# Patient Record
Sex: FEMALE | Race: WHITE | NOT HISPANIC OR LATINO | Employment: OTHER | URBAN - METROPOLITAN AREA
[De-identification: names, ages, dates, MRNs, and addresses within clinical notes are randomized per-mention and may not be internally consistent; named-entity substitution may affect disease eponyms.]

---

## 2007-02-09 LAB
ABS LYMPHOCYTES: 1188 (ref 850–3900)
CD19 CELLS # BLD: 447 /UL (ref 110–660)
CD19: 38 % (ref 6–29)
CD3 ABSOLUTE COUNT: 563 (ref 840–3060)
CD3: 47 % (ref 57–85)

## 2017-01-18 ENCOUNTER — OFFICE VISIT - RIVER FALLS (OUTPATIENT)
Dept: FAMILY MEDICINE | Facility: CLINIC | Age: 42
End: 2017-01-18
Payer: MEDICARE

## 2017-02-28 ENCOUNTER — OFFICE VISIT - RIVER FALLS (OUTPATIENT)
Dept: FAMILY MEDICINE | Facility: CLINIC | Age: 42
End: 2017-02-28
Payer: MEDICARE

## 2017-02-28 ASSESSMENT — MIFFLIN-ST. JEOR: SCORE: 1932.7

## 2017-03-01 ENCOUNTER — OFFICE VISIT - RIVER FALLS (OUTPATIENT)
Dept: FAMILY MEDICINE | Facility: CLINIC | Age: 42
End: 2017-03-01
Payer: MEDICARE

## 2017-03-01 ASSESSMENT — MIFFLIN-ST. JEOR
SCORE: 1932.7
SCORE: 1932.7

## 2017-05-17 ENCOUNTER — OFFICE VISIT - RIVER FALLS (OUTPATIENT)
Dept: FAMILY MEDICINE | Facility: CLINIC | Age: 42
End: 2017-05-17
Payer: MEDICARE

## 2017-05-18 ENCOUNTER — OFFICE VISIT - RIVER FALLS (OUTPATIENT)
Dept: FAMILY MEDICINE | Facility: CLINIC | Age: 42
End: 2017-05-18
Payer: MEDICARE

## 2017-05-18 ENCOUNTER — COMMUNICATION - RIVER FALLS (OUTPATIENT)
Dept: FAMILY MEDICINE | Facility: CLINIC | Age: 42
End: 2017-05-18
Payer: MEDICARE

## 2017-10-17 ENCOUNTER — OFFICE VISIT - RIVER FALLS (OUTPATIENT)
Dept: FAMILY MEDICINE | Facility: CLINIC | Age: 42
End: 2017-10-17
Payer: MEDICARE

## 2018-01-26 ENCOUNTER — OFFICE VISIT - RIVER FALLS (OUTPATIENT)
Dept: FAMILY MEDICINE | Facility: CLINIC | Age: 43
End: 2018-01-26
Payer: MEDICARE

## 2018-01-27 LAB
CREAT SERPL-MCNC: 0.71 MG/DL (ref 0.5–1.1)
GLUCOSE BLD-MCNC: 139 MG/DL (ref 65–99)

## 2018-01-31 ENCOUNTER — OFFICE VISIT - RIVER FALLS (OUTPATIENT)
Dept: FAMILY MEDICINE | Facility: CLINIC | Age: 43
End: 2018-01-31
Payer: MEDICARE

## 2018-02-01 ENCOUNTER — OFFICE VISIT - RIVER FALLS (OUTPATIENT)
Dept: FAMILY MEDICINE | Facility: CLINIC | Age: 43
End: 2018-02-01
Payer: MEDICARE

## 2018-02-01 ASSESSMENT — MIFFLIN-ST. JEOR: SCORE: 1955.38

## 2018-02-02 ENCOUNTER — OFFICE VISIT - RIVER FALLS (OUTPATIENT)
Dept: FAMILY MEDICINE | Facility: CLINIC | Age: 43
End: 2018-02-02
Payer: MEDICARE

## 2018-02-02 ASSESSMENT — MIFFLIN-ST. JEOR: SCORE: 1950.85

## 2018-02-06 ENCOUNTER — OFFICE VISIT - RIVER FALLS (OUTPATIENT)
Dept: FAMILY MEDICINE | Facility: CLINIC | Age: 43
End: 2018-02-06
Payer: MEDICARE

## 2018-02-06 ENCOUNTER — AMBULATORY - RIVER FALLS (OUTPATIENT)
Dept: FAMILY MEDICINE | Facility: CLINIC | Age: 43
End: 2018-02-06
Payer: MEDICARE

## 2018-02-21 ENCOUNTER — OFFICE VISIT - RIVER FALLS (OUTPATIENT)
Dept: FAMILY MEDICINE | Facility: CLINIC | Age: 43
End: 2018-02-21
Payer: MEDICARE

## 2018-02-21 ASSESSMENT — MIFFLIN-ST. JEOR: SCORE: 1976.25

## 2018-02-23 ENCOUNTER — OFFICE VISIT - RIVER FALLS (OUTPATIENT)
Dept: FAMILY MEDICINE | Facility: CLINIC | Age: 43
End: 2018-02-23
Payer: MEDICARE

## 2018-03-01 ENCOUNTER — OFFICE VISIT - RIVER FALLS (OUTPATIENT)
Dept: FAMILY MEDICINE | Facility: CLINIC | Age: 43
End: 2018-03-01
Payer: MEDICARE

## 2018-03-01 ASSESSMENT — MIFFLIN-ST. JEOR: SCORE: 1950.85

## 2018-03-02 ENCOUNTER — OFFICE VISIT - RIVER FALLS (OUTPATIENT)
Dept: FAMILY MEDICINE | Facility: CLINIC | Age: 43
End: 2018-03-02
Payer: MEDICARE

## 2018-04-10 ENCOUNTER — OFFICE VISIT - RIVER FALLS (OUTPATIENT)
Dept: FAMILY MEDICINE | Facility: CLINIC | Age: 43
End: 2018-04-10
Payer: MEDICARE

## 2018-04-10 ASSESSMENT — MIFFLIN-ST. JEOR: SCORE: 1983.51

## 2018-04-17 ENCOUNTER — OFFICE VISIT - RIVER FALLS (OUTPATIENT)
Dept: FAMILY MEDICINE | Facility: CLINIC | Age: 43
End: 2018-04-17
Payer: MEDICARE

## 2018-04-24 ENCOUNTER — OFFICE VISIT - RIVER FALLS (OUTPATIENT)
Dept: FAMILY MEDICINE | Facility: CLINIC | Age: 43
End: 2018-04-24
Payer: MEDICARE

## 2018-05-01 ENCOUNTER — OFFICE VISIT - RIVER FALLS (OUTPATIENT)
Dept: FAMILY MEDICINE | Facility: CLINIC | Age: 43
End: 2018-05-01
Payer: MEDICARE

## 2018-05-01 ASSESSMENT — MIFFLIN-ST. JEOR: SCORE: 1985.32

## 2018-05-22 ENCOUNTER — AMBULATORY - RIVER FALLS (OUTPATIENT)
Dept: FAMILY MEDICINE | Facility: CLINIC | Age: 43
End: 2018-05-22
Payer: MEDICARE

## 2018-10-26 ENCOUNTER — OFFICE VISIT - RIVER FALLS (OUTPATIENT)
Dept: FAMILY MEDICINE | Facility: CLINIC | Age: 43
End: 2018-10-26
Payer: MEDICARE

## 2018-10-26 ASSESSMENT — MIFFLIN-ST. JEOR: SCORE: 1973.53

## 2018-12-12 ENCOUNTER — OFFICE VISIT - RIVER FALLS (OUTPATIENT)
Dept: FAMILY MEDICINE | Facility: CLINIC | Age: 43
End: 2018-12-12
Payer: MEDICARE

## 2018-12-12 ASSESSMENT — MIFFLIN-ST. JEOR: SCORE: 1919.1

## 2018-12-13 LAB
CREAT SERPL-MCNC: 0.55 MG/DL (ref 0.5–1.1)
GLUCOSE BLD-MCNC: 133 MG/DL (ref 65–99)
LDLC SERPL CALC-MCNC: 88 MG/DL

## 2019-01-31 LAB
ALBUMIN SERPL-MCNC: 4 G/DL
ALP SERPL-CCNC: 115 UNIT/L
ALT SERPL W P-5'-P-CCNC: 22 UNIT/L
AST SERPL W P-5'-P-CCNC: 15 UNIT/L
BILIRUB SERPL-MCNC: 0.7 MG/DL
BUN SERPL-MCNC: 8 MG/DL
BUN/CREAT RATIO - HISTORICAL: 11
CALCIUM SERPL-MCNC: 9.8 MEQ/DL
CHLORIDE BLD-SCNC: 105 MEQ/L
CREAT SERPL-MCNC: 0.76 MG/DL
GLUCOSE BLD-MCNC: 138 MG/DL
HGB BLD-MCNC: 12.4 G/DL
PLATELET # BLD AUTO: 323 X10
POTASSIUM BLD-SCNC: 4.1 MEQ/L
PROT SERPL-MCNC: 7 GM/DL
SODIUM SERPL-SCNC: 140 MEQ/L
TSH SERPL DL<=0.005 MIU/L-ACNC: 1.7 MIU/L
WBC # BLD AUTO: 8.6 X10

## 2019-02-05 ENCOUNTER — OFFICE VISIT - RIVER FALLS (OUTPATIENT)
Dept: FAMILY MEDICINE | Facility: CLINIC | Age: 44
End: 2019-02-05
Payer: MEDICARE

## 2019-02-08 ENCOUNTER — OFFICE VISIT - RIVER FALLS (OUTPATIENT)
Dept: FAMILY MEDICINE | Facility: CLINIC | Age: 44
End: 2019-02-08
Payer: MEDICARE

## 2019-03-16 ENCOUNTER — OFFICE VISIT - RIVER FALLS (OUTPATIENT)
Dept: FAMILY MEDICINE | Facility: CLINIC | Age: 44
End: 2019-03-16
Payer: MEDICARE

## 2019-03-16 ENCOUNTER — AMBULATORY - RIVER FALLS (OUTPATIENT)
Dept: FAMILY MEDICINE | Facility: CLINIC | Age: 44
End: 2019-03-16
Payer: MEDICARE

## 2019-03-16 LAB
ALBUMIN UR-MCNC: ABNORMAL G/DL
BILIRUB UR QL STRIP: NEGATIVE
GLUCOSE UR STRIP-MCNC: NEGATIVE MG/DL
HGB UR QL STRIP: ABNORMAL
KETONES UR STRIP-MCNC: ABNORMAL MG/DL
LEUKOCYTE ESTERASE UR QL STRIP: NEGATIVE
NITRATE UR QL: POSITIVE
PH UR STRIP: 5.5 [PH] (ref 5–8)
SP GR UR STRIP: 1.02 (ref 1–1.03)

## 2019-03-17 LAB
ALBUMIN UR-MCNC: ABNORMAL G/DL
APPEARANCE UR: ABNORMAL
BACTERIA #/AREA URNS HPF: ABNORMAL /HPF
BILIRUB UR QL STRIP: NEGATIVE
CAOX CRY #/AREA URNS HPF: ABNORMAL /HPF
COLOR UR AUTO: ABNORMAL
GLUCOSE UR STRIP-MCNC: NEGATIVE MG/DL
HGB UR QL STRIP: ABNORMAL
HYALINE CASTS #/AREA URNS LPF: ABNORMAL /LPF
KETONES UR STRIP-MCNC: ABNORMAL MG/DL
LEUKOCYTE ESTERASE UR QL STRIP: ABNORMAL
NITRATE UR QL: POSITIVE
PH UR STRIP: 5.5 [PH] (ref 5–8)
RBC #/AREA URNS AUTO: ABNORMAL /HPF
SP GR UR STRIP: 1.02 (ref 1–1.03)
SQUAMOUS #/AREA URNS AUTO: ABNORMAL /HPF
WBC #/AREA URNS AUTO: ABNORMAL /HPF

## 2019-03-19 LAB — BACTERIA SPEC CULT: ABNORMAL

## 2019-04-06 ENCOUNTER — AMBULATORY - RIVER FALLS (OUTPATIENT)
Dept: FAMILY MEDICINE | Facility: CLINIC | Age: 44
End: 2019-04-06
Payer: MEDICARE

## 2019-04-10 LAB — BACTERIA SPEC CULT: NORMAL

## 2019-07-09 ENCOUNTER — COMMUNICATION - RIVER FALLS (OUTPATIENT)
Dept: FAMILY MEDICINE | Facility: CLINIC | Age: 44
End: 2019-07-09
Payer: MEDICARE

## 2019-07-23 ENCOUNTER — OFFICE VISIT - RIVER FALLS (OUTPATIENT)
Dept: FAMILY MEDICINE | Facility: CLINIC | Age: 44
End: 2019-07-23
Payer: MEDICARE

## 2019-07-25 ENCOUNTER — OFFICE VISIT - RIVER FALLS (OUTPATIENT)
Dept: FAMILY MEDICINE | Facility: CLINIC | Age: 44
End: 2019-07-25
Payer: MEDICARE

## 2019-07-25 ASSESSMENT — MIFFLIN-ST. JEOR: SCORE: 1822.93

## 2019-08-17 ENCOUNTER — OFFICE VISIT - RIVER FALLS (OUTPATIENT)
Dept: FAMILY MEDICINE | Facility: CLINIC | Age: 44
End: 2019-08-17
Payer: MEDICARE

## 2019-08-17 LAB
ALBUMIN UR-MCNC: NEGATIVE G/DL
BILIRUB UR QL STRIP: NEGATIVE
GLUCOSE UR STRIP-MCNC: NEGATIVE MG/DL
HGB UR QL STRIP: NEGATIVE
KETONES UR STRIP-MCNC: NEGATIVE MG/DL
LEUKOCYTE ESTERASE UR QL STRIP: NEGATIVE
NITRATE UR QL: NEGATIVE
PH UR STRIP: 6 [PH] (ref 5–8)
SP GR UR STRIP: 1.02 (ref 1–1.03)

## 2019-08-17 ASSESSMENT — MIFFLIN-ST. JEOR: SCORE: 1811.14

## 2019-10-13 ENCOUNTER — OFFICE VISIT - RIVER FALLS (OUTPATIENT)
Dept: FAMILY MEDICINE | Facility: CLINIC | Age: 44
End: 2019-10-13
Payer: MEDICARE

## 2020-01-09 LAB
ANION GAP SERPL CALCULATED.3IONS-SCNC: 8 MEQ/L
BUN SERPL-MCNC: 15 MG/DL
BUN/CREAT RATIO - HISTORICAL: 22
CALCIUM SERPL-MCNC: 9.1 MEQ/DL
CHLORIDE BLD-SCNC: 109 MEQ/L
CO2 SERPL-SCNC: 23 MEQ/L
CREAT SERPL-MCNC: 0.68 MG/DL
ERYTHROCYTE [DISTWIDTH] IN BLOOD BY AUTOMATED COUNT: 14.3 %
GLUCOSE BLD-MCNC: 148 MG/DL
HBA1C MFR BLD: 6.9 %
HCT VFR BLD AUTO: 36.6 %
HGB BLD-MCNC: 12.2 G/DL
MCH RBC QN AUTO: 28.3 PG
MCHC RBC AUTO-ENTMCNC: 33.3 GM/DL
MCV RBC AUTO: 85 FL
PLATELET # BLD AUTO: 300 X10
PMV BLD: 10 FL
POTASSIUM BLD-SCNC: 3.9 MEQ/L
RBC # BLD AUTO: 4.31 X10
SODIUM SERPL-SCNC: 140 MEQ/L
VITAMIN D, 1, 25-DIHYDROXY - QUEST: 89.1 NG/ML
WBC # BLD AUTO: 9.3 X10

## 2020-01-16 ENCOUNTER — OFFICE VISIT - RIVER FALLS (OUTPATIENT)
Dept: FAMILY MEDICINE | Facility: CLINIC | Age: 45
End: 2020-01-16
Payer: MEDICARE

## 2020-01-27 ENCOUNTER — OFFICE VISIT - RIVER FALLS (OUTPATIENT)
Dept: FAMILY MEDICINE | Facility: CLINIC | Age: 45
End: 2020-01-27
Payer: MEDICARE

## 2020-01-27 ASSESSMENT — MIFFLIN-ST. JEOR: SCORE: 1805.7

## 2020-01-29 ENCOUNTER — COMMUNICATION - RIVER FALLS (OUTPATIENT)
Dept: FAMILY MEDICINE | Facility: CLINIC | Age: 45
End: 2020-01-29
Payer: MEDICARE

## 2020-01-31 ENCOUNTER — OFFICE VISIT - RIVER FALLS (OUTPATIENT)
Dept: FAMILY MEDICINE | Facility: CLINIC | Age: 45
End: 2020-01-31
Payer: MEDICARE

## 2020-01-31 ASSESSMENT — MIFFLIN-ST. JEOR: SCORE: 1851.06

## 2020-02-04 ENCOUNTER — OFFICE VISIT - RIVER FALLS (OUTPATIENT)
Dept: FAMILY MEDICINE | Facility: CLINIC | Age: 45
End: 2020-02-04
Payer: MEDICARE

## 2020-02-04 ASSESSMENT — MIFFLIN-ST. JEOR: SCORE: 1841.08

## 2020-03-27 ENCOUNTER — OFFICE VISIT - RIVER FALLS (OUTPATIENT)
Dept: FAMILY MEDICINE | Facility: CLINIC | Age: 45
End: 2020-03-27
Payer: MEDICARE

## 2020-03-30 ENCOUNTER — OFFICE VISIT - RIVER FALLS (OUTPATIENT)
Dept: FAMILY MEDICINE | Facility: CLINIC | Age: 45
End: 2020-03-30
Payer: MEDICARE

## 2020-08-17 ENCOUNTER — AMBULATORY - RIVER FALLS (OUTPATIENT)
Dept: FAMILY MEDICINE | Facility: CLINIC | Age: 45
End: 2020-08-17
Payer: MEDICARE

## 2020-08-17 ENCOUNTER — OFFICE VISIT - RIVER FALLS (OUTPATIENT)
Dept: FAMILY MEDICINE | Facility: CLINIC | Age: 45
End: 2020-08-17
Payer: MEDICARE

## 2020-08-19 LAB
ALBUMIN UR-MCNC: NEGATIVE G/DL
APPEARANCE UR: ABNORMAL
BACTERIA #/AREA URNS HPF: ABNORMAL /HPF
BILIRUB UR QL STRIP: NEGATIVE
CAOX CRY #/AREA URNS HPF: ABNORMAL /HPF
COLOR UR AUTO: YELLOW
GLUCOSE UR STRIP-MCNC: ABNORMAL MG/DL
HGB UR QL STRIP: NEGATIVE
HYALINE CASTS #/AREA URNS LPF: ABNORMAL /LPF
KETONES UR STRIP-MCNC: NEGATIVE MG/DL
LEUKOCYTE ESTERASE UR QL STRIP: NEGATIVE
Lab: ABNORMAL
NITRATE UR QL: NEGATIVE
PH UR STRIP: 5.5 [PH] (ref 5–8)
RBC #/AREA URNS AUTO: ABNORMAL /HPF
SP GR UR STRIP: 1.02 (ref 1–1.03)
SQUAMOUS #/AREA URNS AUTO: ABNORMAL /HPF
WBC #/AREA URNS AUTO: ABNORMAL /HPF

## 2020-08-21 LAB — SARS-COV-2 RNA SPEC QL NAA+PROBE: NOT DETECTED

## 2020-10-09 ENCOUNTER — OFFICE VISIT - RIVER FALLS (OUTPATIENT)
Dept: FAMILY MEDICINE | Facility: CLINIC | Age: 45
End: 2020-10-09
Payer: MEDICARE

## 2020-10-09 LAB
ALBUMIN UR-MCNC: NEGATIVE G/DL
BILIRUB UR QL STRIP: NEGATIVE
GLUCOSE UR STRIP-MCNC: NEGATIVE MG/DL
HGB UR QL STRIP: NEGATIVE
KETONES UR STRIP-MCNC: NEGATIVE MG/DL
LEUKOCYTE ESTERASE UR QL STRIP: NEGATIVE
NITRATE UR QL: NEGATIVE
PH UR STRIP: 7 [PH] (ref 5–8)
SP GR UR STRIP: 1.02 (ref 1–1.03)

## 2020-10-09 ASSESSMENT — MIFFLIN-ST. JEOR: SCORE: 1955.38

## 2020-10-10 LAB
CHOLEST SERPL-MCNC: 168 MG/DL
CHOLEST/HDLC SERPL: 3.2 {RATIO}
HBA1C MFR BLD: 7 %
HDLC SERPL-MCNC: 52 MG/DL
LDLC SERPL CALC-MCNC: 92 MG/DL
NONHDLC SERPL-MCNC: 116 MG/DL
TRIGL SERPL-MCNC: 143 MG/DL

## 2020-10-12 ENCOUNTER — COMMUNICATION - RIVER FALLS (OUTPATIENT)
Dept: FAMILY MEDICINE | Facility: CLINIC | Age: 45
End: 2020-10-12
Payer: MEDICARE

## 2021-01-26 ENCOUNTER — AMBULATORY - RIVER FALLS (OUTPATIENT)
Dept: FAMILY MEDICINE | Facility: CLINIC | Age: 46
End: 2021-01-26
Payer: MEDICARE

## 2021-01-26 ENCOUNTER — OFFICE VISIT - RIVER FALLS (OUTPATIENT)
Dept: FAMILY MEDICINE | Facility: CLINIC | Age: 46
End: 2021-01-26
Payer: MEDICARE

## 2021-01-29 LAB — SARS-COV-2 RNA RESP QL NAA+PROBE: NEGATIVE

## 2021-02-08 ENCOUNTER — COMMUNICATION - RIVER FALLS (OUTPATIENT)
Dept: FAMILY MEDICINE | Facility: CLINIC | Age: 46
End: 2021-02-08
Payer: MEDICARE

## 2021-02-10 LAB
ALBUMIN SERPL-MCNC: 3.9 G/DL
ALP SERPL-CCNC: 119 UNIT/L
ALT SERPL W P-5'-P-CCNC: 25 UNIT/L
AST SERPL W P-5'-P-CCNC: 12 UNIT/L
BILIRUB SERPL-MCNC: 0.6 MG/DL
BUN SERPL-MCNC: 12 MG/DL
BUN/CREAT RATIO - HISTORICAL: 18
CALCIUM SERPL-MCNC: 8.8 MEQ/DL
CHLORIDE BLD-SCNC: 105 MEQ/L
CREAT SERPL-MCNC: 0.68 MG/DL
GLUCOSE BLD-MCNC: 164 MG/DL
HCT VFR BLD AUTO: 37.2 %
HGB BLD-MCNC: 12.3 G/DL
PLATELET # BLD AUTO: 256 X10
POTASSIUM BLD-SCNC: 4.3 MEQ/L
PROT SERPL-MCNC: 7.1 GM/DL
RBC # BLD AUTO: 4.28 X10
SODIUM SERPL-SCNC: 139 MEQ/L
WBC # BLD AUTO: 7 X10

## 2021-02-16 ENCOUNTER — OFFICE VISIT - RIVER FALLS (OUTPATIENT)
Dept: FAMILY MEDICINE | Facility: CLINIC | Age: 46
End: 2021-02-16
Payer: MEDICARE

## 2021-02-16 ASSESSMENT — MIFFLIN-ST. JEOR: SCORE: 1973.53

## 2021-03-03 ENCOUNTER — COMMUNICATION - RIVER FALLS (OUTPATIENT)
Dept: FAMILY MEDICINE | Facility: CLINIC | Age: 46
End: 2021-03-03
Payer: MEDICARE

## 2021-03-09 ENCOUNTER — OFFICE VISIT - RIVER FALLS (OUTPATIENT)
Dept: FAMILY MEDICINE | Facility: CLINIC | Age: 46
End: 2021-03-09
Payer: MEDICARE

## 2021-03-18 ENCOUNTER — COMMUNICATION - HEALTHEAST (OUTPATIENT)
Dept: INFECTIOUS DISEASES | Facility: CLINIC | Age: 46
End: 2021-03-18

## 2021-04-07 ENCOUNTER — TELEPHONE (OUTPATIENT)
Facility: CLINIC | Age: 46
End: 2021-04-07

## 2021-04-07 ENCOUNTER — COMMUNICATION - RIVER FALLS (OUTPATIENT)
Dept: FAMILY MEDICINE | Facility: CLINIC | Age: 46
End: 2021-04-07
Payer: MEDICARE

## 2021-04-07 ENCOUNTER — OFFICE VISIT - RIVER FALLS (OUTPATIENT)
Dept: FAMILY MEDICINE | Facility: CLINIC | Age: 46
End: 2021-04-07
Payer: MEDICARE

## 2021-04-07 NOTE — TELEPHONE ENCOUNTER
"3-Hospitalist Huddle Documentation    Acuity/Preferred Bed Type: Observation  Infection Concerns: Possible COVID19 - Unable to get rapid swab at [facility].  Will need to be in precautions and swabbed once here.    Additional Specialist Needed Or Specialist Already Contacted:   Neurology? ID?   Timely Treatments Needed: No  Important things to know/address during hospitalization: sitter not needed and none    44 yo with MS.  Has a cough.  Weak.  Contacted by PMD (Emily Shah).  PCP spoke to Neurologist who stated only rounds at [facility].      Feels as though her \"old symptoms have flared\" which is typical for when she has an infection.  Received Lemtrada, about 1.5 years ago.  According to her Nuerologist, Dr Lai, felt should be able mount infection response.  He also believes it's unlikely acute flare of MS but rather possible infection.      Patient has no fever.  Tmax 98.4.  CXR no infiltrate or consolidation.  No cough since being in clinic.  UA via hat so is felt to be inaccurate.  Unable to straight cath because is felt to be too weak to get up on a table.   Acute on chronic knee pain without associated redness or swelling.  Came in to clinic because she wanted steroid injection.  WBC 7.6 with 77% neutrophils, hgb 12.3, Plt OK.  Also has lymphadenopathy since Oct.  Lymph node biopsy x2 showing reactive process/benign.  \"Spots on lungs\" during recent CT.  Could not get in to see ID.  Symptomatic RUQ hernia without incarceration.  PMD requesting admission for investigation for possible infection, ID evaluation for lymphadenopathy, evaluation of weakness, and so patient can be seen by her primary Neurologist, Dr Lai.      Discussed that this point, patient will qualify only as an observation admission.  Also discussed with patient's neurologist is not the on call neurologist this week and so it is unlikely she would actually see her regular neurologist.      Ultimately, patient did not feel it " would make sense to be transferred so far (from [place]) and less she could see her neurologist.  Thus, they will look for admission elsewhere.    Did not accept patient.  If unable to secure bed elsewhere, would need to call back and evaluate Obs bed availability before being accepted.     [facility]

## 2021-04-08 ENCOUNTER — COMMUNICATION - RIVER FALLS (OUTPATIENT)
Dept: FAMILY MEDICINE | Facility: CLINIC | Age: 46
End: 2021-04-08
Payer: MEDICARE

## 2021-04-09 LAB — BACTERIA SPEC CULT: NORMAL

## 2021-04-14 ENCOUNTER — COMMUNICATION - RIVER FALLS (OUTPATIENT)
Dept: FAMILY MEDICINE | Facility: CLINIC | Age: 46
End: 2021-04-14
Payer: MEDICARE

## 2021-04-14 ENCOUNTER — OFFICE VISIT - RIVER FALLS (OUTPATIENT)
Dept: FAMILY MEDICINE | Facility: CLINIC | Age: 46
End: 2021-04-14
Payer: MEDICARE

## 2021-04-14 ASSESSMENT — MIFFLIN-ST. JEOR: SCORE: 1978.97

## 2021-05-18 ENCOUNTER — OFFICE VISIT - RIVER FALLS (OUTPATIENT)
Dept: FAMILY MEDICINE | Facility: CLINIC | Age: 46
End: 2021-05-18
Payer: MEDICARE

## 2021-05-18 ASSESSMENT — MIFFLIN-ST. JEOR: SCORE: 1955.38

## 2021-05-19 ENCOUNTER — COMMUNICATION - RIVER FALLS (OUTPATIENT)
Dept: FAMILY MEDICINE | Facility: CLINIC | Age: 46
End: 2021-05-19
Payer: MEDICARE

## 2021-05-19 LAB
BASOPHILS # BLD MANUAL: 57 10*3/UL (ref 0–200)
BASOPHILS NFR BLD MANUAL: 0.7 %
BUN SERPL-MCNC: 10 MG/DL (ref 7–25)
BUN/CREAT RATIO - HISTORICAL: ABNORMAL (ref 6–22)
CALCIUM SERPL-MCNC: 9.3 MG/DL (ref 8.6–10.2)
CHLORIDE BLD-SCNC: 106 MMOL/L (ref 98–110)
CO2 SERPL-SCNC: 24 MMOL/L (ref 20–32)
CREAT SERPL-MCNC: 0.67 MG/DL (ref 0.5–1.1)
EGFRCR SERPLBLD CKD-EPI 2021: 105 ML/MIN/1.73M2
EOSINOPHIL # BLD MANUAL: 287 10*3/UL (ref 15–500)
EOSINOPHIL NFR BLD MANUAL: 3.5 %
ERYTHROCYTE [DISTWIDTH] IN BLOOD BY AUTOMATED COUNT: 14.7 % (ref 11–15)
GLUCOSE BLD-MCNC: 118 MG/DL (ref 65–99)
HCT VFR BLD AUTO: 39.3 % (ref 35–45)
HGB BLD-MCNC: 12.8 GM/DL (ref 11.7–15.5)
LYMPHOCYTES # BLD MANUAL: 1222 10*3/UL (ref 850–3900)
LYMPHOCYTES NFR BLD MANUAL: 14.9 %
MCH RBC QN AUTO: 28.4 PG (ref 27–33)
MCHC RBC AUTO-ENTMCNC: 32.6 GM/DL (ref 32–36)
MCV RBC AUTO: 87.1 FL (ref 80–100)
MONOCYTES # BLD MANUAL: 336 10*3/UL (ref 200–950)
MONOCYTES NFR BLD MANUAL: 4.1 %
NEUTROPHILS # BLD MANUAL: 6298 10*3/UL (ref 1500–7800)
NEUTROPHILS NFR BLD MANUAL: 76.8 %
PLATELET # BLD AUTO: 301 10*3/UL (ref 140–400)
PMV BLD: 10.5 FL (ref 7.5–12.5)
POTASSIUM BLD-SCNC: 4.2 MMOL/L (ref 3.5–5.3)
RBC # BLD AUTO: 4.51 10*6/UL (ref 3.8–5.1)
SODIUM SERPL-SCNC: 139 MMOL/L (ref 135–146)
WBC # BLD AUTO: 8.2 10*3/UL (ref 3.8–10.8)

## 2021-06-01 ENCOUNTER — COMMUNICATION - RIVER FALLS (OUTPATIENT)
Dept: FAMILY MEDICINE | Facility: CLINIC | Age: 46
End: 2021-06-01
Payer: MEDICARE

## 2021-06-01 ENCOUNTER — NURSE TRIAGE (OUTPATIENT)
Dept: NURSING | Facility: CLINIC | Age: 46
End: 2021-06-01

## 2021-06-02 NOTE — TELEPHONE ENCOUNTER
Karli calls and says that that she had abdominal hernia surgery last week and her right foot and upper right leg are swollen. Swelling began last night. Pt. Says that she also has pain behind her right knee. Pt. Says that she had surgery at [facility]. Pt. Says that she does not want to go to an ER tonight. Pt. Says that she is going to call [facility] and speak to a Dr. On-call. COVID 19 Nurse Triage Plan/Patient Instructions    Please be aware that novel coronavirus (COVID-19) may be circulating in the community. If you develop symptoms such as fever, cough, or SOB or if you have concerns about the presence of another infection including coronavirus (COVID-19), please contact your health care provider or visit [email or web address].     Disposition/Instructions    ED Visit recommended. Follow protocol based instructions.     Bring Your Own Device:  Please also bring your smart device(s) (smart phones, tablets, laptops) and their charging cables for your personal use and to communicate with your care team during your visit.    Thank you for taking steps to prevent the spread of this virus.  o Limit your contact with others.  o Wear a simple mask to cover your cough.  o Wash your hands well and often.    Resources    [facility]: About COVID-19: [email or web address]    CDC: What to Do If You're Sick: www.cdc.gov/coronavirus/2019-ncov/about/steps-when-sick.html    CDC: Ending Home Isolation: www.cdc.gov/coronavirus/2019-ncov/hcp/disposition-in-home-patients.html     CDC: Caring for Someone: www.cdc.gov/coronavirus/2019-ncov/if-you-are-sick/care-for-someone.html     [facility]: Interim Guidance for Hospital Discharge to Home: www.health.[facility].mn.us/diseases/coronavirus/hcp/hospdischarge.pdf    [facility] clinical trials (COVID-19 research studies): [email or web address].[facility]/umn-clinical-trials     Below are the COVID-19 hotlines at the [facility]  [facility] ([facility]). Interpreters are available.   o For health questions: Call 585-037-5235 or 1-132.891.5958 (7 a.m. to 7 p.m.)  o For questions about schools and childcare: Call 294-493-1951 or 1-840.951.2373 (7 a.m. to 7 p.m.)                   Reason for Disposition    New or worsening leg (calf, thigh) pain    Leg swelling is main symptom    [1] Difficulty breathing with exertion (e.g., walking) AND [2] new onset or worsening    Additional Information    Negative: Sounds like a life-threatening emergency to the triager    Negative: Chest pain    Negative: Difficulty breathing    Negative: Acting confused (e.g., disoriented, slurred speech) or excessively sleepy    Negative: Surgical incision symptoms and questions    Negative: [1] Discomfort (pain, burning or stinging) when passing urine AND [2] male    Negative: [1] Discomfort (pain, burning or stinging) when passing urine AND [2] female    Negative: Constipation    Negative: Looks like a broken bone or dislocated joint (e.g., crooked or deformed)    Negative: Sounds like a life-threatening emergency to the triager    Negative: Followed a leg injury    Negative: Severe difficulty breathing (e.g., struggling for each breath, speaks in single words)    Negative: Looks like a broken bone or dislocated joint (e.g., crooked or deformed)    Negative: Sounds like a life-threatening emergency to the triager    Negative: Chest pain    Negative: Followed a leg injury    Negative: [1] Small area of swelling AND [2] followed an insect bite to the area    Negative: Swelling of one ankle joint    Negative: Swelling of knee is main symptom    Negative: Pregnant    Negative: Postpartum (from 0 to 6 weeks after delivery)    Negative: Difficulty breathing at rest    Negative: Entire foot is cool or blue in comparison to other side    Negative: [1] Can't walk or can barely walk AND [2] new onset    Protocols used: POST-OP SYMPTOMS AND SBMLEJIRC-U-DN, LEG PAIN-A-AH, LEG SWELLING AND  EDEMA-A-AH

## 2021-06-16 ENCOUNTER — OFFICE VISIT - RIVER FALLS (OUTPATIENT)
Dept: FAMILY MEDICINE | Facility: CLINIC | Age: 46
End: 2021-06-16
Payer: MEDICARE

## 2021-06-16 LAB
CREAT SERPL-MCNC: 0.81 MG/DL
HGB BLD-MCNC: 11.6 G/DL
TSH SERPL DL<=0.005 MIU/L-ACNC: 2.08 MIU/L

## 2021-06-16 NOTE — TELEPHONE ENCOUNTER
Date: 3/30/2021 Status: [facility]   Time: 10:30 AM Length: 60   Visit Type: CONSULT [[id number]] Copay: $0.00   Provider: Garrett Montiel MD

## 2021-06-17 NOTE — TELEPHONE ENCOUNTER
Telephone Encounter by Rylee Tam CMA at 3/18/2021 10:28 AM     Author: Rylee Tam CMA Service: -- Author Type: Certified Medical Assistant    Filed: 3/18/2021 10:43 AM Encounter Date: 3/18/2021 Status: Signed    : Rylee Tam CMA (Certified Medical Assistant)       3/17/2021 8:22:15 AM Transmission Record    Referral reviewed by Dr Portillo, next available in clinic visit  Images and labs in care everywhere

## 2021-06-17 NOTE — TELEPHONE ENCOUNTER
Telephone Encounter by Gayathri Maurer at 3/18/2021 11:21 AM     Author: Gayathri Maurer Service: -- Author Type: --    Filed: 3/18/2021 11:31 AM Encounter Date: 3/18/2021 Status: Signed    : Gayathri Maurer          [facility] - , Referring: Dr Orestes Mendosa     3/17/2021 8:22:15 AM Transmission Record     Referral reviewed by Dr Portillo, next available in clinic visit  Images and labs in care everywhere

## 2021-07-13 ENCOUNTER — OFFICE VISIT - RIVER FALLS (OUTPATIENT)
Dept: FAMILY MEDICINE | Facility: CLINIC | Age: 46
End: 2021-07-13
Payer: MEDICARE

## 2021-07-27 ENCOUNTER — AMBULATORY - RIVER FALLS (OUTPATIENT)
Dept: FAMILY MEDICINE | Facility: CLINIC | Age: 46
End: 2021-07-27
Payer: MEDICARE

## 2021-08-24 ENCOUNTER — AMBULATORY - RIVER FALLS (OUTPATIENT)
Dept: FAMILY MEDICINE | Facility: CLINIC | Age: 46
End: 2021-08-24
Payer: MEDICARE

## 2021-08-26 LAB
ALT SERPL W P-5'-P-CCNC: 18 UNIT/L (ref 6–29)
AST SERPL W P-5'-P-CCNC: 11 UNIT/L (ref 10–35)
BASOPHILS # BLD MANUAL: 50 10*3/UL (ref 0–200)
BASOPHILS NFR BLD MANUAL: 0.7 %
EOSINOPHIL # BLD MANUAL: 227 10*3/UL (ref 15–500)
EOSINOPHIL NFR BLD MANUAL: 3.2 %
ERYTHROCYTE [DISTWIDTH] IN BLOOD BY AUTOMATED COUNT: 14.4 % (ref 11–15)
GAMMA INTERFERON BACKGROUND BLD IA-ACNC: 0.02 IU/ML
HBV SURFACE AG SERPL QL IA: NORMAL
HCT VFR BLD AUTO: 38.7 % (ref 35–45)
HCV AB SERPL QL IA: NORMAL
HEP B CORE AB, IGM: NORMAL
HEP C SIGNAL TO CUTOFF(HISTORICAL): 0.01
HGB BLD-MCNC: 12.5 GM/DL (ref 11.7–15.5)
HIV AG/AB  - NOTE: NORMAL
IGG SERPL-MCNC: 911 MG/DL (ref 600–1640)
IGM SERPL-MCNC: 50 MG/DL (ref 50–300)
IMMUNOGLOBULIN A: 111 MG/DL (ref 47–310)
LYMPHOCYTES # BLD MANUAL: 1058 10*3/UL (ref 850–3900)
LYMPHOCYTES NFR BLD MANUAL: 14.9 %
M TB IFN-G BLD-IMP: NEGATIVE
M TB IFN-G CD4+ BCKGRND COR BLD-ACNC: >10 IU/ML
MCH RBC QN AUTO: 28.2 PG (ref 27–33)
MCHC RBC AUTO-ENTMCNC: 32.3 GM/DL (ref 32–36)
MCV RBC AUTO: 87.2 FL (ref 80–100)
MITOGEN IGNF BCKGRD COR BLD-ACNC: 0 IU/ML
MITOGEN IGNF BCKGRD COR BLD-ACNC: 0 IU/ML
MONOCYTES # BLD MANUAL: 312 10*3/UL (ref 200–950)
MONOCYTES NFR BLD MANUAL: 4.4 %
NEUTROPHILS # BLD MANUAL: 5453 10*3/UL (ref 1500–7800)
NEUTROPHILS NFR BLD MANUAL: 76.8 %
PLATELET # BLD AUTO: 272 10*3/UL (ref 140–400)
PMV BLD: 10.3 FL (ref 7.5–12.5)
PROT PATTERN SERPL IFE-IMP: NORMAL
RBC # BLD AUTO: 4.44 10*6/UL (ref 3.8–5.1)
WBC # BLD AUTO: 7.1 10*3/UL (ref 3.8–10.8)

## 2021-09-14 ENCOUNTER — AMBULATORY - RIVER FALLS (OUTPATIENT)
Dept: FAMILY MEDICINE | Facility: CLINIC | Age: 46
End: 2021-09-14
Payer: MEDICARE

## 2021-11-16 ENCOUNTER — COMMUNICATION - RIVER FALLS (OUTPATIENT)
Dept: FAMILY MEDICINE | Facility: CLINIC | Age: 46
End: 2021-11-16
Payer: MEDICARE

## 2021-11-24 ENCOUNTER — OFFICE VISIT - RIVER FALLS (OUTPATIENT)
Dept: FAMILY MEDICINE | Facility: CLINIC | Age: 46
End: 2021-11-24
Payer: MEDICARE

## 2021-11-28 LAB
BUN SERPL-MCNC: 12 MG/DL (ref 7–25)
BUN/CREAT RATIO - HISTORICAL: ABNORMAL (ref 6–22)
CALCIUM SERPL-MCNC: 9.4 MG/DL (ref 8.6–10.2)
CHLORIDE BLD-SCNC: 108 MMOL/L (ref 98–110)
CO2 SERPL-SCNC: 22 MMOL/L (ref 20–32)
CREAT SERPL-MCNC: 0.63 MG/DL (ref 0.5–1.1)
EGFRCR SERPLBLD CKD-EPI 2021: 106 ML/MIN/1.73M2
GLUCOSE BLD-MCNC: 149 MG/DL (ref 65–99)
HBA1C MFR BLD: 6.7 %
LDLC SERPL CALC-MCNC: 83 MG/DL
MICROALBUMIN UR-MCNC: 1.5 MG/DL
POTASSIUM BLD-SCNC: 4.2 MMOL/L (ref 3.5–5.3)
SODIUM SERPL-SCNC: 140 MMOL/L (ref 135–146)

## 2021-11-30 ENCOUNTER — COMMUNICATION - RIVER FALLS (OUTPATIENT)
Dept: FAMILY MEDICINE | Facility: CLINIC | Age: 46
End: 2021-11-30
Payer: MEDICARE

## 2021-12-14 ENCOUNTER — LAB REQUISITION (OUTPATIENT)
Dept: LAB | Facility: CLINIC | Age: 46
End: 2021-12-14
Payer: MEDICARE

## 2021-12-14 ENCOUNTER — AMBULATORY - RIVER FALLS (OUTPATIENT)
Dept: FAMILY MEDICINE | Facility: CLINIC | Age: 46
End: 2021-12-14

## 2021-12-14 ENCOUNTER — OFFICE VISIT - RIVER FALLS (OUTPATIENT)
Dept: FAMILY MEDICINE | Facility: CLINIC | Age: 46
End: 2021-12-14

## 2021-12-14 DIAGNOSIS — U07.1 COVID-19: ICD-10-CM

## 2021-12-14 PROCEDURE — U0005 INFEC AGEN DETEC AMPLI PROBE: HCPCS | Mod: ORL | Performed by: FAMILY MEDICINE

## 2021-12-15 LAB — SARS-COV-2 RNA RESP QL NAA+PROBE: POSITIVE

## 2021-12-16 LAB — SARS-COV-2 RNA RESP QL NAA+PROBE: POSITIVE

## 2022-02-08 ENCOUNTER — AMBULATORY - RIVER FALLS (OUTPATIENT)
Dept: FAMILY MEDICINE | Facility: CLINIC | Age: 47
End: 2022-02-08
Payer: MEDICARE

## 2022-02-11 VITALS — HEIGHT: 65 IN

## 2022-02-12 VITALS
TEMPERATURE: 98.3 F | BODY MASS INDEX: 48.82 KG/M2 | TEMPERATURE: 99.3 F | HEART RATE: 92 BPM | BODY MASS INDEX: 48.59 KG/M2 | SYSTOLIC BLOOD PRESSURE: 134 MMHG | BODY MASS INDEX: 48.65 KG/M2 | HEIGHT: 65 IN | DIASTOLIC BLOOD PRESSURE: 64 MMHG | SYSTOLIC BLOOD PRESSURE: 146 MMHG | DIASTOLIC BLOOD PRESSURE: 78 MMHG | WEIGHT: 291 LBS | TEMPERATURE: 98.8 F | SYSTOLIC BLOOD PRESSURE: 130 MMHG | SYSTOLIC BLOOD PRESSURE: 144 MMHG | DIASTOLIC BLOOD PRESSURE: 84 MMHG | HEART RATE: 105 BPM | DIASTOLIC BLOOD PRESSURE: 80 MMHG | TEMPERATURE: 98.3 F | HEART RATE: 108 BPM | DIASTOLIC BLOOD PRESSURE: 84 MMHG | HEART RATE: 96 BPM | TEMPERATURE: 98.5 F | WEIGHT: 292 LBS | BODY MASS INDEX: 48.48 KG/M2 | HEIGHT: 65 IN | SYSTOLIC BLOOD PRESSURE: 188 MMHG | SYSTOLIC BLOOD PRESSURE: 146 MMHG | SYSTOLIC BLOOD PRESSURE: 132 MMHG | BODY MASS INDEX: 48.09 KG/M2 | BODY MASS INDEX: 47.26 KG/M2 | HEIGHT: 65 IN | WEIGHT: 284 LBS | DIASTOLIC BLOOD PRESSURE: 80 MMHG | TEMPERATURE: 98.1 F | DIASTOLIC BLOOD PRESSURE: 88 MMHG | HEART RATE: 112 BPM | WEIGHT: 292 LBS | HEART RATE: 88 BPM | HEART RATE: 116 BPM | DIASTOLIC BLOOD PRESSURE: 82 MMHG | BODY MASS INDEX: 48.59 KG/M2 | WEIGHT: 291 LBS | WEIGHT: 293 LBS | OXYGEN SATURATION: 96 % | WEIGHT: 289 LBS | SYSTOLIC BLOOD PRESSURE: 144 MMHG | HEIGHT: 65 IN | BODY MASS INDEX: 48.48 KG/M2 | OXYGEN SATURATION: 96 % | TEMPERATURE: 98.2 F | WEIGHT: 292 LBS | HEART RATE: 100 BPM

## 2022-02-12 VITALS
SYSTOLIC BLOOD PRESSURE: 150 MMHG | HEART RATE: 88 BPM | HEIGHT: 65 IN | SYSTOLIC BLOOD PRESSURE: 143 MMHG | BODY MASS INDEX: 47.33 KG/M2 | HEIGHT: 65 IN | HEART RATE: 104 BPM | DIASTOLIC BLOOD PRESSURE: 94 MMHG | TEMPERATURE: 98.7 F | TEMPERATURE: 99 F | HEART RATE: 80 BPM | DIASTOLIC BLOOD PRESSURE: 84 MMHG | SYSTOLIC BLOOD PRESSURE: 150 MMHG | DIASTOLIC BLOOD PRESSURE: 78 MMHG | WEIGHT: 287 LBS | SYSTOLIC BLOOD PRESSURE: 164 MMHG | WEIGHT: 287 LBS | BODY MASS INDEX: 47.82 KG/M2 | BODY MASS INDEX: 47.82 KG/M2 | BODY MASS INDEX: 47.82 KG/M2 | WEIGHT: 287 LBS | DIASTOLIC BLOOD PRESSURE: 86 MMHG | HEIGHT: 65 IN | HEART RATE: 95 BPM | WEIGHT: 284.4 LBS

## 2022-02-12 VITALS
HEIGHT: 65 IN | HEART RATE: 97 BPM | BODY MASS INDEX: 48.82 KG/M2 | OXYGEN SATURATION: 96 % | TEMPERATURE: 98.1 F | BODY MASS INDEX: 48.82 KG/M2 | HEIGHT: 65 IN | BODY MASS INDEX: 49.26 KG/M2 | SYSTOLIC BLOOD PRESSURE: 126 MMHG | BODY MASS INDEX: 49.26 KG/M2 | TEMPERATURE: 98.4 F | OXYGEN SATURATION: 97 % | RESPIRATION RATE: 16 BRPM | DIASTOLIC BLOOD PRESSURE: 74 MMHG | OXYGEN SATURATION: 97 % | HEIGHT: 65 IN | HEART RATE: 81 BPM | WEIGHT: 293 LBS | WEIGHT: 293 LBS | DIASTOLIC BLOOD PRESSURE: 84 MMHG | SYSTOLIC BLOOD PRESSURE: 151 MMHG | SYSTOLIC BLOOD PRESSURE: 124 MMHG | DIASTOLIC BLOOD PRESSURE: 84 MMHG | HEART RATE: 93 BPM | HEIGHT: 65 IN

## 2022-02-12 VITALS
HEART RATE: 93 BPM | WEIGHT: 269 LBS | BODY MASS INDEX: 44.45 KG/M2 | TEMPERATURE: 98.1 F | WEIGHT: 259 LBS | SYSTOLIC BLOOD PRESSURE: 123 MMHG | TEMPERATURE: 99.1 F | WEIGHT: 266.8 LBS | BODY MASS INDEX: 43.15 KG/M2 | HEIGHT: 65 IN | BODY MASS INDEX: 44.82 KG/M2 | HEIGHT: 65 IN | HEART RATE: 80 BPM | DIASTOLIC BLOOD PRESSURE: 76 MMHG | HEIGHT: 65 IN | DIASTOLIC BLOOD PRESSURE: 78 MMHG | SYSTOLIC BLOOD PRESSURE: 132 MMHG

## 2022-02-12 VITALS
BODY MASS INDEX: 48.82 KG/M2 | SYSTOLIC BLOOD PRESSURE: 142 MMHG | DIASTOLIC BLOOD PRESSURE: 66 MMHG | SYSTOLIC BLOOD PRESSURE: 130 MMHG | DIASTOLIC BLOOD PRESSURE: 72 MMHG | HEART RATE: 100 BPM | WEIGHT: 293 LBS | WEIGHT: 292 LBS | DIASTOLIC BLOOD PRESSURE: 94 MMHG | HEART RATE: 90 BPM | SYSTOLIC BLOOD PRESSURE: 138 MMHG | WEIGHT: 293 LBS | BODY MASS INDEX: 48.59 KG/M2 | HEIGHT: 65 IN | HEART RATE: 88 BPM | BODY MASS INDEX: 48.82 KG/M2 | TEMPERATURE: 98.8 F | TEMPERATURE: 98.4 F | HEIGHT: 65 IN

## 2022-02-12 VITALS
HEART RATE: 94 BPM | HEIGHT: 65 IN | WEIGHT: 284 LBS | TEMPERATURE: 98.5 F | OXYGEN SATURATION: 92 % | DIASTOLIC BLOOD PRESSURE: 94 MMHG | SYSTOLIC BLOOD PRESSURE: 152 MMHG | BODY MASS INDEX: 47.32 KG/M2

## 2022-02-12 VITALS
BODY MASS INDEX: 48.65 KG/M2 | HEIGHT: 65 IN | WEIGHT: 288.8 LBS | RESPIRATION RATE: 16 BRPM | BODY MASS INDEX: 48.06 KG/M2 | HEART RATE: 107 BPM | WEIGHT: 292 LBS | SYSTOLIC BLOOD PRESSURE: 126 MMHG | OXYGEN SATURATION: 97 % | HEART RATE: 76 BPM | DIASTOLIC BLOOD PRESSURE: 74 MMHG | SYSTOLIC BLOOD PRESSURE: 140 MMHG | DIASTOLIC BLOOD PRESSURE: 84 MMHG

## 2022-02-12 VITALS
BODY MASS INDEX: 48.65 KG/M2 | HEIGHT: 65 IN | SYSTOLIC BLOOD PRESSURE: 134 MMHG | DIASTOLIC BLOOD PRESSURE: 88 MMHG | WEIGHT: 292 LBS | TEMPERATURE: 98.5 F | HEART RATE: 76 BPM

## 2022-02-12 VITALS
SYSTOLIC BLOOD PRESSURE: 133 MMHG | OXYGEN SATURATION: 95 % | WEIGHT: 282 LBS | HEART RATE: 87 BPM | DIASTOLIC BLOOD PRESSURE: 82 MMHG | BODY MASS INDEX: 46.93 KG/M2 | TEMPERATURE: 97.6 F

## 2022-02-12 VITALS
DIASTOLIC BLOOD PRESSURE: 80 MMHG | TEMPERATURE: 100 F | TEMPERATURE: 98.4 F | HEART RATE: 64 BPM | TEMPERATURE: 98.3 F | BODY MASS INDEX: 43.78 KG/M2 | HEIGHT: 65 IN | OXYGEN SATURATION: 94 % | WEIGHT: 267 LBS | SYSTOLIC BLOOD PRESSURE: 130 MMHG | HEART RATE: 86 BPM | HEIGHT: 65 IN | WEIGHT: 260.2 LBS | DIASTOLIC BLOOD PRESSURE: 76 MMHG | BODY MASS INDEX: 44.43 KG/M2 | HEART RATE: 93 BPM | BODY MASS INDEX: 43.35 KG/M2 | DIASTOLIC BLOOD PRESSURE: 72 MMHG | SYSTOLIC BLOOD PRESSURE: 110 MMHG | SYSTOLIC BLOOD PRESSURE: 140 MMHG | WEIGHT: 262.8 LBS

## 2022-02-12 VITALS
TEMPERATURE: 99.2 F | SYSTOLIC BLOOD PRESSURE: 124 MMHG | DIASTOLIC BLOOD PRESSURE: 79 MMHG | HEART RATE: 131 BPM | BODY MASS INDEX: 46.49 KG/M2 | WEIGHT: 279.4 LBS

## 2022-02-12 VITALS
HEART RATE: 106 BPM | SYSTOLIC BLOOD PRESSURE: 162 MMHG | HEIGHT: 65 IN | TEMPERATURE: 98.8 F | WEIGHT: 293 LBS | OXYGEN SATURATION: 96 % | BODY MASS INDEX: 48.82 KG/M2 | DIASTOLIC BLOOD PRESSURE: 98 MMHG

## 2022-02-12 VITALS — TEMPERATURE: 100.1 F | OXYGEN SATURATION: 95 % | HEART RATE: 110 BPM

## 2022-02-15 NOTE — TELEPHONE ENCOUNTER
---------------------  From: Tati Will CMA   To: Orestes Mendosa MD;     Sent: 4/26/2019 12:27:03 PM CDT  Subject: Zofran refill     Karli called and LM at 949am    C/o vomiting and diarrhea since Wed. Overall not feeling well-feels her body is trying to get back to normal after Lemtrada infusion. She said she used up the rest of her Zofran 4mg she had at home and is hoping for a refill.    LR on 5/17/17 with # 10 tabs/no refills.    OK for refill?---------------------  From: Orestes Mendosa MD   To: Tati Will CMA;     Sent: 4/26/2019 1:05:35 PM CDT  Subject: RE: Zofran refill     yes, though would change to #30 tabs x 3 refills  ** Submitted: **  Order:ondansetron (ondansetron 4 mg oral tablet)  1 tab(s)  PO  q8 hrs  Qty:  30 tab(s)        Refills:  3          Substitutions Allowed     PRN  for nausea and vomiting      Route To Pharmacy - [facility] [id number]    Signed by Tati Will CMA  4/26/2019 1:11:00 [facility] called and let Karli know Zofran sent in to [facility]. Advised if sx continue through the weekend she should f/u with [facility]-she agreed.

## 2022-02-15 NOTE — NURSING NOTE
CAGE Assessment Entered On:  7/24/2019 8:30 AM CDT    Performed On:  7/23/2019 8:30 AM CDT by Freya Mark CMA               Assessment   Have you ever felt you should cut down on your drinking :   No   Have people annoyed you by criticizing your drinking :   No   Have you ever felt bad or guilty about your drinking :   No   Have you ever taken a drink first thing in the morning to steady your nerves or get rid of a hangover (Eye-opener) :   No   CAGE Score :   0    Freya Mark CMA - 7/24/2019 8:30 AM CDT

## 2022-02-15 NOTE — TELEPHONE ENCOUNTER
"---------------------  From: Taty Bojorquez CMA   To: Tati Will CMA;     Sent: 2/22/2019 2:21:32 PM CST  Subject: Need Help with Paperwork     Karli called Tati simmons at 1255pm on 02/22/19.  She was asking for \"help immediately with my disability paperwork and discharging her student loans something was not done correctly and she needs help from Tati and SHANNON.    MYRNA received her message at 2pm, LMTCB on Karli's vm at 220pm.    Taty Bojoqruez CMA.Karli returned a call at 227pm.  She would like to wait for Tati to return to the office as she knows Karli's situation a little clearer.  She would like Tati to call her Monday when she is available.    Taty Bojorquez CMASpoke to Karli-she is applying for disability so her student loans will be written off being she cant work. Long story..    There is a section she needs you to fill out. Can you stop over when you have a min or I can swing by too and you can sign? She is hoping to have this done asap...---------------------  From: Tati Will CMA   To: Orestes Mendosa MD;     Sent: 2/25/2019 1:24:35 PM CST  Subject: Signature neededpaperwork filled out by SHANNON and faxed to # provided w/ fax conf. received. Original mailed to pt. Copy sent back to be scanned in pt's chart.  "

## 2022-02-15 NOTE — PROGRESS NOTES
Patient:   MARGARITA KAISER            MRN: [id number]            FIN: [id number]               Age:   45 years     Sex:  Female     :  1975   Associated Diagnoses:   Pre-op exam; Anxiety; Immunosuppressed status; Lymphadenopathy; Multiple sclerosis   Author:   Orestes Mendosa MD      Preoperative Information   Dr. Cole requested consult for preoperative history and physical for      Chief Complaint   2021 1:33 PM CST    1.  Preop - scheduled for right or left ing lymph node excisional bx   with Dr Cole at [facility]  2.  worsening right lower back pain - had a bad fall 2yrs ago   Patient having increased anxiety regarding uncertainty of status with current workup, complex social and family situation, and worsening back pain in the setting of MS.      Review of Systems   Constitutional:  Negative.    Eye:  Negative.    Ear/Nose/Mouth/Throat:  Negative.    Respiratory:  Negative.    Cardiovascular:  Negative.    Gastrointestinal:  Negative.    Genitourinary:  Negative.    Hematology/Lymphatics:  Negative.    Endocrine:  Negative.    Immunologic:  Negative.    Musculoskeletal:  Negative.    Integumentary:  Negative.    Neurologic:  Negative.    Psychiatric:  Negative.    All other systems reviewed and negative      Health Status   Allergies:    Allergic Reactions (Selected)  Severity Not Documented  Adhesive tape (No reactions were documented)  Sulfa drugs (Rash, vomiting..... and diarrhea)   Problem list:    All Problems  Anemia in chronic illness / SNOMED CT 534206448 / Confirmed  Ataxia / SNOMED CT 58041684 / Confirmed  Chronic low back pain / SNOMED CT 169691517 / Confirmed  Blues / SNOMED CT 646478336 / Confirmed  Fatigue / SNOMED CT 864600976 / Confirmed  History of renal stone / SNOMED CT 8193457773 / Confirmed  Headache, migraine / SNOMED CT 45407942 / Confirmed  Morbid (severe) obesity due to excess calories / SNOMED CT 435532158 / Probable  Multiple sclerosis / SNOMED CT 88887171 / Confirmed  Muscle  weakness / SNOMED CT 04789383 / Confirmed  Neuralgia / SNOMED CT 56313726 / Confirmed  Urinary bladder neurogenic dysfunction / SNOMED CT 1245930160 / Confirmed  Knee osteoarthritis / SNOMED CT 149992817 / Confirmed  Medication management / SNOMED CT 277335508 / Confirmed  Skin sensation disturbance / SNOMED CT 101153006 / Confirmed  Seasonal allergies / SNOMED CT 696762925 / Confirmed  DM (diabetes mellitus), type 2 / SNOMED CT 612479248 / Confirmed  Vitamin D deficiency / SNOMED CT 86313630 / Confirmed  Inactive: Prediabetes / SNOMED CT 4042064253  Resolved: Inpatient stay / SNOMED CT 517218049  Resolved: Inpatient stay / SNOMED CT 730529253  Resolved: Inpatient stay / SNOMED CT 102717564  Resolved: Obstructive sleep apnea syndrome, moderate / SNOMED CT 807263921  Resolved: Pregnancy  Resolved: Pregnancy / SNOMED CT 400612457  Resolved: Pregnancy / SNOMED CT 660836370  Canceled: Headache / SNOMED CT 69939629   Medications:  (Selected)   Prescriptions  Prescribed  Accu-check Guide Test Strips: Accu-check Guide Test Strips, See Instructions, Instructions: Testing 2x daily, Supply, # 200 EA, 11 Refill(s), Type: Maintenance, Pharmacy: [facility] #[id number], Testing 2x daily  Accu-check guide meter: Accu-check guide meter, See Instructions, Instructions: test 2x daily, Supply, # 1 EA, 0 Refill(s), Type: Maintenance, Pharmacy: [facility] #[id number], test 2x daily  B-D PEN NDL MINI 71KI2OC(3/16)PRPL: See Instructions, Instructions: INJECT WITH SAXENDA EVERY DAY, # 100 EA, Type: Soft Stop, Pharmacy: [facility] #[id number], INJECT WITH SAXENDA EVERY DAY  B-D PEN NDL MINI 12EP0DB(3/16)PRPL: See Instructions, Instructions: INJECT WITH SAXENDA EVERY DAY, # 100 unknown unit, Type: Soft Stop, Pharmacy: [facility] #[id number], INJECT WITH SAXENDA EVERY DAY  BD pen needle mini [id number]: BD pen needle mini [id number], See Instructions, Instructions: Use daily with Saxenda as directed, Supply, # 100 EA, 0  Refill(s), Type: Maintenance, Pharmacy: [facility] #[id number], Use daily with Saxenda as directed  Replacement CPAP +8 cm H2o for use daily: Replacement CPAP +8 cm H2o for use daily, See Instructions, Instructions: Heated humidifier x1; Humidifier chamber x1;  Heated tubing x1; Full face mask of choice with headgear  x1; Cushion x 1;  Filters: Disposable x1pk & Reusable x1pk.  Length of Ne...  Victoza 18 mg/3 mL subcutaneous solution: ( 1.8 mg ), Subcutaneous, daily, Instructions: take in am, # 27 mL, 3 Refill(s), Type: Maintenance, Pharmacy: [facility] #[id number], in place of Saxenda, 1.8 mg Subcutaneous daily,Instr:take in am  amLODIPine 5 mg oral tablet: = 1 tab(s), Oral, daily, # 90 tab(s), 0 Refill(s), Type: Maintenance, Pharmacy: [facility] #[id number], due for a visit, 65, in, 02/04/20 13:37:00 CST, Height Measured, 266.8, lb, 02/04/20 13:37:00 CST, Weight Measured  azithromycin 250 mg oral tablet: = 1 packet(s), Oral, once, Instructions: as directed on package labeling, # 6 tab(s), 0 Refill(s), Type: Soft Stop, Pharmacy: [facility] #[id number], 1 packet(s) Oral once,Instr:as directed on package labeling, 65, in, 01/26/21 13:08:00 CST, Heig...  clonazePAM 1 mg oral tablet: = 1 tab(s) ( 1 mg ), Oral, q8 hrs, PRN: for anxiety, # 24 tab(s), 0 Refill(s), Type: Maintenance, Pharmacy: [facility] #[id number], 1 tab(s) Oral q8 hrs,PRN:for anxiety, 65, in, 02/16/21 13:33:00 CST, Height Measured, 296, lb, 02/16/21 13:33:00 C...  contour next microlet lancets: contour next microlet lancets, See Instructions, Instructions: testing 2x/ day, Supply, # 1 box(es), 2 Refill(s), Type: Maintenance, Pharmacy: [facility] #[id number], testing 2x/ day  lidocaine 5% topical ointment: See Instructions, Instructions: APPLY EXTERNALLY TO THE AFFECTED AREA THREE TIMES DAILY AS NEEDED FOR PAIN, # 30 gm, Type: Soft Stop, Pharmacy: [facility] #[id number]  meloxicam 15 mg oral tablet: = 1 tab(s), Oral,  daily, # 90 tab(s), 0 Refill(s), Type: Maintenance, Pharmacy: [facility] #[id number], TAKE 1 TABLET BY MOUTH DAILY, 65, in, 02/04/20 13:37:00 CST, Height Measured, 266.8, lb, 02/04/20 13:37:00 CST, Weight Measured  metFORMIN 500 mg oral tablet, extended release: = 2 tab(s) ( 1,000 mg ), Oral, daily, # 180 tab(s), 3 Refill(s), Type: Maintenance, Pharmacy: [facility], 2 tab(s) Oral daily, 65, in, 01/26/21 13:08:00 CST, Height Measured, 292, lb, 10/09/20 8:46:00 CDT, Weight Measured  methenamine hippurate 1 g oral tablet: = 1 tab(s), Oral, bid, # 180 tab(s), 0 Refill(s), Type: Acute, Pharmacy: [facility] #[id number], TAKE 1 TABLET BY MOUTH TWICE DAILY, 65, in, 02/04/20 13:37:00 CST, Height Measured, 266.8, lb, 02/04/20 13:37:00 CST, Weight Measured  ondansetron 4 mg oral tablet: 1 tab(s), Oral, q8 hrs, PRN: AS NEEDED FOR NAUSEA OR VOMITING, # 30 tab(s), Type: Soft Stop, Pharmacy: [facility] #[id number]  rizatriptan 10 mg oral tablet: See Instructions, Instructions: TAKE 1 TABLET BY MOUTH 1 TIME AS NEEDED FOR MIGRAINE HEADACHE, # 6 tab(s), Type: Soft Stop, Pharmacy: [facility] #[id number]  tiZANidine 4 mg oral tablet: 2 TO 3 TABLETS, Oral, qhs, # 90 tab(s), Type: Soft Stop, Pharmacy: [facility] #[id number]  tiZANidine 4 mg oral tablet: See Instructions, Instructions: TAKE 2 TO 3 TABLETS BY MOUTH EVERY NIGHT AT BEDTIME, # 90 tab(s), Type: Soft Stop, Pharmacy: [facility] #[id number], TAKE 2 TO 3 TABLETS BY MOUTH EVERY NIGHT AT BEDTIME  Documented Medications  Documented  Excedrin: See Instructions, 0 Refill(s), Type: Maintenance  Lemtrada: See Instructions, Instructions: 1x/yr, 0 Refill(s), Type: Maintenance  Vitamin D3 5000 intl units oral tablet: = 3 tab(s) ( 15,000 International Unit ), Oral, daily, 0 Refill(s), Type: Maintenance  acetaminophen 325 mg oral tablet: 1-2 tabs, Oral, q4 hrs, 0 Refill(s), Type: Maintenance  acetaminophen-hydrocodone 325 mg-5 mg oral  tablet: See Instructions, Instructions: 1 tab(s) Oral q4hrs prn pain, 0 Refill(s), Type: Soft Stop  cyclobenzaprine 10 mg oral tablet: = 1 tab(s) ( 10 mg ), Oral, bid, PRN: for spasm, # 30 tab(s), 0 Refill(s), Type: Maintenance  escitalopram 20 mg oral tablet: 0 Refill(s), Type: Soft Stop  morphine 15 mg/8 hr oral tablet, extended release: = 1 tab(s) ( 15 mg ), Oral, q12 hrs, PRN: for pain, 0 Refill(s), Type: Soft Stop  oxyCODONE 5 mg oral tablet: 1-2 tabs, Oral, q4 hrs, PRN: as needed for pain, 0 Refill(s), Type: Soft Stop  valACYclovir 500 mg oral tablet: 1 tab(s) ( 500 mg ), po, bid, 0 Refill(s), Type: Maintenance,    Medications          *denotes recorded medication          BD pen needle mini [id number]: See Instructions, Use daily with Saxenda as directed, 100 EA, 0 Refill(s).          B-D PEN NDL MINI 48TZ2ML(3/16)PRPL: See Instructions, INJECT WITH SAXENDA EVERY DAY, 100 unknown unit.          B-D PEN NDL MINI 92YF4EG(3/16)PRPL: See Instructions, INJECT WITH SAXENDA EVERY DAY, 100 EA.          Replacement CPAP +8 cm H2o for use daily: See Instructions, Heated humidifier x1; Humidifier chamber x1;  Heated tubing x1; Full face mask of choice with headgear  x1; Cushion x 1;  Filters: Disposable x1pk & Reusable x1pk.  Length of Need = 99 Months, 1 EA, 11 Refill(s).          contour next microlet lancets: See Instructions, testing 2x/ day, 1 box(es), 2 Refill(s).          Accu-check guide meter: See Instructions, test 2x daily, 1 EA, 0 Refill(s).          Accu-check Guide Test Strips: See Instructions, Testing 2x daily, 200 EA, 11 Refill(s).          *acetaminophen 325 mg oral tablet: 1-2 tabs, Oral, q4 hrs, 0 Refill(s).          *acetaminophen-hydrocodone 325 mg-5 mg oral tablet: See Instructions, 1 tab(s) Oral q4hrs prn pain, 0 Refill(s).          *Excedrin: See Instructions, 0 Refill(s).          *Lemtrada: See Instructions, 1x/yr, 0 Refill(s).          amLODIPine 5 mg oral tablet: 1 tab(s), Oral, daily, 90  tab(s), 0 Refill(s).          azithromycin 250 mg oral tablet: 1 packet(s), Oral, once, as directed on package labeling, 6 tab(s), 0 Refill(s).          *Vitamin D3 5000 intl units oral tablet: 15,000 International Unit, 3 tab(s), Oral, daily, 0 Refill(s).          clonazePAM 1 mg oral tablet: 1 mg, 1 tab(s), Oral, q8 hrs, PRN: for anxiety, 24 tab(s), 0 Refill(s).          *cyclobenzaprine 10 mg oral tablet: 10 mg, 1 tab(s), Oral, bid, PRN: for spasm, 30 tab(s), 0 Refill(s).          *escitalopram 20 mg oral tablet: 0 Refill(s).          lidocaine 5% topical ointment: See Instructions, APPLY EXTERNALLY TO THE AFFECTED AREA THREE TIMES DAILY AS NEEDED FOR PAIN, 30 gm.          Victoza 18 mg/3 mL subcutaneous solution: 1.8 mg, Subcutaneous, daily, take in am, 27 mL, 3 Refill(s).          meloxicam 15 mg oral tablet: 1 tab(s), Oral, daily, 90 tab(s), 0 Refill(s).          metFORMIN 500 mg oral tablet, extended release: 1,000 mg, 2 tab(s), Oral, daily, 180 tab(s), 3 Refill(s).          methenamine hippurate 1 g oral tablet: 1 tab(s), Oral, bid, 180 tab(s), 0 Refill(s).          *morphine 15 mg/8 hr oral tablet, extended release: 15 mg, 1 tab(s), Oral, q12 hrs, PRN: for pain, 0 Refill(s).          ondansetron 4 mg oral tablet: 1 tab(s), Oral, q8 hrs, PRN: AS NEEDED FOR NAUSEA OR VOMITING, 30 tab(s).          *oxyCODONE 5 mg oral tablet: 1-2 tabs, Oral, q4 hrs, PRN: as needed for pain, 0 Refill(s).          rizatriptan 10 mg oral tablet: See Instructions, TAKE 1 TABLET BY MOUTH 1 TIME AS NEEDED FOR MIGRAINE HEADACHE, 6 tab(s).          tiZANidine 4 mg oral tablet: 2 TO 3 TABLETS, Oral, qhs, 90 tab(s).          tiZANidine 4 mg oral tablet: See Instructions, TAKE 2 TO 3 TABLETS BY MOUTH EVERY NIGHT AT BEDTIME, 90 tab(s).          *valACYclovir 500 mg oral tablet: 500 mg, 1 tab(s), po, bid, 0 Refill(s).          Histories   Past Medical History:    Active  DM (diabetes mellitus), type 2 (743327961): Onset on 1/8/2019 at 43  years.  Multiple sclerosis (71838014): Onset in 2012 at 37 years.  Blues (892602863)  History of renal stone (2502460908)  Urinary bladder neurogenic dysfunction (7940506391)  Fatigue (827675032)  Muscle weakness (66474599)  Vitamin D deficiency (80877372)  Ataxia (83263288)  Headache, migraine (52157549)  Comments:  7/6/2016 CDT 11:19 AM CDT - Katie Hatfield  With aura.  Skin sensation disturbance (359431835)  Seasonal allergies (208678338)  Resolved  Inpatient stay (647731627): Onset on 2/23/2018 at 42 years.  Resolved on 2/25/2018 at 42 years.  Comments:  2/27/2018 CST 7:59 AM La Serrano  @[facility], [place] - Acute pneumonitis; likely related to recent Lemtrada introduction  Obstructive sleep apnea syndrome, moderate (001870223): Onset on 2/21/2018 at 42 years.  Resolved.  Comments:  7/25/2019 CDT 12:23 PM CDT - Erwin Smiley MD  On 2/14/18 AHI 20.2 with RDI 41.2, and oximetry avis 88%. Optimal CPAP titration to 8 cm water.  Inpatient stay (882536482): Onset on 6/14/2016 at 40 years.  Resolved on 6/18/2016 at 40 years.  Comments:  2/27/2018 CST 7:58 AM La Serrano  @[facility], [place] - Complicated UTI  Inpatient stay (961716257): Onset in the month of 5/2016 at 40 years  Resolved.  Comments:  2/27/2018 CST 7:57 AM La Serrano  @[place], [place] - Left renal stone  Pregnancy:  Resolved.  Pregnancy (046999238):  Resolved in 2006 at 30 years.  Pregnancy (456328642):  Resolved in 1994 at 18 years.   Family History:    Melanoma  Father (Shakir)  Thyroid  Sister (Ana Cristina)  Kidney stone  Daughter (Zac)  Diabetes mellitus type II  Father (Shakir)  Hypothyroidism  Sister (Ana Cristina)  Diabetes mellitus type 2  Father (Shakir)  CA - Cancer  Father (Shakir)  Comments:  4/5/2017 11:45 AM CDT - Katie Hatfield  skin  CHF - Congestive heart failure  Father (Shakir)  Migraine  Sister (Ana Cristina)  Daughter (Elda)  Mother (Soledad)  Depression  Daughter (Zac)  Father  (Shakir)  Gallbladder problem  Mother (Soledad)  Bipolar  Father (Shakir)  Miscellaneous  Brother (Adrian)  Comments:  2017 11:43 AM CDT - Katie Hatfield  spinal stenosis  Mother (Soledad)  Comments:  2017 11:43 AM CDT - Katie Hatfield  spinal stenosis  Anxiety  Daughter (Leda)  Daughter (Zac)     Procedure history:    Excision of inguinal lymph nodes (79698666) on 11/10/2020 at 45 Years.  Bone marrow biopsy (636189612) on 10/30/2020 at 45 Years.  Comments:  2020 10:43 AM CST - Ceci Engle  Dx: Lymphadenopathy.  Port-a-cath in place - Right (71107190) on 2016 at 41 Years.  Comments:  2016 8:01 AM CDT - Ceci Engle  Dx:  Multiple sclerosis.  cystoscopy left ureteroscopy with holmium laser left retrogrades stone extraction and left ureteral stent exchange on 6/3/2016 at 40 Years.  Ureteroscopy (8587143181) in the month of 2016 at 40 Years.  Comments:  2016 4:56 PM CDT - La Garcia  with laser/stone extraction (inability to remove all of stone)  Appendectomy (740278176) in  at 39 Years.  Breast reduction (584684142) in  at 26 Years.  Tonsillectomy and adenoidectomy (514074543) in  at 14 Years.  Cholecystectomy (88370266).   section (08286642).  Comments:  2016 11:26 AM CDT - Freya Mark CMA  x1  Tubal ligation (358552031).   Social History:        Electronic Cigarette/Vaping Assessment            Electronic Cigarette Use: Never.      Alcohol Assessment            Current, 1 TIME PER WEEK, 1 drinks/episode maximum.      Tobacco Assessment            Never smoker            Never (less than 100 in lifetime)      Substance Abuse Assessment: Current            vaping THC and cannabis oil      Employment and Education Assessment            Disability      Home and Environment Assessment            2 children.      Nutrition and Health Assessment            Caffeine intake amount: 2 sodas, every day.      Exercise and Physical Activity Assessment: Does not  exercise      Sexual Assessment            Sexually active: No.  Sexual orientation: Heterosexual.  ,        Electronic Cigarette/Vaping Assessment            Electronic Cigarette Use: Never.      Alcohol Assessment            Current, 1 TIME PER WEEK, 1 drinks/episode maximum.      Tobacco Assessment            Never smoker            Never (less than 100 in lifetime)      Substance Abuse Assessment: Current            vaping THC and cannabis oil      Employment and Education Assessment            Disability      Home and Environment Assessment            2 children.      Nutrition and Health Assessment            Caffeine intake amount: 2 sodas, every day.      Exercise and Physical Activity Assessment: Does not exercise      Sexual Assessment            Sexually active: No.  Sexual orientation: Heterosexual.        Physical Examination   Vital Signs   2/16/2021 1:33 PM CST Temperature Tympanic 98.1 DegF    Peripheral Pulse Rate 93 bpm    Systolic Blood Pressure 151 mmHg  HI    Diastolic Blood Pressure 84 mmHg  HI    Mean Arterial Pressure 106 mmHg    Oxygen Saturation 97 %      Measurements from flowsheet : Measurements   2/16/2021 1:33 PM CST Height Measured - Standard 65 in    Height/Length Estimated 65 in    Weight Measured - Standard 296 lb    BSA 2.48 m2    Body Mass Index 49.25 kg/m2  HI      General:  Alert and oriented, No acute distress.    Eye:  Normal conjunctiva.    HENT:  Normocephalic, Tympanic membranes are clear, Oral mucosa is moist, No pharyngeal erythema.    Neck:  Supple, Non-tender, No carotid bruit, No jugular venous distention, No lymphadenopathy, No thyromegaly.    Respiratory:  Breath sounds are equal, Symmetrical chest wall expansion.         Respirations: Are within normal limits.         Pattern: Regular.         Breath sounds: Bilateral, Within normal limits.    Cardiovascular:  Normal rate, Regular rhythm, No murmur, Good pulses equal in all extremities, Normal peripheral perfusion,  No edema.    Gastrointestinal:  Soft, Non-tender, Non-distended.    Musculoskeletal:  Normal range of motion, Normal strength, Normal gait.    Integumentary:  Warm, Dry, Intact, No rash.    Neurologic:  Alert, Oriented.    Psychiatric:  Cooperative, Appropriate mood & affect.       Review / Management   No family history of bleeding tendencies, thrombophilias, or anesthesia complications.  No personal history of bleeding tendencies, thrombophilias, anesthesia complications, or valvular disease.   Results review:  Lab results   2/1/2021 6:59 AM CST Sodium Level  mEq/L    Potassium Level TR 4.3 mEq/L    Chloride  mEq/L    Glucose Level  mg/dL    BUN TR 12 mg/dL    Creatinine TR 0.68 mg/dL    BUN/Creatinine Ratio TR 18    Calcium TR 8.8 mEq/dL    Bili Total TR 0.6 mg/dL    Alk Phos  unit/L    AST TR 12 unit/L    ALT TR 25 unit/L    Protein Total TR 7.1 gm/dL    Albumin Level TR 3.9 g/dL    WBC TR 7 x10^3/uL    RBC TR 4.28 x10^6/uL    Hgb TR 12.3 g/dL    Hct TR 37.2 %    Platelet  x10^3/uL   1/26/2021 2:30 PM CST Coronavirus SARS-CoV-2 (COVID-19) TR Negative   .       Impression and Plan   Diagnosis     Pre-op exam (BJE22-ZG Z01.818).     Immunosuppressed status (OAA33-FT D84.9).     Lymphadenopathy (TAU05-KK R59.1).     Multiple sclerosis (FBC60-DN G35).     Condition:  Stable, Procede with procedure/surgery. ASA Class.    Orders     No SBE prophylaxis or DVT prophylaxis necessary.     Informed patient to avoid aspirin and ibuprofen type products 10 days prior to surgery.     Hold Metformin and victoza on day of procedure..     Diagnosis     Anxiety (ZLF38-TX F41.9).     Course:  Worsening.    Orders     Orders (Selected)   Prescriptions  Prescribed  clonazePAM 1 mg oral tablet: = 1 tab(s) ( 1 mg ), Oral, q8 hrs, PRN: for anxiety, # 24 tab(s), 0 Refill(s), Type: Maintenance, Pharmacy: [facility] #[id number], 1 tab(s) Oral q8 hrs,PRN:for anxiety, 65, in, 02/16/21 13:33:00 CST, Height  Measured, 296, lb, 02/16/21 13:33:00 C....       .) pre-op, planned excisional node LN biopsy on 2/23/2021, [facility] with Dr. Cole  - previous excisional inguinal LN biopsy in fall, 2020: LN with florid lymphoid hyperplasia with granulomatous features - felt to be reactive/infectious process.  - previous bone marrow biopsy in fall, 2020: mildly hypocellular marrow - otherwise unremarkable  - CT C/A/P, 2/1/21: stable, though persistent inguinal and retroperitoneal adenopathy.  RUQ fatty containing ventral wall hernia   - planned COVID testing on 2/18/21    .) RUQ ventral wall hernia - symptomatic  - will need to be further addressed after resolving current lymphoma concerns    .) anxiety - clear situational stressors as well as significant depression/anxiety history  - maintained on escitalopram 20mg daily, clonazepam (provided today for increased recent stressors)  - referral to [facility]    .) MS  - maintained on alemtuzumab - last received in late 2019 prior to pandemic      Advised to return to clinic after resolution of planned biopsy and hematology follow-up    15 minutes spent reviewing documentation prior to visit, on day of visit  5 minutes spent reviewing medications/labs   20 minutes spent completing documentation for visit on day of visit

## 2022-02-15 NOTE — TELEPHONE ENCOUNTER
---------------------  From: Millie Espinoza CMA (Phone Messages Pool ([id number]_[facility]))   To: [facility] Message Pool ([id number]_[place] [facility] [place]);     Sent: 6/17/2021 9:33:40 AM CDT  Subject: phone note- RX     Time: 9:29 am  Note: Patient called stating she has a bladder infection per Neurology and SHANNON was supposed to write her an rx to treat. Still has not heard of rx being sent to [facility]. Please call patient when this has been addressed.     See attached.see other message

## 2022-02-15 NOTE — PROGRESS NOTES
Patient:   MARGARITA KAISER            MRN: [id number]            FIN: [id number]               Age:   41 years     Sex:  Female     :  1975   Associated Diagnoses:   Endometrial hyperplasia   Author:   Myron Girard MD      Visit Information      Date of Service: 2017 10:04 am  Performing Location: [facility]  Encounter#: [id number]      Primary Care Provider (PCP):  [id number] -UNKNOWN,      Referring Provider:  No referring provider recorded for selected visit.      Chief Complaint   3/1/2017 10:16 AM CST    Patient is here per [facility] for pelvic u/s.  Has been having worsening urinary incontinence last 6 months, some AUB.  Also mom has a mass in uterus diagnosed recently.      Interval History   The patient is a 41-year-old female referred by SNOW Wick, for pelvic evaluation after inadequate clinical pelvic examination.  The patient reports that she is quite concerned that she may have pelvic disease because her mother has a tumor in her uterus which is apparently borderline cancerous on D & C/removal of polyp.  The patient has had periods about once a year recently and had one probably within the past few weeks.  She had normal menses and normal fertility prior to her children.  She is borderline diabetic and has MS.  She is also referred for suggestions regarding her bladder which leaks almost any time during the day a little bit and the patient has undergone urodynamics but has not gone back in for results.  She wears a pad all of the time because of her incontinence and does not seem to have stress component but just a bit of leaking even if her bladder is not very full.  She does not have bleeding between these periods which are happening every year.  She is status post LEEP in  and has had normal Pap smears since that time.  She had shoulder dystocia with a normal sized baby and her next baby was delivered by  section.        Review of Systems   Review  of systems  is negative except as documented under interval history.      Health Status   Allergies:    Allergic Reactions (Selected)  Severity Not Documented  Adhesive tape (No reactions were documented)  Sulfa drugs (Rash, vomiting..... and diarrhea)   Medications:  (Selected)   Prescriptions  Prescribed  Lyrica 300 mg oral capsule: 1 cap(s) ( 300 mg ), po, tid, # 90 cap(s), 5 Refill(s), Type: Maintenance, Pharmacy: [facility] [id number], 1 cap(s) po tid  Provera 10 mg oral tablet: 1 tab(s) ( 10 mg ), PO, Daily, Instructions: take for 10 days once per month per provider, # 30 tab(s), 0 Refill(s), Type: Maintenance, Pharmacy: [facility] [id number], 1 tab(s) po daily,Instr:take for 10 days once per month per provider  Walker with large wheels and large sitting seat: Walker with large wheels and large sitting seat, See Instructions, Instructions: use as directed, Supply, # 1 EA, 0 Refill(s), Type: Maintenance  cholecalciferol 5000 intl units oral tablet: 1 tab(s) ( 5,000 International Unit ), po, daily, # 30 tab(s), 5 Refill(s), Type: Maintenance, Pharmacy: [facility] [id number], 1 tab(s) po daily  ergocalciferol 50,000 intl units (1.25 mg) oral capsule: See Instructions, Instructions: 1 cap(s) PO 1x/Wk, # 12 cap(s), 0 Refill(s), Type: Maintenance, Pharmacy: [facility] [id number], 1 cap(s) PO 1x/Wk  methenamine hippurate 1 g oral tablet: See Instructions, Instructions: TAKE ONE TABLET BY MOUTH TWICE DAILY WITH FOOD, # 60 tab(s), Type: Soft Stop, Pharmacy: [facility] [id number], TAKE ONE TABLET BY MOUTH TWICE DAILY WITH FOOD  modafinil 200 mg oral tablet: ( 200 mg ), po, bid, # 60 tab(s), 5 Refill(s), Type: Maintenance, called to pharmacy (Rx)  rizatriptan 10 mg oral tablet: See Instructions, Instructions: TAKE 1 TABLET BY MOUTH 1 TIME AS NEEDED FOR MIGRAINE HEADACHE, # 6 tab(s), Type: Soft Stop, Pharmacy: [id number]  tiZANidine 4 mg oral tablet: See Instructions, Instructions: 2-3 tab(s)  PO qhs, # 90 tab(s), 3 Refill(s), Type: Maintenance, Pharmacy: [id number], 2-3 tab(s) PO qhs  Documented Medications  Documented  Adderall: ( 45 mg ), po, bid, 0 Refill(s), Type: Maintenance  Cymbalta 30 mg oral delayed release capsule: 1 cap(s) ( 30 mg ), po, daily, 0 Refill(s), Type: Maintenance  Cymbalta 60 mg oral delayed release capsule: 1 cap(s) ( 60 mg ), po, daily, 0 Refill(s), Type: Maintenance  Excedrin: See Instructions, 0 Refill(s), Type: Maintenance  LORazepam 0.5 mg oral tablet: po, tid, Instructions: 0.5mg prn and 1-2 hs, PRN: for agitation, 0 Refill(s), Type: Maintenance  LaMICtal 100 mg oral tablet: 1 tab(s) ( 100 mg ), po, daily, 0 Refill(s), Type: Maintenance  Tysabri: iv, q4 wks, 0 Refill(s), Type: Maintenance  Vitamin C: ( 500 mg ), bid, 0 Refill(s), Type: Maintenance  cyclobenzaprine: ( 10 mg ), po, tid, PRN: as needed for muscle spasm, 0 Refill(s), Type: Maintenance  indomethacin 25 mg oral capsule: 1 cap(s) ( 25 mg ), po, q6 hrs, PRN: for gout pain, 0 Refill(s), Type: Maintenance   Problem list:    All Problems  Obesity / SNOMED CT 2730694409 / Probable  Multiple sclerosis / SNOMED CT 27897736 / Confirmed  Blues / SNOMED CT 271060986 / Confirmed  History of renal stone / SNOMED CT 0561804462 / Confirmed  Urinary bladder neurogenic dysfunction / SNOMED CT 1794701353 / Confirmed  Fatigue / SNOMED CT 458497455 / Confirmed  Muscle weakness / SNOMED CT 94962613 / Confirmed  Vitamin D deficiency / SNOMED CT 90290430 / Confirmed  Ataxia / SNOMED CT 55121091 / Confirmed  Headache, migraine / SNOMED CT 58442100 / Confirmed  Skin sensation disturbance / SNOMED CT 951070779 / Confirmed  Anemia in chronic illness / SNOMED CT 490875213 / Confirmed  Resolved: Pregnancy  Resolved: *Hospitalized@[facility] - Left renal stone  Resolved: *Hospitalized@[facility] - Complicated UTI  Resolved: Pregnancy / SNOMED CT 643331655  Canceled: Headache / SNOMED CT 11662948      Histories   Past Medical History:     Active  Multiple sclerosis (15620851): Onset in  at 37 years.  Blues (541446006)  History of renal stone (3785464317)  Urinary bladder neurogenic dysfunction (0728269417)  Fatigue (690880831)  Muscle weakness (13276180)  Vitamin D deficiency (40285644)  Ataxia (93216676)  Headache, migraine (02353552)  Comments:  2016 CDT 11:19 AM CDT - Katie Hatfield  With aura.  Skin sensation disturbance (293941905)  Resolved  *Hospitalized@[facility] - Complicated UTI: Onset on 2016 at 40 years.  Resolved on 2016 at 40 years.  *Hospitalized@[facility] - Left renal stone: Onset in the month of 2016 at 40 years  Resolved.  Pregnancy:  Resolved.  Pregnancy (284230214):  Resolved in  at 30 years.   Family History:    Melanoma  Father (Shakir)  Diabetes mellitus type II  Father (Shakir)  Heart disease  Father (Shakir)  Hypothyroidism  Sister (Ana Cristina)  Diabetes mellitus type 2  Father (Shakir)  Heart failure  Father (Shakir)  Migraine  Mother (Soledad)  Sister (Ana Cristina)  Daughter     Procedure history:    Port-a-cath in place - Right (15271237) on 2016 at 41 Years.  Comments:  2016 8:01 AM - Ceci Engle  Dx:  Multiple sclerosis.  cystoscopy left ureteroscopy with holmium laser left retrogrades stone extraction and left ureteral stent exchange on 6/3/2016 at 40 Years.  Ureteroscopy (6179002560) in the month of 2016 at 40 Years.  Comments:  2016 4:56 PM - La Garcia  with laser/stone extraction (inability to remove all of stone)  Appendectomy (300445458) in  at 39 Years.  Breast reduction (989419354) in  at 26 Years.  Tonsillectomy and adenoidectomy (049297815) in  at 14 Years.  Cholecystectomy (29105609).   section (70110925).  Comments:  2016 11:26 AM - Freya Mark CMA  x1  Tubal ligation (402849032).   Social History:        Alcohol Assessment            Current, 1-2 times per week, 1 drinks/episode maximum.      Tobacco Assessment            Never smoker      Substance Abuse  Assessment            Never      Employment and Education Assessment            Disability      Home and Environment Assessment            2 children.      Nutrition and Health Assessment            Caffeine intake amount: 2 sodas, every day.      Exercise and Physical Activity Assessment: Does not exercise      Sexual Assessment            Sexually active: No.  Sexual orientation: Heterosexual.        Physical Examination   Vital Signs   3/1/2017 10:16 AM CST Peripheral Pulse Rate 104 bpm  HI    HR Method Electronic    Systolic Blood Pressure 164 mmHg  HI    Diastolic Blood Pressure 94 mmHg  HI    Mean Arterial Pressure 117 mmHg    BP Site Right arm    BP Method Electronic      General:  The patient is obese at 287 pounds..    Gynecologic:  Ultrasound is done..       Review / Management   Radiology results   Ultrasound, Vaginal probe pelvic ultrasound is done.  Vaginal probe ultrasound finds a small amount of urine within the bladder.  The uterus is fairly normal size at 8.6 x 6.4 x 5.2 cm.  The endometrium has a few small cysts within and measures 13.7 mm.  There is no fluid within the endometrial cavity.  There is no fluid in the cul-de-sac.  Ovaries are not well seen but no abnormalities are noted.      Impression and Plan   Diagnosis     Endometrial hyperplasia ([id number]-ZD N85.00).     Hypomenorrhea probably related to obesity.  No overflow incontinence most likely but unstable bladder is most likely related to MS..     Plan:  The patient will continue evaluation of her bladder with urology.  She will probably not pursue medications because she takes a lot of medications already and feels like her bladder instability is tolerable.  She was told that the hyperplasia is probably benign at this point but should be followed up.  We plan to treat it with cyclic Provera at least every two to three months.  We will plan to do two cycles of Provera one month apart and then she will come in soon after her period to make  certain her endometrium is thin.  She will come in sooner if she has abnormal bleeding and consider doing endometrial biopsy if she has persistent hyperplastic appearance on ultrasound..    Patient Instructions:       Counseled: Patient, Regarding diagnosis, Regarding treatment, Regarding medications, Verbalized understanding.

## 2022-02-15 NOTE — TELEPHONE ENCOUNTER
---------------------  From: Sue Mack CMA (eRx Pool ([id number]_[facility]))   To: BR Message Pool ([id number]_[place]);     Sent: 12/27/2019 10:01:55 AM CST  Subject: FW: Medication Management   Due Date/Time: 12/27/2019 8:12:00 PM CST       Medication Refill needing approval    PCP:   SHANNON    Medication:   lidocaine topical cream  Last Filled:  5/23/19    Quantity:  30 gm  Refills:  0  CSA on file?   n/a     Date of last office visit and reason:   8/17/19; bronchitis  Date of last labs pertaining to condition:  8/17/19    Return to Clinic order placed?  yes; was due in November    ------------------------------------------  From: [facility] #[id number]  To: Emily Lan MD  Sent: December 26, 2019 8:12:30 PM CST  Subject: Medication Management  Due: December 27, 2019 8:12:30 PM CST    ** On Hold Pending Signature **  Drug: lidocaine topical (lidocaine 5% topical ointment)  APPLY EXTERNALLY TO THE AFFECTED AREA THREE TIMES DAILY AS NEEDED FOR PAIN  Quantity: 30 gm  Days Supply: 15  Refills: 0  Substitutions Allowed  Notes from Pharmacy:     Dispensed Drug: lidocaine topical (lidocaine 5% topical ointment)  APPLY EXTERNALLY TO THE AFFECTED AREA THREE TIMES DAILY AS NEEDED FOR PAIN  Quantity: 30 gm  Days Supply: 15  Refills: 0  Substitutions Allowed  Notes from Pharmacy:   ---------------------------------------------------------------  From: Freya Mark CMA ([facility] Message Pool ([id number]_[facility]))   To: Orestes Mendosa MD;     Sent: 12/27/2019 10:22:49 AM CST  Subject: FW: Medication Management   Due Date/Time: 12/27/2019 8:12:00 PM CST---------------------  From: Orestes Mendosa MD   To: [facility] #[id number]    Sent: 12/30/2019 3:05:37 PM CST  Subject: FW: Medication Management     ** Submitted: **  Complete:lidocaine topical (lidocaine 5% topical ointment)   Signed by Orestes Mendosa MD  12/30/2019 3:05:00 PM    ** Approved **  lidocaine topical (LIDOCAINE 5% TOPICAL OINTMENT 30GM)  APPLY  EXTERNALLY TO THE AFFECTED AREA THREE TIMES DAILY AS NEEDED FOR PAIN  Qty:  30 gm        Days Supply:  15        Refills:  0          Substitutions Allowed     Route To Pharmacy - [facility] #[id number]   Signed by Orestes Mendosa MD

## 2022-02-15 NOTE — CARE COORDINATION
Pt had called re: FRANCOISE results from ED visit on 10/19 as she hadnt heard on her results. Results given to BRM and he had advised Macrobid 100mg BID x 1 week.     Before I called pt back she called requesting to be seen-she did see BRM today and abx changed to Ceftin. Called and LM for [facility] advising to disregard Macrobid rx and go w/ Ceftin.

## 2022-02-15 NOTE — PROGRESS NOTES
Patient:   MARGARITA KAISER            MRN: [id number]            FIN: [id number]               Age:   45 years     Sex:  Female     :  1975   Associated Diagnoses:   UTI symptoms; Fever   Author:   Junior Donis PA-C      Visit Information      Date of Service: 2020 09:01 am  Performing Location: [facility]  Encounter#: [id number]      Primary Care Provider (PCP):  Orestes Mendosa MD    NPI# [id number]      Referring Provider:  Junior Donis PA-C    NPI# [id number]   Visit type:  Video Visit via [employer] or [employer].    Participants in room during visit:  2   Location of patient:  Home  Location of provider:  _ (Clinic office )  Video Start Time:    Video End Time:       Today's visit was conducted via video conference due to the COVID-19 pandemic.  The patient's consent to proceed with a video visit has been obtained and documented.      Chief Complaint   Fever      History of Present Illness   Patient is a 45 year old female who is being evaluated via a billable video visit. Fever since last Thursday. Urinary frequency. One episode of incontinence. Feels fatigued and run down. Usually has UTI when this happens. Exposed to public. No known C-19 exposure. HA and myalgias. No nausea. No loss of sense of taste or smell. [place] now. No flank pain. Has celebration of life service for father on Saturday. Wants to be checked for C-19. MS and DM.       Review of Systems   Constitutional:  Fever, Chills, Weakness, Fatigue.    Eye:  Negative.    Ear/Nose/Mouth/Throat:  Negative.    Respiratory:  Negative.    Cardiovascular:  Negative.    Gastrointestinal:  Negative except as documented in history of present illness.    Genitourinary:  Negative except as documented in history of present illness.    Immunologic:  Negative.    Musculoskeletal:  Negative except as documented in history of present illness.    Integumentary:  Negative.    Neurologic:   Negative.    Psychiatric:  Negative.       Health Status   Allergies:    Allergic Reactions (Selected)  Severity Not Documented  Adhesive tape (No reactions were documented)  Sulfa drugs (Rash, vomiting..... and diarrhea)   Medications:  (Selected)   Prescriptions  Prescribed  Accu-check Guide Test Strips: Accu-check Guide Test Strips, See Instructions, Instructions: Testing 2x daily, Supply, # 200 EA, 11 Refill(s), Type: Maintenance, Pharmacy: [facility] #[id number], Testing 2x daily  Accu-check guide meter: Accu-check guide meter, See Instructions, Instructions: test 2x daily, Supply, # 1 EA, 0 Refill(s), Type: Maintenance, Pharmacy: [facility] #[id number], test 2x daily  B-D PEN NDL MINI 40ST1RV(3/16)PRPL: See Instructions, Instructions: INJECT WITH SAXENDA EVERY DAY, # 100 EA, Type: Soft Stop, Pharmacy: [facility] #[id number], INJECT WITH SAXENDA EVERY DAY  B-D PEN NDL MINI 43JY2RD(3/16)PRPL: See Instructions, Instructions: INJECT WITH SAXENDA EVERY DAY, # 100 unknown unit, Type: Soft Stop, Pharmacy: [facility] #[id number], INJECT WITH SAXENDA EVERY DAY  BD pen needle mini 89Ex6GL: BD pen needle mini 55Ho8OA, See Instructions, Instructions: Use daily with Saxenda as directed, Supply, # 100 EA, 0 Refill(s), Type: Maintenance, Pharmacy: [facility] #[id number], Use daily with Saxenda as directed  Replacement CPAP +8 cm H2o for use daily: Replacement CPAP +8 cm H2o for use daily, See Instructions, Instructions: Heated humidifier x1; Humidifier chamber x1;  Heated tubing x1; Full face mask of choice with headgear  x1; Cushion x 1;  Filters: Disposable x1pk & Reusable x1pk.  Length of Ne...  Victoza 18 mg/3 mL subcutaneous solution: ( 1.8 mg ), Subcutaneous, daily, Instructions: take in am, # 27 mL, 3 Refill(s), Type: Maintenance, Pharmacy: [facility] #[id number], in place of Saxenda, 1.8 mg Subcutaneous daily,Instr:take in am  amLODIPine 5 mg oral tablet: = 1 tab(s), Oral, daily, # 90  tab(s), 0 Refill(s), Type: Maintenance, Pharmacy: [facility] #[id number], due for a visit, 65, in, 02/04/20 13:37:00 CST, Height Measured, 266.8, lb, 02/04/20 13:37:00 CST, Weight Measured  contour next microlet lancets: contour next microlet lancets, See Instructions, Instructions: testing 2x/ day, Supply, # 1 box(es), 2 Refill(s), Type: Maintenance, Pharmacy: [facility] #[id number], testing 2x/ day  lidocaine 5% topical ointment: See Instructions, Instructions: APPLY EXTERNALLY TO THE AFFECTED AREA THREE TIMES DAILY AS NEEDED FOR PAIN, # 30 gm, Type: Soft Stop, Pharmacy: [facility] #[id number]  meloxicam 15 mg oral tablet: 1 tab(s), Oral, daily, # 90 tab(s), Type: Soft Stop, Pharmacy: [facility] #[id number]  metFORMIN 500 mg oral tablet, extended release: = 2 tab(s) ( 1,000 mg ), Oral, daily, # 180 tab(s), 3 Refill(s), Type: Maintenance, Pharmacy: [facility] #[id number], 2 tab(s) Oral daily  methenamine hippurate 1 g oral tablet: = 1 tab(s) ( 1 gm ), Oral, bid, # 180 tab(s), 3 Refill(s), Type: Maintenance, Pharmacy: [id number], 1 tab(s) Oral bid,x90 day(s)  ondansetron 4 mg oral tablet: 1 tab(s), Oral, q8 hrs, PRN: AS NEEDED FOR NAUSEA OR VOMITING, # 30 tab(s), Type: Soft Stop, Pharmacy: [facility] #[id number]  rizatriptan 10 mg oral tablet: See Instructions, Instructions: TAKE 1 TABLET BY MOUTH 1 TIME AS NEEDED FOR MIGRAINE HEADACHE, # 6 tab(s), Type: Soft Stop, Pharmacy: [facility] #[id number]  tiZANidine 4 mg oral tablet: 2 TO 3 TABLETS, Oral, qhs, # 90 tab(s), Type: Soft Stop, Pharmacy: [facility] #[id number]  tiZANidine 4 mg oral tablet: See Instructions, Instructions: TAKE 2 TO 3 TABLETS BY MOUTH EVERY NIGHT AT BEDTIME, # 90 tab(s), Type: Soft Stop, Pharmacy: [facility] #[id number], TAKE 2 TO 3 TABLETS BY MOUTH EVERY NIGHT AT BEDTIME  Documented Medications  Documented  Excedrin: See Instructions, 0 Refill(s), Type: Maintenance  Lemtrada: See  Instructions, Instructions: 1x/yr, 0 Refill(s), Type: Maintenance  Saxenda 18 mg/3 mL subcutaneous solution: ( 1.2 mg ), Subcutaneous, daily, 0 Refill(s), Type: Maintenance  Vitamin D3 5000 intl units oral tablet: = 4 tab(s) ( 20,000 International Unit ), Oral, daily, 0 Refill(s), Type: Maintenance  baclofen 20 mg oral tablet: = 1 tab(s) ( 20 mg ), Oral, hs, 0 Refill(s), Type: Maintenance  cyclobenzaprine 10 mg oral tablet: = 1 tab(s) ( 10 mg ), Oral, tid, PRN: for spasm, # 30 tab(s), 0 Refill(s), Type: Maintenance  escitalopram 10 mg oral tablet: = 1 tab(s) ( 10 mg ), Oral, daily, # 30 tab(s), 0 Refill(s), Type: Maintenance  traZODone: ( 25 mg ), Oral, hs, 0 Refill(s), Type: Maintenance  valACYclovir 500 mg oral tablet: 1 tab(s) ( 500 mg ), po, bid, 0 Refill(s), Type: Maintenance   Problem list:    All Problems  Vitamin D deficiency / SNOMED CT 27140533 / Confirmed  Urinary bladder neurogenic dysfunction / SNOMED CT 6488962894 / Confirmed  Skin sensation disturbance / SNOMED CT 310688879 / Confirmed  Seasonal allergies / SNOMED CT 527842893 / Confirmed  Neuralgia / SNOMED CT 12968765 / Confirmed  Muscle weakness / SNOMED CT 32065449 / Confirmed  Multiple sclerosis / SNOMED CT 02497206 / Confirmed  Morbid (severe) obesity due to excess calories / SNOMED CT 801269373 / Probable  Medication management / SNOMED CT 938280867 / Confirmed  Knee osteoarthritis / SNOMED CT 252912981 / Confirmed  History of renal stone / SNOMED CT 0858423507 / Confirmed  Headache, migraine / SNOMED CT 18266254 / Confirmed  Fatigue / SNOMED CT 557209886 / Confirmed  DM (diabetes mellitus), type 2 / SNOMED CT 125162856 / Confirmed  Chronic low back pain / SNOMED CT 035748428 / Confirmed  Blues / SNOMED CT 626008180 / Confirmed  Ataxia / SNOMED CT 48441665 / Confirmed  Anemia in chronic illness / SNOMED CT 521344089 / Confirmed      Histories   Past Medical History:    Active  DM (diabetes mellitus), type 2 (614514212): Onset on 1/8/2019 at 43  years.  Multiple sclerosis (23769868): Onset in 2012 at 37 years.  Blues (263535870)  History of renal stone (2835493503)  Urinary bladder neurogenic dysfunction (4214345534)  Fatigue (778075167)  Muscle weakness (30258546)  Vitamin D deficiency (04909687)  Ataxia (00917472)  Headache, migraine (38425936)  Comments:  7/6/2016 CDT 11:19 AM CDT - Katie Hatfield  With aura.  Skin sensation disturbance (793863818)  Seasonal allergies (599801909)  Resolved  Inpatient stay (925703151): Onset on 2/23/2018 at 42 years.  Resolved on 2/25/2018 at 42 years.  Comments:  2/27/2018 CST 7:59 AM La Serrano  @[facility] [place], [place] - Acute pneumonitis; likely related to recent Lemtrada introduction  Obstructive sleep apnea syndrome, moderate (602314732): Onset on 2/21/2018 at 42 years.  Resolved.  Comments:  7/25/2019 CDT 12:23 PM CDT - Erwin Smiley MD  On 2/14/18 AHI 20.2 with RDI 41.2, and oximetry avis 88%. Optimal CPAP titration to 8 cm water.  Inpatient stay (515986920): Onset on 6/14/2016 at 40 years.  Resolved on 6/18/2016 at 40 years.  Comments:  2/27/2018 CST 7:58 AM La Serrano  @[facility] [place], [place] - Complicated UTI  Inpatient stay (216128477): Onset in the month of 5/2016 at 40 years  Resolved.  Comments:  2/27/2018 CST 7:57 AM La Serrano  @[place], [place] - Left renal stone  Pregnancy:  Resolved.  Pregnancy (153781617):  Resolved in 2006 at 30 years.  Pregnancy (144673263):  Resolved in 1994 at 18 years.   Family History:    Melanoma  Father (Shakir)  Thyroid  Sister (Ana Cristina)  Kidney stone  Daughter (Zac)  Diabetes mellitus type II  Father (Shakir)  Hypothyroidism  Sister (Ana Cristina)  Diabetes mellitus type 2  Father (Shakir)  CA - Cancer  Father (Shakir)  Comments:  4/5/2017 11:45 AM CDT - Katie Hatfield  skin  CHF - Congestive heart failure  Father (Shakir)  Migraine  Sister (Ana Cristina)  Daughter (Leda)  Mother (Soledad)  Depression  Daughter (Zac)  Father  (Shakir)  Gallbladder problem  Mother (Soledad)  Bipolar  Father (Shakir)  Miscellaneous  Brother (Adrian)  Comments:  2017 11:43 AM CDT - Katie Hatfield  spinal stenosis  Mother (Soledad)  Comments:  2017 11:43 AM CDT - Katie Hatfield  spinal stenosis  Anxiety  Daughter (Leda)  Daughter (Zac)     Procedure history:    Port-a-cath in place - Right (27709717) on 2016 at 41 Years.  Comments:  2016 8:01 AM CDT - Ceci Engle  Dx:  Multiple sclerosis.  cystoscopy left ureteroscopy with holmium laser left retrogrades stone extraction and left ureteral stent exchange on 6/3/2016 at 40 Years.  Ureteroscopy (9435899496) in the month of 2016 at 40 Years.  Comments:  2016 4:56 PM CDT - La Garcia  with laser/stone extraction (inability to remove all of stone)  Appendectomy (912866331) in  at 39 Years.  Breast reduction (411308828) in  at 26 Years.  Tonsillectomy and adenoidectomy (342041224) in  at 14 Years.  Cholecystectomy (70576009).   section (89480177).  Comments:  2016 11:26 AM CDT - Deedee LERMA Freya  x1  Tubal ligation (772744543).   Social History:        Alcohol Assessment            Current, 1 TIME PER WEEK, 1 drinks/episode maximum.      Tobacco Assessment            Never smoker      Substance Abuse Assessment: Current            vaping THC and cannabis oil      Employment and Education Assessment            Disability      Home and Environment Assessment            2 children.      Nutrition and Health Assessment            Caffeine intake amount: 2 sodas, every day.      Exercise and Physical Activity Assessment: Does not exercise      Sexual Assessment            Sexually active: No.  Sexual orientation: Heterosexual.        Physical Examination   General:  Alert and oriented, No acute distress.    Eye:  Pupils are equal, round and reactive to light, Normal conjunctiva.    HENT:  Oral mucosa is moist.    Neck:  Supple.    Respiratory:  Respirations are  non-labored.    Psychiatric:  Cooperative, Appropriate mood & affect, Normal judgment.       Impression and Plan   Diagnosis     UTI symptoms (AWX02-JV R39.9).     Fever (VUF03-ZP R50.9).     Course:  Worsening.    Patient Instructions:       Counseled: Patient, Regarding treatment, Regarding medications, Activity.    Summary:  set up for C-19 testing. Self quarantine. Return/call if symptoms worsening, chest tightness, SOB etc. Tylenol. Fluids. Push fluids. UTI symptoms should clear quickly or she will recheck..    Orders     Orders (Selected)   Outpatient Orders  Ordered (Dispatched)  SARS-CoV-2 RNA (COVID-19), Qualitative NAAT* ([facility]): Specimen Type: Nasopharyngeal Swab, Collection Date: 08/17/20 10:25:00 CDT  Urinalysis, Complete w/Reflex to Culture* ([facility]): Specimen Type: Urine, Collection Date: 08/17/20 10:24:00 CDT  Completed  28970 office outpatient visit 15 minutes (Charge): Quantity: 1, UTI symptoms  Fever  Prescriptions  Prescribed  cefdinir 300 mg oral capsule: = 1 cap(s) ( 300 mg ), PO, q12hr, x 7 day(s), # 14 cap(s), 0 Refill(s), Type: Acute, Pharmacy: [facility] #[id number], 1 cap(s) Oral q12 hrs,x7 day(s), 65, in, 02/04/20 13:37:00 CST, Height Measured, 266.8, lb, 02/04/20 13:37:00 CST, Weight Measured....        Health Maintenance      Recommendations     Pending (in the next year)        OverDue           Tetanus Vaccine due  07/16/18  and every 10  year(s)           Lipid Disorders Screen (Female) due  12/12/19  and every 1  year(s)           DM - HgbA1c due  04/08/20  and every 3  month(s)        Due            Alcohol Misuse Screen (Female) due  07/23/20  and every 1  year(s)           Depression Screen (Female) due  07/23/20  and every 1  year(s)           Cervical Cancer Screen (if sexually active) due  08/17/20  Variable frequency           DM - Communication with Managing Provider due  08/17/20  and every 1  year(s)           DM - Eye Exam due  08/17/20  and every 1  year(s)            DM - Microalbumin due  08/17/20  and every 1  year(s)        Near Due            Influenza Vaccine near due  08/31/20  and every 1  year(s)        Due In Future            Type 2 Diabetes Mellitus Screen (Female) not due until  01/08/21  and every 1  year(s)           Body Mass Index Check (Female) not due until  02/04/21  and every 1  year(s)           High Blood Pressure Screen (Female) not due until  02/04/21  and every 1  year(s)           Obesity Screen and Counseling (Female) not due until  02/04/21  and every 1  year(s)           DM - Foot Exam not due until  06/24/21  and every 1  year(s)     Satisfied (in the past 1 year)        Satisfied            Body Mass Index Check (Female) on  02/04/20.           Body Mass Index Check (Female) on  01/31/20.           Body Mass Index Check (Female) on  01/27/20.           DM - Foot Exam on  06/24/20.           DM - Foot Exam on  05/27/20.           DM - Foot Exam on  04/28/20.           DM - HgbA1c on  01/08/20.           High Blood Pressure Screen (Female) on  02/04/20.           High Blood Pressure Screen (Female) on  01/31/20.           Obesity Screen and Counseling (Female) on  02/04/20.           Obesity Screen and Counseling (Female) on  01/31/20.           Obesity Screen and Counseling (Female) on  01/27/20.           Tobacco Use Screen (Female) on  08/17/20.           Tobacco Use Screen (Female) on  01/31/20.           Type 2 Diabetes Mellitus Screen (Female) on  01/08/20.

## 2022-02-15 NOTE — TELEPHONE ENCOUNTER
---------------------  From: Sue Mack CMA (eRx Pool ([id number]_[facility]))   To: SHANNON Message Pool ([address]_[place]);     Sent: 10/30/2019 8:26:34 AM CDT  Subject: FW: Medication Management   Due Date/Time: 10/30/2019 8:54:00 PM CDT     Medication Refill needing approval    PCP:   SHANNON    Medication:   tessalon   Last Filled:  9/24/19   Quantity:  30  Refills:  0  CSA on file?   no     Medication:   tizanidine  Last Filled:  9/24/19    Quantity:  90  Refills:  0    Medication:   rizatriptan  Last Filled:  7/24/19    Quantity:  6  Refills:  0    Medication:   zofran  Last Filled:  4/26/19    Quantity:  30  Refills:  2    Medication: saxenda  Last Filled:  10/26/18    Quantity:  5mL  Refills:  6       Date of last office visit and reason:   8/17/19; bronchitis  Date of last labs pertaining to condition:  8/17/90    Return to Clinic order placed?  yes; November          ------------------------------------------  From: [facility] #[id number]  To: Orestes Mendosa MD  Sent: October 29, 2019 8:54:44 PM CDT  Subject: Medication Management  Due: October 30, 2019 8:54:44 PM CDT    ** On Hold Pending Signature **  Drug: tiZANidine (tiZANidine 4 mg oral tablet)  2 TO 3 TABLETS ORAL QHS  Quantity: 90 tab(s)  Days Supply: 0  Refills: 0  Substitutions Allowed  Notes from Pharmacy:     Dispensed Drug: tiZANidine (tiZANidine 4 mg oral tablet)  TAKE 2 TO 3 TABLETS BY MOUTH EVERY NIGHT AT BEDTIME  Quantity: 90 tab(s)  Days Supply: 30  Refills: 0  Substitutions Allowed  Notes from Pharmacy:     ** On Hold Pending Signature **  Drug: benzonatate (Tessalon Perles 100 mg oral capsule)  1 CAP(S) ORAL TID,X10 DAY(S),PRN:AS NEEDED FOR COUGH  Quantity: 30 cap(s)  Days Supply: 0  Refills: 0  Substitutions Allowed  Notes from Pharmacy:     Dispensed Drug: benzonatate (benzonatate 100 mg oral capsule)  TAKE 1 CAPSULE BY MOUTH THREE TIMES DAILY FOR 10 DAYS AS NEEDED FOR COUGH  Quantity: 30 cap(s)  Days Supply: 10  Refills:  0  Substitutions Allowed  Notes from Pharmacy:     ** On Hold Pending Signature **  Drug: B-D PEN NDL MINI [id number](3/16)PRPL  USE DAILY WITH SAXENDA AS DIRECTED  Quantity: 100 unknown unit  Days Supply: 0  Refills: 0  Substitutions Allowed  Notes from Pharmacy:     Dispensed Drug: B-D PEN NDL MINI [id number](3/16)PRPL  USE DAILY WITH SAXENDA AS DIRECTED  Quantity: 100 unknown unit  Days Supply: 30  Refills: 0  Substitutions Allowed  Notes from Pharmacy:     ** On Hold Pending Signature **  Drug: rizatriptan (rizatriptan 10 mg oral tablet)  TAKE 1 TABLET BY MOUTH ONCE AS NEEDED FOR MIGRAINE HEADACHE  Quantity: 6 tab(s)  Days Supply: 0  Refills: 0  Substitutions Allowed  Notes from Pharmacy:     Dispensed Drug: rizatriptan (rizatriptan 10 mg oral tablet)  TAKE 1 TABLET BY MOUTH 1 TIME AS NEEDED FOR MIGRAINE HEADACHE  Quantity: 6 tab(s)  Days Supply: 6  Refills: 0  Substitutions Allowed  Notes from Pharmacy:     ** On Hold Pending Signature **  Drug: ondansetron (ondansetron 4 mg oral tablet)  1 TAB(S) ORAL Q8 HRS,PRN:FOR NAUSEA AND VOMITING  Quantity: 30 tab(s)  Days Supply: 0  Refills: 2  Substitutions Allowed  Notes from Pharmacy:     Dispensed Drug: ondansetron (ondansetron 4 mg oral tablet)  TAKE 1 TABLET BY MOUTH EVERY 8 HOURS AS NEEDED FOR NAUSEA OR VOMITING  Quantity: 30 tab(s)  Days Supply: 10  Refills: 0  Substitutions Allowed  Notes from Pharmacy:     ** On Hold Pending Signature **  Drug: liraglutide (Saxenda 18 mg/3 mL subcutaneous solution)  3 MG SUBCUTANEOUS DAILY,INSTR:TAKE 3MG INJECTION Q AM (GOAL DOSE)  Quantity: 15 mL  Days Supply: 0  Refills: 5  Substitutions Allowed  Notes from Pharmacy:     Dispensed Drug: liraglutide (Saxenda 18 mg/3 mL subcutaneous solution)  INJECT 3MG SUBCUTANEOUSLY DAILY EVERY MORNING(GOAL DOSE)  Quantity: 15 unknown unit  Days Supply: 30  Refills: 0  Substitutions Allowed  Notes from Pharmacy:   ---------------------------------------------------------------  From: Deedee  Freya LERMA ([facility] Message Pool ([id number]_[facility]))   To: Orestes Mendosa MD;     Sent: 10/30/2019 10:51:44 AM CDT  Subject: FW: Medication Management   Due Date/Time: 10/30/2019 8:54:00 PM CDT---------------------  From: Orestes Mendosa MD   To: [facility] #[id number]    Sent: 10/30/2019 11:37:09 AM CDT  Subject: FW: Medication Management     ** Submitted: **  Complete:ondansetron (ondansetron 4 mg oral tablet)   Signed by Orestes Mendosa MD  10/30/2019 11:37:00 AM    ** Submitted: **  Complete:rizatriptan (rizatriptan 10 mg oral tablet)   Signed by Orestes Mendosa MD  10/30/2019 11:37:00 AM    ** Submitted: **  Complete:tiZANidine (tiZANidine 4 mg oral tablet)   Signed by Orestes Mendosa MD  10/30/2019 11:37:00 AM    ** Submitted: **  Complete:liraglutide (Saxenda 18 mg/3 mL subcutaneous solution)   Signed by Orestes Mendosa MD  10/30/2019 11:37:00 AM    ** Submitted: **  Complete:benzonatate (benzonatate 100 mg oral capsule)   Signed by Orestes Mendosa MD  10/30/2019 11:37:00 AM    ** Submitted: **  Complete:tiZANidine (tiZANidine 4 mg oral tablet)   Signed by Orestes Mendosa MD  10/30/2019 11:37:00 AM    ** Approved **  tiZANidine (TIZANIDINE 4MG TABLETS)  TAKE 2 TO 3 TABLETS BY MOUTH EVERY NIGHT AT BEDTIME  Qty:  90 tab(s)        Days Supply:  30        Refills:  0          Substitutions Allowed     Route To [facility] #[id number]       ** Approved **  benzonatate (BENZONATATE 100MG CAPSULES)  TAKE 1 CAPSULE BY MOUTH THREE TIMES DAILY FOR 10 DAYS AS NEEDED FOR COUGH  Qty:  30 cap(s)        Days Supply:  10        Refills:  0          Substitutions Allowed     Route To [facility] #[id number]       ** Approved **  rizatriptan (RIZATRIPTAN 10MG TABLETS)  TAKE 1 TABLET BY MOUTH 1 TIME AS NEEDED FOR MIGRAINE HEADACHE  Qty:  6 tab(s)        Days Supply:  6        Refills:  0          Substitutions Allowed     Route To Pharmacy - [facility] #[id number]       ** Approved **  ondansetron (ONDANSETRON 4MG  TABLETS)  TAKE 1 TABLET BY MOUTH EVERY 8 HOURS AS NEEDED FOR NAUSEA OR VOMITING  Qty:  30 tab(s)        Days Supply:  10        Refills:  0          Substitutions Allowed     Route To [facility] #[id number]       ** Approved **  liraglutide (SAXENDA 6MG/ML PEN INJ 3ML)  INJECT 3MG SUBCUTANEOUSLY DAILY EVERY MORNING(GOAL DOSE)  Qty:  15 unknown unit        Days Supply:  30        Refills:  0          Substitutions Allowed     Route To [facility] #[id number]

## 2022-02-15 NOTE — TELEPHONE ENCOUNTER
---------------------From: Tati Will CMA To: Joshua Coles MD;   Sent: 4/10/2019 2:06:07 PM CDTSubject: General Message JERILYN SHANNON OUTPt called CC1. Wanted to know u/c results from last week/wondering if treatment necessary. Does look like u/c came back positive (see attached results). Please advise-allergy to sulfa drugs. 2. She receives Lemtrada infusions for her MS 1x/yr. She had requested magic mouthwash from  her neuro as the Lemtrada causes mouth sores. Neuro advised to ask PCP.---------------------From: Joshua Coles MD To: Tati Will CMA;   Sent: 4/10/2019 2:14:34 PM CDTSubject: RE: General Message cefdinir 300mg bid 7 Karey have not prescribe magic mouth wash** Submitted: **Order:cefdinir (cefdinir 300 mg oral capsule)  1 cap(s)  Oral  bidQty:  14 cap(s)        Duration:  7 day(s)        Refills:  0        Substitutions Allowed     Route To Pharmacy - [id number]  Signed by Tati Will CMA  4/10/2019 2:38:00 PM---------------------From: Tati Will CMA To: Orestes Mendosa MD;   Sent: 4/10/2019 2:43:08 PM CDTSubject: FW: General Message pls advise about magic mouth wash..---------------------From: Orestes Mendosa MD To: Tati Will CMA;   Sent: 4/11/2019 1:16:50 PM CDTSubject: RE: General Message I'm fine with magic mouth wash (nystatin/benadryl/lidocaine 1:1:1 solution, 240cc bottle; use TID prn)** Submitted: **Order:Miscellaneous Rx Supply (Magic Mouth Wash Nystatin/Benadryl/Lidocaine 1:1:1 Solution)  See Instructions  use TID PRNQty:  1 bottle(s)        Refills:  0        Substitutions Allowed     Route To Pharmacy - [id number]  Signed by Tati Will CMA  4/11/2019 1:30:00 PMLM advising magic mouth wash sent in.

## 2022-02-15 NOTE — TELEPHONE ENCOUNTER
---------------------  From: Tim/Jenny DOUGHERTY (Phone Messages Pool ([id number]_[facility]))   To: Tati Will CMA;     Sent: 10/17/2019 1:28:16 PM CDT  Subject: Port flushes     Phone Message    PCP: SHANNON    Patient asking for Tati     Time of Call: 1818    Phone Number: [phone]      Note:     Patient called stating that she needs new orders from [facility] for port flushes to be sent to [facility].    Please Advise.I faxed over updated order for port flushes--PEREZ Calvillo aware.---------------------  From: Tati Will CMA   To: Orestes Mendosa MD;     Sent: 10/17/2019 4:11:27 PM CDT  Subject: FW: Port flushes
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
## 2022-02-15 NOTE — NURSING NOTE
Seen for COVID testing at [facility] per  _ [facility]    Fax Result to: _    O2 Sat = _ 98%  (Children under 12 do not require O2 sat)    Specimen sent to:  _ [place] [facility]    PUI form faxed to _ formerly [facility].

## 2022-02-15 NOTE — TELEPHONE ENCOUNTER
I received a message from Sue at [facility] stating that Medicare requires that the patient get a new sleep study done in order to start CPAP therapy.    A new order for a sleep study was ordered by Dr. Cabello and forwarded to referrals.    I called patient and let her know all of the above and that she can expect a call from our referral department about her in lab study.

## 2022-02-15 NOTE — CARE COORDINATION
Patient:   MARGARITA KAISER            MRN: [id number]            FIN: [id number]               Age:   42 years     Sex:  Female     :  1975   Associated Diagnoses:   None   Author:   Tati iWll CMA      Sources of Information:  [ ] Patient, family member, or caregiver (Please list):  [x ] Hospital discharge summary  [ ] Hospital fax  [ ] List of recent hospitalizations or ED visits  [ ] Other:     Discharged From: [facility]  Discharge Date: 18    Diagnosis/Problem: acute pneumonitis; likely related to recent lemtrada introduction    Medication Changes: [x ] Yes [ ] No   Medication List Updated: [x ] Yes [ ] No    Needs Referral or Lab: [ ] Yes [x ] No    Needs Follow-up Appointment:  [x ] Within 7 days of discharge (highly complex visit)  [ ] Within 14 days of discharge (moderately complex visit)    Appointment Made With: no appt made yet  Date:     x [address] [place] [address] d/c note-scheduled f/u with [facility], [facility] to pt-she is doing OK-tired but no other complaints. Transferred her to schedule an appt with [facility] this week. Also need to look into Saxenda options for coverageappt completed

## 2022-02-15 NOTE — NURSING NOTE
Comprehensive Intake Entered On:  1/31/2020 2:44 PM CST    Performed On:  1/31/2020 2:39 PM CST by Jenny Ballesteros               Summary   Chief Complaint :   f/u sleep study.   Menstrual Status :   Menarcheal   Weight Measured :   269.0 lb(Converted to: 269 lb 0 oz, 122.02 kg)    Height Measured :   65 in(Converted to: 5 ft 5 in, 165.10 cm)    Body Mass Index :   44.76 kg/m2 (HI)    Body Surface Area :   2.36 m2   Systolic Blood Pressure :   123 mmHg   Diastolic Blood Pressure :   78 mmHg   Mean Arterial Pressure :   93 mmHg   Peripheral Pulse Rate :   93 bpm   BP Site :   Right arm   BP Method :   Electronic   HR Method :   Electronic   Temperature Tympanic :   98.1 DegF(Converted to: 36.7 DegC)    Jenny Ballesteros - 1/31/2020 2:39 PM CST   Health Status   Allergies Verified? :   Yes   Medication History Verified? :   Yes   Medical History Verified? :   Yes   Pre-Visit Planning Status :   Completed   Tobacco Use? :   Never smoker   Jenny Ballesteros - 1/31/2020 2:39 PM CST   Consents   Consent for Immunization Exchange :   Consent Granted   Consent for Immunizations to Providers :   Consent Granted   Jenny Ballesteros - 1/31/2020 2:39 PM CST   Meds / Allergies   (As Of: 1/31/2020 2:44:44 PM CST)   Allergies (Active)   adhesive tape  Estimated Onset Date:   Unspecified ; Created By:   Katie Hatfield; Reaction Status:   Active ; Category:   Drug ; Substance:   adhesive tape ; Type:   Allergy ; Updated By:   Katie Hatfield; Reviewed Date:   1/31/2020 2:43 PM CST      sulfa drugs  Estimated Onset Date:   Unspecified ; Reactions:   Rash, Vomiting....., Diarrhea ; Created By:   Michelle Pedersen CMA; Reaction Status:   Active ; Category:   Drug ; Substance:   sulfa drugs ; Type:   Allergy ; Updated By:   Michelle Pedersen CMA; Reviewed Date:   1/31/2020 2:43 PM CST        Medication List   (As Of: 1/31/2020 2:44:44 PM CST)   Prescription/Discharge Order    meloxicam 15 mg oral tablet  :   meloxicam 15 mg oral tablet  ; Status:   Prescribed ; Ordered As Mnemonic:   meloxicam 15 mg oral tablet ; Simple Display Line:   1 tab(s), Oral, daily, 90 tab(s) ; Ordering Provider:   Emily Lan MD; Catalog Code:   meloxicam ; Order Dt/Tm:   1/28/2020 8:21:41 AM CST          Miscellaneous Rx Supply  :   Miscellaneous Rx Supply ; Status:   Prescribed ; Ordered As Mnemonic:   contour next microlet lancets ; Simple Display Line:   See Instructions, testing 2x/ day, 1 box(es), 2 Refill(s) ; Ordering Provider:   Orestes Mendosa MD; Catalog Code:   Miscellaneous Rx Supply ; Order Dt/Tm:   1/27/2020 4:36:08 PM CST          Miscellaneous Rx Supply  :   Miscellaneous Rx Supply ; Status:   Prescribed ; Ordered As Mnemonic:   jaz contour next test strips ; Simple Display Line:   See Instructions, testing 2x/ day, 4 box(es), 2 Refill(s) ; Ordering Provider:   Orestes Mendosa MD; Catalog Code:   Miscellaneous Rx Supply ; Order Dt/Tm:   1/27/2020 4:35:11 PM CST          liraglutide  :   liraglutide ; Status:   Prescribed ; Ordered As Mnemonic:   Victoza 18 mg/3 mL subcutaneous solution ; Simple Display Line:   1.8 mg, Subcutaneous, daily, take in am, 27 mL, 3 Refill(s) ; Ordering Provider:   Orestes Mendosa MD; Catalog Code:   liraglutide ; Order Dt/Tm:   1/27/2020 4:32:35 PM CST          amLODIPine  :   amLODIPine ; Status:   Prescribed ; Ordered As Mnemonic:   amLODIPine 5 mg oral tablet ; Simple Display Line:   1 tab(s), Oral, daily, 30 tab(s), 0 Refill(s) ; Ordering Provider:   rOestes Mendosa MD; Catalog Code:   amLODIPine ; Order Dt/Tm:   1/27/2020 7:54:05 AM CST          tiZANidine 4 mg oral tablet  :   tiZANidine 4 mg oral tablet ; Status:   Prescribed ; Ordered As Mnemonic:   tiZANidine 4 mg oral tablet ; Simple Display Line:   2 TO 3 TABLETS, Oral, qhs, 90 tab(s) ; Ordering Provider:   Orestes Mendosa MD; Catalog Code:   tiZANidine ; Order Dt/Tm:   1/7/2020 7:00:19 AM CST          lidocaine 5% topical ointment  :   lidocaine 5% topical ointment ; Status:    Prescribed ; Ordered As Mnemonic:   lidocaine 5% topical ointment ; Simple Display Line:   See Instructions, APPLY EXTERNALLY TO THE AFFECTED AREA THREE TIMES DAILY AS NEEDED FOR PAIN, 30 gm ; Ordering Provider:   Orestes Mendosa MD; Catalog Code:   lidocaine topical ; Order Dt/Tm:   12/30/2019 3:05:36 PM CST          benzonatate 100 mg oral capsule  :   benzonatate 100 mg oral capsule ; Status:   Prescribed ; Ordered As Mnemonic:   benzonatate 100 mg oral capsule ; Simple Display Line:   1 cap(s), Oral, tid, for 10 day(s), PRN: AS NEEDED FOR COUGH, 30 cap(s) ; Ordering Provider:   Orestes Mendosa MD; Catalog Code:   benzonatate ; Order Dt/Tm:   12/30/2019 3:05:21 PM CST          Misc Prescription  :   Misc Prescription ; Status:   Prescribed ; Ordered As Mnemonic:   B-D PEN NDL MINI 02NK3EV(3/16)PRPL ; Simple Display Line:   See Instructions, INJECT WITH SAXENDA EVERY DAY, 100 EA ; Ordering Provider:   Orestes Mendosa MD; Catalog Code:   Miscellaneous Prescription ; Order Dt/Tm:   12/30/2019 3:05:21 PM CST          ondansetron 4 mg oral tablet  :   ondansetron 4 mg oral tablet ; Status:   Prescribed ; Ordered As Mnemonic:   ondansetron 4 mg oral tablet ; Simple Display Line:   1 tab(s), Oral, q8 hrs, PRN: AS NEEDED FOR NAUSEA OR VOMITING, 30 tab(s) ; Ordering Provider:   Orestes Mendosa MD; Catalog Code:   ondansetron ; Order Dt/Tm:   12/30/2019 3:05:21 PM CST          rizatriptan 10 mg oral tablet  :   rizatriptan 10 mg oral tablet ; Status:   Prescribed ; Ordered As Mnemonic:   rizatriptan 10 mg oral tablet ; Simple Display Line:   See Instructions, TAKE 1 TABLET BY MOUTH 1 TIME AS NEEDED FOR MIGRAINE HEADACHE, 6 tab(s) ; Ordering Provider:   Orestes Mendosa MD; Catalog Code:   rizatriptan ; Order Dt/Tm:   12/30/2019 3:05:21 PM CST          azithromycin  :   azithromycin ; Status:   Prescribed ; Ordered As Mnemonic:   Zithromax 250 mg oral tablet ; Simple Display Line:   1 packet(s), Oral, once, as directed on package labeling, 6  tab(s), 0 Refill(s) ; Ordering Provider:   Emily Lan MD; Catalog Code:   azithromycin ; Order Dt/Tm:   12/18/2019 7:04:03 PM CST          Misc Prescription  :   Misc Prescription ; Status:   Prescribed ; Ordered As Mnemonic:   B-D PEN NDL MINI [id number](3/16)PRPL ; Simple Display Line:   See Instructions, INJECT WITH SAXENDA EVERY DAY, 100 unknown unit ; Ordering Provider:   Orestes Mendosa MD; Catalog Code:   Miscellaneous Prescription ; Order Dt/Tm:   12/2/2019 11:16:59 AM CST          Miscellaneous Prescription  :   Miscellaneous Prescription ; Status:   Prescribed ; Ordered As Mnemonic:   BD pen needle mini [id number] ; Simple Display Line:   See Instructions, Use daily with Saxenda as directed, 100 EA, 0 Refill(s) ; Ordering Provider:   Orestes Mendosa MD; Catalog Code:   Miscellaneous Prescription ; Order Dt/Tm:   10/30/2019 8:12:33 AM CDT          Miscellaneous Rx Supply  :   Miscellaneous Rx Supply ; Status:   Prescribed ; Ordered As Mnemonic:   Replacement CPAP +8 cm H2o for use daily ; Simple Display Line:   See Instructions, Heated humidifier x1; Humidifier chamber x1;  Heated tubing x1; Full face mask of choice with headgear  x1; Cushion x 1;  Filters: Disposable x1pk & Reusable x1pk.  Length of Need = 99 Months, 1 EA, 11 Refill(s) ; Ordering Provider:   Erwin Smiley MD; Catalog Code:   Miscellaneous Rx Supply ; Order Dt/Tm:   7/25/2019 1:49:35 PM CDT          methenamine  :   methenamine ; Status:   Prescribed ; Ordered As Mnemonic:   methenamine hippurate 1 g oral tablet ; Simple Display Line:   1 gm, 1 tab(s), Oral, bid, for 90 day(s), 180 tab(s), 3 Refill(s) ; Ordering Provider:   Orestes Mendosa MD; Catalog Code:   methenamine ; Order Dt/Tm:   7/23/2019 8:18:37 AM CDT          ergocalciferol 50,000 intl units (1.25 mg) oral capsule  :   ergocalciferol 50,000 intl units (1.25 mg) oral capsule ; Status:   Prescribed ; Ordered As Mnemonic:   ergocalciferol 50,000 intl units (1.25 mg) oral capsule ; Simple  Display Line:   See Instructions, TAKE 1 CAPSULE BY MOUTH EVERY WEEK, 5 cap(s) ; Ordering Provider:   Orestes Mendosa MD; Catalog Code:   ergocalciferol ; Order Dt/Tm:   12/13/2018 12:22:08 PM CST          ergocalciferol  :   ergocalciferol ; Status:   Prescribed ; Ordered As Mnemonic:   ergocalciferol 50,000 intl units (1.25 mg) oral capsule ; Simple Display Line:   50,000 International Unit, 1 cap(s), Oral, qweek, for 30 day(s), 5 cap(s), 0 Refill(s) ; Ordering Provider:   Orestes Mendosa MD; Catalog Code:   ergocalciferol ; Order Dt/Tm:   7/17/2018 10:46:19 AM CDT            Home Meds    cyclobenzaprine  :   cyclobenzaprine ; Status:   Documented ; Ordered As Mnemonic:   cyclobenzaprine 10 mg oral tablet ; Simple Display Line:   10 mg, 1 tab(s), Oral, tid, PRN: for spasm, 30 tab(s), 0 Refill(s) ; Catalog Code:   cyclobenzaprine ; Order Dt/Tm:   7/23/2019 8:00:07 AM CDT          alemtuzumab  :   alemtuzumab ; Status:   Documented ; Ordered As Mnemonic:   Lemtrada ; Simple Display Line:   See Instructions, 1x/yr, 0 Refill(s) ; Catalog Code:   alemtuzumab ; Order Dt/Tm:   10/26/2018 3:52:14 PM CDT          clonazePAM  :   clonazePAM ; Status:   Documented ; Ordered As Mnemonic:   clonazePAM 0.5 mg oral tablet ; Simple Display Line:   1 mg, 2 tab(s), Oral, hs, 0 Refill(s) ; Catalog Code:   clonazePAM ; Order Dt/Tm:   10/26/2018 3:49:45 PM CDT          cholecalciferol  :   cholecalciferol ; Status:   Documented ; Ordered As Mnemonic:   cholecalciferol 2000 intl units oral tablet ; Simple Display Line:   2,000 International Unit, 1 tab(s), po, daily, 0 Refill(s) ; Catalog Code:   cholecalciferol ; Order Dt/Tm:   2/26/2018 11:07:57 AM CST          valACYclovir  :   valACYclovir ; Status:   Documented ; Ordered As Mnemonic:   valACYclovir 500 mg oral tablet ; Simple Display Line:   500 mg, 1 tab(s), po, bid, 0 Refill(s) ; Catalog Code:   valACYclovir ; Order Dt/Tm:   2/23/2018 2:39:43 PM CST           acetaminophen/aspirin/caffeine  :   acetaminophen/aspirin/caffeine ; Status:   Documented ; Ordered As Mnemonic:   Excedrin ; Simple Display Line:   See Instructions, 0 Refill(s) ; Catalog Code:   acetaminophen/aspirin/caffeine ; Order Dt/Tm:   9/6/2016 10:16:52 AM CDT

## 2022-02-15 NOTE — TELEPHONE ENCOUNTER
---------------------  From: Marisol Arteaga CMA (Phone Messages Pool ([id number]_[facility]))   To: [employer] Message Pool ([id number]_[place] [facility] [place]);     Sent: 6/1/2021 1:32:55 PM CDT  Subject: Phone Message, pain med. refill     Phone Message    PCP:   [id number]      Time of Call:  1327       Person Calling:  Patient   Phone number:  [phone]    Returned call at: Direct call.    Note:   Patient had a hernia repair surgery last week and used all her pain medication from the surgery center. The surgeon's office told patient to call her primary doctor if further pain medication is needed. She states, the pain is still very bad and she request [facility] send a medication to [facility] . Please advise.    Last office visit and reason:  05/18/21 preop BRM---------------------  From: Freya Mark CMA ([employer] Message Pool ([id number]))   To: Orestes Mendosa MD;     Sent: 6/1/2021 2:53:10 PM CDT  Subject: FW: Phone Message, pain med. refill     per PDMP - LRF Oxycodone 5mg #5 per Dr Cole on 2/23    per Nicola rx'd oxycodone 5mg 1-2 tabs q 4hrs prn pain #20 on 5/24    please advise---------------------  From: Orestes Mendosa MD   To: [employer] Message Pool ([id number]);     Sent: 6/1/2021 3:27:18 PM CDT  Subject: RE: Phone Message, pain med. refill     eRx for oxycodone 5mg tabs, #20 tabs sent to Rx.  Would need to be seen in clinic for any further refillpt contacted and advised  f/u appt with Dr Cole in 1wk

## 2022-02-15 NOTE — TELEPHONE ENCOUNTER
Entered by Freya Mark CMA on May 27, 2020 4:58:46 PM CDT  ---------------------  From: Freya Mark CMA   To: [employer] #[id number]    Sent: 5/27/2020 4:58:46 PM CDT  Subject: Medication Management     ** Submitted: **  Order:amLODIPine (amLODIPine 5 mg oral tablet)  1 tab(s)  Oral  daily  Qty:  90 tab(s)        Refills:  0          Substitutions Allowed     [id number]    Signed by Freya Mark CMA  5/27/2020 9:57:00 PM    ** Submitted: **  Complete:amLODIPine (amLODIPine 5 mg oral tablet)   Signed by Freya Mark CMA  5/27/2020 9:58:00 PM    ** Not Approved:  **  amLODIPine (AMLODIPINE BESYLATE 5MG TABLETS)  TAKE 1 TABLET BY MOUTH DAILY  Qty:  90 tab(s)        Refills:  0          Substitutions Allowed     Details:  90 tab(s), TAKE 1 TABLET BY MOUTH DAILY, [id number]/2020, 4/27/2020, 90, Orestes Mendosa MD      [id number]   Signed by Freya Mark CMA            ------------------------------------------  From: [employer] #[id number]  To: Orestes Mendosa MD  Sent: May 27, 2020 11:02:53 AM CDT  Subject: Medication Management  Due: May 23, 2020 1:34:11 PM CDT     ** On Hold Pending Signature **     Dispensed Drug: amLODIPine (amLODIPine 5 mg oral tablet), TAKE 1 TABLET BY MOUTH DAILY  Quantity: 90 tab(s)  Days Supply: 90  Refills: 0  Substitutions Allowed  Notes from Pharmacy:  ------------------------------------------LM at 1224 for pt to c/b to set up for fasting labs and offered as f/u with [facility]M a phone or video visit

## 2022-02-15 NOTE — PROGRESS NOTES
Patient:   MARGARITA KAISER            MRN: [id number]            FIN: [id number]               Age:   43 years     Sex:  Female     :  1975   Associated Diagnoses:   Cystitis   Author:   Nurys Dowd      Visit Information      Date of Service: 2019 02:50 pm  Performing Location: [facility]  Encounter#: [id number]      Primary Care Provider (PCP):  Orestes Mendosa MD    NPI# [id number]      Referring Provider:  Nurys Dowd    NPI# [id number]      Chief Complaint   3/16/2019 3:17 PM CDT    vomit, diarrhea, no bladder control for 24 hours. fever, frequency x 2 weeks      History of Present Illness   Chief complaint confirmed and reviewed with patient. The patient was seen in the ER 12 days ago for evaluation of vomiting and diarrhea and was given a prescription for Zofran.  The patient has a diagnosis of MS and due to spastic bladder, she has frequent UTI's and diminished control of her bladder. The patient has a phlebotomist come monthly to draw labs and collect a UA for her. She received a call 3-4 days ago from her phlebotomist stating she would like the patient to have her UA repeated due to suspected UTI. The patient's daughter was being evaluate for illness today and the patient would like to have UA repeated today. She endorses feeling unsteady on her feet, urinary frequency and upper left back pain which is typical for her when she has a UTI. She continues to feel nausea and has been taking Zofran for her symptoms. 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 LMP was in 2019, patient requires oral progesterone to get a period.        Review of Systems   Gastrointestinal:  Nausea.    Genitourinary:  Frequency.    Musculoskeletal:       Back pain: On the left side, In the upper region.       Health Status   Allergies:    Allergic Reactions (Selected)  Severity Not Documented  Adhesive tape (No reactions were documented)  Sulfa drugs (Rash, vomiting.....  and diarrhea)   Medications:  (Selected)   Prescriptions  Prescribed  Azithromycin 5 Day Dose Pack 250 mg oral tablet: = 1 tab(s) ( 250 mg ), Oral, daily, Instructions: 2 tabs day one.  1 tab day 2-5   as directed on package labeling, # 6 tab(s), 0 Refill(s), Type: Maintenance, Pharmacy: [facility] [id number], 1 tab(s) Oral daily,Instr:2 tabs day one.  1 tab day...  B-D PEN NDL MINI [id number](3/16)PRPL: See Instructions, Instructions: USE DAILY WITH SAXENDA AS DIRECTED, # 100 unknown unit, Type: Soft Stop, Pharmacy: [facility] [id number], USE DAILY WITH SAXENDA AS DIRECTED  DULoxetine 60 mg oral delayed release capsule: = 2 cap(s) ( 120 mg ), Oral, daily, # 60 cap(s), 0 Refill(s), Type: Maintenance, Pharmacy: [facility] [address], Needs appt for further refills  Macrobid 100 mg oral capsule: = 1 cap(s) ( 100 mg ), Oral, bid, # 14 cap(s), 0 Refill(s), Type: Maintenance, Pharmacy: [facility] [address], 1 cap(s) Oral bid,x7 day(s)  Saxenda 18 mg/3 mL subcutaneous solution: ( 3 mg ), subcutaneous, daily, Instructions: take 3mg injection q am (goal dose), # 15 mL, 6 Refill(s), Type: Maintenance, Pharmacy: [facility] [address], 3 mg Subcutaneous daily,Instr:take 3mg injection q am (goal dose)  amLODIPine 5 mg oral tablet: = 1 tab(s), Oral, daily, # 90 tab(s), 0 Refill(s), Type: Maintenance, Pharmacy: [facility] [address]  benzonatate 100 mg oral capsule: See Instructions, Instructions: TAKE 1 CAPSULE BY MOUTH THREE TIMES DAILY AS NEEDED FOR COUGH, # 30 cap(s), 1 Refill(s), Type: Soft Stop, Pharmacy: [facility] [address], TAKE 1 CAPSULE BY MOUTH THREE TIMES DAILY AS NEEDED FOR COUGH  ergocalciferol 50,000 intl units (1.25 mg) oral capsule: 1 cap(s) ( 50,000 International Unit ), Oral, qweek, # 5 cap(s), 0 Refill(s), Type: Maintenance, Pharmacy: [facility] [address]  ergocalciferol 50,000 intl units (1.25 mg) oral capsule: See Instructions, Instructions: TAKE 1 CAPSULE BY MOUTH  EVERY WEEK, # 5 cap(s), Type: Soft Stop, [facility] [address], TAKE 1 CAPSULE BY MOUTH EVERY WEEK  lidocaine 5% topical ointment: 1 gabby, TOP, TID, PRN: for pain, # 50 gm, 2 Refill(s), Type: Maintenance, Pharmacy: [facility] [place] [address], 1 gabby top tid,PRN:for pain  medroxyPROGESTERone 10 mg oral tablet: See Instructions, Instructions: TAKE 1 TABLET BY MOUTH EVERY DAY FOR 10 DAYS ONCE PER MONTH PER PROVIDER, # 30 tab(s), Type: Soft Stop, [facility] [address], TAKE 1 TABLET BY MOUTH EVERY DAY FOR 10 DAYS ONCE PER MONTH PER PROVIDER  meloxicam 15 mg oral tablet: See Instructions, Instructions: TAKE 1 TABLET BY MOUTH DAILY, # 90 tab(s), Type: Soft Stop, [facility] [address]  methenamine hippurate 1 g oral tablet: See Instructions, Instructions: TAKE ONE TABLET BY MOUTH TWICE DAILY WITH FOOD, # 60 tab(s), Type: Soft Stop, [facility] [address], TAKE ONE TABLET BY MOUTH TWICE DAILY WITH FOOD  modafinil 200 mg oral tablet: See Instructions, Instructions: TAKE 1 TABLET BY MOUTH TWICE DAILY, # 60 tab(s), Type: Soft Stop, [facility], TAKE 1 TABLET BY MOUTH TWICE DAILY  ondansetron 4 mg oral tablet: 1 tab(s) ( 4 mg ), PO, q8 hrs, PRN: for nausea and vomiting, # 10 tab(s), 0 Refill(s), Type: Maintenance, [facility] [address], 1 tab(s) po q8 hrs,PRN:for nausea and vomiting  rizatriptan 10 mg oral tablet: See Instructions, Instructions: TAKE 1 TABLET BY MOUTH ONCE AS NEEDED FOR MIGRAINE HEADACHE, # 6 tab(s), Type: Soft Stop, [facility] [address]  tiZANidine 4 mg oral tablet: 2 TO 3 TABLETS, Oral, qhs, # 90 tab(s), Type: Soft Stop, [facility] [address]  Documented Medications  Documented  Abilify 10 mg oral tablet: = 1 tab(s) ( 10 mg ), Oral, daily, 0 Refill(s), Type: Maintenance  Adderall: ( 45 mg ), po, bid, 0 Refill(s), Type: Maintenance  Excedrin: See Instructions, 0 Refill(s), Type:  Maintenance  LORazepam 0.5 mg oral tablet: po, tid, Instructions: 0.5mg prn and 1-2 hs, PRN: for agitation, 0 Refill(s), Type: Maintenance  Lemtrada: See Instructions, Instructions: 1x/yr, 0 Refill(s), Type: Maintenance  cholecalciferol 2000 intl units oral tablet: 1 tab(s) ( 2,000 International Unit ), po, daily, 0 Refill(s), Type: Maintenance  clonazePAM 0.5 mg oral tablet: = 2 tab(s) ( 1 mg ), Oral, hs, 0 Refill(s), Type: Maintenance  traZODone: Oral, 0 Refill(s), Type: Maintenance  valACYclovir 500 mg oral tablet: 1 tab(s) ( 500 mg ), po, bid, 0 Refill(s), Type: Maintenance   Problem list:    All Problems (Selected)  Anemia in chronic illness / SNOMED CT 488015953 / Confirmed  Ataxia / SNOMED CT 78294950 / Confirmed  Chronic low back pain / SNOMED CT 159747878 / Confirmed  Blues / SNOMED CT 843617577 / Confirmed  Fatigue / SNOMED CT 061647204 / Confirmed  History of renal stone / SNOMED CT 1529767104 / Confirmed  Headache, migraine / SNOMED CT 76966311 / Confirmed  Morbid (severe) obesity due to excess calories / SNOMED CT 095096241 / Probable  Multiple sclerosis / SNOMED CT 22928138 / Confirmed  Muscle weakness / SNOMED CT 37840288 / Confirmed  Neuralgia / SNOMED CT 31992580 / Confirmed  Urinary bladder neurogenic dysfunction / SNOMED CT 2482310082 / Confirmed  Knee osteoarthritis / SNOMED CT 313698559 / Confirmed  Prediabetes / SNOMED CT 8171241617 / Confirmed  Skin sensation disturbance / SNOMED CT 274886899 / Confirmed  Seasonal allergies / SNOMED CT 388800040 / Confirmed  Vitamin D deficiency / SNOMED CT 53075124 / Confirmed      Histories   Past Medical History:    Active  Multiple sclerosis (94778349): Onset in 2012 at 37 years.  Blues (609031307)  History of renal stone (9857329045)  Urinary bladder neurogenic dysfunction (5841436397)  Fatigue (525362862)  Muscle weakness (53704206)  Vitamin D deficiency (42632127)  Ataxia (50690191)  Headache, migraine (12598918)  Comments:  7/6/2016 CDT 11:19 AM CDT -  Katie Hatfield  With aura.  Skin sensation disturbance (523320812)  Seasonal allergies (151283926)  Resolved  Inpatient stay (396217814): Onset on 2/23/2018 at 42 years.  Resolved on 2/25/2018 at 42 years.  Comments:  2/27/2018 CST 7:59 AM La Serrano  @[email or web address] [place] [facility] [place] - Acute pneumonitis; likely related to recent Lemtrada introduction  Inpatient stay (722122756): Onset on 6/14/2016 at 40 years.  Resolved on 6/18/2016 at 40 years.  Comments:  2/27/2018 CST 7:58 AM La Serrano  @[email or web address] [place] [facility] [place] - Complicated UTI  Inpatient stay (197101524): Onset in the month of 5/2016 at 40 years  Resolved.  Comments:  2/27/2018 CST 7:57 AM La Serrano  @[facility], [place] - Left renal stone  Pregnancy:  Resolved.  Pregnancy (003233000):  Resolved in 2006 at 30 years.  Pregnancy (707110151):  Resolved in 1994 at 18 years.   Family History:    Melanoma  Father (Shakir)  Thyroid  Sister (Ana Cristina)  Kidney stone  Daughter (Zac)  Diabetes mellitus type II  Father (Shakir)  Hypothyroidism  Sister (Ana Cristina)  Diabetes mellitus type 2  Father (Shakir)  CA - Cancer  Father (Shakir)  Comments:  4/5/2017 11:45 AM CDT - Katie Hatfield  skin  CHF - Congestive heart failure  Father (Shakir)  Migraine  Sister (Ana Cristina)  Daughter (Leda)  Mother (Soledad)  Depression  Daughter (Zac)  Father (Shakir)  Gallbladder problem  Mother (Soledad)  Bipolar  Father (Shakir)  Miscellaneous  Brother (Adrian)  Comments:  4/5/2017 11:43 AM CDT - Katie Hatfield  spinal stenosis  Mother (Soledad)  Comments:  4/5/2017 11:43 AM CDT - Katie Hatfield  spinal stenosis  Anxiety  Daughter (Leda)  Daughter (Zac)     Procedure history:    Port-a-cath in place - Right (52707916) on 8/11/2016 at 41 Years.  Comments:  8/18/2016 8:01 AM CDT - Ceci Engle  Dx:  Multiple sclerosis.  cystoscopy left ureteroscopy with holmium laser left retrogrades stone extraction and left ureteral stent exchange on 6/3/2016 at 40  Years.  Ureteroscopy (6341469116) in the month of 2016 at 40 Years.  Comments:  2016 4:56 PM CDT - La Garcia  with laser/stone extraction (inability to remove all of stone)  Appendectomy (933416576) in  at 39 Years.  Breast reduction (913789917) in  at 26 Years.  Tonsillectomy and adenoidectomy (179002539) in  at 14 Years.  Cholecystectomy (49274179).   section (96956778).  Comments:  2016 11:26 AM CDT - Clif[place]on Freya LERMA  x1  Tubal ligation (001609654).   Social History:        Alcohol Assessment            Current, 1 TIME PER WEEK, 1 drinks/episode maximum.      Tobacco Assessment            Never smoker      Substance Abuse Assessment: Current            vaping THC and cannabis oil      Employment and Education Assessment            Disability      Home and Environment Assessment            2 children.      Nutrition and Health Assessment            Caffeine intake amount: 2 sodas, every day.      Exercise and Physical Activity Assessment: Does not exercise      Sexual Assessment            Sexually active: No.  Sexual orientation: Heterosexual.      Physical Examination   Vital Signs   3/16/2019 3:17 PM CDT Temperature Tympanic 100.1 DegF    Peripheral Pulse Rate 110 bpm  HI    Oxygen Saturation 95 %      General:  Alert and oriented, No acute distress.    HENT:  Normocephalic.    Neck:  Supple.    Respiratory:  Lungs are clear to auscultation, Respirations are non-labored, Breath sounds are equal, Symmetrical chest wall expansion.    Cardiovascular:  Normal rate, Regular rhythm.    Gastrointestinal:  Soft, Non-tender.    Genitourinary:       Kidney: Left, Costovertebral angle tenderness.    Musculoskeletal:  No deformity.    Integumentary:  Warm, Dry, Pink.    Neurologic:  Alert, Oriented.    Psychiatric:  Cooperative, Appropriate mood & affect.       Review / Management   Results review:  Lab results   3/16/2019 3:18 PM CDT UA pH 5.5    UA Specific Gravity 1.025    UA  Glucose NEGATIVE    UA Bilirubin NEGATIVE    UA Ketones 1+    Urine Occult Blood 1+    UA Protein 1+    UA Nitrite POSITIVE    UA Leukocyte Esterase NEGATIVE   3/16/2019 10:30 AM CDT UA Epithelial Cells Few    UA Amorphous Present    UA WBC 0-2    UA RBC None Seen    UA Calcium Oxalate Crystals Present    UA Bacteria Rare   .       Impression and Plan   Diagnosis     Cystitis (SDD00-II N30.90).     Patient Instructions:       Counseled: Patient, Regarding diagnosis, Regarding treatment, Regarding medications.    Summary:  Patient has standing prescription for Macrobid BID X7 days and has called this in. I think this medication is appropriate for the patient to start given UA consistent with UTI. Culture is pending and I will notify the patient of this result. Red flag symptoms to monitor for that would warrant reevaluation include but not limited to fevers, chills, irretractable vomiting, worsening back pain or any other concerning signs or symptoms. .    agree with assessment and plan

## 2022-02-15 NOTE — LETTER
(Inserted Image. Unable to display)                April 08, 2021        MARGARITA KAISER  [address] [address]        Dear MARGARITA,     Thank you for selecting [facility] for your healthcare needs. Below you will find the results of your recent test(s) done at our clinic.      Your HgBA1C is higher than it should be.  Please follow up with your primary care physician.      Result Name Current Result Previous Result Reference Range   Hgb A1c ((H)) 7.4 4/7/2021 ((H)) 7.0 10/9/2020  - <5.7     Please contact my practice at [phone] if you have any questions or concerns.     Sincerely,        Emily Lan MD      What do your labs mean?  Below is a glossary of commonly ordered labs:  LDL   Bad Cholesterol   HDL   Good Cholesterol  AST/ALT   Liver Function   Cr/Creatinine   Kidney Function  Microalbumin   Kidney Function  BUN   Kidney Function  PSA   Prostate    TSH   Thyroid Hormone  HgbA1c   Diabetes Test   Hgb (Hemoglobin)   Red Blood Cells   pt reporting an increase in joint pain over the last month. pt has hx of RA,. pt stating right knee and right ankle are more swollen then normal. pt has difficulty ambulating and moving in the am and when seated for a period of time. pt stating migraine headache, took mediation with partial relief. ambulatory with pain, relief with continuous movement. joints are swollen and painful to the touch.

## 2022-02-15 NOTE — TELEPHONE ENCOUNTER
---------------------  From: Tim/Jenny DOUGHERTY (Phone Messages Pool ([address]_[facility]))   To: Advanced Practice Provider [place] ([address]_[facility]);     Sent: 7/19/2021 1:12:03 PM CDT  Subject: lost script      Phone Message    PCP: Ivan     Time of Call: 1309    Phone Number: [phone]    Returned call at: n/a    Note: Patient calls stating that she was given a prescription for prednisone, however, she was out of town this weekend and ended up losing it. She is wondering if this can be filled again.     Pharmacy: [facility]     Last office visit and reason: Joint pain 7/13/21 w/ TFS    Transferred to: JAIRO---------------------  From: Kishore Sutton PA-C (Advanced Practice Provider [place] ([address]_[facility]))   To: Phone [facility] ([address]_[place] [facility] [place]);     Sent: 7/19/2021 1:21:22 PM CDT  Subject: RE: lost script      new Rx sent inPatient notified.

## 2022-02-15 NOTE — TELEPHONE ENCOUNTER
---------------------  From: Taty Bojorquez CMA   Sent: 1/3/2019 10:57:58 AM CST  Subject: General Message-Not feeling well.     Nadia called and left a message in the CC office.  She is not feeling well at all today.  She is coughing up green phlegm even after finishing a z-pack.  She is also having a hard time walking, her right arm and hand isn't working like it is supposed to she stated.     called her back.  She had an MS appointment and needed to cancel it due to not feeling well. Her mom is there with her and she is willing to come in.  Transferred her to appointments.    Taty Bojorquez CMA.

## 2022-02-15 NOTE — NURSING NOTE
CAGE Assessment Entered On:  11/24/2021 4:26 PM CST    Performed On:  11/24/2021 4:26 PM CST by Freya Mark CMA               Assessment   Have you ever felt you should cut down on your drinking :   No   Have people annoyed you by criticizing your drinking :   No   Have you ever felt bad or guilty about your drinking :   No   Have you ever taken a drink first thing in the morning to steady your nerves or get rid of a hangover (Eye-opener) :   No   CAGE Score :   0    Freya Mark CMA - 11/24/2021 4:26 PM CST

## 2022-02-15 NOTE — TELEPHONE ENCOUNTER
---------------------  From: Tati Will CMA (eRx Pool ([address]_[facility]))   To: Orestes Mendosa MD;     Sent: 9/29/2020 11:30:33 AM CDT  Subject: Meloxicam refill   Due Date/Time: 9/30/2020 10:51:00 AM CDT     LR on 4/30 for # 90    Last visit w/ you in Feb for f/u on MS and DM.     Karli actually called requesting refill go to you vs P.       ------------------------------------------  From: [facility] #[id number]  To: Emily Lan MD  Sent: September 29, 2020 10:51:29 AM CDT  Subject: Medication Management  Due: September 9, 2020 4:21:06 PM CDT     ** On Hold Pending Signature **     Dispensed Drug: meloxicam (meloxicam 15 mg oral tablet), TAKE 1 TABLET BY MOUTH DAILY  Quantity: 90 tab(s)  Days Supply: 30  Refills: 0  Substitutions Allowed  Notes from Pharmacy:  ---------------------------------------------------------------  From: Orestes Mendosa MD   To: [facility] #[id number]    Sent: 9/29/2020 3:29:15 PM CDT  Subject: Meloxicam refill     ** Submitted: **  Complete:meloxicam (meloxicam 15 mg oral tablet)   Signed by Orestes Mendosa MD  9/29/2020 8:29:00 PM [facility] [[email or web address]]    ** Approved with modifications: **  meloxicam (MELOXICAM 15MG TABLETS)  TAKE 1 TABLET BY MOUTH DAILY  Qty:  90 tab(s)        Days Supply:  30        Refills:  0          Substitutions Allowed     Route To Pharmacy - [facility] #[id number]   Signed by Orestes Mendosa MD

## 2022-02-15 NOTE — TELEPHONE ENCOUNTER
---------------------  From: Vicky Man   To: [facility] Message Pool ([address]);     Sent: 10/22/2019 5:17:23 PM CDT  Subject: CPAP DOES NOT QUALIFY FOR       I received a message from Sue at [facility] about this patient. She stated that she got a copy of the new sleep study but that the patient does not qualify for CPAP.  Per Medicare guidelines she has an AHI of 2.4. For Medicare it needs to be a 5 or above.     Sue would like for you to reach to the patient to let her know.---------------------  From: Sara Connell CMA ([facility] Message Pool ([facility]))   To: Gustabo Cabello MD;     Sent: 10/23/2019 7:38:52 AM CDT  Subject: FW: CPAP DOES NOT QUALIFY FOR---------------------  From: Sara Connell CMA ([facility] Message Pool ([facility]))   To: [facility]ERNA[facility] Message Pool ([address] [place] [address]);     Sent: 10/23/2019 7:40:07 AM CDT  Subject: FW: CPAP DOES NOT QUALIFY FOR---------------------  From: Jenny Ballesteros ([facility] Message Pool ([address] [place] [address]))   To: Erwin Smiley MD;     Sent: 10/23/2019 7:59:01 AM CDT  Subject: FW: CPAP DOES NOT QUALIFY FOR---------------------  From: Erwin Smiley MD   To: [employer] Message Pool ([address] [place] [address]);     Sent: 10/23/2019 12:24:47 PM CDT  Subject: RE: CPAP DOES NOT QUALIFY FOR     Needs to be seenPatient notified and transferred to scheduling.

## 2022-02-15 NOTE — TELEPHONE ENCOUNTER
---------------------  From: Yokasta Lawton LPN (Phone Messages Pool ([address]_[facility]))   To: Advanced Practice Provider [place] ([address]);     Sent: 4/28/2021 4:27:52 PM CDT  Subject: headaches     Phone Message    PCP:   SHANNON      Time of Call:  4:08pm       Person Calling:  pt  Phone number:  [phone]    Note:   Pt LM stating she had a pain blocking shot in her lower lumbar region and since  then has had a headache on her right side. Pt says she has taken Vicodin and rizatriptan and it keeps coming back every day. Pt asking what she can do.    Please advise    Last office visit and reason:  4/14/21 hospital f/u---------------------  From: Herman WILLARD, Kishore FELIPE ([facility] Practice Provider [place] ([address]_[facility]))   To: Phone Messages Pool ([address]_[place] [facility] [place]);     Sent: 4/28/2021 4:42:35 PM CDT  Subject: RE: headaches     should have a clinic visit to discuss these sxsPt informed. Pt requesting to schedule with MJP.    Pt says this is the 15th or 16th day. She says if she lays flat on her back it helps.  Transferred to scheduling

## 2022-02-15 NOTE — TELEPHONE ENCOUNTER
---------------------  From: Emily Lan MD   To: [facility] Message Pool ([address]_[place] [facility] [place]);     Sent: 12/16/2021 2:44:20 PM CST !  Subject: monoclonal antibodies     please let pt know that she is a candidate for monoclonal antibodies to reduce her risk of serious covid, she would have to get the infusion from [facility] tomorrow, I checked with them and they do have openings on their schedule.  If she would like me to place the order for her I'm happy to, if she has additional questions please ask her to make a telemed appt to discuss today so we can send over the order if she decides to do it.    thanksPt notified and states understanding. She states yes she would like to try the monoclonal antibodies.---------------------  From: Rosa Liang ([facility] ([address]_[facility]))   To: Emily Lan MD;     Sent: 12/16/2021 2:54:21 PM CST  Subject: FW: monoclonal antibodies---------------------  From: Emily Lan MD   To: [facility] ([address]_[place] [facility] [place]);     Sent: 12/16/2021 3:02:40 PM CST  Subject: RE: monoclonal antibodies     The order is ready to be faxed.Faxed and mailed fact sheet to pt.

## 2022-02-15 NOTE — TELEPHONE ENCOUNTER
---------------------  From: Junior Donis PA-C   To: Phone Messages Pool ([address]_[place] [facility] [place]);     Sent: 8/21/2020 5:57:07 AM CDT  Subject: General Message     Please call and inform her of negative C-19 test.    FIDELIA called the patient and told her of her negative COVID results and also her negative UC. Pt says she is doing better with being on the antibiotics and had no questions. Will call back if she has any concerns     AES

## 2022-02-15 NOTE — PROGRESS NOTES
Patient:   MARGARITA KAISER            MRN: [id number]            FIN: [id number]               Age:   45 years     Sex:  Female     :  1975   Associated Diagnoses:   Knee osteoarthritis   Author:   Joshua Coles MD      Visit Information      Date of Service: 2021 02:18 pm  Performing Location: [facility]  Encounter#: [id number]      Chief Complaint   2021 2:23 PM CDT    Pt c/o L knee pain due to arthritis. She has had an injection before 5 years ago and it helped. She has tried PT which did not help.        History of Present Illness   Patient here with left knee pain.  She has known osteoarthritis of the left knee.  Said prior x-rays.  She has steroid injection several years ago but knee is acting up more now.  She does have balance issues with her MS.         Review of Systems   Constitutional:  Negative except as documented in history of present illness.    Neurologic:  Negative except as documented in history of present illness.       Health Status   Allergies:    Allergic Reactions (Selected)  Severity Not Documented  Adhesive tape (No reactions were documented)  Sulfa drugs (Rash, vomiting..... and diarrhea)   Medications:  (Selected)   Prescriptions  Prescribed  Accu-check Guide Test Strips: Accu-check Guide Test Strips, See Instructions, Instructions: Testing 2x daily, Supply, # 200 EA, 11 Refill(s), Type: Maintenance, Pharmacy: [facility] #[id number], Testing 2x daily  Accu-check guide meter: Accu-check guide meter, See Instructions, Instructions: test 2x daily, Supply, # 1 EA, 0 Refill(s), Type: Maintenance, Pharmacy: [facility] #[id number], test 2x daily  B-D PEN NDL MINI 14OF7EC(3/16)PRPL: See Instructions, Instructions: INJECT WITH SAXENDA EVERY DAY, # 100 EA, Type: Soft Stop, Pharmacy: [facility] #[id number], INJECT WITH SAXENDA EVERY DAY  B-D PEN NDL MINI 37XE4AE(3/16)PRPL: See Instructions, Instructions: INJECT WITH SAXENDA EVERY DAY, # 100  unknown unit, Type: Soft Stop, Pharmacy: [facility] #[id number], INJECT WITH SAXENDA EVERY DAY  BD pen needle mini [id number]: BD pen needle mini [id number], See Instructions, Instructions: Use daily with Saxenda as directed, Supply, # 100 EA, 0 Refill(s), Type: Maintenance, Pharmacy: [facility] #[id number], Use daily with Saxenda as directed  Replacement CPAP +8 cm H2o for use daily: Replacement CPAP +8 cm H2o for use daily, See Instructions, Instructions: Heated humidifier x1; Humidifier chamber x1;  Heated tubing x1; Full face mask of choice with headgear  x1; Cushion x 1;  Filters: Disposable x1pk & Reusable x1pk.  Length of Ne...  amLODIPine 5 mg oral tablet: = 1 tab(s), Oral, daily, # 90 tab(s), 1 Refill(s), Type: Maintenance, Pharmacy: [facility], 1 tab(s) Oral daily, 65, in, 02/16/21 13:33:00 CST, Height Measured, 296, lb, 02/16/21 13:33:00 CST, Weight Measured  amantadine 100 mg oral capsule: = 1 cap(s) ( 100 mg ), Oral, bid, PRN: Other (see comment), # 180 cap(s), 1 Refill(s), Type: Maintenance, Pharmacy: [facility], 1 cap(s) Oral bid,PRN:Other (see comment), 65, in, 04/14/21 8:13:00 CDT, Height Measured, 297.2, lb,...  clonazePAM 1 mg oral tablet: = 1 tab(s) ( 1 mg ), Oral, q8 hrs, PRN: for anxiety, # 24 tab(s), 0 Refill(s), Type: Maintenance, [facility] #[id number], 1 tab(s) Oral q8 hrs,PRN:for anxiety, 65, in, 02/16/21 13:33:00 CST, Height Measured, 296, lb, 02/16/21 13:33:00 C...  contour next microlet lancets: contour next microlet lancets, See Instructions, Instructions: testing 2x/ day, Supply, # 1 box(es), 2 Refill(s), Type: Maintenance, Pharmacy: [facility] #[id number], testing 2x/ day  lidocaine 5% topical ointment: See Instructions, Instructions: APPLY EXTERNALLY TO THE AFFECTED AREA THREE TIMES DAILY AS NEEDED FOR PAIN, # 30 gm, Type: Soft Stop, Pharmacy: [facility] #[id number]  meloxicam 15 mg oral tablet: = 1 tab(s),  Oral, daily, # 90 tab(s), 1 Refill(s), Type: Maintenance, Pharmacy: [facility], 1 tab(s) Oral daily, 65, in, 03/09/21 13:53:00 CST, Height Measured, 296, lb, 02/16/21 13:33:00 CST, Weight Measured  metFORMIN 500 mg oral tablet, extended release: = 2 tab(s) ( 1,000 mg ), Oral, daily, # 180 tab(s), 3 Refill(s), Type: Maintenance, Pharmacy: [facility], 2 tab(s) Oral daily, 65, in, 01/26/21 13:08:00 CST, Height Measured, 292, lb, 10/09/20 8:46:00 CDT, Weight Measured  methenamine hippurate 1 g oral tablet: = 1 tab(s), Oral, bid, # 180 tab(s), 1 Refill(s), Type: Maintenance, Pharmacy: [facility], 1 tab(s) Oral bid, 65, in, 02/16/21 13:33:00 CST, Height Measured, 296, lb, 02/16/21 13:33:00 CST, Weight Measured  ondansetron 4 mg oral tablet: 1 tab(s), Oral, q8 hrs, PRN: AS NEEDED FOR NAUSEA OR VOMITING, # 30 tab(s), Type: Soft Stop, Pharmacy: [facility] #[id number]  oxyCODONE 5 mg oral tablet: 1-2 tabs, Oral, q4 hrs, PRN: as needed for pain, # 20 tab(s), 0 Refill(s), Type: Maintenance, Pharmacy: [facility] #[id number], 1-2 tabs Oral q4 hrs,PRN:as needed for pain, 65, in, 05/18/21 13:06:00 CDT, Height Measured, 292, lb, 05/18/21 13:06:0...  predniSONE 10 mg oral tablet: 4 tabs daily for 3 days taper bey 1 tab every 3 days, Oral, daily, # 30 tab(s), 0 Refill(s), Type: Maintenance, Pharmacy: [facility] #[id number], 4 tabs daily for 3 days taper bey 1 tab every 3 days Oral daily, 65, in, 05/18/21 13:06:00 Nataly WRIGHT.  rizatriptan 10 mg oral tablet: See Instructions, Instructions: TAKE 1 TABLET BY MOUTH 1 TIME AS NEEDED FOR MIGRAINE HEADACHE, # 6 tab(s), Type: Soft Stop, Pharmacy: [facility] #[id number]  tiZANidine 4 mg oral tablet: 2 TO 3 TABLETS, Oral, qhs, # 90 tab(s), Type: Soft Stop, Pharmacy: [facility] #[id number]  tiZANidine 4 mg oral tablet: See Instructions, Instructions: TAKE 2 TO 3 TABLETS BY MOUTH EVERY NIGHT AT BEDTIME,  # 90 tab(s), Type: Soft Stop, Pharmacy: [facility] #[id number], TAKE 2 TO 3 TABLETS BY MOUTH EVERY NIGHT AT BEDTIME  Documented Medications  Documented  Abilify 5 mg oral tablet: = 1 tab(s) ( 5 mg ), Oral, daily, 0 Refill(s), Type: Maintenance  Excedrin: See Instructions, 0 Refill(s), Type: Maintenance  Lemtrada: See Instructions, Instructions: 1x/yr, 0 Refill(s), Type: Maintenance  Norco 5 mg-325 mg oral tablet: 1 tab(s), Oral, q4 hrs, PRN: for pain, 0 Refill(s), Type: Maintenance  Vitamin D3 5000 intl units oral tablet: = 2 tab(s) ( 10,000 International Unit ), Oral, daily, 0 Refill(s), Type: Maintenance  acetaminophen 325 mg oral tablet: 1-2 tabs, Oral, q4 hrs, 0 Refill(s), Type: Maintenance  busPIRone 15 mg oral tablet: = 1 tab(s) ( 15 mg ), Oral, bid, 0 Refill(s), Type: Soft Stop  clonazePAM 1 mg oral tablet: = 1 tab(s) ( 1 mg ), Oral, qhs, 0 Refill(s), Type: Maintenance  cyclobenzaprine 10 mg oral tablet: = 1 tab(s) ( 10 mg ), Oral, bid, PRN: for spasm, # 30 tab(s), 0 Refill(s), Type: Maintenance  escitalopram 20 mg oral tablet: 0 Refill(s), Type: Soft Stop  hydrOXYzine hydrochloride 10 mg oral tablet: = 1 tab(s) ( 10 mg ), Oral, PRN: as needed for anxiety, 0 Refill(s), Type: Soft Stop  morphine 15 mg/8 hr oral tablet, extended release: = 1 tab(s) ( 15 mg ), Oral, q12 hrs, 0 Refill(s), Type: Maintenance  prazosin 1 mg oral capsule: = 1 cap(s) ( 1 mg ), Oral, hs, 0 Refill(s), Type: Soft Stop  valACYclovir 500 mg oral tablet: 1 tab(s) ( 500 mg ), po, bid, 0 Refill(s), Type: Maintenance   Problem list:    All Problems (Selected)  Headache, migraine / SNOMED CT 23321778 / Confirmed  Vitamin D deficiency / SNOMED CT 50698702 / Confirmed  Skin sensation disturbance / SNOMED CT 559082191 / Confirmed  Seasonal allergies / SNOMED CT 844550676 / Confirmed  Blues / SNOMED CT 656630278 / Confirmed  Medication management / SNOMED CT 042013557 / Confirmed  Muscle weakness / SNOMED CT 61501494 / Confirmed  Chronic low  back pain / SNOMED CT 185536046 / Confirmed  Multiple sclerosis / SNOMED CT 50620746 / Confirmed  Knee osteoarthritis / SNOMED CT 261571808 / Confirmed  Morbid (severe) obesity due to excess calories / SNOMED CT 560276963 / Probable  Anemia in chronic illness / SNOMED CT 737799374 / Confirmed  Ataxia / SNOMED CT 64896226 / Confirmed  Neuralgia / SNOMED CT 74804911 / Confirmed  History of renal stone / SNOMED CT 6704899962 / Confirmed  DM (diabetes mellitus), type 2 / SNOMED CT 018671794 / Confirmed  Urinary bladder neurogenic dysfunction / SNOMED CT 1385258109 / Confirmed  Fatigue / SNOMED CT 630430342 / Confirmed      Histories   Past Medical History:    Active  DM (diabetes mellitus), type 2 (236270663): Onset on 1/8/2019 at 43 years.  Multiple sclerosis (17217658): Onset in 2012 at 37 years.  Blues (850361781)  History of renal stone (8931900076)  Urinary bladder neurogenic dysfunction (7361707103)  Fatigue (147237786)  Muscle weakness (31275967)  Vitamin D deficiency (64851231)  Ataxia (07268966)  Headache, migraine (79890176)  Comments:  7/6/2016 CDT 11:19 AM CDT - Katie Hatfield  With aura.  Skin sensation disturbance (386259416)  Seasonal allergies (045319292)  Resolved  Inpatient stay (681316482): Onset on 4/7/2021 at 45 years.  Resolved on 4/9/2021 at 45 years.  Comments:  4/13/2021 CDT 10:40 AM CDT - Ceci Engle  [place], [place] - Malaise and fatigue.  Inpatient stay (052176649): Onset on 2/23/2018 at 42 years.  Resolved on 2/25/2018 at 42 years.  Comments:  2/27/2018 CST 7:59 AM CST - La Garcia  @[place], [place] - Acute pneumonitis; likely related to recent Lemtrada introduction  Obstructive sleep apnea syndrome, moderate (307216223): Onset on 2/21/2018 at 42 years.  Resolved.  Comments:  7/25/2019 CDT 12:23 PM CDT - Erwin Smiley MD  On 2/14/18 AHI 20.2 with RDI 41.2, and oximetry avis 88%. Optimal CPAP titration to 8 cm water.  Inpatient stay (828155312): Onset on  6/14/2016 at 40 years.  Resolved on 6/18/2016 at 40 years.  Comments:  2/27/2018 CST 7:58 AM CST - La Garcia  @[facility] [place], [place] - Complicated UTI  Inpatient stay (192237410): Onset in the month of 5/2016 at 40 years  Resolved.  Comments:  2/27/2018 CST 7:57 AM La Serrano  @[facility], [place] - Left renal stone  Pregnancy:  Resolved.  Pregnancy (243241449):  Resolved in 2006 at 30 years.  Pregnancy (933220278):  Resolved in 1994 at 18 years.   Family History:    Melanoma  Father (Shakir)  Thyroid  Sister (Ana Cristina)  Kidney stone  Daughter (Zac)  Diabetes mellitus type II  Father (Shakir)  Hypothyroidism  Sister (Ana Cristina)  Diabetes mellitus type 2  Father (Shakir)  CA - Cancer  Father (Shakir)  Comments:  4/5/2017 11:45 AM CDT - Katie Hatfield  skin  CHF - Congestive heart failure  Father (Shakir)  Migraine  Sister (Ana Cristina)  Daughter (Leda)  Mother (Soledad)  Depression  Daughter (Zac)  Father (Shakir)  Gallbladder problem  Mother (Soledad)  Bipolar  Father (Shakir)  Miscellaneous  Brother (Adrian)  Comments:  4/5/2017 11:43 AM CDT - Katie Hatfield  spinal stenosis  Mother (Soledad)  Comments:  4/5/2017 11:43 AM CDT - Katie Hatfield  spinal stenosis  Anxiety  Daughter (Leda)  Daughter (Zac)     Procedure history:    Repair of ventral hernia (SNOMED CT 878922757) performed by Kevin Cole MD on 5/24/2021 at 45 Years.  Excision of inguinal lymph nodes (SNOMED CT 18283596) performed by Kevin Cole MD on 2/23/2021 at 45 Years.  Comments:  2/25/2021 11:06 AM KULWINDER - Ceci Engle  Right.  Excision of inguinal lymph nodes (SNOMED CT 03684401) performed by Kevin Cole MD on 11/10/2020 at 45 Years.  Bone marrow biopsy (SNOMED CT 103931041) on 10/30/2020 at 45 Years.  Comments:  11/5/2020 10:43 AM KULWINDER - Ceci Engle  Dx: Lymphadenopathy.  Port-a-cath in place - Right (IMO 68493163) performed by Adriano Dougherty MD on 8/11/2016 at 41 Years.  Comments:  8/18/2016 8:01 AM CDT - Ceci Engle  Dx:   Multiple sclerosis.  cystoscopy left ureteroscopy with holmium laser left retrogrades stone extraction and left ureteral stent exchange on 6/3/2016 at 40 Years.  Ureteroscopy (SNOMED CT 3234760189) in the month of 2016 at 40 Years.  Comments:  2016 4:56 PM CDT - La Garcia  with laser/stone extraction (inability to remove all of stone)  Appendectomy (SNOMED CT 004038048) in  at 39 Years.  Breast reduction (SNOMED CT 110076987) in  at 26 Years.  Tonsillectomy and adenoidectomy (SNOMED CT 127658684) in  at 14 Years.  Cholecystectomy (SNOMED CT 07097504).   section (SNOMED CT 18712360).  Comments:  2016 11:26 AM CDT - Clifnapoleon Freya LERMA  x1  Tubal ligation (SNOMED CT 293804073).   Social History:        Electronic Cigarette/Vaping Assessment            Electronic Cigarette Use: Never.      Alcohol Assessment            Current, 1 TIME PER WEEK, 1 drinks/episode maximum.      Tobacco Assessment            Never smoker            Never (less than 100 in lifetime)      Substance Abuse Assessment: Current            vaping THC and cannabis oil      Employment and Education Assessment            Disability      Home and Environment Assessment            2 children.      Nutrition and Health Assessment            Caffeine intake amount: 2 sodas, every day.      Exercise and Physical Activity Assessment: Does not exercise      Sexual Assessment            Sexually active: No.  Sexual orientation: Heterosexual.        Physical Examination   Vital Signs   2021 2:23 PM CDT Peripheral Pulse Rate 107 bpm  HI    Respiratory Rate 16 br/min    Systolic Blood Pressure 140 mmHg  HI    Diastolic Blood Pressure 74 mmHg    Mean Arterial Pressure 96 mmHg    BP Site Right arm    BP Method Manual    Oxygen Saturation 97 %      Measurements from flowsheet : Measurements   2021 2:23 PM CDT Height/Length Estimated 65 in    Weight Measured - Standard 288.8 lb      General:  Alert and oriented, No acute  distress.    Musculoskeletal:  Degenerative changes noted of left knee no effusion some diffuse joint line tenderness no redness or increased warmth  .    Neurologic:  Alert, Oriented.       Impression and Plan   Diagnosis     Knee osteoarthritis ([id number] M17.11).     Plan:  Patient with osteoarthritis of left knee we will put on prednisone taper get orthopedic consultation certainly her balance issues do not help her knee.  .         Refer to: Orthopedics.

## 2022-02-15 NOTE — TELEPHONE ENCOUNTER
---------------------  From: Brianna Parrish   To: Deondre HART, Orestes;     Sent: 2/8/2019 3:15:23 PM CST  Subject: Scheduling Management     Patient no showed appointment. Appointment was for a follow up hospital stay.

## 2022-02-15 NOTE — NURSING NOTE
Comprehensive Intake Entered On:  7/13/2021 2:33 PM CDT    Performed On:  7/13/2021 2:23 PM CDT by Rosa Liang               Summary   Chief Complaint :   Pt c/o L knee pain due to arthritis. She has had an injection before 5 years ago and it helped. She has tried PT which did not help.    Menstrual Status :   Menarcheal   Weight Measured :   288.8 lb(Converted to: 288 lb 13 oz, 130.997 kg)    Height/Length Estimated :   65 in(Converted to: 5 ft 5 in, 165.10 cm)    Systolic Blood Pressure :   140 mmHg (HI)    Diastolic Blood Pressure :   74 mmHg   Mean Arterial Pressure :   96 mmHg   Peripheral Pulse Rate :   107 bpm (HI)    BP Site :   Right arm   BP Method :   Manual   Respiratory Rate :   16 br/min   Oxygen Saturation :   97 %   Rosa Liang - 7/13/2021 2:23 PM CDT   Health Status   Allergies Verified? :   Yes   Medication History Verified? :   Yes   Tobacco Use? :   Never smoker   Rosa Liang - 7/13/2021 2:23 PM CDT   Consents   Consent for Immunization Exchange :   Consent Granted   Consent for Immunizations to Providers :   Consent Granted   Rosa Liang - 7/13/2021 2:23 PM CDT   Meds / Allergies   (As Of: 7/13/2021 2:33:05 PM CDT)   Allergies (Active)   adhesive tape  Estimated Onset Date:   Unspecified ; Created By:   Katie Hatfield; Reaction Status:   Active ; Category:   Drug ; Substance:   adhesive tape ; Type:   Allergy ; Updated By:   Katie Hatfield; Reviewed Date:   7/13/2021 2:32 PM CDT      sulfa drugs  Estimated Onset Date:   Unspecified ; Reactions:   Rash, Vomiting....., Diarrhea ; Created By:   Michelle Pedersen CMA; Reaction Status:   Active ; Category:   Drug ; Substance:   sulfa drugs ; Type:   Allergy ; Updated By:   Michelle Pedersen CMA; Reviewed Date:   7/13/2021 2:32 PM CDT        Medication List   (As Of: 7/13/2021 2:33:05 PM CDT)   Prescription/Discharge Order    amantadine  :   amantadine ; Status:   Prescribed ; Ordered As Mnemonic:   amantadine 100 mg oral capsule ; Simple  Display Line:   100 mg, 1 cap(s), Oral, bid, PRN: Other (see comment), 180 cap(s), 1 Refill(s) ; Ordering Provider:   Orestes Mendosa MD; Catalog Code:   amantadine ; Order Dt/Tm:   4/14/2021 11:45:59 AM CDT          amLODIPine  :   amLODIPine ; Status:   Prescribed ; Ordered As Mnemonic:   amLODIPine 5 mg oral tablet ; Simple Display Line:   1 tab(s), Oral, daily, 90 tab(s), 1 Refill(s) ; Ordering Provider:   Orestes Mendosa MD; Catalog Code:   amLODIPine ; Order Dt/Tm:   2/19/2021 8:52:29 AM CST          clonazePAM  :   clonazePAM ; Status:   Prescribed ; Ordered As Mnemonic:   clonazePAM 1 mg oral tablet ; Simple Display Line:   1 mg, 1 tab(s), Oral, q8 hrs, PRN: for anxiety, 24 tab(s), 0 Refill(s) ; Ordering Provider:   Orestes Mendosa MD; Catalog Code:   clonazePAM ; Order Dt/Tm:   2/16/2021 2:04:30 PM CST ; Comment:   Responsible Provider: OPAL THORNTON          lidocaine 5% topical ointment  :   lidocaine 5% topical ointment ; Status:   Prescribed ; Ordered As Mnemonic:   lidocaine 5% topical ointment ; Simple Display Line:   See Instructions, APPLY EXTERNALLY TO THE AFFECTED AREA THREE TIMES DAILY AS NEEDED FOR PAIN, 30 gm ; Ordering Provider:   Orestes Mendosa MD; Catalog Code:   lidocaine topical ; Order Dt/Tm:   12/30/2019 3:05:36 PM CST          meloxicam  :   meloxicam ; Status:   Prescribed ; Ordered As Mnemonic:   meloxicam 15 mg oral tablet ; Simple Display Line:   1 tab(s), Oral, daily, 90 tab(s), 1 Refill(s) ; Ordering Provider:   Orestes Mendosa MD; Catalog Code:   meloxicam ; Order Dt/Tm:   3/10/2021 8:33:10 AM CST          metFORMIN  :   metFORMIN ; Status:   Prescribed ; Ordered As Mnemonic:   metFORMIN 500 mg oral tablet, extended release ; Simple Display Line:   1,000 mg, 2 tab(s), Oral, daily, 180 tab(s), 3 Refill(s) ; Ordering Provider:   Orestes Mendosa MD; Catalog Code:   metFORMIN ; Order Dt/Tm:   1/26/2021 1:35:52 PM CST          methenamine  :   methenamine ; Status:   Prescribed ; Ordered As  Mnemonic:   methenamine hippurate 1 g oral tablet ; Simple Display Line:   1 tab(s), Oral, bid, 180 tab(s), 1 Refill(s) ; Ordering Provider:   Orestes Mendosa MD; Catalog Code:   methenamine ; Order Dt/Tm:   2/19/2021 8:52:30 AM CST          Misc Prescription  :   Misc Prescription ; Status:   Prescribed ; Ordered As Mnemonic:   B-D PEN NDL MINI 89IS3IV(3/16)PRPL ; Simple Display Line:   See Instructions, INJECT WITH SAXENDA EVERY DAY, 100 EA ; Ordering Provider:   Orestes Mendosa MD; Catalog Code:   Miscellaneous Prescription ; Order Dt/Tm:   12/30/2019 3:05:21 PM CST          Misc Prescription  :   Misc Prescription ; Status:   Prescribed ; Ordered As Mnemonic:   B-D PEN NDL MINI [id number](3/16)PRPL ; Simple Display Line:   See Instructions, INJECT WITH SAXENDA EVERY DAY, 100 unknown unit ; Ordering Provider:   Orestes Mendosa MD; Catalog Code:   Miscellaneous Prescription ; Order Dt/Tm:   12/2/2019 11:16:59 AM CST          Miscellaneous Prescription  :   Miscellaneous Prescription ; Status:   Prescribed ; Ordered As Mnemonic:   BD pen needle mini [id number] ; Simple Display Line:   See Instructions, Use daily with Saxenda as directed, 100 EA, 0 Refill(s) ; Ordering Provider:   Orestes Mendosa MD; Catalog Code:   Miscellaneous Prescription ; Order Dt/Tm:   10/30/2019 8:12:33 AM CDT          Miscellaneous Rx Supply  :   Miscellaneous Rx Supply ; Status:   Prescribed ; Ordered As Mnemonic:   Accu-check guide meter ; Simple Display Line:   See Instructions, test 2x daily, 1 EA, 0 Refill(s) ; Ordering Provider:   Orestes Mendosa MD; Catalog Code:   Miscellaneous Rx Supply ; Order Dt/Tm:   1/31/2020 3:05:08 PM CST          Miscellaneous Rx Supply  :   Miscellaneous Rx Supply ; Status:   Prescribed ; Ordered As Mnemonic:   Accu-check Guide Test Strips ; Simple Display Line:   See Instructions, Testing 2x daily, 200 EA, 11 Refill(s) ; Ordering Provider:   Orestes Mendosa MD; Catalog Code:   Miscellaneous Rx Supply ; Order Dt/Tm:   1/31/2020 3:06:14  PM CST          Miscellaneous Rx Supply  :   Miscellaneous Rx Supply ; Status:   Prescribed ; Ordered As Mnemonic:   contour next microlet lancets ; Simple Display Line:   See Instructions, testing 2x/ day, 1 box(es), 2 Refill(s) ; Ordering Provider:   Orestes Mendosa MD; Catalog Code:   Miscellaneous Rx Supply ; Order Dt/Tm:   1/27/2020 4:36:08 PM CST          Miscellaneous Rx Supply  :   Miscellaneous Rx Supply ; Status:   Prescribed ; Ordered As Mnemonic:   Replacement CPAP +8 cm H2o for use daily ; Simple Display Line:   See Instructions, Heated humidifier x1; Humidifier chamber x1;  Heated tubing x1; Full face mask of choice with headgear  x1; Cushion x 1;  Filters: Disposable x1pk & Reusable x1pk.  Length of Need = 99 Months, 1 EA, 11 Refill(s) ; Ordering Provider:   Erwin Smiley MD; Catalog Code:   Miscellaneous Rx Supply ; Order Dt/Tm:   7/25/2019 1:49:35 PM CDT          ondansetron 4 mg oral tablet  :   ondansetron 4 mg oral tablet ; Status:   Prescribed ; Ordered As Mnemonic:   ondansetron 4 mg oral tablet ; Simple Display Line:   1 tab(s), Oral, q8 hrs, PRN: AS NEEDED FOR NAUSEA OR VOMITING, 30 tab(s) ; Ordering Provider:   Orestes Mendosa MD; Catalog Code:   ondansetron ; Order Dt/Tm:   12/30/2019 3:05:21 PM CST          oxyCODONE  :   oxyCODONE ; Status:   Prescribed ; Ordered As Mnemonic:   oxyCODONE 5 mg oral tablet ; Simple Display Line:   1-2 tabs, Oral, q4 hrs, PRN: as needed for pain, 20 tab(s), 0 Refill(s) ; Ordering Provider:   Orestes Mendosa MD; Catalog Code:   oxyCODONE ; Order Dt/Tm:   6/1/2021 3:25:57 PM CDT ; Comment:   Responsible Provider: TE GALLAGHER          rizatriptan 10 mg oral tablet  :   rizatriptan 10 mg oral tablet ; Status:   Prescribed ; Ordered As Mnemonic:   rizatriptan 10 mg oral tablet ; Simple Display Line:   See Instructions, TAKE 1 TABLET BY MOUTH 1 TIME AS NEEDED FOR MIGRAINE HEADACHE, 6 tab(s) ; Ordering Provider:   Orestes Mendosa MD; Catalog Code:   rizatriptan ; Order  Dt/Tm:   12/30/2019 3:05:21 PM CST          tiZANidine 4 mg oral tablet  :   tiZANidine 4 mg oral tablet ; Status:   Prescribed ; Ordered As Mnemonic:   tiZANidine 4 mg oral tablet ; Simple Display Line:   See Instructions, TAKE 2 TO 3 TABLETS BY MOUTH EVERY NIGHT AT BEDTIME, 90 tab(s) ; Ordering Provider:   Orestes Mendosa MD; Catalog Code:   tiZANidine ; Order Dt/Tm:   2/5/2020 10:24:40 AM CST          tiZANidine 4 mg oral tablet  :   tiZANidine 4 mg oral tablet ; Status:   Prescribed ; Ordered As Mnemonic:   tiZANidine 4 mg oral tablet ; Simple Display Line:   2 TO 3 TABLETS, Oral, qhs, 90 tab(s) ; Ordering Provider:   Orestes Mendosa MD; Catalog Code:   tiZANidine ; Order Dt/Tm:   1/7/2020 7:00:19 AM CST            Home Meds    acetaminophen-hydrocodone  :   acetaminophen-hydrocodone ; Status:   Documented ; Ordered As Mnemonic:   Norco 5 mg-325 mg oral tablet ; Simple Display Line:   1 tab(s), Oral, q4 hrs, PRN: for pain, 0 Refill(s) ; Catalog Code:   acetaminophen-hydrocodone ; Order Dt/Tm:   4/14/2021 8:12:23 AM CDT          acetaminophen  :   acetaminophen ; Status:   Documented ; Ordered As Mnemonic:   acetaminophen 325 mg oral tablet ; Simple Display Line:   1-2 tabs, Oral, q4 hrs, 0 Refill(s) ; Catalog Code:   acetaminophen ; Order Dt/Tm:   2/10/2021 11:14:05 AM CST          acetaminophen/aspirin/caffeine  :   acetaminophen/aspirin/caffeine ; Status:   Documented ; Ordered As Mnemonic:   Excedrin ; Simple Display Line:   See Instructions, 0 Refill(s) ; Catalog Code:   acetaminophen/aspirin/caffeine ; Order Dt/Tm:   9/6/2016 10:16:52 AM CDT          alemtuzumab  :   alemtuzumab ; Status:   Documented ; Ordered As Mnemonic:   Lemtrada ; Simple Display Line:   See Instructions, 1x/yr, 0 Refill(s) ; Catalog Code:   alemtuzumab ; Order Dt/Tm:   10/26/2018 3:52:14 PM CDT          aripiprazole  :   aripiprazole ; Status:   Documented ; Ordered As Mnemonic:   Abilify 5 mg oral tablet ; Simple Display Line:   5 mg, 1  tab(s), Oral, daily, 0 Refill(s) ; Catalog Code:   aripiprazole ; Order Dt/Tm:   4/7/2021 9:10:37 AM CDT          busPIRone  :   busPIRone ; Status:   Documented ; Ordered As Mnemonic:   busPIRone 15 mg oral tablet ; Simple Display Line:   15 mg, 1 tab(s), Oral, bid, 0 Refill(s) ; Catalog Code:   busPIRone ; Order Dt/Tm:   4/14/2021 8:07:11 AM CDT ; Comment:   Responsible Provider: JD SANCHEZ          cholecalciferol  :   cholecalciferol ; Status:   Documented ; Ordered As Mnemonic:   Vitamin D3 5000 intl units oral tablet ; Simple Display Line:   10,000 International Unit, 2 tab(s), Oral, daily, 0 Refill(s) ; Catalog Code:   cholecalciferol ; Order Dt/Tm:   2/4/2020 1:35:21 PM CST          clonazePAM  :   clonazePAM ; Status:   Documented ; Ordered As Mnemonic:   clonazePAM 1 mg oral tablet ; Simple Display Line:   1 mg, 1 tab(s), Oral, qhs, 0 Refill(s) ; Catalog Code:   clonazePAM ; Order Dt/Tm:   4/14/2021 8:09:06 AM CDT          cyclobenzaprine  :   cyclobenzaprine ; Status:   Documented ; Ordered As Mnemonic:   cyclobenzaprine 10 mg oral tablet ; Simple Display Line:   10 mg, 1 tab(s), Oral, bid, PRN: for spasm, 30 tab(s), 0 Refill(s) ; Catalog Code:   cyclobenzaprine ; Order Dt/Tm:   7/23/2019 8:00:07 AM CDT          escitalopram  :   escitalopram ; Status:   Documented ; Ordered As Mnemonic:   escitalopram 20 mg oral tablet ; Simple Display Line:   0 Refill(s) ; Catalog Code:   escitalopram ; Order Dt/Tm:   1/26/2021 1:07:14 PM CST ; Comment:   Responsible Provider: SHARONA CARBAJAL          hydrOXYzine  :   hydrOXYzine ; Status:   Documented ; Ordered As Mnemonic:   hydrOXYzine hydrochloride 10 mg oral tablet ; Simple Display Line:   10 mg, 1 tab(s), Oral, PRN: as needed for anxiety, 0 Refill(s) ; Catalog Code:   hydrOXYzine ; Order Dt/Tm:   4/14/2021 8:07:07 AM CDT ; Comment:   Responsible Provider: SHARONA CARBAJAL          morphine  :   morphine ; Status:   Documented ;  Ordered As Mnemonic:   morphine 15 mg/8 hr oral tablet, extended release ; Simple Display Line:   15 mg, 1 tab(s), Oral, q12 hrs, 0 Refill(s) ; Catalog Code:   morphine ; Order Dt/Tm:   4/14/2021 8:12:49 AM CDT          prazosin  :   prazosin ; Status:   Documented ; Ordered As Mnemonic:   prazosin 1 mg oral capsule ; Simple Display Line:   1 mg, 1 cap(s), Oral, hs, 0 Refill(s) ; Catalog Code:   prazosin ; Order Dt/Tm:   4/14/2021 8:07:15 AM CDT ; Comment:   Responsible Provider: JD SANCHEZ          valACYclovir  :   valACYclovir ; Status:   Documented ; Ordered As Mnemonic:   valACYclovir 500 mg oral tablet ; Simple Display Line:   500 mg, 1 tab(s), po, bid, 0 Refill(s) ; Catalog Code:   valACYclovir ; Order Dt/Tm:   2/23/2018 2:39:43 PM CST            Social History   Social History   (As Of: 7/13/2021 2:33:05 PM CDT)   Alcohol:        Current, 1 TIME PER WEEK, 1 drinks/episode maximum.   (Last Updated: 12/14/2018 12:13:00 PM CST by Brianna Parrish)          Tobacco:        Never smoker   (Last Updated: 1/13/2016 7:49:24 PM CST by Aminata Landon CMA)   Never (less than 100 in lifetime)   (Last Updated: 1/26/2021 1:09:01 PM CST by Freya Mark CMA)          Electronic Cigarette/Vaping:        Electronic Cigarette Use: Never.   (Last Updated: 1/26/2021 1:09:08 PM CST by Freya Mark CMA)          Substance Abuse:  Current      vaping THC and cannabis oil   (Last Updated: 12/12/2018 2:39:37 PM CST by Tati Will CMA)          Employment/School:        Disability   (Last Updated: 2/28/2017 2:54:15 PM CST by Dione Núñez RN)          Home/Environment:        2 children.   (Last Updated: 1/13/2016 7:50:00 PM CST by Aminata Landon CMA)          Nutrition/Health:        Caffeine intake amount: 2 sodas, every day.   (Last Updated: 7/6/2016 11:10:11 AM CDT by Katie Hatfield)          Exercise:  Does not exercise      (Last Updated: 1/13/2016 7:49:47 PM CST by Aminata Landon CMA )         Sexual:         Sexually active: No.  Sexual orientation: Heterosexual.   (Last Updated: 1/13/2016 7:50:46 PM CST by Aminata Landon CMA)

## 2022-02-15 NOTE — NURSING NOTE
Comprehensive Intake Entered On:  11/24/2021 8:17 AM CST    Performed On:  11/24/2021 8:14 AM CST by Freya Mark CMA               Summary   Chief Complaint :   med review - would like a referral for epidural L4-5 injection.  Discuss pain mgmt - req vicodin prn, ? morphone    Menstrual Status :   Menarcheal   Weight Measured :   282 lb(Converted to: 282 lb 0 oz, 127.913 kg)    Height/Length Estimated :   65 in(Converted to: 5 ft 5 in, 165.10 cm)    Systolic Blood Pressure :   133 mmHg (HI)    Diastolic Blood Pressure :   82 mmHg (HI)    Mean Arterial Pressure :   99 mmHg   Peripheral Pulse Rate :   87 bpm   Temperature Tympanic :   97.6 DegF(Converted to: 36.4 DegC)  (LOW)    Oxygen Saturation :   95 %   Freya Mark CMA - 11/24/2021 8:14 AM CST   Health Status   Allergies Verified? :   Yes   Medication History Verified? :   Yes   Medical History Verified? :   Yes   Pre-Visit Planning Status :   Completed   Tobacco Use? :   Never smoker   Freya Mark CMA - 11/24/2021 8:14 AM CST   Consents   Consent for Immunization Exchange :   Consent Granted   Consent for Immunizations to Providers :   Consent Granted   Freya Mark CMA - 11/24/2021 8:14 AM CST   Social History   Social History   (As Of: 11/24/2021 8:17:54 AM CST)   Alcohol:        Current, 1 TIME PER WEEK, 1 drinks/episode maximum.   (Last Updated: 12/14/2018 12:13:00 PM CST by Brianna Parrish)          Tobacco:        Never smoker   (Last Updated: 1/13/2016 7:49:24 PM CST by Aminata Landon CMA)   Never (less than 100 in lifetime)   (Last Updated: 1/26/2021 1:09:01 PM CST by Freya Mark CMA)          Electronic Cigarette/Vaping:        Electronic Cigarette Use: Never.   (Last Updated: 1/26/2021 1:09:08 PM CST by Freya Mark CMA)          Substance Abuse:  Current      vaping THC and cannabis oil   (Last Updated: 12/12/2018 2:39:37 PM CST by Tati Will CMA)          Employment/School:        Disability   (Last Updated: 2/28/2017 2:54:15 PM CST by  [place] SOLIZ, Dione)          Home/Environment:        2 children.   (Last Updated: 1/13/2016 7:50:00 PM CST by Aminata Landon CMA)          Nutrition/Health:        Caffeine intake amount: 2 sodas, every day.   (Last Updated: 7/6/2016 11:10:11 AM CDT by Katie Hatfield)          Exercise:  Does not exercise      (Last Updated: 1/13/2016 7:49:47 PM CST by Aminata Landon CMA )         Sexual:        Sexually active: No.  Sexual orientation: Heterosexual.   (Last Updated: 1/13/2016 7:50:46 PM CST by Aminata Landon CMA)

## 2022-02-15 NOTE — TELEPHONE ENCOUNTER
Entered by Sue Mack CMA on February 25, 2020 12:45:35 PM CST  ---------------------  From: Sue Mack CMA   To: [facility] #[id number]    Sent: 2/25/2020 12:45:35 PM CST  Subject: Medication Management     ** Not Approved: Patient has requested refill too soon, #90 sent 2/5/20 **  tiZANidine (TIZANIDINE 4MG TABLETS)  TAKE 2 TO 3 TABLETS BY MOUTH EVERY NIGHT AT BEDTIME  Qty:  90 tab(s)        Days Supply:  30        Refills:  0          Substitutions Allowed     Route To [facility] #[id number]   Signed by Sue Mack CMA            ** Submitted: **  Order:amLODIPine (amLODIPine 5 mg oral tablet)  1 tab(s)  Oral  daily  Qty:  90 tab(s)        Refills:  0          Substitutions Allowed     Route To [facility] #[id number]    Signed by Sue Mack CMA  2/25/2020 12:44:00 PM    ** Submitted: **  Complete:amLODIPine (amLODIPine 5 mg oral tablet)   Signed by Sue Mack CMA  2/25/2020 12:44:00 PM    ** Not Approved:  **  amLODIPine (AMLODIPINE BESYLATE 5MG TABLETS)  TAKE 1 TABLET BY MOUTH DAILY  Qty:  30 tab(s)        Days Supply:  30        Refills:  0          Substitutions Allowed     Route To [facility] #[id number]   Signed by Sue Mack CMA            ------------------------------------------  From: [facility] #[id number]  To: Orestes Mendosa MD  Sent: February 25, 2020 10:13:30 AM CST  Subject: Medication Management  Due: February 26, 2020 10:13:30 AM CST    ** On Hold Pending Signature **  Drug: tiZANidine (tiZANidine 4 mg oral tablet)  TAKE 2 TO 3 TABLETS BY MOUTH EVERY NIGHT AT BEDTIME  Quantity: 90 tab(s)  Days Supply: 30  Refills: 0  Substitutions Allowed  Notes from Pharmacy:     Dispensed Drug: tiZANidine (tiZANidine 4 mg oral tablet)  TAKE 2 TO 3 TABLETS BY MOUTH EVERY NIGHT AT BEDTIME  Quantity: 90 tab(s)  Days Supply: 30  Refills: 0  Substitutions Allowed  Notes from Pharmacy:     ** On Hold Pending Signature **  Drug: amLODIPine (amLODIPine 5  mg oral tablet)  TAKE 1 TABLET BY MOUTH DAILY  Quantity: 30 tab(s)  Days Supply: 30  Refills: 0  Substitutions Allowed  Notes from Pharmacy:     Dispensed Drug: amLODIPine (amLODIPine 5 mg oral tablet)  TAKE 1 TABLET BY MOUTH DAILY  Quantity: 30 tab(s)  Days Supply: 30  Refills: 0  Substitutions Allowed  Notes from Pharmacy:   ------------------------------------------Med Refill      Date of last office visit and reason:  2/4/20; MS, DM      Date of last Med Check / Px:   2/4/20  Date of last labs pertaining to med:  1/8/20    RTC order in chart:  yes; May    For Protocol refill, has patient been contacted:  n/a

## 2022-02-15 NOTE — PROGRESS NOTES
Patient:   MARGARITA KAISER            MRN: [id number]            FIN: [id number]               Age:   42 years     Sex:  Female     :  1975   Associated Diagnoses:   None   Author:   Emily Lan MD      Visit Information      Date of Service: 2018 05:20 pm  Performing Location: [facility]  Encounter#: [id number]      Primary Care Provider (PCP):  Orestes Mendosa MD    NPI# [id number]      Referring Provider:  Emily Lan MD    NPI# [id number]      Procedure   Procedure   Time.     Indication: myofascial pain.     Informed consent: signed by patient.     Confirmed: patient, procedure, side, site.     Type of procedure: trigger point injection.     Physical Exam: vital signs Vital Signs   2018 5:31 PM CDT Temperature Tympanic 98.4 DegF    Peripheral Pulse Rate 100 bpm    Pulse Site Radial artery    HR Method Manual    Systolic Blood Pressure 142 mmHg  HI    Diastolic Blood Pressure 94 mmHg  HI    Mean Arterial Pressure 110 mmHg    BP Site Right arm    BP Method Manual      .     Preparation and technique: informed consent obtained, position sitting, sterile preparation of site with 70 % alcohol, hemostasis achieved using direct pressure, estimated blood loss minimal, adhesive bandagedressing applied.     Findings: painful and tender myofascial trigger points identified by palpation with communication from patient    Location: bilateral lower lateral back, left x 4 and right x 1    Number of injections:5    pain prior to procedure:   moderate      pain following procedure:improved    number of milliliters of the 1:1 solution of 1% lidocaine without epiniephrine and 0.5% marcaine without epinephrine used in total: 8.     Procedure tolerated: well.     No Complications.     No qualifying data available.          Impression and Plan   Diagnosis     return to clinic if symptoms worsen or do not improve.     Counseled:  Patient, Family, use ice or heat as needed to help with pain,  continue with home exercise program, .

## 2022-02-15 NOTE — NURSING NOTE
Comprehensive Intake Entered On:  7/23/2019 8:04 AM CDT    Performed On:  7/23/2019 8:01 AM CDT by Freya Mark CMA               Summary   Chief Complaint :   would like to go back on Adderall - has been off x 2yrs- family counseling and counselor jarvis she would benefit going back on med    Menstrual Status :   Menarcheal   Weight Measured :   267 lb(Converted to: 267 lb 0 oz, 121.11 kg)    Systolic Blood Pressure :   110 mmHg   Diastolic Blood Pressure :   72 mmHg   Mean Arterial Pressure :   85 mmHg   Peripheral Pulse Rate :   64 bpm   BP Site :   Right arm   Temperature Tympanic :   98.3 DegF(Converted to: 36.8 DegC)    Denisesonja LERMA Freya - 7/23/2019 8:01 AM CDT   Health Status   Allergies Verified? :   Yes   Medication History Verified? :   Yes   Medical History Verified? :   Yes   Pre-Visit Planning Status :   Completed   Tobacco Use? :   Never smoker   Deedee LERMA Freya - 7/23/2019 8:01 AM CDT   Social History   Social History   (As Of: 7/23/2019 8:04:07 AM CDT)   Alcohol:        Current, 1 TIME PER WEEK, 1 drinks/episode maximum.   (Last Updated: 12/14/2018 12:13:00 PM CST by Brianna Parrish)          Tobacco:        Never smoker   (Last Updated: 1/13/2016 7:49:24 PM CST by Aminata Landon CMA)          Substance Abuse:  Current      vaping THC and cannabis oil   (Last Updated: 12/12/2018 2:39:37 PM CST by Tati Will CMA)          Employment/School:        Disability   (Last Updated: 2/28/2017 2:54:15 PM CST by [place] SOLIZ, Dione)          Home/Environment:        2 children.   (Last Updated: 1/13/2016 7:50:00 PM CST by Aminata Landon CMA)          Nutrition/Health:        Caffeine intake amount: 2 sodas, every day.   (Last Updated: 7/6/2016 11:10:11 AM CDT by Katie Hatfield)          Exercise:  Does not exercise      (Last Updated: 1/13/2016 7:49:47 PM CST by Aminata Landon CMA )         Sexual:        Sexually active: No.  Sexual orientation: Heterosexual.   (Last Updated: 1/13/2016 7:50:46  PM CST by Lauro Landon CMA

## 2022-02-15 NOTE — TELEPHONE ENCOUNTER
---------------------  From: Freya Mark CMA (Miguel Martinez ([id number]_[facility]))   To: Orestes Mendosa MD;     Sent: 8/27/2019 1:40:27 PM CDT  Subject: FW: Medication Management   Due Date/Time: 8/28/2019 8:22:00 AM CDT     last filled 7/24/2019  last seen 8/17 for bronchitis      ------------------------------------------  From: [facility] #[id number]  To: Orestes Mendosa MD  Sent: August 27, 2019 8:22:12 AM CDT  Subject: Medication Management  Due: August 28, 2019 8:22:12 AM CDT    ** On Hold Pending Signature **  Drug: tiZANidine (tiZANidine 4 mg oral tablet)  2 TO 3 TABLETS ORAL QHS  Quantity: 90 tab(s)     Days Supply: 0         Refills: 0  Substitutions Allowed  Notes from Pharmacy:     Dispensed Drug: tiZANidine (tiZANidine 4 mg oral tablet)  TAKE 2 TO 3 TABLETS BY MOUTH EVERY NIGHT AT BEDTIME  Quantity: 90 tab(s)     Days Supply: 30        Refills: 0  Substitutions Allowed  Notes from Pharmacy:   ---------------------------------------------------------------  From: Orestes Mendosa MD   To: [facility] #[id number]    Sent: 8/27/2019 1:46:39 PM CDT  Subject: FW: Medication Management     ** Submitted: **  Complete:tiZANidine (tiZANidine 4 mg oral tablet)   Signed by Orestes Mendosa MD  8/27/2019 1:46:00 PM    ** Approved **  tiZANidine (TIZANIDINE 4MG TABLETS)  TAKE 2 TO 3 TABLETS BY MOUTH EVERY NIGHT AT BEDTIME  Qty:  90 tab(s)        Days Supply:  30        Refills:  0          Substitutions Allowed     Route To Pharmacy - [facility] #[id number]

## 2022-02-15 NOTE — PROGRESS NOTES
Chief Complaint    interested in sleep study  History of Present Illness      48s here to discuss having a sleep study.  Her daughters report that she does snore.  Occasionally wakes up gasping and choking.  She has non-restful disturbed sleep is fatigued his insomnia with restless legs is obese has morning headaches is recently had high blood pressures no drowsy driving incidents.  Epsworth score is 11.  Review of Systems      No heartburn.  She has nocturia and chronic urinary tract symptoms.  Her MS.  Physical Exam   Vitals & Measurements    HR: 116(Peripheral)  BP: 144/80     HT: 65 in  WT: 292 lb  BMI: 48.59       Patient appears comfortable.  Alert and oriented.  Cranial nerves normal.  H&T exam reveals a Mallampati 1 airway neck is thick chest clear cardiac exam regular no edema.  Assessment/Plan       Multiple sclerosis         Ordered:          Referral (Request), 02/01/18 14:46:00 CST, Referred to: Other, Referred to: [facility], Reason for referral: Polysomnogram for EDS, snoring, Multiple sclerosis  Obesity                Obesity         High pretest probability of this morbidly obese patient with MS.  Snoring apneas sleepiness present.  I believe she should have a lab test due to her comorbid conditions.         Ordered:          Referral (Request), 02/01/18 14:46:00 CST, Referred to: Other, Referred to: [facility], Reason for referral: Polysomnogram for EDS, snoring, Multiple sclerosis  Obesity           Patient Information     Name:MARGARITA KAISER      Address:      [address] [place] [id number]     Sex:Female     YOB: 1975     [phone]     Emergency Contact:ELISA HOPE     MRN:[id number]     FIN:[id number]     Location:[facility]     Date of Service:02/01/2018      Primary Care Physician:       Deondre HART, Orestes, [phone]  Problem List/Past Medical History    Ongoing     Anemia in chronic illness     Ataxia      Blues     Fatigue     Headache, migraine      Comments: With aura.     History of renal stone     Multiple sclerosis     Muscle weakness     Obesity     Seasonal allergies     Skin sensation disturbance     Urinary bladder neurogenic dysfunction     Vitamin D deficiency    Historical     *Hospitalized@[facility] - Complicated UTI     *Hospitalized@[place] - Left renal stone     Pregnancy     Pregnancy     Pregnancy  Procedure/Surgical History     Port-a-cath in place - Right (2016)     cystoscopy left ureteroscopy with holmium laser left retrogrades stone extraction and left ureteral stent exchange (2016)     Ureteroscopy (2016)     Appendectomy ()     Breast reduction ()     Tonsillectomy and adenoidectomy ()      section     Cholecystectomy     Tubal ligation  Medications        DULoxetine 60 mg oral delayed release capsule: 180 mg, 3 cap(s), po, daily, 180 cap(s), 0 Refill(s).        LORazepam 0.5 mg oral tablet: po, tid, 0.5mg prn and 1-2 hs, PRN: for agitation, 0 Refill(s).        Walker with large wheels and large sitting seat: See Instructions, use as directed, 1 EA, 0 Refill(s).        Excedrin: See Instructions, 0 Refill(s).        Adderall: 45 mg, po, bid, 0 Refill(s).        Keflex 500 mg oral capsule: 500 mg, 1 cap(s), PO, QID, for 10 day(s), 40 cap(s), 0 Refill(s).        cholecalciferol 5000 intl units oral tablet: See Instructions, 15,000 units daily, 100 tab(s), 5 Refill(s).        cyclobenzaprine: 10 mg, po, tid, PRN: as needed for muscle spasm, 0 Refill(s).        ergocalciferol 50,000 intl units (1.25 mg) oral capsule: See Instructions, 1 cap(s) po wkly, 12 cap(s), 0 Refill(s).        indomethacin 25 mg oral capsule: 25 mg, 1 cap(s), po, q6 hrs, PRN: for gout pain, 0 Refill(s).        LaMICtal 100 mg oral tablet: 100 mg, 1 tab(s), po, daily, 0 Refill(s).        lidocaine 5% topical ointment: 1 gabby, TOP, TID, PRN: for pain, 50 gm, 2 Refill(s).         lidocaine-prilocaine 2.5%-2.5% topical cream: See Instructions, APPLY 5 GRAMS TOPICALLY TO THE AFFECTED AREA(S) ONCE FOR A ONE TIME DOSE, 30 gm.        medroxyPROGESTERone 10 mg oral tablet: See Instructions, TAKE 1 TABLET BY MOUTH EVERY DAY FOR 10 DAYS ONCE PER MONTH PER PROVIDER, 30 tab(s).        methenamine hippurate 1 g oral tablet: See Instructions, TAKE ONE TABLET BY MOUTH TWICE DAILY WITH FOOD, 60 tab(s).        modafinil 200 mg oral tablet: See Instructions, TAKE 1 TABLET BY MOUTH TWICE DAILY, 60 tab(s).        Tysabri: iv, q4 wks, 0 Refill(s).        ondansetron 4 mg oral tablet: 4 mg, 1 tab(s), PO, q8 hrs, PRN: for nausea and vomiting, 10 tab(s), 0 Refill(s).        Lyrica 300 mg oral capsule: 300 mg, 1 cap(s), po, tid, 90 cap(s), 5 Refill(s).        rizatriptan 10 mg oral tablet: See Instructions, TAKE 1 TABLET BY MOUTH 1 TIME AS NEEDED FOR MIGRAINE HEADACHE, 6 tab(s).        tiZANidine 4 mg oral tablet: See Instructions, TAKE 2 TO 3 TABLETS BY MOUTH EVERY NIGHT AT BEDTIME, 90 tab(s).                Allergies    adhesive tape    sulfa drugs (Rash, Vomiting....., Diarrhea)  Social History    Smoking Status - 02/01/2018     Never smoker     Alcohol - 01/26/2018      Current, 1-2 times per month, 1 drinks/episode maximum.     Employment and Education - 01/26/2018      Disability     Exercise and Physical Activity - Does not exercise, 01/26/2018     Home and Environment - 01/26/2018      2 children.     Nutrition and Health - 01/26/2018      Caffeine intake amount: 2 sodas, every day.     Sexual - 01/26/2018      Sexually active: No. Sexual orientation: Heterosexual.     Substance Abuse - Past, 01/26/2018      Marijuana     Tobacco - 01/26/2018      Never smoker  Family History    Anxiety: Daughter and Daughter.    Bipolar: Father.    CA - Cancer: Father.    CHF - Congestive heart failure: Father.    Depression: Father and Daughter.    Diabetes mellitus type 2: Father.    Diabetes mellitus type II: Father.     Gallbladder problem: Mother.    Hypothyroidism: Sister.    Kidney stone: Daughter.    Melanoma: Father.    Migraine: Mother, Sister and Daughter.    Miscellaneous: Mother and Brother.    Thyroid: Sister.  Immunizations      Vaccine Date Status      pneumococcal (PPSV23) 05/14/2012 Recorded      tetanus/diphth/pertuss (Tdap) adult/adol 07/16/2008 Recorded  Lab Results      Results (Last 90 days)                Laboratory                     Chemistry                          General Chemistry                               BUN:      12 mg/dL  (01/26/18 02:12 PM CST)                                                                                                                                          BUN/Creat Ratio:      NOT APPLICABLE   (01/26/18 02:12 PM CST)                                                                                                                                          Basic Metabolic Profile:         (01/26/18 02:12 PM CST)                                                                                                                                          C-Reactive Protein (CRP):      H 44.0 mg/L (Range  - <8.0)  (01/26/18 02:12 PM CST)                                                                                                                                          CO2 Level:      23 mmol/L  (01/26/18 02:12 PM CST)                                                                                                                                          Calcium Level:      9.5 mg/dL  (01/26/18 02:12 PM CST)                                                                                                                                          Chloride Level:      104 mmol/L  (01/26/18 02:12 PM CST)                                                                                                                                          Creatinine Level:      0.71 mg/dL  (01/26/18 02:12  PM CST)                                                                                                                                          Glucose Level:      H 139 mg/dL (Range 65 - 99)  (01/26/18 02:12 PM CST)                                                                                                                                          Potassium Level:      4.4 mmol/L  (01/26/18 02:12 PM CST)                                                                                                                                          Sodium Level:      138 mmol/L  (01/26/18 02:12 PM CST)                                                                                                                                          eGFR:      104 mL/min/1.73m2  (01/26/18 02:12 PM CST)                                                                                                                                          eGFR African American:      120 mL/min/1.73m2  (01/26/18 02:12 PM CST)                                                                                                                                Hematology                          CBC                               Hct:      36.1 %  (01/26/18 02:12 PM CST)                                                                                                                                          Hgb:      12.0 gm/dL  (01/26/18 02:12 PM CST)                                                                                                                                          MCH:      L 26.7 pg (Range 27.0 - 33.0)  (01/26/18 02:12 PM CST)                                                                                                                                          MCHC:      33.2 gm/dL  (01/26/18 02:12 PM CST)                                                                                                                                           MCV:      80.2 fL  (01/26/18 02:12 PM CST)                                                                                                                                          MPV:      9.7 fL  (01/26/18 02:12 PM CST)                                                                                                                                          Platelet:      343   (01/26/18 02:12 PM CST)                                                                                                                                          RBC:      4.50   (01/26/18 02:12 PM CST)                                                                                                                                          RDW:      15.0 %  (01/26/18 02:12 PM CST)                                                                                                                                          WBC                                        (01/26/18 02:12 PM CST)                                                                                                                                                10.1   (01/26/18 02:12 PM CST)                                                                                                                                     Differential                               Abs Basophils:      71   (01/26/18 02:12 PM CST)                                                                                                                                          Abs Eosinophils:      283   (01/26/18 02:12 PM CST)                                                                                                                                          Abs Lymphocytes:      2464   (01/26/18 02:12 PM CST)                                                                                                                                          Abs Monocytes:      404   (01/26/18 02:12 PM CST)                                                                                                                                           Abs Neutrophils:      6878   (01/26/18 02:12 PM CST)                                                                                                                                          Basophils:      0.7 %  (01/26/18 02:12 PM CST)                                                                                                                                          Eosinophils:      2.8 %  (01/26/18 02:12 PM CST)                                                                                                                                          Lymphocytes:      24.4 %  (01/26/18 02:12 PM CST)                                                                                                                                          Monocytes:      4.0 %  (01/26/18 02:12 PM CST)                                                                                                                                          Neutrophils:      68.1 %  (01/26/18 02:12 PM CST)                                                                                                                                Microbiology                          Bacteriology                               Culture Blood                                        (01/26/18 02:15 PM CST)                                                                                                                                                See comment   (01/26/18 02:15 PM CST)                                                                                                                                          Culture Urine                                        (01/26/18 02:14 PM CST)                                                                                                                                                See comment   (01/26/18 02:14  PM CST)                                                                                                                                Urinalysis                          UA Dipstick                               UA Bilirubin:      Negative   (01/26/18 02:32 PM CST)                                                                                                                                          UA Clarity:      Slightly Cloudy   (01/26/18 02:32 PM CST)                                                                                                                                          UA Color:      Yellow   (01/26/18 02:32 PM CST)                                                                                                                                          UA Glucose:      Negative mg/dL  (01/26/18 02:32 PM CST)                                                                                                                                          UA Ketones:      Negative mg/dL  (01/26/18 02:32 PM CST)                                                                                                                                          UA Leukocyte Esterase:      Negative   (01/26/18 02:32 PM CST)                                                                                                                                          UA Nitrite:      Negative   (01/26/18 02:32 PM CST)                                                                                                                                          UA Protein:      Negative mg/dL  (01/26/18 02:32 PM CST)                                                                                                                                          UA Specific Gravity:      1.025   (01/26/18 02:32 PM CST)                                                                                                                                           UA Urobilinogen:      Normal   (01/26/18 02:32 PM CST)                                                                                                                                          UA pH:      6.0   (01/26/18 02:32 PM CST)                                                                                                                                          Urine Occult Blood:      Negative   (01/26/18 02:32 PM CST)                                                                                                                                     UA Microscopic                               UA Bacteria:      Moderate   (01/26/18 02:32 PM CST)                                                                                                                                          UA Calcium Oxalate Crystals:      Present   (01/26/18 02:32 PM CST)                                                                                                                                          UA Epithelial Cells:      Moderate   (01/26/18 02:32 PM CST)                                                                                                                                          UA RBC:      0-2   (01/26/18 02:32 PM CST)                                                                                                                                          UA WBC:      6-10   (01/26/18 02:32 PM CST)

## 2022-02-15 NOTE — PROGRESS NOTES
Patient:   KARLI KAISER            MRN: [id number]            FIN: [id number]               Age:   41 years     Sex:  Female     :  1975   Associated Diagnoses:   Multiple sclerosis; Prediabetes; Neurogenic bladder; Migraine   Author:   Orestes Mendosa MD      Visit Information      Date of Service: 2017 03:55 pm  Performing Location: [facility]  Encounter#: [id number]      Primary Care Provider (PCP):  [id number] -UNKNOWN,      Referring Provider:  No referring provider recorded for selected visit.      Chief Complaint   2017 4:00 PM CST    Migraine started Monday and took 1 rizatriptan which helped.  Came back again Tues on right side and unable to get rx due to cost at this time until she meets her deductable.              Additional Information:No additional information recorded during visit.   Chief complaint and symptoms as noted above and confirmed with patient.  Recent lab and diagnostic studies reviewed with patient      History of Present Illness   3/8/2016: Karli presents to clinic for ER follow-up after presentations on both Thursday and Friday this past week for urinary incontinence and suspected pyelonephritis.  She requested for Srivastava catheter to be placed this past week because of persistent incontinence.  She is scheduled to meet with the urologist on the  of this month.  She was originally prescribed Cipro which was later changed to Macrobid after discovery of extended spectrum beta-lactamase resistant E. coli bacteria.  She was started on Macrobid on .  Overall continues to have severe left-sided flank pain.  She is very distressed about her underlying multiple sclerosis and her ability to care for her daughter.  She does follow with a neurologist through [facility].  She previously was following with an internist through [place] and now wishes to localize her cares here.    2016: Karli returns to clinic for follow-up after recent  hospitalization with pyelonephritis.  She has grown out ESBL E. coli strains her last 2 urine cultures.  She was empirically started on ertapenem and change to fosfomycin as an outpatient.  She feels well.  Previous ureteral stenting and stone extraction is completed.  Scheduled to follow-up with urology in the near future for urodynamic studies.    8/23/2016: Karli returns to clinic for follow-up after evaluation this past week with Dr. Radford.  She did present with febrile illness and CVA tenderness.  Urinalysis was normal.  Urine culture isolating 10-50,000 colonies of normal genitalia colonizing bacteria.  She was empirically started on both fosfomycin and ertapenem given her history of a previous ESBL organisms for presumptive treatment of pyelonephritis.  Her fever is resolved.  She continues to have some urinary frequency and incontinence.  She is not clear this is related to her residual MS versus infection related.  Does describe having antibiotic related diarrhea.  Does bring up questions related to underlying anemia.    9/6/2016: Presents to clinic with migraine complaints for the past 5 days.  No improvement with Excedrin originally.  Since then she has used both indomethacin and Flexeril with no improvement.  She did have a availability to Percocet which helped transiently.  Historically has not been responsive to sumatriptan and.  No other triptan use    12/15/2016: Karli presents to clinic with 2 week history of cold symptoms.  She presented to the emergency department earlier in this course for concerns of possible urinary tract infection.  She has had increased urinary frequency with incontinence.  Urinalysis was normal in the emergency room.  She was referred on to a neurologist who ordered a CT of her neuro spine showing new MS lesions.  She was recommended to start on steroids though has had very significant psychiatric adverse reactions on corticosteroids.  She was prescribed corticotropin  (Acthar) which she completed today.  Describing a 24-hour history of severe flulike symptoms including myalgias, extreme malaise, nonproductive cough.  She was advised to seek any medical attention with describes symptoms on Acthar.  Previously was treating with a variety of over-the-counter cold medicines including Robitussin, Tessalon Perles, Tylenol, Claritin    1/18/2017: Presents with migraine headaches now for the last 3 days.  She used up the last of her rizatriptan which generally works the best for her.  Currently with very limited funds for any other medication options.  Scheduled for infusion for her MS scheduled tomorrow.  Hoping for more immediate relief today, allowing her to get to infusion tomorrow.  After infusion, her deductible will be met.         Review of Systems   Constitutional:  Weakness, No fever, No chills.    Eye:  Negative except as documented in history of present illness.    Ear/Nose/Mouth/Throat:  Negative except as documented in history of present illness.    Respiratory:  No shortness of breath.    Cardiovascular:  No chest pain, No palpitations, No peripheral edema, No syncope.    Gastrointestinal:  Nausea, No vomiting.    Genitourinary:  No dysuria, No hematuria, No change in urine stream.    Hematology/Lymphatics:  Negative except as documented in history of present illness.    Endocrine:  No excessive thirst, No polyuria.    Immunologic:  No recurrent fevers.    Musculoskeletal:  No joint pain, No muscle pain.    Neurologic:  Alert and oriented X4, Numbness, Headache, No abnormal balance, No tingling.    Psychiatric:  Anxiety.       Health Status   Allergies:    Allergic Reactions (Selected)  Severity Not Documented  Adhesive tape (No reactions were documented)  Sulfa drugs (Rash, vomiting..... and diarrhea)   Medications:  (Selected)   Prescriptions  Prescribed  Lyrica 300 mg oral capsule: 1 cap(s) ( 300 mg ), po, tid, # 90 cap(s), 5 Refill(s), Type: Maintenance, Pharmacy:  [facility] [place] [address], 1 cap(s) po tid  Tessalon Perles 100 mg oral capsule: 2 cap(s) ( 200 mg ), po, q4 hrs, Instructions: Can increase to 400mg q 4 hrs if needed, # 60 cap(s), 0 Refill(s), Type: Maintenance, Pharmacy: [id number], 2 cap(s) po q4 hrs,Instr:Can increase to 400mg q 4 hrs if needed  Walker with large wheels and large sitting seat: Walker with large wheels and large sitting seat, See Instructions, Instructions: use as directed, Supply, # 1 EA, 0 Refill(s), Type: Maintenance  azithromycin 500 mg oral tablet: 1 tab(s) ( 500 mg ), PO, Daily, # 5 tab(s), 0 Refill(s), Type: Maintenance, Pharmacy: [id number], 1 tab(s) po daily,x5 day(s)  cefuroxime 500 mg oral tablet: 1 tab(s) ( 500 mg ), PO, BID, # 14 tab(s), 0 Refill(s), Type: Maintenance, Pharmacy: [id number], 1 tab(s) po bid,x7 day(s)  methenamine hippurate 1 g oral tablet: See Instructions, Instructions: TAKE ONE TABLET BY MOUTH TWICE DAILY WITH FOOD, # 60 tab(s), Type: Soft Stop, Pharmacy: [facility] [place] [address], TAKE ONE TABLET BY MOUTH TWICE DAILY WITH FOOD  modafinil 200 mg oral tablet: ( 200 mg ), po, bid, # 60 tab(s), 5 Refill(s), Type: Maintenance, called to pharmacy (Rx)  rizatriptan 10 mg oral tablet: See Instructions, Instructions: TAKE 1 TABLET BY MOUTH 1 TIME AS NEEDED FOR MIGRAINE HEADACHE, # 6 tab(s), Type: Soft Stop, [id number]  Documented Medications  Documented  Adderall: ( 45 mg ), po, bid, 0 Refill(s), Type: Maintenance  Cymbalta 30 mg oral delayed release capsule: 1 cap(s) ( 30 mg ), po, daily, 0 Refill(s), Type: Maintenance  Cymbalta 60 mg oral delayed release capsule: 1 cap(s) ( 60 mg ), po, daily, 0 Refill(s), Type: Maintenance  Excedrin: See Instructions, 0 Refill(s), Type: Maintenance  LORazepam 0.5 mg oral tablet: po, tid, Instructions: 0.5mg prn and 1-2 hs, PRN: for agitation, 0 Refill(s), Type: Maintenance  LaMICtal 100 mg oral tablet: 1 tab(s) ( 100 mg ), po,  daily, 0 Refill(s), Type: Maintenance  Tysabri: iv, q4 wks, 0 Refill(s), Type: Maintenance  Vitamin C: ( 500 mg ), bid, 0 Refill(s), Type: Maintenance  Vitamin D3 2000 intl units oral tablet: 1 tab(s) ( 2,000 International Unit ), po, tid, 0 Refill(s), Type: Maintenance  cyclobenzaprine: ( 10 mg ), po, tid, PRN: as needed for muscle spasm, 0 Refill(s), Type: Maintenance  indomethacin 25 mg oral capsule: 1 cap(s) ( 25 mg ), po, q6 hrs, PRN: for gout pain, 0 Refill(s), Type: Maintenance  tiZANidine 2 mg oral tablet: 2 tab(s) ( 4 mg ), po, tid, 0 Refill(s), Type: Maintenance   Problem list:    All Problems  Anemia in chronic illness / SNOMED CT 344627533 / Confirmed  Ataxia / SNOMED CT 55539922 / Confirmed  Blues / SNOMED CT 490706960 / Confirmed  Fatigue / SNOMED CT 364330328 / Confirmed  History of renal stone / SNOMED CT 9271908880 / Confirmed  Headache, migraine / SNOMED CT 79023285 / Confirmed  Multiple sclerosis / SNOMED CT 67345244 / Confirmed  Muscle weakness / SNOMED CT 95193950 / Confirmed  Urinary bladder neurogenic dysfunction / SNOMED CT 4989894050 / Confirmed  Obesity / SNOMED CT 9219507128 / Probable  Skin sensation disturbance / SNOMED CT 741340937 / Confirmed  Vitamin D deficiency / SNOMED CT 41661200 / Confirmed  Resolved: *Hospitalized@[facility] - Complicated UTI  Resolved: *Hospitalized@[email or web address] - Left renal stone  Resolved: Pregnancy  Resolved: Pregnancy / SNOMED CT 028745329  Canceled: Headache / SNOMED CT 37477358      Histories   Past Medical History:    Active  Multiple sclerosis (70317417): Onset in 2012 at 37 years.  Blues (866875901)  History of renal stone (5859590182)  Urinary bladder neurogenic dysfunction (4543891121)  Fatigue (333027148)  Muscle weakness (48626378)  Vitamin D deficiency (63467331)  Ataxia (87128583)  Headache, migraine (32947531)  Comments:  7/6/2016 CDT 11:19 AM CDT - Katie Hatfield  With aura.  Skin sensation disturbance (838894307)  Resolved  *Hospitalized@[facility] - Complicated  UTI: Onset on 2016 at 40 years.  Resolved on 2016 at 40 years.  *Hospitalized@[email or web address] - Left renal stone: Onset in the month of 2016 at 40 years  Resolved.  Pregnancy:  Resolved.  Pregnancy (570161310):  Resolved in  at 30 years.   Family History:    Melanoma  Father (Shakir)  Diabetes mellitus type II  Father (Shakir)  Heart disease  Father (Shakir)  Hypothyroidism  Sister (Ana Cristina)  Diabetes mellitus type 2  Father (Shakir)  Heart failure  Father (Shakir)  Migraine  Mother (Soledad)  Sister (Ana Cristina)  Daughter     Procedure history:    Port-a-cath in place - Right (44567665) on 2016 at 41 Years.  Comments:  2016 8:01 AM - Ceci Engle  Dx:  Multiple sclerosis.  cystoscopy left ureteroscopy with holmium laser left retrogrades stone extraction and left ureteral stent exchange on 6/3/2016 at 40 Years.  Ureteroscopy (7372903642) in the month of 2016 at 40 Years.  Comments:  2016 4:56 PM - La Garcia  with laser/stone extraction (inability to remove all of stone)  Appendectomy (775965547) in  at 39 Years.  Breast reduction (277109321) in  at 26 Years.  Tonsillectomy and adenoidectomy (341294515) in  at 14 Years.  Cholecystectomy (30018717).   section (42486304).  Comments:  2016 11:26 AM - Freya Mark CMA  x1  Tubal ligation (782244394).   Social History:        Alcohol Assessment            Current, 1-2 times per week, 1 drinks/episode maximum.      Tobacco Assessment            Never smoker      Substance Abuse Assessment            Never      Home and Environment Assessment            2 children.      Nutrition and Health Assessment            Caffeine intake amount: 2 sodas, every day.      Exercise and Physical Activity Assessment: Does not exercise      Sexual Assessment            Sexually active: No.  Sexual orientation: Heterosexual.        Physical Examination   vital signs stable, as noted above   Vital Signs   2017 4:00 PM CST Temperature Tympanic  98.7 DegF    Peripheral Pulse Rate 88 bpm    Systolic Blood Pressure 150 mmHg  HI    Diastolic Blood Pressure 86 mmHg    Mean Arterial Pressure 107 mmHg    BP Site Right arm      Measurements from flowsheet : Measurements   1/18/2017 4:00 PM CST    Weight Measured - Standard                284.4 lb     General:  Alert and oriented, No acute distress.    Eye:  Pupils are equal, round and reactive to light, Extraocular movements are intact.    HENT:  Normocephalic.    Neck:  Supple.    Respiratory:  Lungs are clear to auscultation, Respirations are non-labored.    Cardiovascular:  Normal rate, Regular rhythm, No murmur, No edema.    Musculoskeletal:  Infusaport in place; not engaged.    Neurologic:  Alert, Oriented, Normal motor function, No focal deficits, Cranial Nerves II-XII are grossly intact.    Cognition and Speech:  Oriented, Speech clear and coherent.    Psychiatric:  Appropriate mood & affect.       Review / Management   Results review:  Lab results: 12/15/2016 7:40 PM CST   Influenza Ag              Negative for Influenza A antigen; Negative for Influenza B antigen  .       Impression and Plan   Diagnosis     Multiple sclerosis (QZG27-KZ G35).     Prediabetes (RWB67-QN R73.09).     Neurogenic bladder (OPF31-SU N31.9).     Migraine (VJO53-IL G43.019).         .) migraine HA   - provided Toradol 60mg IM, phenergan 25m IM and morphine 5mg IM today in clinic with symptom improvement   - historically good response to rizatriptan 10mg once at onset of symptoms in the future    - Excedrin in the future if triptan not available    plan as previously outlined:     .) lower urinary tract symptoms; fever   - urine Cx 8/18: 10-50K of colonizing toan (normal U/A)   - empirically treated with ertapenem and fosfomycin given h/o ESBL pyelo   - urology retrieval of kidney stones in 6/2016   - still with incontinence and lower urinary tract symptoms - suspect more MS related    - can stop Abx at this point    - will need to  follow up for urodnamic studies    .) multiple sclerosis   - currently on Tysabri   - following with Dr. Tucker through [facility] neurology group in [place]     .) chronic anemia   - last CBC for review with Hgb 9.6 (6/2016)   - will have CBC, iron studies drawn with next planned lab draw at [facility]    .) health maintenance   - prediabetes - planning on attending evening class with Oanh Mane   - enrolled in [facility] program

## 2022-02-15 NOTE — TELEPHONE ENCOUNTER
---------------------  From: Freya Mark CMA   To: [facility] Message Pool ([id number] - [place]);     Sent: 1/10/2020 3:28:39 PM CST  Subject: General Message-lab results     LM for pt to return call at 1527    A1c per Hosp at 6.9 - would like her to meet with DS regarding her new Dx of Type 2 DMpt calls back and notified of results, agrees to schedule with Oanh and transferred to scheduling

## 2022-02-15 NOTE — PROGRESS NOTES
Patient:   MARGARITA KAISER            MRN: [id number]            FIN: [id number]               Age:   42 years     Sex:  Female     :  1975   Associated Diagnoses:   Blues; Cellulitis; Multiple sclerosis; Myalgia; Physical deconditioning   Author:   Emily Lan MD      Visit Information      Date of Service: 2018 02:20 pm  Performing Location: [facility]  Encounter#: [id number]      Primary Care Provider (PCP):  Orestes Mendosa MD    NPI# [id number]      Referring Provider:  Emily Lan MD    NPI# [id number]      Chief Complaint   2018 2:29 PM CST    consult  per [facility]        History of Present Illness   Patient presents to clinic today for follow-up of her chronic pain.  Her primary care provider is Dr. Orestes Mendosa.  Patient has a history of multiple sclerosis as well as degenerative disc disease and has an abnormal gait and some overall deconditioning.  She reports that about a month ago she had a fall, she does fall frequently related to some gait instability secondary to her multiple sclerosis.  She does not routinely use a cane or walker although she has been counseled that using these would help reduce the number of falls that she has.  She has had pain in her bilateral lower back since that time.  She finds it difficult to stand still in one place for more than just a few moments.  Sitting improves the pain however the pain is constantly present it wraps is a tight band across her lower back and radiates into her pelvic area.  It is tight sharp and stabbing at times.  She has not tried her indomethacin to see if this would improve it.  She also reports that her duloxetine was recently increased from 120 mg to 180 mg daily.  Since this increase she is noted some new little red dots on her chest.  She is wondering if this might be related.  She says that she has always had a problem with bleeding easily and that after every surgery she has ever had she has had a problem with  hemorrhaging.  She thinks that this may have worsened a little bit since increasing the duloxetine with some increased bruising.  She has not noticed over the past 2 weeks that increase the duloxetine from 120 mg to 180 mg has improved her pain at all.  She does be report increasing the duloxetine from 60 mg to 120 mg did significantly improve her pain.  She is interested in pursuing physical therapy and would like to try pool based therapy.  I shared with her that this should be available at [facility] over in [place].  She is also interested in pursuing acupuncture.  She is not sure if her secondary insurance will cover this but says that she has asked her sister to take over her finances and that she has about $1000 a month of money to use above the cause of her bills and feels that it would be worth using some of this money on acupuncture.  She would also like to try trigger point injections today to see if this might relieve her pain.  She is aware that if any procedure there is a risk of pain bleeding infection injury to surrounding tissue as well as need for further treatment and that I cannot guarantee that any treatment whether it be acupuncture or trigger point injections will help to improve her pain.       .  She ahs noticed some scabs on her back and chest, they hurt a little and get itchy.  Discussed with patient:  Risks include risk of pain bleeding infection injury to surrounding tissue and need for further treatment.  Also about 1 and 1000 patients may experience a vasovagal reaction or lightheadedness.  There is a possibility that there might be some initial worsening of symptoms.  There may be no benefit with the treatment.  Indication is for treatment of patient's chronic condition.  Alternatives include doing nothing, continuing with traditional Western medicine, or seeing another acupuncturist.  Needle placement for acupuncture will be based on Traditional Chinese Medicine and clinical  knowledge.    Benefits may include decreased pain and improvement in symptoms.    Alternatives include doing nothing, trying medications, referral to other specialists, referral to physical therapy, trying heat or ice, trying cupping, or trigger point injections.  .       .            Review of Systems   Constitutional:  Negative except as documented in history of present illness.    Eye:  Negative except as documented in history of present illness.    Ear/Nose/Mouth/Throat:  Negative except as documented in history of present illness.    Respiratory:  Negative except as documented in history of present illness.    Cardiovascular:  Negative except as documented in history of present illness.    Gastrointestinal:  Negative except as documented in history of present illness.    Immunologic:  Negative except as documented in history of present illness.    Integumentary:  Negative except as documented in history of present illness.    Neurologic:  Negative except as documented in history of present illness.              Health Status   Allergies:    Allergic Reactions (Selected)  Severity Not Documented  Adhesive tape (No reactions were documented)  Sulfa drugs (Rash, vomiting..... and diarrhea)   Medications:  (Selected)   Prescriptions  Prescribed  DULoxetine 60 mg oral delayed release capsule: 3 cap(s) ( 180 mg ), po, daily, # 180 cap(s), 0 Refill(s), Type: Soft Stop, Pharmacy: [id number], 3 cap(s) po daily  Keflex 500 mg oral capsule: 1 cap(s) ( 500 mg ), PO, QID, # 40 cap(s), 0 Refill(s), Type: Maintenance, Pharmacy: [id number], 1 cap(s) po qid,x10 day(s)  Lyrica 300 mg oral capsule: 1 cap(s) ( 300 mg ), po, tid, # 90 cap(s), 5 Refill(s), Type: Maintenance, Pharmacy: [id number], 1 cap(s) po tid  Walker with large wheels and large sitting seat: Walker with large wheels and large sitting seat, See Instructions, Instructions: use as directed, Supply, # 1 EA, 0 Refill(s),  Type: Maintenance  cholecalciferol 5000 intl units oral tablet: See Instructions, Instructions: 15,000 units daily, # 100 tab(s), 5 Refill(s), Type: Maintenance, [facility]  ergocalciferol 50,000 intl units (1.25 mg) oral capsule: See Instructions, Instructions: 1 cap(s) po wkly, # 12 cap(s), 0 Refill(s), Type: Maintenance, [facility]  lidocaine 5% topical ointment: 1 gabby, TOP, TID, PRN: for pain, # 50 gm, 2 Refill(s), Type: Maintenance, [email or web address], 1 gabby top tid,PRN:for pain  lidocaine-prilocaine 2.5%-2.5% topical cream: See Instructions, Instructions: APPLY 5 GRAMS TOPICALLY TO THE AFFECTED AREA(S) ONCE FOR A ONE TIME DOSE, # 30 gm, Type: Soft Stop, [facility]  medroxyPROGESTERone 10 mg oral tablet: See Instructions, Instructions: TAKE 1 TABLET BY MOUTH EVERY DAY FOR 10 DAYS ONCE PER MONTH PER PROVIDER, # 30 tab(s), Type: Soft Stop, [email or web address], TAKE 1 TABLET BY MOUTH EVERY DAY FOR 10 DAYS ONCE PER MONTH PER PROVIDER  methenamine hippurate 1 g oral tablet: See Instructions, Instructions: TAKE ONE TABLET BY MOUTH TWICE DAILY WITH FOOD, # 60 tab(s), Type: Soft Stop, [id number], TAKE ONE TABLET BY MOUTH TWICE DAILY WITH FOOD  modafinil 200 mg oral tablet: See Instructions, Instructions: TAKE 1 TABLET BY MOUTH TWICE DAILY, # 60 tab(s), Type: Soft Stop, [id number], TAKE 1 TABLET BY MOUTH TWICE DAILY  ondansetron 4 mg oral tablet: 1 tab(s) ( 4 mg ), PO, q8 hrs, PRN: for nausea and vomiting, # 10 tab(s), 0 Refill(s), Type: Maintenance, [facility], 1 tab(s) po q8 hrs,PRN:for nausea and vomiting  rizatriptan 10 mg oral tablet: See Instructions, Instructions: TAKE 1 TABLET BY MOUTH 1 TIME AS NEEDED FOR MIGRAINE HEADACHE, # 6 tab(s), Type: Soft Stop, Pharmacy: [id number]  tiZANidine 4 mg oral tablet: See  Instructions, Instructions: TAKE 2 TO 3 TABLETS BY MOUTH EVERY NIGHT AT BEDTIME, # 90 tab(s), Type: Soft Stop, Pharmacy: [id number]  Documented Medications  Documented  Adderall: ( 45 mg ), po, bid, 0 Refill(s), Type: Maintenance  Excedrin: See Instructions, 0 Refill(s), Type: Maintenance  LORazepam 0.5 mg oral tablet: po, tid, Instructions: 0.5mg prn and 1-2 hs, PRN: for agitation, 0 Refill(s), Type: Maintenance  LaMICtal 100 mg oral tablet: 1 tab(s) ( 100 mg ), po, daily, 0 Refill(s), Type: Maintenance  Tysabri: iv, q4 wks, 0 Refill(s), Type: Maintenance  cyclobenzaprine: ( 10 mg ), po, tid, PRN: as needed for muscle spasm, 0 Refill(s), Type: Maintenance  indomethacin 25 mg oral capsule: 1 cap(s) ( 25 mg ), po, q6 hrs, PRN: for gout pain, 0 Refill(s), Type: Maintenance   Problem list:    All Problems  Obesity / SNOMED CT 8919991350 / Probable  Anemia in chronic illness / SNOMED CT 953826132 / Confirmed  Ataxia / SNOMED CT 31054110 / Confirmed  Blues / SNOMED CT 602619899 / Confirmed  Fatigue / SNOMED CT 975354320 / Confirmed  Headache, migraine / SNOMED CT 77458498 / Confirmed  History of renal stone / SNOMED CT 5344584018 / Confirmed  Multiple sclerosis / SNOMED CT 35650659 / Confirmed  Muscle weakness / SNOMED CT 70009939 / Confirmed  Seasonal allergies / SNOMED CT 579412791 / Confirmed  Skin sensation disturbance / SNOMED CT 010194212 / Confirmed  Urinary bladder neurogenic dysfunction / SNOMED CT 0662240478 / Confirmed  Vitamin D deficiency / SNOMED CT 73264136 / Confirmed  Resolved: Pregnancy  Resolved: *Hospitalized@[email or web address] - Complicated UTI  Resolved: *Hospitalized@[email or web address] - Left renal stone  Resolved: Pregnancy / SNOMED CT 693654632  Resolved: Pregnancy / SNOMED CT 999811467  Canceled: Headache / SNOMED CT 01575874      Histories   Past Medical History:    Active  Multiple sclerosis (39367825): Onset in 2012 at 37 years.  Blues (565062175)  History of renal stone (8264275416)  Urinary bladder  neurogenic dysfunction (4985844545)  Fatigue (122016116)  Muscle weakness (15327958)  Vitamin D deficiency (99466778)  Ataxia (11104723)  Headache, migraine (84874637)  Comments:  7/6/2016 CDT 11:19 AM CDT - LiuLuz Elena sinclairan  With aura.  Skin sensation disturbance (845069835)  Seasonal allergies (769780274)  Resolved  *Hospitalized@[facility] - Complicated UTI: Onset on 6/14/2016 at 40 years.  Resolved on 6/18/2016 at 40 years.  *Hospitalized@[place] - Left renal stone: Onset in the month of 5/2016 at 40 years  Resolved.  Pregnancy:  Resolved.  Pregnancy (692654457):  Resolved in 2006 at 30 years.  Pregnancy (741905699):  Resolved in 1994 at 18 years.   Family History:    Melanoma  Father (Shakir)  Thyroid  Sister (Ana Cristina)  Kidney stone  Daughter (Zac)  Diabetes mellitus type II  Father (Shakir)  Hypothyroidism  Sister (Ana Cristina)  Diabetes mellitus type 2  Father (Shakir)  CA - Cancer  Father (Shakir)  Comments:  4/5/2017 11:45 AM - Katie Hatfield  skin  CHF - Congestive heart failure  Father (Shakir)  Migraine  Sister (Ana Cristina)  Daughter (Leda)  Mother (Soledad)  Depression  Daughter (Zac)  Father (Shakir)  Gallbladder problem  Mother (Soledad)  Bipolar  Father (Shakir)  Miscellaneous  Brother (Adrian)  Comments:  4/5/2017 11:43 AM - Katie Haftield  spinal stenosis  Mother (Soledad)  Comments:  4/5/2017 11:43 AM - Katie Hatfield  spinal stenosis  Anxiety  Daughter (Leda)  Daughter (Zac)     Procedure history:    Port-a-cath in place - Right (44450567) on 8/11/2016 at 41 Years.  Comments:  8/18/2016 8:01 AM - Ceci Engle  Dx:  Multiple sclerosis.  cystoscopy left ureteroscopy with holmium laser left retrogrades stone extraction and left ureteral stent exchange on 6/3/2016 at 40 Years.  Ureteroscopy (3306982966) in the month of 5/2016 at 40 Years.  Comments:  6/14/2016 4:56 PM - La Garcia  with laser/stone extraction (inability to remove all of stone)  Appendectomy (937856852) in 2014 at 39 Years.  Breast reduction  (138585635) in  at 26 Years.  Tonsillectomy and adenoidectomy (798610317) in  at 14 Years.  Cholecystectomy (56596712).   section (60661169).  Comments:  2016 11:26 AM - Deedee Freya LERMA  x1  Tubal ligation (668405211).   Social History:        Alcohol Assessment            Current, 1-2 times per month, 1 drinks/episode maximum.      Tobacco Assessment            Never smoker      Substance Abuse Assessment: Past            Marijuana      Employment and Education Assessment            Disability      Home and Environment Assessment            2 children.      Nutrition and Health Assessment            Caffeine intake amount: 2 sodas, every day.      Exercise and Physical Activity Assessment: Does not exercise      Sexual Assessment            Sexually active: No.  Sexual orientation: Heterosexual.        Physical Examination   Vital Signs   2018 2:29 PM CST Temperature Tympanic 98.1 DegF    Peripheral Pulse Rate 108 bpm  HI    Pulse Site Radial artery    HR Method Manual    Systolic Blood Pressure 146 mmHg  HI    Diastolic Blood Pressure 84 mmHg  HI    Mean Arterial Pressure 105 mmHg    BP Site Right arm    BP Method Manual      Measurements from flowsheet : Measurements   2018 2:29 PM CST    Weight Measured - Standard                292 lb     General:  Alert and oriented, No acute distress.    Eye:  Pupils are equal, round and reactive to light, Extraocular movements are intact, Normal conjunctiva.    HENT:  Normocephalic, hearing grossly normal during our conversation, hearing grossly normal during our conversation.    Respiratory:  Respirations are non-labored, Symmetrical chest wall expansion.    Cardiovascular:  Normal peripheral perfusion, brisk capillary refill, brisk capillary refill.    Musculoskeletal:  Normal gait, tender at left SI joint, the right upper gluteus laura.  abnormal gait.    Integumentary:  Warm, Dry, 3 crustedulcers on right upper back, several across  lower chest and breasts, diffuse mild petecheae on chest.    Neurologic:  Alert, Oriented.    Cognition and Speech:  Oriented, Speech clear and coherent, Functional cognition intact.    Psychiatric:  Cooperative, Normal judgment, Non-suicidal, No Abnormal / Psychotic thoughts.         Mood and affect: Anxious, Flat, Sad.         Behavior: Pressured speech.         Judgment: Able to make sensible decisions.         Thought process: Appropriate.       Health Maintenance      Recommendations     Pending (in the next year)        Due            Cervical Cancer Screen (if sexually active) due  01/31/18  and every 3  year(s)           Lipid Disorders Screen (Female) due  01/31/18  and every 1  year(s)           Type 2 Diabetes Mellitus Screen (Female) due  01/31/18  Variable frequency        Near Due            Body Mass Index Check (Female) near due  03/01/18  and every 1  year(s)        Due In Future            Tetanus Vaccine not due until  07/16/18  and every 10  year(s)           Depression Screen (Female) not due until  01/26/19  and every 1  year(s)           Alcohol Misuse Screen (Female) not due until  01/26/19  and every 1  year(s)     Satisfied (in the past 1 year)        Satisfied            Alcohol Misuse Screen (Female) on  01/26/18.           Alcohol Misuse Screen (Female) on  02/28/17.           Body Mass Index Check (Female) on  03/01/17.           Body Mass Index Check (Female) on  03/01/17.           Body Mass Index Check (Female) on  02/28/17.           Depression Screen (Female) on  01/26/18.           Depression Screen (Female) on  01/26/18.           Depression Screen (Female) on  01/26/18.           Depression Screen (Female) on  02/28/17.           Depression Screen (Female) on  02/28/17.           Depression Screen (Female) on  02/28/17.           High Blood Pressure Screen (Female) on  01/31/18.           High Blood Pressure Screen (Female) on  01/26/18.           High Blood Pressure Screen  (Female) on  05/17/17.           High Blood Pressure Screen (Female) on  05/17/17.           High Blood Pressure Screen (Female) on  03/01/17.           High Blood Pressure Screen (Female) on  03/01/17.           High Blood Pressure Screen (Female) on  02/28/17.           Obesity Screen and Counseling (Female) on  01/31/18.           Obesity Screen and Counseling (Female) on  01/26/18.           Obesity Screen and Counseling (Female) on  05/17/17.           Obesity Screen and Counseling (Female) on  03/01/17.           Obesity Screen and Counseling (Female) on  03/01/17.           Obesity Screen and Counseling (Female) on  02/28/17.           Tobacco Use Screen (Female) on  01/31/18.           Tobacco Use Screen (Female) on  01/26/18.           Tobacco Use Screen (Female) on  05/17/17.           Tobacco Use Screen (Female) on  03/01/17.           Tobacco Use Screen (Female) on  02/28/17.          Procedure   Procedure   Time.     Indication: myofascial pain.     Informed consent: signed by patient.     Confirmed: patient, procedure, side, site.     Type of procedure: trigger point injection.     Physical Exam: vital signs Vital Signs   1/31/2018 2:29 PM CST Temperature Tympanic 98.1 DegF    Peripheral Pulse Rate 108 bpm  HI    Pulse Site Radial artery    HR Method Manual    Systolic Blood Pressure 146 mmHg  HI    Diastolic Blood Pressure 84 mmHg  HI    Mean Arterial Pressure 105 mmHg    BP Site Right arm    BP Method Manual      .     Preparation and technique: informed consent obtained, position sitting, sterile preparation of site (with 70 % alcohol, with 10 % povidone iodine), hemostasis achieved using direct pressure, estimated blood loss minimal, adhesive bandagedressing applied.     Findings: painful and tender myofascial trigger points identified by palpation with communication from patient    Location: left upper gluteous laura x 2 and right sacroiliac joint region x 1    Number of injections:3    pain  prior to procedure:     8    pain following procedure:2-3, also reports she can now  place without discomfort for longer than she could prior to the injection    number of milliliters of the 1:1 solution of 1% lidocaine without epiniephrine and 0.5% marcaine without epinephrine used in total to each of the right lower back injections: 2    number of milliliters of the 1:1 solution of 1% lidocaine without epiniephrine and 0.5% marcaine without epinephrine used in total: 4, with 1 ml of 40/1 kenalog.     Procedure tolerated: well.     No Complications.     No qualifying data available.          Review / Management   Results review:  Lab results   1/26/2018 2:32 PM CST UA Color Yellow    UA Clarity Slightly Cloudy    UA pH 6.0    UA Specific Gravity 1.025    UA Glucose Negative mg/dL    UA Bilirubin Negative    UA Ketones Negative mg/dL    Urine Occult Blood Negative    UA Protein Negative mg/dL    UA Nitrite Negative    UA Leukocyte Esterase Negative    UA Urobilinogen Normal    UA Epithelial Cells Moderate    UA WBC 6-10    UA RBC 0-2    UA Calcium Oxalate Crystals Present    UA Bacteria Moderate   1/26/2018 2:14 PM CST Culture Urine See comment   1/26/2018 2:12 PM CST Sodium Level 138 mmol/L    Potassium Level 4.4 mmol/L    Chloride Level 104 mmol/L    CO2 Level 23 mmol/L    Glucose Level 139 mg/dL  HI    BUN 12 mg/dL    Creatinine Level 0.71 mg/dL    BUN/Creat Ratio NOT APPLICABLE    eGFR 104 mL/min/1.73m2    eGFR African American 120 mL/min/1.73m2    Calcium Level 9.5 mg/dL    C-Reactive Protein (CRP) 44.0 mg/L  HI    WBC 10.1    RBC 4.50    Hgb 12.0 gm/dL    Hct 36.1 %    MCV 80.2 fL    MCH 26.7 pg  LOW    MCHC 33.2 gm/dL    RDW 15.0 %    Platelet 343    MPV 9.7 fL    Lymphocytes 24.4 %    Abs Lymphocytes 2,464    Neutrophils 68.1 %    Abs Neutrophils 6,878    Monocytes 4.0 %    Abs Monocytes 404    Eosinophils 2.8 %    Abs Eosinophils 283    Basophils 0.7 %    Abs Basophils 71   .         Interpretation:  reviewed current medications .    Course:  Progressing as expected.       Impression and Plan   Diagnosis     Blues (SMC93-GW F32.9).     Cellulitis (WYW82-AU L03.90).     Multiple sclerosis (TVN15-QW G35).     Myalgia (QHR96-UR M79.1).     Physical deconditioning (QHE54-VL R53.81).     Summary:    In regards to chronic pain patient will decrease the duloxetine back down to 120 mg per day and will watch to see if the number for ataxia decreases.  She will also return to clinic for acupuncture and or repeated trigger point injections and will continue with pursuit of physical therapy.  Also will start lidocaine topical ointment to see if this might be of benefit.      Mood disorder seems to be poorly controlled at this time however she is currently maxed out on her duloxetine dosing.  May consider future recommendation of tg chi or yoga as well as counseling.  Patient does plan to follow-up with her psychiatrist also.      Multiple sclerosis treatment which will cause pretty severe immune no suppression as planned in her near future.  She does have a history of cervical dysplasia status post LEEP.  Her last Pap smear was done in February 2017.  No HPV testing was done on the specimen.  Would recommend that while on the immunosuppressant medication that patient does not meet guidelines for routine cervical cancer monitoring or testing and instead would recommend annual Pap smear with both ThinPrep and HPV testing.      45 minutes was spent with patient in direct face-to-face contact of which greater than 50% of the time was spent counseling in addition to the time spent performing procedures today.   , suspect the elevated sed rate was related to this impetigo/cellulitis on chest and back.    Treatment goals:  Decrease pain intensity, Improve psychological state, Improve quality of life.    Diagnosis     return to clinic if symptoms worsen or do not improve.     Plan   Counseled:  Patient, Family, use ice or heat as  needed to help with pain, continue with home exercise program, .    Patient Instructions:  Recommended that patient consider doing 6 treatments of acupuncture, 1 every 2 weeks, but that if there is no improvement after 3 treatments then we should not continue as it is unlikley to have benefit., Patient can also continue to follow up with PCP and specilaists reguarding chronic medical problems., Patient is aware that I can't promise that the treatment will be effective, that acupuncture does not need to replace traditional Western medical treatments but can work as a compliment to them, and that they should check with their insurance company to determine if it is covered, and be willing to pay for any portion that is not covered by insurance..

## 2022-02-15 NOTE — NURSING NOTE
Comprehensive Intake Entered On:  10/9/2020 8:55 AM CDT    Performed On:  10/9/2020 8:46 AM CDT by Stephanie Mitchell               Summary   Chief Complaint :   c/o mass under Rt rib x3-4 days, not painful but tender to palpation, SOB. Also has back pain when she's walking but not sure if it is related. Felt warm last night.    Menstrual Status :   Menarcheal   Weight Measured :   292.0 lb(Converted to: 292 lb 0 oz, 132.449 kg)    Height Measured :   65 in(Converted to: 5 ft 5 in, 165.10 cm)    Body Mass Index :   48.59 kg/m2 (HI)    Body Surface Area :   2.46 m2   Height/Length Estimated :   65 in(Converted to: 5 ft 5 in, 165.10 cm)    Systolic Blood Pressure :   134 mmHg (HI)    Diastolic Blood Pressure :   88 mmHg (HI)    Mean Arterial Pressure :   103 mmHg   Peripheral Pulse Rate :   76 bpm   BP Site :   Right arm   Pulse Site :   Radial artery   BP Method :   Manual   HR Method :   Manual   Temperature Tympanic :   98.5 DegF(Converted to: 36.9 DegC)    Stephanie Mitchell - 10/9/2020 8:46 AM CDT   Health Status   Allergies Verified? :   Yes   Medication History Verified? :   Yes   Medical History Verified? :   Yes   Pre-Visit Planning Status :   Completed   Tobacco Use? :   Never smoker   Stephanie Mitchell - 10/9/2020 8:46 AM CDT   Consents   Consent for Immunization Exchange :   Consent Granted   Consent for Immunizations to Providers :   Consent Granted   Stephanie Mitchell - 10/9/2020 8:46 AM CDT   Meds / Allergies   (As Of: 10/9/2020 8:55:48 AM CDT)   Allergies (Active)   adhesive tape  Estimated Onset Date:   Unspecified ; Created By:   Katie Hatfield; Reaction Status:   Active ; Category:   Drug ; Substance:   adhesive tape ; Type:   Allergy ; Updated By:   Katie Hatfield; Reviewed Date:   10/9/2020 8:54 AM CDT      sulfa drugs  Estimated Onset Date:   Unspecified ; Reactions:   Rash, Vomiting....., Diarrhea ; Created By:   Michelle Pedersen CMA; Reaction Status:   Active ; Category:   Drug ; Substance:    sulfa drugs ; Type:   Allergy ; Updated By:   Michelle Pedersen CMA; Reviewed Date:   10/9/2020 8:54 AM CDT        Medication List   (As Of: 10/9/2020 8:55:48 AM CDT)   Prescription/Discharge Order    tiZANidine 4 mg oral tablet  :   tiZANidine 4 mg oral tablet ; Status:   Prescribed ; Ordered As Mnemonic:   tiZANidine 4 mg oral tablet ; Simple Display Line:   See Instructions, TAKE 2 TO 3 TABLETS BY MOUTH EVERY NIGHT AT BEDTIME, 90 tab(s) ; Ordering Provider:   Orestes Mendosa MD; Catalog Code:   tiZANidine ; Order Dt/Tm:   2/5/2020 10:24:40 AM CST          tiZANidine 4 mg oral tablet  :   tiZANidine 4 mg oral tablet ; Status:   Prescribed ; Ordered As Mnemonic:   tiZANidine 4 mg oral tablet ; Simple Display Line:   2 TO 3 TABLETS, Oral, qhs, 90 tab(s) ; Ordering Provider:   Orestes Mendosa MD; Catalog Code:   tiZANidine ; Order Dt/Tm:   1/7/2020 7:00:19 AM CST          rizatriptan 10 mg oral tablet  :   rizatriptan 10 mg oral tablet ; Status:   Prescribed ; Ordered As Mnemonic:   rizatriptan 10 mg oral tablet ; Simple Display Line:   See Instructions, TAKE 1 TABLET BY MOUTH 1 TIME AS NEEDED FOR MIGRAINE HEADACHE, 6 tab(s) ; Ordering Provider:   Orestes Mendosa MD; Catalog Code:   rizatriptan ; Order Dt/Tm:   12/30/2019 3:05:21 PM CST          ondansetron 4 mg oral tablet  :   ondansetron 4 mg oral tablet ; Status:   Prescribed ; Ordered As Mnemonic:   ondansetron 4 mg oral tablet ; Simple Display Line:   1 tab(s), Oral, q8 hrs, PRN: AS NEEDED FOR NAUSEA OR VOMITING, 30 tab(s) ; Ordering Provider:   Orestes Mendosa MD; Catalog Code:   ondansetron ; Order Dt/Tm:   12/30/2019 3:05:21 PM CST          Miscellaneous Rx Supply  :   Miscellaneous Rx Supply ; Status:   Prescribed ; Ordered As Mnemonic:   Replacement CPAP +8 cm H2o for use daily ; Simple Display Line:   See Instructions, Heated humidifier x1; Humidifier chamber x1;  Heated tubing x1; Full face mask of choice with headgear  x1; Cushion x 1;  Filters: Disposable x1pk &  Reusable x1pk.  Length of Need = 99 Months, 1 EA, 11 Refill(s) ; Ordering Provider:   Erwin Smiley MD; Catalog Code:   Miscellaneous Rx Supply ; Order Dt/Tm:   7/25/2019 1:49:35 PM CDT          Miscellaneous Rx Supply  :   Miscellaneous Rx Supply ; Status:   Prescribed ; Ordered As Mnemonic:   contour next microlet lancets ; Simple Display Line:   See Instructions, testing 2x/ day, 1 box(es), 2 Refill(s) ; Ordering Provider:   Orestes Mendosa MD; Catalog Code:   Miscellaneous Rx Supply ; Order Dt/Tm:   1/27/2020 4:36:08 PM CST          Miscellaneous Rx Supply  :   Miscellaneous Rx Supply ; Status:   Prescribed ; Ordered As Mnemonic:   Accu-check Guide Test Strips ; Simple Display Line:   See Instructions, Testing 2x daily, 200 EA, 11 Refill(s) ; Ordering Provider:   Orestes Mendosa MD; Catalog Code:   Miscellaneous Rx Supply ; Order Dt/Tm:   1/31/2020 3:06:14 PM CST          Miscellaneous Rx Supply  :   Miscellaneous Rx Supply ; Status:   Prescribed ; Ordered As Mnemonic:   Accu-check guide meter ; Simple Display Line:   See Instructions, test 2x daily, 1 EA, 0 Refill(s) ; Ordering Provider:   Orestes Mendosa MD; Catalog Code:   Miscellaneous Rx Supply ; Order Dt/Tm:   1/31/2020 3:05:08 PM CST          Miscellaneous Prescription  :   Miscellaneous Prescription ; Status:   Prescribed ; Ordered As Mnemonic:   BD pen needle mini 60Ln9GS ; Simple Display Line:   See Instructions, Use daily with Saxenda as directed, 100 EA, 0 Refill(s) ; Ordering Provider:   Orestes Mendosa MD; Catalog Code:   Miscellaneous Prescription ; Order Dt/Tm:   10/30/2019 8:12:33 AM CDT          Misc Prescription  :   Misc Prescription ; Status:   Prescribed ; Ordered As Mnemonic:   B-D PEN NDL MINI [id number](3/16)PRPL ; Simple Display Line:   See Instructions, INJECT WITH SAXENDA EVERY DAY, 100 EA ; Ordering Provider:   Orestes Mendosa MD; Catalog Code:   Miscellaneous Prescription ; Order Dt/Tm:   12/30/2019 3:05:21 PM CST          Misc Prescription  :   Misc  Prescription ; Status:   Prescribed ; Ordered As Mnemonic:   B-D PEN NDL MINI [id number](3/16)PRPL ; Simple Display Line:   See Instructions, INJECT WITH SAXENDA EVERY DAY, 100 unknown unit ; Ordering Provider:   Orestes Mendosa MD; Catalog Code:   Miscellaneous Prescription ; Order Dt/Tm:   12/2/2019 11:16:59 AM CST          methenamine  :   methenamine ; Status:   Prescribed ; Ordered As Mnemonic:   methenamine hippurate 1 g oral tablet ; Simple Display Line:   1 tab(s), Oral, bid, 180 tab(s), 0 Refill(s) ; Ordering Provider:   Orestes Mendosa MD; Catalog Code:   methenamine ; Order Dt/Tm:   8/21/2020 9:07:07 AM CDT          metFORMIN  :   metFORMIN ; Status:   Prescribed ; Ordered As Mnemonic:   metFORMIN 500 mg oral tablet, extended release ; Simple Display Line:   1,000 mg, 2 tab(s), Oral, daily, 180 tab(s), 3 Refill(s) ; Ordering Provider:   Orestes Mendosa MD; Catalog Code:   metFORMIN ; Order Dt/Tm:   2/4/2020 2:08:22 PM CST          meloxicam  :   meloxicam ; Status:   Prescribed ; Ordered As Mnemonic:   meloxicam 15 mg oral tablet ; Simple Display Line:   1 tab(s), Oral, daily, 90 tab(s), 0 Refill(s) ; Ordering Provider:   Emily Lan MD; Catalog Code:   meloxicam ; Order Dt/Tm:   9/29/2020 3:29:04 PM CDT          liraglutide  :   liraglutide ; Status:   Prescribed ; Ordered As Mnemonic:   Victoza 18 mg/3 mL subcutaneous solution ; Simple Display Line:   1.8 mg, Subcutaneous, daily, take in am, 27 mL, 3 Refill(s) ; Ordering Provider:   Orestes Mendosa MD; Catalog Code:   liraglutide ; Order Dt/Tm:   2/5/2020 3:50:31 PM CST          lidocaine 5% topical ointment  :   lidocaine 5% topical ointment ; Status:   Prescribed ; Ordered As Mnemonic:   lidocaine 5% topical ointment ; Simple Display Line:   See Instructions, APPLY EXTERNALLY TO THE AFFECTED AREA THREE TIMES DAILY AS NEEDED FOR PAIN, 30 gm ; Ordering Provider:   Orestes Mendosa MD; Catalog Code:   lidocaine topical ; Order Dt/Tm:   12/30/2019 3:05:36 PM CST           amLODIPine  :   amLODIPine ; Status:   Prescribed ; Ordered As Mnemonic:   amLODIPine 5 mg oral tablet ; Simple Display Line:   1 tab(s), Oral, daily, 90 tab(s), 0 Refill(s) ; Ordering Provider:   Orestes Mendosa MD; Catalog Code:   amLODIPine ; Order Dt/Tm:   5/27/2020 4:57:56 PM CDT            Home Meds    valACYclovir  :   valACYclovir ; Status:   Documented ; Ordered As Mnemonic:   valACYclovir 500 mg oral tablet ; Simple Display Line:   500 mg, 1 tab(s), po, bid, 0 Refill(s) ; Catalog Code:   valACYclovir ; Order Dt/Tm:   2/23/2018 2:39:43 PM CST          traZODone  :   traZODone ; Status:   Processing ; Ordered As Mnemonic:   traZODone ; Action Display:   Complete ; Catalog Code:   traZODone ; Order Dt/Tm:   10/9/2020 8:55:28 AM CDT          liraglutide  :   liraglutide ; Status:   Documented ; Ordered As Mnemonic:   Saxenda 18 mg/3 mL subcutaneous solution ; Simple Display Line:   1.2 mg, Subcutaneous, daily, 0 Refill(s) ; Catalog Code:   liraglutide ; Order Dt/Tm:   2/4/2020 1:37:24 PM CST          escitalopram  :   escitalopram ; Status:   Documented ; Ordered As Mnemonic:   escitalopram 10 mg oral tablet ; Simple Display Line:   10 mg, 1 tab(s), Oral, daily, 30 tab(s), 0 Refill(s) ; Catalog Code:   escitalopram ; Order Dt/Tm:   2/4/2020 1:36:48 PM CST          cyclobenzaprine  :   cyclobenzaprine ; Status:   Documented ; Ordered As Mnemonic:   cyclobenzaprine 10 mg oral tablet ; Simple Display Line:   10 mg, 1 tab(s), Oral, tid, PRN: for spasm, 30 tab(s), 0 Refill(s) ; Catalog Code:   cyclobenzaprine ; Order Dt/Tm:   7/23/2019 8:00:07 AM CDT          cholecalciferol  :   cholecalciferol ; Status:   Documented ; Ordered As Mnemonic:   Vitamin D3 5000 intl units oral tablet ; Simple Display Line:   20,000 International Unit, 4 tab(s), Oral, daily, 0 Refill(s) ; Catalog Code:   cholecalciferol ; Order Dt/Tm:   2/4/2020 1:35:21 PM CST          baclofen  :   baclofen ; Status:   Processing ; Ordered As Mnemonic:    baclofen 20 mg oral tablet ; Action Display:   Complete ; Catalog Code:   baclofen ; Order Dt/Tm:   10/9/2020 8:55:28 AM CDT          alemtuzumab  :   alemtuzumab ; Status:   Documented ; Ordered As Mnemonic:   Lemtrada ; Simple Display Line:   See Instructions, 1x/yr, 0 Refill(s) ; Catalog Code:   alemtuzumab ; Order Dt/Tm:   10/26/2018 3:52:14 PM CDT          acetaminophen/aspirin/caffeine  :   acetaminophen/aspirin/caffeine ; Status:   Documented ; Ordered As Mnemonic:   Excedrin ; Simple Display Line:   See Instructions, 0 Refill(s) ; Catalog Code:   acetaminophen/aspirin/caffeine ; Order Dt/Tm:   9/6/2016 10:16:52 AM CDT            ID Risk Screen   Recent Travel History :   No recent travel   Family Member Travel History :   No recent travel   Other Exposure to Infectious Disease :   Unknown   Stephanie Mitchell - 10/9/2020 8:46 AM CDT

## 2022-02-15 NOTE — TELEPHONE ENCOUNTER
Patient called in and stated that [facility] has yet to contact her so she is wanting to switch to [facility]. Her information has been faxed to [facility].

## 2022-02-15 NOTE — TELEPHONE ENCOUNTER
Entered by Katya Walker on July 26, 2019 3:35:49 PM CDT  ---------------------  From: Katya Walker   To: [facility] #[id number]    Sent: 7/26/2019 3:35:49 PM CDT  Subject: Medication Management     ** Submitted: **  Order:Miscellaneous Prescription (B-D PEN NDL MINI [id number](3/16)PRPL)  See Instructions  USE DAILY WITH SAXENDA AS DIRECTED  Qty:  100 unknown unit        Days Supply:  30        Refills:  1          Substitutions Allowed     Route To Pharmacy - [facility] #[id number]    Signed by Katya Walker  7/26/2019 3:35:00 PM    ** Not Approved:  **  Order:Miscellaneous Prescription (B-D PEN NDL MINI [id number](3/16)PRPL)  USE DAILY WITH SAXENDA AS DIRECTED  Qty:  100 unknown unit        Days Supply:  30        Refills:  0          Substitutions Allowed     Route To Pharmacy - [facility] #[id number]   Signed by Katya Walker            ------------------------------------------  From: [facility] #[id number]  To: Orestes Mednosa MD  Sent: July 25, 2019 2:23:10 PM CDT  Subject: Medication Management  Due: July 26, 2019 2:23:10 PM CDT    ** On Hold Pending Signature **  Drug: B-D PEN NDL MINI [id number](3/16)PRPL  USE DAILY WITH SAXENDA AS DIRECTED  Quantity: 100 unknown unit Days Supply: 30        Refills: 0  Substitutions Allowed  Notes from Pharmacy:     Dispensed Drug: B-D PEN NDL MINI [id number](3/16)PRPL  USE DAILY WITH SAXENDA AS DIRECTED  Quantity: 100 unknown unit Days Supply: 30        Refills: 0  Substitutions Allowed  Notes from Pharmacy:   ------------------------------------------

## 2022-02-15 NOTE — TELEPHONE ENCOUNTER
---------------------  From: Sue Mack CMA (eRx Pool ([id number]_[facility]))   To: [facility] Message Pool ([id number]_[place] [facility] [place]);     Sent: 10/30/2019 8:16:44 AM CDT  Subject: FW: Medication Management   Due Date/Time: 10/30/2019 8:54:00 PM CDT     Medication Refill needing approval    PCP:   [facility]    Medication:   meloxicam  Last Filled:  8/26/18    Quantity:  90  Refills:  0  CSA on file?   no     Date of last office visit and reason:   8/17/19; bronchitis  Date of last labs pertaining to condition:  8/17/19    Return to Clinic order placed?  yes; November          ------------------------------------------  From: [facility] #[id number]  To: Emily Lan MD  Sent: October 29, 2019 8:54:50 PM CDT  Subject: Medication Management  Due: October 30, 2019 8:54:50 PM CDT    ** On Hold Pending Signature **  Drug: meloxicam (meloxicam 15 mg oral tablet)  1 TAB(S) PO DAILY  Quantity: 90 tab(s)  Days Supply: 0  Refills: 0  Substitutions Allowed  Notes from Pharmacy:     Dispensed Drug: meloxicam (meloxicam 15 mg oral tablet)  TAKE 1 TABLET BY MOUTH DAILY  Quantity: 90 tab(s)  Days Supply: 30  Refills: 0  Substitutions Allowed  Notes from Pharmacy:   ---------------------------------------------------------------  From: Freya Mark CMA ([facility] Message Pool ([id number]_[facility]))   To: Orestes Mendosa MD;     Sent: 10/30/2019 10:51:14 AM CDT  Subject: FW: Medication Management   Due Date/Time: 10/30/2019 8:54:00 PM CDT     filled 10/26/2018 #[id number]---------------------  From: Orestes Mendosa MD   To: [facility] #[id number]    Sent: 10/30/2019 11:37:27 AM CDT  Subject: FW: Medication Management     ** Submitted: **  Complete:meloxicam (meloxicam 15 mg oral tablet)   Signed by Orestes Mendosa MD  10/30/2019 11:37:00 AM    ** Approved **  meloxicam (MELOXICAM 15MG TABLETS)  TAKE 1 TABLET BY MOUTH DAILY  Qty:  90 tab(s)        Days Supply:  30        Refills:  0          Substitutions Allowed     Route To Pharmacy - [facility] #[id number]   Signed by Orestes Mendosa MD

## 2022-02-15 NOTE — TELEPHONE ENCOUNTER
---------------------  From: Tati Will CMA   To: Orestes Mendosa MD;     Sent: 2/5/2019 10:24:23 AM CST  Subject: Depression     Spoke to Karli this morning-    She was supposed to come see you today for f/u hospital stay but she is not able to make it in.     She is still struggling with her depression. She was put on Abilify 10mg qd during her hospitalization. She has been seeing a counselor (Chasity Amaro through [facility]) and has been told before that she likely has Bipolar. She feels she should start to increase Abilify to see if this will help with her depression. She said she still has passive suicidal thoughts.     CC advised that an appt would be needed to discuss-she will reschedule for Friday.---------------------  From: Orestes Mendosa MD   To: Tati Will CMA;     Sent: 2/12/2019 9:11:34 AM CST  Subject: RE: Depression     no showed againLMTCB for Karli to return my call so we can get her rescheduled w/ [facility]

## 2022-02-15 NOTE — NURSING NOTE
Comprehensive Intake Entered On:  3/16/2019 3:19 PM CDT    Performed On:  3/16/2019 3:17 PM CDT by Quiana rollins LPN               Summary   Chief Complaint :   vomit, diarrhea, no bladder control for 24 hours. fever, frequency x 2 weeks    Menstrual Status :   Menarcheal   Peripheral Pulse Rate :   110 bpm (HI)    Temperature Tympanic :   100.1 DegF(Converted to: 37.8 DegC)    Oxygen Saturation :   95 %   Quiana Sotelo LPN - 3/16/2019 3:17 PM CDT   Health Status   Allergies Verified? :   Yes   Medication History Verified? :   Yes   Medical History Verified? :   Yes   Pre-Visit Planning Status :   Completed   Tobacco Use? :   Never smoker   Quiana rollins LPN - 3/16/2019 3:17 PM CDT   Consents   Consent for Immunization Exchange :   Consent Granted   Consent for Immunizations to Providers :   Consent Granted   Quiana Sotelo LPN - 3/16/2019 3:17 PM CDT   Meds / Allergies   (As Of: 3/16/2019 3:19:39 PM CDT)   Allergies (Active)   adhesive tape  Estimated Onset Date:   Unspecified ; Created By:   Katie Hatfield; Reaction Status:   Active ; Category:   Drug ; Substance:   adhesive tape ; Type:   Allergy ; Updated By:   Katie Hatfield; Reviewed Date:   3/16/2019 3:18 PM CDT      sulfa drugs  Estimated Onset Date:   Unspecified ; Reactions:   Rash, Vomiting....., Diarrhea ; Created By:   Michelle Pedersen CMA; Reaction Status:   Active ; Category:   Drug ; Substance:   sulfa drugs ; Type:   Allergy ; Updated By:   Michelle Pedersen CMA; Reviewed Date:   3/16/2019 3:18 PM CDT        Medication List   (As Of: 3/16/2019 3:19:39 PM CDT)   Prescription/Discharge Order    amLODIPine  :   amLODIPine ; Status:   Prescribed ; Ordered As Mnemonic:   amLODIPine 5 mg oral tablet ; Simple Display Line:   1 tab(s), Oral, daily, 90 tab(s), 0 Refill(s) ; Ordering Provider:   Orestes Mendosa MD; Catalog Code:   amLODIPine ; Order Dt/Tm:   12/21/2018 11:59:47 AM          azithromycin  :   azithromycin ; Status:   Prescribed ; Ordered As Mnemonic:    Azithromycin 5 Day Dose Pack 250 mg oral tablet ; Simple Display Line:   250 mg, 1 tab(s), Oral, daily, 2 tabs day one.  1 tab day 2-5   as directed on package labeling, 6 tab(s), 0 Refill(s) ; Ordering Provider:   Orestes Mendosa MD; Catalog Code:   azithromycin ; Order Dt/Tm:   12/27/2018 12:13:33 PM          benzonatate  :   benzonatate ; Status:   Prescribed ; Ordered As Mnemonic:   benzonatate 100 mg oral capsule ; Simple Display Line:   See Instructions, TAKE 1 CAPSULE BY MOUTH THREE TIMES DAILY AS NEEDED FOR COUGH, 30 cap(s), 1 Refill(s) ; Ordering Provider:   Orestes Mendosa MD; Catalog Code:   benzonatate ; Order Dt/Tm:   12/12/2018 2:23:31 PM          DULoxetine  :   DULoxetine ; Status:   Prescribed ; Ordered As Mnemonic:   DULoxetine 60 mg oral delayed release capsule ; Simple Display Line:   120 mg, 2 cap(s), Oral, daily, 60 cap(s), 0 Refill(s) ; Ordering Provider:   Orestes Mendosa MD; Catalog Code:   DULoxetine ; Order Dt/Tm:   10/26/2018 9:36:40 AM          ergocalciferol 50,000 intl units (1.25 mg) oral capsule  :   ergocalciferol 50,000 intl units (1.25 mg) oral capsule ; Status:   Prescribed ; Ordered As Mnemonic:   ergocalciferol 50,000 intl units (1.25 mg) oral capsule ; Simple Display Line:   See Instructions, TAKE 1 CAPSULE BY MOUTH EVERY WEEK, 5 cap(s) ; Ordering Provider:   Orestes Mendosa MD; Catalog Code:   ergocalciferol ; Order Dt/Tm:   12/13/2018 12:22:08 PM          ergocalciferol  :   ergocalciferol ; Status:   Prescribed ; Ordered As Mnemonic:   ergocalciferol 50,000 intl units (1.25 mg) oral capsule ; Simple Display Line:   50,000 International Unit, 1 cap(s), Oral, qweek, for 30 day(s), 5 cap(s), 0 Refill(s) ; Ordering Provider:   Orestes Mendosa MD; Catalog Code:   ergocalciferol ; Order Dt/Tm:   7/17/2018 10:46:19 AM          lidocaine topical  :   lidocaine topical ; Status:   Prescribed ; Ordered As Mnemonic:   lidocaine 5% topical ointment ; Simple Display Line:   1 gabby, TOP, TID, PRN: for pain,  50 gm, 2 Refill(s) ; Ordering Provider:   Emily Lan MD; Catalog Code:   lidocaine topical ; Order Dt/Tm:   1/31/2018 3:54:52 PM          liraglutide  :   liraglutide ; Status:   Prescribed ; Ordered As Mnemonic:   Saxenda 18 mg/3 mL subcutaneous solution ; Simple Display Line:   3 mg, subcutaneous, daily, take 3mg injection q am (goal dose), 15 mL, 6 Refill(s) ; Ordering Provider:   Orestes Mendosa MD; Catalog Code:   liraglutide ; Order Dt/Tm:   10/26/2018 4:26:23 PM          medroxyPROGESTERone 10 mg oral tablet  :   medroxyPROGESTERone 10 mg oral tablet ; Status:   Prescribed ; Ordered As Mnemonic:   medroxyPROGESTERone 10 mg oral tablet ; Simple Display Line:   See Instructions, TAKE 1 TABLET BY MOUTH EVERY DAY FOR 10 DAYS ONCE PER MONTH PER PROVIDER, 30 tab(s) ; Ordering Provider:   Myron Girard MD; Catalog Code:   medroxyPROGESTERone ; Order Dt/Tm:   8/8/2017 5:01:15 PM          meloxicam 15 mg oral tablet  :   meloxicam 15 mg oral tablet ; Status:   Prescribed ; Ordered As Mnemonic:   meloxicam 15 mg oral tablet ; Simple Display Line:   See Instructions, TAKE 1 TABLET BY MOUTH DAILY, 90 tab(s) ; Ordering Provider:   Emily Lan MD; Catalog Code:   meloxicam ; Order Dt/Tm:   10/26/2018 4:53:45 PM          methenamine hippurate 1 g oral tablet  :   methenamine hippurate 1 g oral tablet ; Status:   Prescribed ; Ordered As Mnemonic:   methenamine hippurate 1 g oral tablet ; Simple Display Line:   See Instructions, TAKE ONE TABLET BY MOUTH TWICE DAILY WITH FOOD, 60 tab(s) ; Ordering Provider:   Orestes Mendosa MD; Catalog Code:   methenamine ; Order Dt/Tm:   8/24/2016 11:42:01 AM          Misc Prescription  :   Misc Prescription ; Status:   Prescribed ; Ordered As Mnemonic:   B-D PEN NDL MINI [id number](3/16)PRPL ; Simple Display Line:   See Instructions, USE DAILY WITH SAXENDA AS DIRECTED, 100 unknown unit ; Ordering Provider:   Orestes Mendosa MD; Catalog Code:   Miscellaneous Prescription ; Order Dt/Tm:    6/5/2018 10:24:17 AM          modafinil 200 mg oral tablet  :   modafinil 200 mg oral tablet ; Status:   Prescribed ; Ordered As Mnemonic:   modafinil 200 mg oral tablet ; Simple Display Line:   See Instructions, TAKE 1 TABLET BY MOUTH TWICE DAILY, 60 tab(s) ; Ordering Provider:   Orestes Mendosa MD; Catalog Code:   modafinil ; Order Dt/Tm:   10/11/2017 7:32:11 AM          nitrofurantoin  :   nitrofurantoin ; Status:   Prescribed ; Ordered As Mnemonic:   Macrobid 100 mg oral capsule ; Simple Display Line:   100 mg, 1 cap(s), Oral, bid, for 7 day(s), 14 cap(s), 0 Refill(s) ; Ordering Provider:   Orestes Mendosa MD; Catalog Code:   nitrofurantoin ; Order Dt/Tm:   1/23/2019 2:46:38 PM          ondansetron  :   ondansetron ; Status:   Prescribed ; Ordered As Mnemonic:   ondansetron 4 mg oral tablet ; Simple Display Line:   4 mg, 1 tab(s), PO, q8 hrs, PRN: for nausea and vomiting, 10 tab(s), 0 Refill(s) ; Ordering Provider:   Orestes Mendosa MD; Catalog Code:   ondansetron ; Order Dt/Tm:   5/17/2017 4:08:58 PM          rizatriptan 10 mg oral tablet  :   rizatriptan 10 mg oral tablet ; Status:   Prescribed ; Ordered As Mnemonic:   rizatriptan 10 mg oral tablet ; Simple Display Line:   See Instructions, TAKE 1 TABLET BY MOUTH ONCE AS NEEDED FOR MIGRAINE HEADACHE, 6 tab(s) ; Ordering Provider:   Orestes Mendosa MD; Catalog Code:   rizatriptan ; Order Dt/Tm:   8/9/2018 2:58:50 PM          tiZANidine 4 mg oral tablet  :   tiZANidine 4 mg oral tablet ; Status:   Prescribed ; Ordered As Mnemonic:   tiZANidine 4 mg oral tablet ; Simple Display Line:   2 TO 3 TABLETS, Oral, qhs, 90 tab(s) ; Ordering Provider:   Orestes Mendosa MD; Catalog Code:   tiZANidine ; Order Dt/Tm:   12/23/2018 3:05:58 PM            Home Meds    acetaminophen/aspirin/caffeine  :   acetaminophen/aspirin/caffeine ; Status:   Documented ; Ordered As Mnemonic:   Excedrin ; Simple Display Line:   See Instructions, 0 Refill(s) ; Catalog Code:   acetaminophen/aspirin/caffeine ;  Order Dt/Tm:   9/6/2016 10:16:52 AM          alemtuzumab  :   alemtuzumab ; Status:   Documented ; Ordered As Mnemonic:   Lemtrada ; Simple Display Line:   See Instructions, 1x/yr, 0 Refill(s) ; Catalog Code:   alemtuzumab ; Order Dt/Tm:   10/26/2018 3:52:14 PM          amphetamine-dextroamphetamine  :   amphetamine-dextroamphetamine ; Status:   Documented ; Ordered As Mnemonic:   Adderall ; Simple Display Line:   45 mg, po, bid, 0 Refill(s) ; Catalog Code:   amphetamine-dextroamphetamine ; Order Dt/Tm:   6/23/2016 11:31:52 AM          aripiprazole  :   aripiprazole ; Status:   Documented ; Ordered As Mnemonic:   Abilify 10 mg oral tablet ; Simple Display Line:   10 mg, 1 tab(s), Oral, daily, 0 Refill(s) ; Catalog Code:   aripiprazole ; Order Dt/Tm:   2/5/2019 10:24:56 AM          cholecalciferol  :   cholecalciferol ; Status:   Documented ; Ordered As Mnemonic:   cholecalciferol 2000 intl units oral tablet ; Simple Display Line:   2,000 International Unit, 1 tab(s), po, daily, 0 Refill(s) ; Catalog Code:   cholecalciferol ; Order Dt/Tm:   2/26/2018 11:07:57 AM          clonazePAM  :   clonazePAM ; Status:   Documented ; Ordered As Mnemonic:   clonazePAM 0.5 mg oral tablet ; Simple Display Line:   1 mg, 2 tab(s), Oral, hs, 0 Refill(s) ; Catalog Code:   clonazePAM ; Order Dt/Tm:   10/26/2018 3:49:45 PM          LORazepam  :   LORazepam ; Status:   Documented ; Ordered As Mnemonic:   LORazepam 0.5 mg oral tablet ; Simple Display Line:   po, tid, 0.5mg prn and 1-2 hs, PRN: for agitation, 0 Refill(s) ; Catalog Code:   LORazepam ; Order Dt/Tm:   12/15/2016 7:02:40 PM          traZODone  :   traZODone ; Status:   Documented ; Ordered As Mnemonic:   traZODone ; Simple Display Line:   Oral, 0 Refill(s) ; Catalog Code:   traZODone ; Order Dt/Tm:   1/23/2019 2:57:42 PM          valACYclovir  :   valACYclovir ; Status:   Documented ; Ordered As Mnemonic:   valACYclovir 500 mg oral tablet ; Simple Display Line:   500 mg, 1  tab(s), po, bid, 0 Refill(s) ; Catalog Code:   valACYclovir ; Order Dt/Tm:   2/23/2018 2:39:43 PM

## 2022-02-15 NOTE — NURSING NOTE
"Pt was having difficulty voiding on her own for a UA needed for BRM and Neurology. Pt requested to be cathed in order to obtain urine. I spoke with [facility]M and he gave verbal \"ok\" for this. Pt walked to procedure room and was cathed successfully on third attempt using sterile procedure. Patient tolerated well. Urine was yellow, clear, a lot of sediment noted. Patient ambulated out of the clinic on her own and had no complaints. Patient was encouraged to drink more fluids to help with sediment and decrease chances of UTI.  "

## 2022-02-15 NOTE — TELEPHONE ENCOUNTER
---------------------  From: Sue Mack CMA (eRx Pool ([id number]_[facility]))   To: [facility] Message Pool ([id number]_[place] [facility] [place]);     Sent: 12/2/2019 6:52:02 AM CST  Subject: FW: Medication Management   Due Date/Time: 11/28/2019 8:57:00 AM CST     Medication Refill needing approval    PCP:   [facility]    Medication:   tessalon   Last Filled:  10/30/19   Quantity:  30  Refills:  0  CSA on file?   no     Medication:   tizanidine  Last Filled:  10/30/19    Quantity:  90  Refills:  0    Medication:   rizatriptan  Last Filled:  10/30/19   Quantity:  6  Refills:  0    Medication:   zofran  Last Filled:  10/30/19    Quantity:  30  Refills:  0    Medication: saxenda  Last Filled:  10/30/19    Quantity:  15  Refills:  0       Date of last office visit and reason:   8/17/19; bronchitis  Date of last labs pertaining to condition:  8/17/90    Return to Clinic order placed?  yes; November      ------------------------------------------  From: [facility] #[id number]  To: Orestes Mendosa MD  Sent: November 27, 2019 8:57:58 AM CST  Subject: Medication Management  Due: November 28, 2019 8:57:58 AM CST    ** On Hold Pending Signature **  Drug: liraglutide (Saxenda 18 mg/3 mL subcutaneous solution)  3 MG SUBCUTANEOUS DAILY,INSTR:TAKE 3MG INJECTION Q AM (GOAL DOSE)  Quantity: 15 mL  Days Supply: 0  Refills: 0  Substitutions Allowed  Notes from Pharmacy:     Dispensed Drug: liraglutide (Saxenda 18 mg/3 mL subcutaneous solution)  INJECT 3MG SUBCUTANEOUSLY DAILY EVERY MORNING(GOAL DOSE)  Quantity: 15 unknown unit  Days Supply: 30  Refills: 0  Substitutions Allowed  Notes from Pharmacy:     ** On Hold Pending Signature **  Drug: BD pen needle mini [id number]  USE DAILY WITH SAXENDA AS DIRECTED  Quantity: 100 unknown unit  Days Supply: 0  Refills: 0  Substitutions Allowed  Notes from Pharmacy:     Dispensed Drug: B-D PEN NDL MINI [id number](3/16)PRPL  INJECT WITH SAXENDA EVERY DAY  Quantity: 100 unknown unit  Days Supply: 30  Refills:  0  Substitutions Allowed  Notes from Pharmacy:     ** On Hold Pending Signature **  Drug: ondansetron (ondansetron 4 mg oral tablet)  1 TAB(S) ORAL Q8 HRS,PRN:FOR NAUSEA AND VOMITING  Quantity: 30 tab(s)  Days Supply: 0  Refills: 0  Substitutions Allowed  Notes from Pharmacy:     Dispensed Drug: ondansetron (ondansetron 4 mg oral tablet)  TAKE 1 TABLET BY MOUTH EVERY 8 HOURS AS NEEDED FOR NAUSEA OR VOMITING  Quantity: 30 tab(s)  Days Supply: 10  Refills: 0  Substitutions Allowed  Notes from Pharmacy:     ** On Hold Pending Signature **  Drug: rizatriptan (rizatriptan 10 mg oral tablet)  TAKE 1 TABLET BY MOUTH ONCE AS NEEDED FOR MIGRAINE HEADACHE  Quantity: 6 tab(s)  Days Supply: 0  Refills: 0  Substitutions Allowed  Notes from Pharmacy:     Dispensed Drug: rizatriptan (rizatriptan 10 mg oral tablet)  TAKE 1 TABLET BY MOUTH 1 TIME AS NEEDED FOR MIGRAINE HEADACHE  Quantity: 6 tab(s)  Days Supply: 6  Refills: 0  Substitutions Allowed  Notes from Pharmacy:     ** On Hold Pending Signature **  Drug: benzonatate (Tessalon Perles 100 mg oral capsule)  1 CAP(S) ORAL TID,X10 DAY(S),PRN:AS NEEDED FOR COUGH  Quantity: 30 cap(s)  Days Supply: 0  Refills: 0  Substitutions Allowed  Notes from Pharmacy:     Dispensed Drug: benzonatate (benzonatate 100 mg oral capsule)  TAKE 1 CAPSULE BY MOUTH THREE TIMES DAILY FOR 10 DAYS AS NEEDED FOR COUGH  Quantity: 30 cap(s)  Days Supply: 10  Refills: 0  Substitutions Allowed  Notes from Pharmacy:     ** On Hold Pending Signature **  Drug: tiZANidine (tiZANidine 4 mg oral tablet)  2 TO 3 TABLETS ORAL QHS  Quantity: 90 tab(s)  Days Supply: 0  Refills: 0  Substitutions Allowed  Notes from Pharmacy:     Dispensed Drug: tiZANidine (tiZANidine 4 mg oral tablet)  TAKE 2 TO 3 TABLETS BY MOUTH EVERY NIGHT AT BEDTIME  Quantity: 90 tab(s)  Days Supply: 30  Refills: 0  Substitutions Allowed  Notes from Pharmacy:   ---------------------------------------------------------------  From: Tati Will CMA  ([facility] Message Pool ([id number]_[facility]))   To: Orestes Mendosa MD;     Sent: 12/2/2019 8:14:25 AM CST  Subject: FW: Medication Management   Due Date/Time: 11/28/2019 8:57:00 AM CST---------------------  From: Orestes Mendosa MD   To: [facility] #[id number]    Sent: 12/2/2019 11:17:03 AM CST  Subject: FW: Medication Management     ** Submitted: **  Complete:tiZANidine (tiZANidine 4 mg oral tablet)   Signed by Orestes Mendosa MD  12/2/2019 11:17:00 AM    ** Submitted: **  Complete:benzonatate (benzonatate 100 mg oral capsule)   Signed by Orestes Mendosa MD  12/2/2019 11:17:00 AM    ** Submitted: **  Complete:rizatriptan (rizatriptan 10 mg oral tablet)   Signed by Orestes Mendosa MD  12/2/2019 11:17:00 AM    ** Submitted: **  Complete:ondansetron (ondansetron 4 mg oral tablet)   Signed by Orestes Mendosa MD  12/2/2019 11:17:00 AM    ** Submitted: **  Complete:liraglutide (Saxenda 18 mg/3 mL subcutaneous solution)   Signed by Orestes Mendosa MD  12/2/2019 11:17:00 AM    ** Approved **  [employer] (B-D PEN NDL MINI [id number](3/16)PRPL)  INJECT WITH SAXENDA EVERY DAY  Qty:  100 unknown unit        Days Supply:  30        Refills:  0          Substitutions Allowed     Route To [facility] #[id number]       ** Approved **  liraglutide (SAXENDA 6MG/ML PEN INJ 3ML)  INJECT 3MG SUBCUTANEOUSLY DAILY EVERY MORNING(GOAL DOSE)  Qty:  15 unknown unit        Days Supply:  30        Refills:  0          Substitutions Allowed     Route To [facility] #[id number]       ** Approved **  ondansetron (ONDANSETRON 4MG TABLETS)  TAKE 1 TABLET BY MOUTH EVERY 8 HOURS AS NEEDED FOR NAUSEA OR VOMITING  Qty:  30 tab(s)        Days Supply:  10        Refills:  0          Substitutions Allowed     Route To [facility] #[id number]       ** Approved **  rizatriptan (RIZATRIPTAN 10MG TABLETS)  TAKE 1 TABLET BY MOUTH 1 TIME AS NEEDED FOR MIGRAINE HEADACHE  Qty:  6 tab(s)        Days Supply:  6        Refills:  0           Substitutions Allowed     Route To [facility] #[id number]       ** Approved **  benzonatate (BENZONATATE 100MG CAPSULES)  TAKE 1 CAPSULE BY MOUTH THREE TIMES DAILY FOR 10 DAYS AS NEEDED FOR COUGH  Qty:  30 cap(s)        Days Supply:  10        Refills:  0          Substitutions Allowed     Route To [facility] #[id number]       ** Approved **  tiZANidine (TIZANIDINE 4MG TABLETS)  TAKE 2 TO 3 TABLETS BY MOUTH EVERY NIGHT AT BEDTIME  Qty:  90 tab(s)        Days Supply:  30        Refills:  0          Substitutions Allowed     Route To [facility] #[id number]
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
## 2022-02-15 NOTE — TELEPHONE ENCOUNTER
Entered by Jenny Ballesteros on July 24, 2019 9:53:10 AM CDT  ---------------------  From: Jenny Ballesteros   To: [facility] #[id number]    Sent: 7/24/2019 9:53:10 AM CDT  Subject: Medication Management     ** Submitted: **  Order:tiZANidine (tiZANidine 4 mg oral tablet)  2 TO 3 TABLETS  Oral  qhs  Qty:  90 tab(s)        Days Supply:  30        Refills:  0          Substitutions Allowed     Route To [facility] #[id number]    Signed by Jenny Ballesteros  7/24/2019 9:52:00 AM    ** Submitted: **  Complete:tiZANidine (tiZANidine 4 mg oral tablet)   Signed by Jenny Ballesteros  7/24/2019 9:53:00 AM    ** Not Approved:  **  tiZANidine (TIZANIDINE 4MG TABLETS)  TAKE 2 TO 3 TABLETS BY MOUTH EVERY NIGHT AT BEDTIME  Qty:  90 tab(s)        Days Supply:  30        Refills:  0          Substitutions Allowed     Route To [facility] #[id number]   Signed by Jenny Ballesteros            ------------------------------------------  From: [facility] [employer] #[id number]  To: Orestes Mendosa MD  Sent: July 24, 2019 8:59:22 AM CDT  Subject: Medication Management  Due: July 25, 2019 8:59:22 AM CDT    ** On Hold Pending Signature **  Drug: tiZANidine (tiZANidine 4 mg oral tablet)  2-3 TAB(S) PO QHS  Quantity: 90 tab(s)     Days Supply: 0         Refills: 0  Substitutions Allowed  Notes from Pharmacy:     Dispensed Drug: tiZANidine (tiZANidine 4 mg oral tablet)  TAKE 2 TO 3 TABLETS BY MOUTH EVERY NIGHT AT BEDTIME  Quantity: 90 tab(s)     Days Supply: 30        Refills: 0  Substitutions Allowed  Notes from Pharmacy:     ** On Hold Pending Signature **  Drug: rizatriptan (rizatriptan 10 mg oral tablet)  TAKE 1 TABLET BY MOUTH 1 TIME AS NEEDED FOR MIGRAINE HEADACHE  Quantity: 6 tab(s)     Days Supply: 0         Refills: 0  Substitutions Allowed  Notes from Pharmacy: DUE FOR A VISIT    Dispensed Drug: rizatriptan (rizatriptan 10 mg oral tablet)  TAKE 1 TABLET BY MOUTH ONCE AS NEEDED FOR MIGRAINE  HEADACHE  Quantity: 6 tab(s)     Days Supply: 15        Refills: 0  Substitutions Allowed  Notes from Pharmacy:   ------------------------------------------Date of last office visit and reason:  7/23/19 MS, ADHD      Date of last Med Check / Px:   7/23/19  Date of last labs pertaining to med:  1/19/19    RTC order in chart:  Yes. Placed 7/23/19 for 4 months

## 2022-02-15 NOTE — CARE COORDINATION
Pt called stating she was previously on Adderall rx'd by Dr. Blunt. She is no longer seeing Dr. Blunt and hasnt been using the Adderall.  She said she is currently involved in a lot of therapy with her daughter and wonders if resuming her Adderall will help focus on her daughters needs. I advised an appt is needed w/ [facility] to discuss and I have transferred her to schedule.

## 2022-02-15 NOTE — TELEPHONE ENCOUNTER
---------------------  From: Yokasta Lawton LPN (eRx Pool (32224_[facility]))   To: Orestes Mendosa MD;     Sent: 6/24/2019 5:49:07 PM CDT  Subject: FW: Medication Management   Due Date/Time: 6/24/2019 10:28:00 AM CDT     Medication Refill needing approval    PCP:   [facility]    Medication:   tizanidine 4mg 2-3 tabs PO hs  Last Filled:  12/23/18     Quantity:  90  Refills:  0  CSA on file?   n/a     Date of last office visit and reason:   3/16/19 Urinary frequency  Date of last labs pertaining to condition:  n/a    Note:  Please advise on refill    Return to Clinic order placed?  pt was due for appt in April    Resource:   pharmacy          ------------------------------------------  From: [employer] [id number]  To: Orestes Mendosa MD  Sent: June 23, 2019 10:28:33 AM CDT  Subject: Medication Management  Due: June 24, 2019 10:28:33 AM CDT    ** On Hold Pending Signature **  Drug: tiZANidine (tiZANidine 4 mg oral tablet)  2-3 TAB(S) PO QHS  Quantity: 90 tab(s)     Days Supply: 0         Refills: 0  Substitutions Allowed  Notes from Pharmacy:     Dispensed Drug: tiZANidine (tiZANidine 4 mg oral tablet)  TAKE 2 TO 3 TABLETS BY MOUTH EVERY NIGHT AT BEDTIME  Quantity: 90 tab(s)     Days Supply: 30        Refills: 0  Substitutions Allowed  Notes from Pharmacy:   ---------------------------------------------------------------  From: Orestes Mendosa MD   To: [employer] [id number]    Sent: 6/25/2019 7:55:56 AM CDT  Subject: FW: Medication Management     ** Submitted: **  Complete:tiZANidine (tiZANidine 4 mg oral tablet)   Signed by Orestes Mendosa MD  6/25/2019 7:55:00 AM    ** Approved **  tiZANidine (TIZANIDINE 4MG TABLETS)  TAKE 2 TO 3 TABLETS BY MOUTH EVERY NIGHT AT BEDTIME  Qty:  90 tab(s)        Days Supply:  30        Refills:  0          Substitutions Allowed     Route To Pharmacy - [id number]

## 2022-02-15 NOTE — NURSING NOTE
Phone Message    PCP:   SHANNON      Time of Call:  1259       Person Calling:  Pt  Phone number:  [phone]    Returned call at: 1309    Note:   Patient called stating she had received a letter saying she was due for lab work. She states she has a phlebotomist come to her home once month and wondered if she could have the labs drawn through them and then results sent to [facility]. Called back and left message informing her that we would need the name of the company and fax number of there to send the orders to. Asked her to return call.

## 2022-02-15 NOTE — TELEPHONE ENCOUNTER
---------------------  From: Macey Pedraza RN (Phone Messages Pool (32224_[facility]))   To: [facility] Message Pool ([address]_[place] [facility] [place]);     Sent: 7/9/2019 3:58:58 PM CDT  Subject: Phone Message     Phone Message    PCP:   SHANNON      Time of Call:  1502       Person Calling:  Pt  Phone number:  [phone] - vm ok    Returned call at: 1553    Note:   Pt called stating she had fallen this past Saturday and hit her face. Returned call. She states she fell and hit her right cheek bone, right breast, and right shoulder. She states no lacerations or discoloration. Has noticed a lump and some swelling on cheek bone, but no bruising. She has had a headache but feels it is more whiplash related. She states she did not loose consciousness and no concerns with vision changes. She is not having any significant symptoms but states she has a hard time differentiating if symptoms are fall related or MS related. She is wanting to know if [facility] thinks she is ok to monitor or if she should be evaluated due to MS. Please advise.     Last office visit and reason:  3-16-19 Urinary Frequency w/[facility]---------------------  From: Freya Makr CMA ([facility] Message Pool ([address]_[facility]))   To: Orestes Mendosa MD;     Sent: 7/9/2019 4:01:59 PM CDT  Subject: FW: Phone Message---------------------  From: Orestes Mendosa MD   To: [facility] Message Pool ([address]_[place] [facility] [place]);     Sent: 7/10/2019 7:57:36 AM CDT  Subject: RE: Phone Message     From my perspective, I think its reasonable to monitor.  She may want to reach out to [place] neurologist to get his opinions.contacted pt at 0826 and discussed.  Sore today with h/a, feels comfortable monitoring and will come in with any concerns.  Seeing her neuro in Aug and we did discuss having her update them since it's her 2nd fall.  Pt expressed understanding and no other concerns.

## 2022-02-15 NOTE — TELEPHONE ENCOUNTER
---------------------  From: Sue Mack CMA (eRx Pool (32224_[facility]))   To: SHANNON Message Pool ([address]_[place]);     Sent: 1/27/2020 7:55:47 AM CST  Subject: FW: Medication Management   Due Date/Time: 1/28/2020 1:24:00 AM CST       Medication Refill needing approval    PCP:   SHANNON    Medication:   tiazanidine  Last Filled:  1/7/20    Quantity:  90  Refills:  0  CSA on file?   no     Date of last office visit and reason:   8/17/19; bronchitis  Date of last labs pertaining to condition:  1/8/20    Note:  Please advise refill.    Return to Clinic order placed?  yes        ** Submitted: **  Order:amLODIPine (amLODIPine 5 mg oral tablet)  1 tab(s)  Oral  daily  Qty:  30 tab(s)        Days Supply:  30        Refills:  0          Substitutions Allowed     Route To Pharmacy - [facility] #[id number]    Signed by Sue Mack CMA  1/27/2020 7:54:00 AM    ** Submitted: **  Complete:amLODIPine (amLODIPine 5 mg oral tablet)   Signed by Sue Mack CMA  1/27/2020 7:54:00 AM    ** Not Approved:  **  amLODIPine (AMLODIPINE BESYLATE 5MG TABLETS)  TAKE 1 TABLET BY MOUTH EVERY DAY  Qty:  30 tab(s)        Days Supply:  30        Refills:  0          Substitutions Allowed     Route To [facility] #[id number]   Signed by Sue Mack CMA            ------------------------------------------  From: [facility] #[id number]  To: Orestes Mendosa MD  Sent: January 27, 2020 1:24:55 AM CST  Subject: Medication Management  Due: January 28, 2020 1:24:55 AM CST    ** On Hold Pending Signature **  Drug: tiZANidine (tiZANidine 4 mg oral tablet)  TAKE 2 TO 3 TABLETS BY MOUTH EVERY NIGHT AT BEDTIME  Quantity: 90 tab(s)  Days Supply: 30  Refills: 0  Substitutions Allowed  Notes from Pharmacy:     Dispensed Drug: tiZANidine (tiZANidine 4 mg oral tablet)  TAKE 2 TO 3 TABLETS BY MOUTH EVERY NIGHT AT BEDTIME  Quantity: 90 tab(s)  Days Supply: 30  Refills: 0  Substitutions Allowed  Notes from Pharmacy:     ** On Hold  Pending Signature **  Drug: amLODIPine (amLODIPine 5 mg oral tablet)  TAKE 1 TABLET BY MOUTH EVERY DAY  Quantity: 30 tab(s)  Days Supply: 30  Refills: 0  Substitutions Allowed  Notes from Pharmacy:     Dispensed Drug: amLODIPine (amLODIPine 5 mg oral tablet)  TAKE 1 TABLET BY MOUTH EVERY DAY  Quantity: 30 tab(s)  Days Supply: 30  Refills: 0  Substitutions Allowed  Notes from Pharmacy:   ---------------------------------------------------------------  From: Tati Will CMA   To: "[facility] #[id number]    Sent: 1/27/2020 3:30:25 PM CST  Subject: FW: Medication Management     ** Not Approved: Patient needs appointment, LR 1/7/20 for 90 tabs **  tiZANidine (TIZANIDINE 4MG TABLETS)  TAKE 2 TO 3 TABLETS BY MOUTH EVERY NIGHT AT BEDTIME  Qty:  90 tab(s)        Days Supply:  30        Refills:  0          Substitutions Allowed     Route To Pharmacy - [facility] #[id number]   Signed by Tati Will CMAPt is overdue for an appt-has been notified multiple times.

## 2022-02-15 NOTE — NURSING NOTE
Comprehensive Intake Entered On:  7/25/2019 1:31 PM CDT    Performed On:  7/25/2019 1:25 PM CDT by Jenny Ballesteros               Summary   Chief Complaint :   Patient here to f/u sleep study that was done 1 1/2 year ago   Menstrual Status :   Menarcheal   Weight Measured :   262.8 lb(Converted to: 262 lb 13 oz, 119.20 kg)    Height Measured :   65 in(Converted to: 5 ft 5 in, 165.10 cm)    Body Mass Index :   43.73 kg/m2 (HI)    Body Surface Area :   2.34 m2   Systolic Blood Pressure :   140 mmHg (HI)    Diastolic Blood Pressure :   80 mmHg   Mean Arterial Pressure :   100 mmHg   Peripheral Pulse Rate :   86 bpm   BP Site :   Right arm   Pulse Site :   Radial artery   BP Method :   Manual   HR Method :   Manual   Temperature Tympanic :   98.4 DegF(Converted to: 36.9 DegC)    Jenny Ballesteros - 7/25/2019 1:25 PM CDT   Health Status   Allergies Verified? :   Yes   Medication History Verified? :   Yes   Medical History Verified? :   Yes   Pre-Visit Planning Status :   Completed   Tobacco Use? :   Never smoker   Jenny Ballesteros - 7/25/2019 1:25 PM CDT   Consents   Consent for Immunization Exchange :   Consent Granted   Consent for Immunizations to Providers :   Consent Granted   Jenny Ballesteros - 7/25/2019 1:25 PM CDT   Meds / Allergies   (As Of: 7/25/2019 1:31:18 PM CDT)   Allergies (Active)   adhesive tape  Estimated Onset Date:   Unspecified ; Created By:   Katie Hatfield; Reaction Status:   Active ; Category:   Drug ; Substance:   adhesive tape ; Type:   Allergy ; Updated By:   Katie Hatfield; Reviewed Date:   7/25/2019 1:27 PM CDT      sulfa drugs  Estimated Onset Date:   Unspecified ; Reactions:   Rash, Vomiting....., Diarrhea ; Created By:   Michelle Pedersen CMA; Reaction Status:   Active ; Category:   Drug ; Substance:   sulfa drugs ; Type:   Allergy ; Updated By:   Michelle Pedersen CMA; Reviewed Date:   7/25/2019 1:27 PM CDT        Medication List   (As Of: 7/25/2019 1:31:18 PM CDT)    Prescription/Discharge Order    tiZANidine  :   tiZANidine ; Status:   Prescribed ; Ordered As Mnemonic:   tiZANidine 4 mg oral tablet ; Simple Display Line:   2 TO 3 TABLETS, Oral, qhs, 90 tab(s), 0 Refill(s) ; Ordering Provider:   Orestes Mendosa MD; Catalog Code:   tiZANidine ; Order Dt/Tm:   7/24/2019 9:52:31 AM          rizatriptan  :   rizatriptan ; Status:   Prescribed ; Ordered As Mnemonic:   rizatriptan 10 mg oral tablet ; Simple Display Line:   See Instructions, TAKE 1 TABLET BY MOUTH ONCE AS NEEDED FOR MIGRAINE HEADACHE, 6 tab(s), 0 Refill(s) ; Ordering Provider:   Orestes Mendosa MD; Catalog Code:   rizatriptan ; Order Dt/Tm:   7/24/2019 9:48:06 AM          methenamine  :   methenamine ; Status:   Prescribed ; Ordered As Mnemonic:   methenamine hippurate 1 g oral tablet ; Simple Display Line:   1 gm, 1 tab(s), Oral, bid, for 90 day(s), 180 tab(s), 3 Refill(s) ; Ordering Provider:   Orestes Mendosa MD; Catalog Code:   methenamine ; Order Dt/Tm:   7/23/2019 8:18:37 AM          lidocaine 5% topical ointment  :   lidocaine 5% topical ointment ; Status:   Prescribed ; Ordered As Mnemonic:   lidocaine 5% topical ointment ; Simple Display Line:   See Instructions, APPLY EXTERNALLY TO THE AFFECTED AREA THREE TIMES DAILY AS NEEDED FOR PAIN, 30 gm ; Ordering Provider:   Orestes Mendosa MD; Catalog Code:   lidocaine topical ; Order Dt/Tm:   5/23/2019 12:52:19 PM          Misc Prescription  :   Misc Prescription ; Status:   Prescribed ; Ordered As Mnemonic:   B-D PEN NDL MINI 90TB8JH(3/16)PRPL ; Simple Display Line:   See Instructions, USE DAILY WITH SAXENDA AS DIRECTED, 100 unknown unit ; Ordering Provider:   Orestes Mendosa MD; Catalog Code:   Miscellaneous Prescription ; Order Dt/Tm:   5/21/2019 12:55:23 PM          ondansetron  :   ondansetron ; Status:   Prescribed ; Ordered As Mnemonic:   ondansetron 4 mg oral tablet ; Simple Display Line:   4 mg, 1 tab(s), PO, q8 hrs, PRN: for nausea and vomiting, 30 tab(s), 3 Refill(s) ;  Ordering Provider:   Orestes Mendosa MD; Catalog Code:   ondansetron ; Order Dt/Tm:   4/26/2019 1:11:44 PM          amLODIPine  :   amLODIPine ; Status:   Prescribed ; Ordered As Mnemonic:   amLODIPine 5 mg oral tablet ; Simple Display Line:   1 tab(s), Oral, daily, 90 tab(s), 0 Refill(s) ; Ordering Provider:   Orestes Mendosa MD; Catalog Code:   amLODIPine ; Order Dt/Tm:   12/21/2018 11:59:47 AM          ergocalciferol 50,000 intl units (1.25 mg) oral capsule  :   ergocalciferol 50,000 intl units (1.25 mg) oral capsule ; Status:   Prescribed ; Ordered As Mnemonic:   ergocalciferol 50,000 intl units (1.25 mg) oral capsule ; Simple Display Line:   See Instructions, TAKE 1 CAPSULE BY MOUTH EVERY WEEK, 5 cap(s) ; Ordering Provider:   Orestes Mendosa MD; Catalog Code:   ergocalciferol ; Order Dt/Tm:   12/13/2018 12:22:08 PM          meloxicam 15 mg oral tablet  :   meloxicam 15 mg oral tablet ; Status:   Prescribed ; Ordered As Mnemonic:   meloxicam 15 mg oral tablet ; Simple Display Line:   See Instructions, TAKE 1 TABLET BY MOUTH DAILY, 90 tab(s) ; Ordering Provider:   Alfreda Lan MDica; Catalog Code:   meloxicam ; Order Dt/Tm:   10/26/2018 4:53:45 PM          liraglutide  :   liraglutide ; Status:   Prescribed ; Ordered As Mnemonic:   Saxenda 18 mg/3 mL subcutaneous solution ; Simple Display Line:   3 mg, subcutaneous, daily, take 3mg injection q am (goal dose), 15 mL, 6 Refill(s) ; Ordering Provider:   Orestes Mendosa MD; Catalog Code:   liraglutide ; Order Dt/Tm:   10/26/2018 4:26:23 PM          ergocalciferol  :   ergocalciferol ; Status:   Prescribed ; Ordered As Mnemonic:   ergocalciferol 50,000 intl units (1.25 mg) oral capsule ; Simple Display Line:   50,000 International Unit, 1 cap(s), Oral, qweek, for 30 day(s), 5 cap(s), 0 Refill(s) ; Ordering Provider:   Deondre MD, Orestes; Catalog Code:   ergocalciferol ; Order Dt/Tm:   7/17/2018 10:46:19 AM          Misc Prescription  :   Misc Prescription ; Status:   Prescribed ;  Ordered As Mnemonic:   B-D PEN NDL MINI 10ZE5IX(3/16)PRPL ; Simple Display Line:   See Instructions, USE DAILY WITH SAXENDA AS DIRECTED, 100 unknown unit ; Ordering Provider:   Orestes Mendosa MD; Catalog Code:   Miscellaneous Prescription ; Order Dt/Tm:   6/5/2018 10:24:17 AM          modafinil 200 mg oral tablet  :   modafinil 200 mg oral tablet ; Status:   Prescribed ; Ordered As Mnemonic:   modafinil 200 mg oral tablet ; Simple Display Line:   See Instructions, TAKE 1 TABLET BY MOUTH TWICE DAILY, 60 tab(s) ; Ordering Provider:   Orestes Mendosa MD; Catalog Code:   modafinil ; Order Dt/Tm:   10/11/2017 7:32:11 AM            Home Meds    cyclobenzaprine  :   cyclobenzaprine ; Status:   Documented ; Ordered As Mnemonic:   cyclobenzaprine 10 mg oral tablet ; Simple Display Line:   10 mg, 1 tab(s), Oral, tid, PRN: for spasm, 30 tab(s), 0 Refill(s) ; Catalog Code:   cyclobenzaprine ; Order Dt/Tm:   7/23/2019 8:00:07 AM          alemtuzumab  :   alemtuzumab ; Status:   Documented ; Ordered As Mnemonic:   Lemtrada ; Simple Display Line:   See Instructions, 1x/yr, 0 Refill(s) ; Catalog Code:   alemtuzumab ; Order Dt/Tm:   10/26/2018 3:52:14 PM          clonazePAM  :   clonazePAM ; Status:   Documented ; Ordered As Mnemonic:   clonazePAM 0.5 mg oral tablet ; Simple Display Line:   1 mg, 2 tab(s), Oral, hs, 0 Refill(s) ; Catalog Code:   clonazePAM ; Order Dt/Tm:   10/26/2018 3:49:45 PM          cholecalciferol  :   cholecalciferol ; Status:   Documented ; Ordered As Mnemonic:   cholecalciferol 2000 intl units oral tablet ; Simple Display Line:   2,000 International Unit, 1 tab(s), po, daily, 0 Refill(s) ; Catalog Code:   cholecalciferol ; Order Dt/Tm:   2/26/2018 11:07:57 AM          valACYclovir  :   valACYclovir ; Status:   Documented ; Ordered As Mnemonic:   valACYclovir 500 mg oral tablet ; Simple Display Line:   500 mg, 1 tab(s), po, bid, 0 Refill(s) ; Catalog Code:   valACYclovir ; Order Dt/Tm:   2/23/2018 2:39:43 PM           acetaminophen/aspirin/caffeine  :   acetaminophen/aspirin/caffeine ; Status:   Documented ; Ordered As Mnemonic:   Excedrin ; Simple Display Line:   See Instructions, 0 Refill(s) ; Catalog Code:   acetaminophen/aspirin/caffeine ; Order Dt/Tm:   9/6/2016 10:16:52 AM          amphetamine-dextroamphetamine  :   amphetamine-dextroamphetamine ; Status:   Documented ; Ordered As Mnemonic:   Adderall ; Simple Display Line:   45 mg, po, bid, 0 Refill(s) ; Catalog Code:   amphetamine-dextroamphetamine ; Order Dt/Tm:   6/23/2016 11:31:52 AM

## 2022-02-15 NOTE — TELEPHONE ENCOUNTER
---------------------  From: Orestes Mendosa MD   To: [facility] Message Pool ([id number]_[place]);     Sent: 2/18/2021 5:47:37 PM CST  Subject: RE: General Message     yes      ---------------------  From: Freya Mark CMA ([facility] Message Pool ([address]_[facility]))   To: Orestes Mendosa MD;     Sent: 2/18/2021 5:05:29 PM CST  Subject: General Message     can we fill methenamine 1g BID  for Karli?

## 2022-02-15 NOTE — TELEPHONE ENCOUNTER
MRI order faxed to [facility], they will contact patient to schedule, called patient 3/10 and LM but has not returned call.Re-faxed 4-6-21

## 2022-02-15 NOTE — TELEPHONE ENCOUNTER
---------------------  From: Tati Will CMA   Sent: 2/10/2020 4:29:41 PM CST  Subject: Next due visit     Karli called wondering when she was to return to see [facility] called pt back and LM advising to return to clinic in 3 mo w/ fasting labs done prior.

## 2022-02-15 NOTE — LETTER
(Inserted Image. Unable to display)   [address] [place] [address]  November 30, 2021      MARGARITA [facility]  [address]  [place] [address]        Dear MARGARITA,     Thank you for selecting [facility] for your healthcare needs. Below you will find the results of your recent test(s) done at our clinic.      Labs all look good.  Good overall diabetic control.       Result Name Current Result Previous Result Reference Range   U Microalbumin (mg/dL)  1.5 11/24/2021  See Note: -    Microalbumin Comment  See comment 11/24/2021     Hgb A1c ((H)) 6.7 11/24/2021 ((H)) 7.4 4/7/2021  - <5.7   LDL Direct (mg/dL)  83 11/24/2021   - <100   Glucose Level (mg/dL) ((H)) 149 11/24/2021 ((H)) 118 5/18/2021 65 - 99   BUN (mg/dL)  12 11/24/2021  10 5/18/2021 7 - 25   Creatinine Level (mg/dL)  0.63 11/24/2021  0.67 5/18/2021 0.50 - 1.10   eGFR (mL/min/1.73m2)  106 11/24/2021  105 5/18/2021 > OR = 60 -    eGFR  (mL/min/1.73m2)  123 11/24/2021  121 5/18/2021 > OR = 60 -    BUN/Creat Ratio  NOT APPLICABLE 11/24/2021  NOT APPLICABLE 5/18/2021 6 - 22   Sodium Level (mmol/L)  140 11/24/2021  139 5/18/2021 135 - 146   Potassium Level (mmol/L)  4.2 11/24/2021  4.2 5/18/2021 3.5 - 5.3   Chloride Level (mmol/L)  108 11/24/2021  106 5/18/2021 98 - 110   CO2 Level (mmol/L)  22 11/24/2021  24 5/18/2021 20 - 32   Calcium Level (mg/dL)  9.4 11/24/2021  9.3 5/18/2021 8.6 - 10.2       Please contact me or my assistant at [phone] if you have any questions or concerns.     Sincerely,        Orestes Mendosa MD    What do your labs mean?  Below is a glossary of commonly ordered labs:  LDL - Bad Cholesterol  HDL - Good Cholesterol  AST/ALT - Liver Function  Cr/Creatinine - Kidney Function  Microalbumin - Kidney Function  BUN - Kidney Function  PSA - Prostate   TSH - Thyroid Hormone  HgbA1c - Diabetes Test  Hgb (Hemoglobin) - Red Blood Cells   How Severe Is Your Skin Lesion?: mild Has Your Skin Lesion Been Treated?: not been treated Is This A New Presentation, Or A Follow-Up?: Growths Which Family Member (Optional)?: Mother, Father, Brother

## 2022-02-15 NOTE — TELEPHONE ENCOUNTER
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 Pt says she just got a call from her neurologists office and they told her that she has a bladder infection. Pt says she was instructed to call [facility] to get Rx.    Pt gave the number for her neurologists office if [facility] needs a copy of the UA. [phone]    Last office visit and reason:  5/18/21 pre-opcontacted Neuro office and they will be faxing over results  DOS 6/9---------------------  From: Freya Mark CMA ([facility] Message Pool (32224[facility]))   To: [facility] Message Pool ([id number]_[place]);     Sent: 6/15/2021 2:55:49 PM CDT  Subject: FW: Rx request     micro results:  WBC      0-5  RBC        3-10  Epitheiial cells  >10  Crystals   present  Bacteria    few  yeast        present    LM for pt to return call to see if she is having symptoms, does have an appt tomorrow to see [facility] another  message for pt this am after I noticed she cancelled appt for today---------------------  From: Freya Mark CMA ([facility] Message Pool ([address]224_[facility]))   To: Orestes Mendosa MD;     Sent: 6/17/2021 10:47:56 AM CDT  Subject: FW: Rx request     pt called back again this am stating she hasn't heard back from us - realized wrong ph# listed, apologized to Karli villeda pt at 1045 - c/o freq urination, diarrhea, swelling right side, difficulty walking.  States she's learning from her specialist on how to identify her symptoms/UTI - please advise    allergic to Sulfa---------------------  From: Orestes Mendosa MD   To: [facility] [employer] Pool ([address]_[place]);     Sent: 6/17/2021 11:41:58 AM CDT  Subject:  Dilaudid 1 mg given via slow IV push for pt c/o abdominal pain 7/10 RE: Rx request     Can send in Rx for Keflex 500mg TID x 1 week.pt contacted - advised to be seen if symptoms worsening.  Advised will need to be seen if contacted again from Neuro on + UA results and she expressed understanding

## 2022-02-15 NOTE — PROGRESS NOTES
Patient:   KARLI KAISER            MRN: [id number]            FIN: [id number]               Age:   42 years     Sex:  Female     :  1975   Associated Diagnoses:   Multiple sclerosis; Prediabetes; Urinary bladder neurogenic dysfunction; Migraine; Morbid obesity   Author:   Orestes Mendosa MD      Visit Information      Date of Service: 2018 01:00 pm  Performing Location: [facility]  Encounter#: [id number]      Primary Care Provider (PCP):  Orestes Mendosa MD    NPI# [id number]      Referring Provider:  Orestes Mendosa MD# [id number]      Chief Complaint            Additional Information:No additional information recorded during visit.   Chief complaint and symptoms as noted above and confirmed with patient.  Recent lab and diagnostic studies reviewed with patient      History of Present Illness   3/8/2016: Karli presents to clinic for ER follow-up after presentations on both Thursday and Friday this past week for urinary incontinence and suspected pyelonephritis.  She requested for Srivastava catheter to be placed this past week because of persistent incontinence.  She is scheduled to meet with the urologist on the  of this month.  She was originally prescribed Cipro which was later changed to Macrobid after discovery of extended spectrum beta-lactamase resistant E. coli bacteria.  She was started on Macrobid on .  Overall continues to have severe left-sided flank pain.  She is very distressed about her underlying multiple sclerosis and her ability to care for her daughter.  She does follow with a neurologist through [facility].  She previously was following with an internist through [place] and now wishes to localize her cares here.    12/15/2016: Karli presents to clinic with 2 week history of cold symptoms.  She presented to the emergency department earlier in this course for concerns of possible urinary tract infection.  She has had increased urinary frequency with  incontinence.  Urinalysis was normal in the emergency room.  She was referred on to a neurologist who ordered a CT of her neuro spine showing new MS lesions.  She was recommended to start on steroids though has had very significant psychiatric adverse reactions on corticosteroids.  She was prescribed corticotropin (Acthar) which she completed today.  Describing a 24-hour history of severe flulike symptoms including myalgias, extreme malaise, nonproductive cough.  She was advised to seek any medical attention with describes symptoms on Acthar.  Previously was treating with a variety of over-the-counter cold medicines including Robitussin, Tessalon Perles, Tylenol, Claritin    1/18/2017: Presents with migraine headaches now for the last 3 days.  She used up the last of her rizatriptan which generally works the best for her.  Currently with very limited funds for any other medication options.  Scheduled for infusion for her MS scheduled tomorrow.  Hoping for more immediate relief today, allowing her to get to infusion tomorrow.  After infusion, her deductible will be met.    3/1/2017: Karli presents to clinic after recent assessment by Dr. Girard earlier this week related to vaginal bleeding.  She underwent a vaginal ultrasound showing increased uterine lining thickness.  He recommended she be started on Provera to obtain more regular menses.  Shares having spontaneous menses about once/year. She voices concerns related to increased health risk from her obesity.  Specifically raises concerns to uterine cancer as well as metabolic complications.  She wonders if it is time to think more seriously about bariatric surgery options.  She shares having a good friend that went through the process has had some success.  She has not worked with Oanh Mane here in clinic.  She feels very physically limited related to her MS.  She states gaining over 80 pounds since beginning on MS therapy.    5/17/2017: Presents with 3 day  history of nausea she is watery diarrhea.  She started taking Lomotil yesterday.  She took a total of 4 tablets with improvement in stool frequency though still having profuse diarrhea.  She still has been able to eat chicken soup and sparkling cider.  No fever chills.  No bloody component the diarrhea.  No described sick contacts.  No recent antibiotic exposure.    1/26/2018: Karli returns to clinic with one-month history of complaints of dysuria, urinary incontinence increased urinary frequency.  Last diagnosed with urinary tract infection this past month through the [facility] emergency room.  She was prescribed Macrobid at that time based on sensitivities.  In the past has used fosfomycin related to previous history of multidrug-resistant UTIs.  Her multiple sclerosis continues to progress, primarily defined by lower extremity weakness, back pains and urinary changes.  She has failed all conventional means of therapy.  There is discussion about her beginning on alemtuzumab in the near future.  She needs to be infection free prior to beginning therapy.  Having trouble coping especially back pains and associated anxieties.  Would like to discuss other potential weight loss therapy options         Review of Systems   Constitutional:  No fever, No chills, No weakness.    Eye:  Negative except as documented in history of present illness.    Ear/Nose/Mouth/Throat:  Negative except as documented in history of present illness.    Respiratory:  No shortness of breath.    Cardiovascular:  No chest pain, No palpitations, No peripheral edema, No syncope.    Gastrointestinal:  Constipation, No nausea, No vomiting.    Genitourinary:  Change in urine stream, No dysuria, No hematuria.    Hematology/Lymphatics:  Negative except as documented in history of present illness.    Endocrine:  No excessive thirst, No polyuria.    Immunologic:  No recurrent fevers.    Musculoskeletal:  No joint pain, No muscle pain.    Neurologic:   Alert and oriented X4, Abnormal balance, Confusion, No numbness, No tingling, No headache.    Psychiatric:  Anxiety, Depression.       Health Status   Allergies:    Allergic Reactions (Selected)  Severity Not Documented  Adhesive tape (No reactions were documented)  Sulfa drugs (Rash, vomiting..... and diarrhea)   Medications:  (Selected)   Prescriptions  Prescribed  DULoxetine 60 mg oral delayed release capsule: 3 cap(s) ( 180 mg ), po, daily, # 180 cap(s), 0 Refill(s), Type: Soft Stop, Pharmacy: [id number], 3 cap(s) po daily  Lyrica 300 mg oral capsule: 1 cap(s) ( 300 mg ), po, tid, # 90 cap(s), 5 Refill(s), Type: Maintenance, Pharmacy: [facility] [id number], 1 cap(s) po tid  Walker with large wheels and large sitting seat: Walker with large wheels and large sitting seat, See Instructions, Instructions: use as directed, Supply, # 1 EA, 0 Refill(s), Type: Maintenance  cholecalciferol 5000 intl units oral tablet: See Instructions, Instructions: 15,000 units daily, # 100 tab(s), 5 Refill(s), Type: Maintenance, Pharmacy: [facility] [id number]  ergocalciferol 50,000 intl units (1.25 mg) oral capsule: See Instructions, Instructions: 1 cap(s) po wkly, # 12 cap(s), 0 Refill(s), Type: Maintenance, Pharmacy: [facility] [id number]  lidocaine-prilocaine 2.5%-2.5% topical cream: See Instructions, Instructions: APPLY 5 GRAMS TOPICALLY TO THE AFFECTED AREA(S) ONCE FOR A ONE TIME DOSE, # 30 gm, Type: Soft Stop, Pharmacy: [facility] [id number]  medroxyPROGESTERone 10 mg oral tablet: See Instructions, Instructions: TAKE 1 TABLET BY MOUTH EVERY DAY FOR 10 DAYS ONCE PER MONTH PER PROVIDER, # 30 tab(s), Type: Soft Stop, Pharmacy: [facility] [id number], TAKE 1 TABLET BY MOUTH EVERY DAY FOR 10 DAYS ONCE PER MONTH PER PROVIDER  methenamine hippurate 1 g oral tablet: See Instructions, Instructions: TAKE ONE TABLET BY MOUTH TWICE DAILY WITH FOOD, # 60 tab(s), Type: Soft Stop, [facility], TAKE ONE TABLET BY MOUTH TWICE DAILY WITH FOOD  modafinil 200 mg oral tablet: See Instructions, Instructions: TAKE 1 TABLET BY MOUTH TWICE DAILY, # 60 tab(s), Type: Soft Stop, Pharmacy: [id number], TAKE 1 TABLET BY MOUTH TWICE DAILY  ondansetron 4 mg oral tablet: 1 tab(s) ( 4 mg ), PO, q8 hrs, PRN: for nausea and vomiting, # 10 tab(s), 0 Refill(s), Type: Maintenance, Pharmacy: [facility], 1 tab(s) po q8 hrs,PRN:for nausea and vomiting  rizatriptan 10 mg oral tablet: See Instructions, Instructions: TAKE 1 TABLET BY MOUTH 1 TIME AS NEEDED FOR MIGRAINE HEADACHE, # 6 tab(s), Type: Soft Stop, Pharmacy: [facility] [id number]  tiZANidine 4 mg oral tablet: See Instructions, Instructions: TAKE 2 TO 3 TABLETS BY MOUTH EVERY NIGHT AT BEDTIME, # 90 tab(s), Type: Soft Stop, Pharmacy: [id number]  Documented Medications  Documented  Adderall: ( 45 mg ), po, bid, 0 Refill(s), Type: Maintenance  Excedrin: See Instructions, 0 Refill(s), Type: Maintenance  LORazepam 0.5 mg oral tablet: po, tid, Instructions: 0.5mg prn and 1-2 hs, PRN: for agitation, 0 Refill(s), Type: Maintenance  LaMICtal 100 mg oral tablet: 1 tab(s) ( 100 mg ), po, daily, 0 Refill(s), Type: Maintenance  Tysabri: iv, q4 wks, 0 Refill(s), Type: Maintenance  cyclobenzaprine: ( 10 mg ), po, tid, PRN: as needed for muscle spasm, 0 Refill(s), Type: Maintenance  indomethacin 25 mg oral capsule: 1 cap(s) ( 25 mg ), po, q6 hrs, PRN: for gout pain, 0 Refill(s), Type: Maintenance   Problem list:    All Problems  Anemia in chronic illness / SNOMED CT 550676726 / Confirmed  Ataxia / SNOMED CT 52806996 / Confirmed  Blues / SNOMED CT 829658181 / Confirmed  Fatigue / SNOMED CT 770631369 / Confirmed  History of renal stone / SNOMED CT 8265480299 / Confirmed  Headache, migraine / SNOMED CT 65790598 / Confirmed  Multiple sclerosis / SNOMED CT 76779703 / Confirmed  Muscle weakness / SNOMED CT 53971695 /  Confirmed  Urinary bladder neurogenic dysfunction / SNOMED CT 7898691671 / Confirmed  Obesity / SNOMED CT 5589710646 / Probable  Skin sensation disturbance / SNOMED CT 587432957 / Confirmed  Seasonal allergies / SNOMED CT 013568318 / Confirmed  Vitamin D deficiency / SNOMED CT 19512104 / Confirmed  Resolved: *Hospitalized@[facility] - Complicated UTI  Resolved: *Hospitalized@[place] - Left renal stone  Resolved: Pregnancy  Resolved: Pregnancy / SNOMED CT 443266334  Resolved: Pregnancy / SNOMED CT 640470516  Canceled: Headache / SNOMED CT 73765294      Histories   Past Medical History:    Active  Multiple sclerosis (07249796): Onset in 2012 at 37 years.  Blues (216177473)  History of renal stone (5189381677)  Urinary bladder neurogenic dysfunction (3665072460)  Fatigue (222486604)  Muscle weakness (76106382)  Vitamin D deficiency (94472405)  Ataxia (46286733)  Headache, migraine (86867425)  Comments:  7/6/2016 CDT 11:19 AM CDT - Katie Hatfield  With aura.  Skin sensation disturbance (690946295)  Seasonal allergies (922354025)  Resolved  *Hospitalized@[facility] - Complicated UTI: Onset on 6/14/2016 at 40 years.  Resolved on 6/18/2016 at 40 years.  *Hospitalized@[place] - Left renal stone: Onset in the month of 5/2016 at 40 years  Resolved.  Pregnancy:  Resolved.  Pregnancy (886036990):  Resolved in 2006 at 30 years.  Pregnancy (484319913):  Resolved in 1994 at 18 years.   Family History:    Melanoma  Father (Shakir)  Thyroid  Sister (Ana Cristina)  Kidney stone  Daughter (Zac)  Diabetes mellitus type II  Father (Shakir)  Hypothyroidism  Sister (Ana Cristina)  Diabetes mellitus type 2  Father (Shakir)  CA - Cancer  Father (Shakir)  Comments:  4/5/2017 11:45 AM - Katie Hatfield  skin  CHF - Congestive heart failure  Father (Shakir)  Migraine  Sister (Ana Cristina)  Daughter (Leda)  Mother (Soledad)  Depression  Daughter (Zac)  Father (Shakir)  Gallbladder problem  Mother (Soledad)  Bipolar  Father (Shakir)  Miscellaneous  Brother  (Adrian)  Comments:  2017 11:43 AM - Katie Hatfield  spinal stenosis  Mother (Soledad)  Comments:  2017 11:43 AM - Katie Hatfield  spinal stenosis  Anxiety  Daughter (Leda)  Daughter (Zac)     Procedure history:    Port-a-cath in place - Right (21574817) on 2016 at 41 Years.  Comments:  2016 8:01 AM - Ceci Engle  Dx:  Multiple sclerosis.  cystoscopy left ureteroscopy with holmium laser left retrogrades stone extraction and left ureteral stent exchange on 6/3/2016 at 40 Years.  Ureteroscopy (5451403580) in the month of 2016 at 40 Years.  Comments:  2016 4:56 PM - La Garcia  with laser/stone extraction (inability to remove all of stone)  Appendectomy (079692181) in  at 39 Years.  Breast reduction (515311379) in  at 26 Years.  Tonsillectomy and adenoidectomy (232886122) in  at 14 Years.  Cholecystectomy (40366769).   section (27866104).  Comments:  2016 11:26 AM - Deedee CMA, Freya  x1  Tubal ligation (229806812).   Social History:        Alcohol Assessment            Current, 1-2 times per month, 1 drinks/episode maximum.      Tobacco Assessment            Never smoker      Substance Abuse Assessment: Past            Marijuana      Employment and Education Assessment            Disability      Home and Environment Assessment            2 children.      Nutrition and Health Assessment            Caffeine intake amount: 2 sodas, every day.      Exercise and Physical Activity Assessment: Does not exercise      Sexual Assessment            Sexually active: No.  Sexual orientation: Heterosexual.        Physical Examination   vital signs stable, as noted above   VS/Measurements   General:  Alert and oriented, No acute distress.    HENT:  Normocephalic.    Neck:  Supple.    Respiratory:  Lungs are clear to auscultation, Respirations are non-labored.    Cardiovascular:  Normal rate, Regular rhythm, No murmur, No edema.    Gastrointestinal:  Soft, Non-distended.     Genitourinary:  +CVA tenderness.    Musculoskeletal:  Infusaport in place; not engaged.    Neurologic:  Alert, Oriented.    Cognition and Speech:  Oriented, Speech clear and coherent.    Psychiatric:  Appropriate mood & affect.       Review / Management   Results review:  Lab results   1/26/2018 2:32 PM CST UA Color Yellow    UA Clarity Slightly Cloudy    UA pH 6.0    UA Specific Gravity 1.025    UA Glucose Negative mg/dL    UA Bilirubin Negative    UA Ketones Negative mg/dL    U Occult Blood Negative    UA Protein Negative mg/dL    UA Nitrite Negative    UA Leuk Est Negative    UA Urobilinogen Normal    UA Epithelial Cells Moderate    UA WBC 6-10    UA RBC 0-2    UA CA Ox Crystal Present    UA Bacteria Moderate   1/26/2018 2:12 PM CST Sodium Level 138 mmol/L    Potassium Level 4.4 mmol/L    Chloride Level 104 mmol/L    CO2 Level 23 mmol/L    Glucose Level 139 mg/dL  HI    BUN 12 mg/dL    Creatinine 0.71 mg/dL    BUN/Creat Ratio NOT APPLICABLE    eGFR 104 mL/min/1.73m2    eGFR African American 120 mL/min/1.73m2    Calcium Level 9.5 mg/dL    C-Reactive Protein (CRP) 44.0 mg/L  HI    WBC 10.1    RBC 4.50    Hgb 12.0 gm/dL    Hct 36.1 %    MCV 80.2 fL    MCH 26.7 pg  LOW    MCHC 33.2 gm/dL    RDW 15.0 %    Platelet 343    MPV 9.7 fL    Lymphs 24.4 %    Abs Lymphs 2,464    Neutrophils 68.1 %    Abs Neutrophils 6,878    Monocytes 4.0 %    Abs Monocytes 404    Eosinophils 2.8 %    Abs Eosinophils 283    Basophils 0.7 %    Abs Basophils 71   .       Impression and Plan   Diagnosis     Multiple sclerosis (OUO06-JT G35).     Prediabetes (FLQ77-XR R73.09).     Urinary bladder neurogenic dysfunction (XZR98-HI N31.9).     Migraine (RIA81-VL G43.019).     Morbid obesity (CTA12-DC E66.01).         .) lower urinary tract symptoms; UTI vs. MS attributable bladder dysfunction  CRP 44; non-specific; WBC - normal  U/A: - leuk esterase; WBC 6-10; moderate bacteria (w/ squam)    - urine Cx processed; holding off on empiric Abx given  unclear symptoms and h/o Abx resistance   - historically has used fosphomycin in setting of ESBL urinary infections    .) multiple sclerosis - progressively worsening   - progression of disease on Tysabri (failed on forms of conventional therapy)   - following with Dr. Tucker through [facility] in [place]   - discussion about introducing alemtuzumab (Lemtrada) - potent monoclonal Ab directed against T cell population leading to sustained, severe immunosuppression    .) obesity, morbid; current BMI 48 - substantial weight gains since beginning therapies for multiple sclerosis   - referral to Oanh Mane to discuss medication options   - discussed weight loss management options including lifestyle, medication options, and potential bariatric surgery   - I think her MS and potential therapy directions limits active pursuits of bariatric surgery for now    .) vitamin D deficiency; total level 15 (1/2017 through [place])   - on cholecalciferol 5000 units/daily    .) health maintenance   - prediabetes    - enrolled in [facility] program

## 2022-02-15 NOTE — TELEPHONE ENCOUNTER
Per patient's request, order is sent to [facility] and they will contact patient.  Patient informed.

## 2022-02-15 NOTE — NURSING NOTE
Depression Screening Entered On:  11/24/2021 4:26 PM CST    Performed On:  11/24/2021 4:26 PM CST by Freya Mark CMA               Depression Screening   Little Interest - Pleasure in Activities :   Not at all   Feeling Down, Depressed, Hopeless :   Not at all   Initial Depression Screen Score :   0 Score   Poor Appetite or Overeating :   Several days   Trouble Falling or Staying Asleep :   Nearly every day   Feeling Tired or Little Energy :   Nearly every day   Feeling Bad About Yourself :   Several days   Trouble Concentrating :   Nearly every day   Moving or Speaking Slowly :   Several days   Thoughts Better Off Dead or Hurting Self :   Not at all   Detailed Depression Screen Score :   12    Total Depression Screen Score :   12    Freya Mark CMA - 11/24/2021 4:26 PM CST

## 2022-02-15 NOTE — PROGRESS NOTES
Patient:   MARGARITA KAISER            MRN: [id number]            FIN: [id number]               Age:   42 years     Sex:  Female     :  1975   Associated Diagnoses:   Ataxia; Multiple sclerosis; Muscle weakness; Myalgia   Author:   Emily Lan MD      Visit Information      Date of Service: 2018 11:00 am  Performing Location: [facility]  Encounter#: [id number]      Primary Care Provider (PCP):  Orestes Mendosa MD    NPI# [id number]      Referring Provider:  Emily Lan MD    NPI# [id number]      Chief Complaint   2018 11:04 AM CST    f/u trigger point injections        History of Present Illness   pt reports her pain is overall much better than it has been in some time, good enough that she was moving some furniture and thinks she may have over done it.   she would like to do both more trigger point injections to some different trigger points and would like to pursue acupuncture.  for today we will focus on trigger point injections..        Review of Systems   Constitutional:  Negative except as documented in history of present illness.    Eye:  Negative except as documented in history of present illness.    Ear/Nose/Mouth/Throat:  Negative except as documented in history of present illness.    Respiratory:  Negative except as documented in history of present illness.    Cardiovascular:  Negative except as documented in history of present illness.    Gastrointestinal:  Negative except as documented in history of present illness.    Immunologic:  Negative except as documented in history of present illness.    Integumentary:  Negative except as documented in history of present illness.              Health Status   Allergies:    Allergic Reactions (Selected)  Severity Not Documented  Adhesive tape (No reactions were documented)  Sulfa drugs (Rash, vomiting..... and diarrhea)   Medications:  (Selected)   Prescriptions  Prescribed  DULoxetine 60 mg oral delayed release capsule: 3  cap(s) ( 180 mg ), po, daily, # 180 cap(s), 0 Refill(s), Type: Soft Stop, Pharmacy: [id number], 3 cap(s) po daily  Keflex 500 mg oral capsule: 1 cap(s) ( 500 mg ), PO, QID, # 40 cap(s), 0 Refill(s), Type: Maintenance, Pharmacy: [id number], 1 cap(s) po qid,x10 day(s)  Lyrica 300 mg oral capsule: 1 cap(s) ( 300 mg ), po, tid, # 90 cap(s), 5 Refill(s), Type: Maintenance, Pharmacy: [id number], 1 cap(s) po tid  Saxenda 18 mg/3 mL subcutaneous solution: ( 3 mg ), subcutaneous, daily, Instructions: take 3mg injection q am (goal dose), # 15 mL, 6 Refill(s), Type: Maintenance, Pharmacy: [id number], 3 mg subcutaneous daily,Instr:take 3mg injection q am (goal dose)  Walker with large wheels and large sitting seat: Walker with large wheels and large sitting seat, See Instructions, Instructions: use as directed, Supply, # 1 EA, 0 Refill(s), Type: Maintenance  cholecalciferol 5000 intl units oral tablet: See Instructions, Instructions: 15,000 units daily, # 100 tab(s), 5 Refill(s), Type: Maintenance, Pharmacy: [id number]  ergocalciferol 50,000 intl units (1.25 mg) oral capsule: See Instructions, Instructions: 1 cap(s) po wkly, # 12 cap(s), 0 Refill(s), Type: Maintenance, Pharmacy: [id number]  lidocaine 5% topical ointment: 1 gabby, TOP, TID, PRN: for pain, # 50 gm, 2 Refill(s), Type: Maintenance, Pharmacy: [facility], 1 gabby top tid,PRN:for pain  lidocaine-prilocaine 2.5%-2.5% topical cream: See Instructions, Instructions: APPLY 5 GRAMS TOPICALLY TO THE AFFECTED AREA(S) ONCE FOR A ONE TIME DOSE, # 30 gm, Type: Soft Stop, Pharmacy: [id number]  medroxyPROGESTERone 10 mg oral tablet: See Instructions, Instructions: TAKE 1 TABLET BY MOUTH EVERY DAY FOR 10 DAYS ONCE PER MONTH PER PROVIDER, # 30 tab(s), Type: Soft Stop, Pharmacy: [id number], TAKE 1 TABLET BY MOUTH EVERY DAY FOR 10 DAYS ONCE PER  MONTH PER PROVIDER  meloxicam 15 mg oral tablet: 1 tab(s) ( 15 mg ), PO, Daily, # 90 tab(s), 0 Refill(s), Type: Maintenance, Pharmacy: [facility], 1 tab(s) po daily  methenamine hippurate 1 g oral tablet: See Instructions, Instructions: TAKE ONE TABLET BY MOUTH TWICE DAILY WITH FOOD, # 60 tab(s), Type: Soft Stop, Pharmacy: [id number], TAKE ONE TABLET BY MOUTH TWICE DAILY WITH FOOD  modafinil 200 mg oral tablet: See Instructions, Instructions: TAKE 1 TABLET BY MOUTH TWICE DAILY, # 60 tab(s), Type: Soft Stop, Pharmacy: [facility], TAKE 1 TABLET BY MOUTH TWICE DAILY  ondansetron 4 mg oral tablet: 1 tab(s) ( 4 mg ), PO, q8 hrs, PRN: for nausea and vomiting, # 10 tab(s), 0 Refill(s), Type: Maintenance, [id number], 1 tab(s) po q8 hrs,PRN:for nausea and vomiting  rizatriptan 10 mg oral tablet: See Instructions, Instructions: TAKE 1 TABLET BY MOUTH 1 TIME AS NEEDED FOR MIGRAINE HEADACHE, # 6 tab(s), Type: Soft Stop, Pharmacy: [facility]  tiZANidine 4 mg oral tablet: See Instructions, Instructions: TAKE 2 TO 3 TABLETS BY MOUTH EVERY NIGHT AT BEDTIME, # 90 tab(s), Type: Soft Stop, [id number]  Documented Medications  Documented  Adderall: ( 45 mg ), po, bid, 0 Refill(s), Type: Maintenance  Excedrin: See Instructions, 0 Refill(s), Type: Maintenance  LORazepam 0.5 mg oral tablet: po, tid, Instructions: 0.5mg prn and 1-2 hs, PRN: for agitation, 0 Refill(s), Type: Maintenance  LaMICtal 100 mg oral tablet: 1 tab(s) ( 100 mg ), po, daily, 0 Refill(s), Type: Maintenance  Tysabri: iv, q4 wks, 0 Refill(s), Type: Maintenance  cyclobenzaprine: ( 10 mg ), po, tid, PRN: as needed for muscle spasm, 0 Refill(s), Type: Maintenance  indomethacin 25 mg oral capsule: 1 cap(s) ( 25 mg ), po, q6 hrs, PRN: for gout pain, 0 Refill(s), Type: Maintenance   Problem list:    All Problems  Anemia in chronic illness / SNOMED CT 789730314 /  Confirmed  Ataxia / SNOMED CT 16555565 / Confirmed  Blues / SNOMED CT 687156736 / Confirmed  Fatigue / SNOMED CT 534549813 / Confirmed  Headache, migraine / SNOMED CT 26203913 / Confirmed  History of renal stone / SNOMED CT 8694034946 / Confirmed  Morbid (severe) obesity due to excess calories / SNOMED CT 432148736 / Probable  Multiple sclerosis / SNOMED CT 19958526 / Confirmed  Muscle weakness / SNOMED CT 04936746 / Confirmed  Prediabetes / SNOMED CT 8359278672 / Confirmed  Seasonal allergies / SNOMED CT 199719779 / Confirmed  Skin sensation disturbance / SNOMED CT 964107036 / Confirmed  Urinary bladder neurogenic dysfunction / SNOMED CT 4605950963 / Confirmed  Vitamin D deficiency / SNOMED CT 54899724 / Confirmed  Resolved: Pregnancy  Resolved: *Hospitalized@[facility] - Complicated UTI  Resolved: *Hospitalized@[place] - Left renal stone  Resolved: Pregnancy / SNOMED CT 254032015  Resolved: Pregnancy / SNOMED CT 761612806  Canceled: Headache / SNOMED CT 04343952      Histories   Past Medical History:    Active  Multiple sclerosis (03384981): Onset in 2012 at 37 years.  Blues (464238819)  History of renal stone (2448160201)  Urinary bladder neurogenic dysfunction (8299328081)  Fatigue (565241238)  Muscle weakness (22912123)  Vitamin D deficiency (62432132)  Ataxia (49236770)  Headache, migraine (83303289)  Comments:  7/6/2016 CDT 11:19 AM CDT - Katie Hatfield  With aura.  Skin sensation disturbance (216609466)  Seasonal allergies (225141513)  Resolved  *Hospitalized@[facility] - Complicated UTI: Onset on 6/14/2016 at 40 years.  Resolved on 6/18/2016 at 40 years.  *Hospitalized@[facility] - Left renal stone: Onset in the month of 5/2016 at 40 years  Resolved.  Pregnancy:  Resolved.  Pregnancy (504142021):  Resolved in 2006 at 30 years.  Pregnancy (524048232):  Resolved in 1994 at 18 years.   Family History:    Melanoma  Father (Shakir)  Thyroid  Sister (Ana Cristina)  Kidney stone  Daughter (Zac)  Diabetes mellitus type II  Father  (Shakir)  Hypothyroidism  Sister (Ana Cristina)  Diabetes mellitus type 2  Father (Shakir)  CA - Cancer  Father (Shakir)  Comments:  2017 11:45 AM - Liu Luz Elenaan  skin  CHF - Congestive heart failure  Father (Shakir)  Migraine  Sister (Ana Cristina)  Daughter (Leda)  Mother (Soledad)  Depression  Daughter (Zac)  Father (Shakir)  Gallbladder problem  Mother (Soledad)  Bipolar  Father (Shakir)  Miscellaneous  Brother (Adrian)  Comments:  2017 11:43 AM - Katie Hatfield  spinal stenosis  Mother (Soledad)  Comments:  2017 11:43 AM - Katie Hatfield  spinal stenosis  Anxiety  Daughter (Leda)  Daughter (Zac)     Procedure history:    Port-a-cath in place - Right (89060184) on 2016 at 41 Years.  Comments:  2016 8:01 AM - Ceci Engle  Dx:  Multiple sclerosis.  cystoscopy left ureteroscopy with holmium laser left retrogrades stone extraction and left ureteral stent exchange on 6/3/2016 at 40 Years.  Ureteroscopy (1489934926) in the month of 2016 at 40 Years.  Comments:  2016 4:56 PM - La Garcia  with laser/stone extraction (inability to remove all of stone)  Appendectomy (241025295) in  at 39 Years.  Breast reduction (507326575) in  at 26 Years.  Tonsillectomy and adenoidectomy (341034430) in  at 14 Years.  Cholecystectomy (53383201).   section (31610249).  Comments:  2016 11:26 AM - Freya Mark CMA  x1  Tubal ligation (005255703).   Social History:        Alcohol Assessment            Current, 1-2 times per month, 1 drinks/episode maximum.      Tobacco Assessment            Never smoker      Substance Abuse Assessment: Past            Marijuana      Employment and Education Assessment            Disability      Home and Environment Assessment            2 children.      Nutrition and Health Assessment            Caffeine intake amount: 2 sodas, every day.      Exercise and Physical Activity Assessment: Does not exercise      Sexual Assessment            Sexually active: No.   Sexual orientation: Heterosexual.        Physical Examination   Vital Signs   2/6/2018 11:04 AM CST Temperature Tympanic 98.3 DegF    Peripheral Pulse Rate 96 bpm    Pulse Site Radial artery    HR Method Manual    Systolic Blood Pressure 146 mmHg  HI    Diastolic Blood Pressure 78 mmHg    Mean Arterial Pressure 101 mmHg    BP Site Right arm    BP Method Manual      Measurements from flowsheet : Measurements   2/6/2018 11:04 AM CST    Weight Measured - Standard                289 lb     General:  Alert and oriented.    Eye:  Pupils are equal, round and reactive to light, Extraocular movements are intact, Normal conjunctiva.    HENT:  Normocephalic, hearing grossly normal during our conversation.    Respiratory:  Respirations are non-labored, Symmetrical chest wall expansion.    Cardiovascular:  Normal peripheral perfusion, brisk capillary refill.    Musculoskeletal:  tender trigger points in  lower back and  left upper back and neck and occiptial head , gait much improved today.    Integumentary:  Warm, Dry.    Neurologic:  Alert, Oriented, No focal deficits.    Cognition and Speech:  Oriented, Speech clear and coherent.    Psychiatric:  Cooperative, Appropriate mood & affect, Normal judgment, Non-suicidal.       Health Maintenance      Recommendations     Pending (in the next year)        Due            Cervical Cancer Screen (if sexually active) due  02/06/18  and every 3  year(s)           Lipid Disorders Screen (Female) due  02/06/18  and every 1  year(s)           Type 2 Diabetes Mellitus Screen (Female) due  02/06/18  Variable frequency        Due In Future            Tetanus Vaccine not due until  07/16/18  and every 10  year(s)           Depression Screen (Female) not due until  01/26/19  and every 1  year(s)           Alcohol Misuse Screen (Female) not due until  01/26/19  and every 1  year(s)           Body Mass Index Check (Female) not due until  02/02/19  and every 1  year(s)     Satisfied (in the past 1  year)        Satisfied            Alcohol Misuse Screen (Female) on  01/26/18.           Alcohol Misuse Screen (Female) on  02/28/17.           Body Mass Index Check (Female) on  02/02/18.           Body Mass Index Check (Female) on  02/01/18.           Body Mass Index Check (Female) on  03/01/17.           Body Mass Index Check (Female) on  03/01/17.           Body Mass Index Check (Female) on  02/28/17.           Depression Screen (Female) on  01/26/18.           Depression Screen (Female) on  01/26/18.           Depression Screen (Female) on  01/26/18.           Depression Screen (Female) on  02/28/17.           Depression Screen (Female) on  02/28/17.           Depression Screen (Female) on  02/28/17.           High Blood Pressure Screen (Female) on  02/06/18.           High Blood Pressure Screen (Female) on  02/01/18.           High Blood Pressure Screen (Female) on  02/01/18.           High Blood Pressure Screen (Female) on  02/01/18.           High Blood Pressure Screen (Female) on  01/31/18.           High Blood Pressure Screen (Female) on  01/26/18.           High Blood Pressure Screen (Female) on  05/17/17.           High Blood Pressure Screen (Female) on  05/17/17.           High Blood Pressure Screen (Female) on  03/01/17.           High Blood Pressure Screen (Female) on  03/01/17.           High Blood Pressure Screen (Female) on  02/28/17.           Obesity Screen and Counseling (Female) on  02/06/18.           Obesity Screen and Counseling (Female) on  02/02/18.           Obesity Screen and Counseling (Female) on  02/01/18.           Obesity Screen and Counseling (Female) on  01/31/18.           Obesity Screen and Counseling (Female) on  01/26/18.           Obesity Screen and Counseling (Female) on  05/17/17.           Obesity Screen and Counseling (Female) on  03/01/17.           Obesity Screen and Counseling (Female) on  03/01/17.           Obesity Screen and Counseling (Female) on  02/28/17.            Tobacco Use Screen (Female) on  02/06/18.           Tobacco Use Screen (Female) on  02/01/18.           Tobacco Use Screen (Female) on  02/01/18.           Tobacco Use Screen (Female) on  01/31/18.           Tobacco Use Screen (Female) on  01/26/18.           Tobacco Use Screen (Female) on  05/17/17.           Tobacco Use Screen (Female) on  03/01/17.           Tobacco Use Screen (Female) on  02/28/17.          Procedure   Procedure   Date:  2/6/2018.     Indication: myofascial pain.     Informed consent: signed by patient.     Confirmed: patient, procedure, side, site.     Type of procedure: trigger point injection.     Physical Exam: vital signs Vital Signs   2/6/2018 11:04 AM CST Temperature Tympanic 98.3 DegF    Peripheral Pulse Rate 96 bpm    Pulse Site Radial artery    HR Method Manual    Systolic Blood Pressure 146 mmHg  HI    Diastolic Blood Pressure 78 mmHg    Mean Arterial Pressure 101 mmHg    BP Site Right arm    BP Method Manual      .     Preparation and technique: informed consent obtained, position sitting, sterile preparation of site with 70 % alcohol, hemostasis achieved using direct pressure, estimated blood loss minimal, adhesive bandagedressing applied.     Findings: painful and tender myofascial trigger points identified by palpation with communication from patient    Location:  occipital head, left neck,  trapezius x 2,  mid lower back    Number of injections:5    pain prior to procedure: moderate       pain following procedure:minimal    number of milliliters of the 1:1 solution of 1% lidocaine without epiniephrine and 0.5% marcaine without epinephrine used in total: 8.     Procedure tolerated: well.     pt has significant tenderness at left SI joint, too soon to do injection at this location, encouraged pt to pursue PT.     No Complications.     No qualifying data available.          Review / Management   Results review      Impression and Plan   Diagnosis     Ataxia (BJQ79-OS R27.0).      Multiple sclerosis (NKC75-UP G35).     Muscle weakness (JCP62-BB M62.81).     Myalgia (LBU76-VA M79.1).     Orders   Diagnosis     return to clinic if symptoms worsen or do not improve.     Plan   Counseled:  Patient, Family, use ice or heat as needed to help with pain, continue with home exercise program, .

## 2022-02-15 NOTE — TELEPHONE ENCOUNTER
Entered by Freya Mark CMA on May 21, 2019 12:55:23 PM CDT  ---------------------  From: Freya Mark CMA   To: [id number]    Sent: 5/21/2019 12:55:23 PM CDT  Subject: Medication Management     ** Approved **  Freet[employer] Med (B-D PEN NDL MINI [id number](3/16)PRPL)  USE DAILY WITH SAXENDA AS DIRECTED  Qty:  100 unknown unit        Days Supply:  30        Refills:  0          Substitutions Allowed     Route To Pharmacy - [id number]   Signed by Freya Mark CMA          last seen 12/2018      ------------------------------------------  From: [facility]  To: Orestes Mendosa MD  Sent: May 21, 2019 9:37:53 AM CDT  Subject: Medication Management  Due: May 22, 2019 9:37:53 AM CDT    ** On Hold Pending Signature **  Drug: B-D PEN NDL MINI [id number](3/16)PRPL  USE DAILY WITH SAXENDA AS DIRECTED  Quantity: 100 unknown unit Days Supply: 30        Refills: 0  Substitutions Allowed  Notes from Pharmacy:     Dispensed Drug: B-D PEN NDL MINI [id number](3/16)PRPL  USE DAILY WITH SAXENDA AS DIRECTED  Quantity: 100 unknown unit Days Supply: 30        Refills: 0  Substitutions Allowed  Notes from Pharmacy:   ------------------------------------------

## 2022-02-15 NOTE — PROGRESS NOTES
Seen for COVID testing at [facility] per Dr. Orestes Mendosa    O2 Sat = 97%  (Children under 12 do not require O2 sat)    Specimen sent to:  [place] [facility]    PUI form faxed to: [facility].

## 2022-02-15 NOTE — TELEPHONE ENCOUNTER
"---------------------  From: Patrica Valencia MA (Phone Messages Pool ([id number]_[employer]))   To: Orestes Mendosa MD;     Sent: 3/1/2021 4:19:31 PM CST  Subject: Referral     Phone Message    PCP:   SHANNON      Time of Call:  1536       Person Calling:  pt  Phone number:  [phone]    Reason for call:  pt seen by counselor today and scored \"19\" on depression screen.  Pt wants to be set up w/ psychiatrist ASAP to further discuss med management for depression.  Please advise re: referral  Returned call at: _    Note:   _    Last office visit and reason:  2/16/2021 w/ [facility] for preop    Transferred to: _---------------------  From: Orestes Mendosa MD   To: Phone Messages Pool ([id number]);     Sent: 3/1/2021 4:41:58 PM CST  Subject: RE: Referral     can we have her referred to Dai through clinic behavioral health who can help expedite psych referral as indicated---------------------  From: Patrica Valencia MA (Phone Messages Pool ([id number]_[place] [facility] [place]))   To: [facility] Coordinators [facility] ([facility]);     Sent: 3/1/2021 4:51:37 PM CST  Subject: FW: Referral     Can you help get this set up?  Thanks.---------------------  From: Disha Macdonald (Referral Coordinators Pool ([employer] [place]))   To: Phone Messages Pool ([employer] [place]);     Sent: 3/1/2021 4:58:47 PM CST  Subject: RE: Referral     Yes but need a referral for Behavioral Health with [facility] RAY in comments entered to be able to.---------------------  From: Mary Hogue (Phone Messages Pool ([employer] [place]))   To: Orestes Mendosa MD;     Sent: 3/1/2021 5:28:16 PM CST  Subject: FW: Referral     please see below    Mary SOLIZ---------------------  From: Orestes Mendosa MD   To: Phone Messages Pool ([id number]_[place] [facility] [place]);     Sent: 3/3/2021 7:36:27 AM CST  Subject: RE: Referral     Please... enter a behavioral health referral order with below instructions---------------------  From: Yokasta Lawton LPN (Phone Messages Pool " ([id number]_[facility]))   To: Referral Coordinators Pool ([address]);     Sent: 3/3/2021 8:07:33 AM CST  Subject: FW: Referral     Order placed

## 2022-02-15 NOTE — PROGRESS NOTES
"Chief Complaint    Patient here to f/u sleep study that was done 1 1/2 year ago  History of Present Illness      Patient here for follow-up of his sleep study done last year.  She had been on stimulants for her MS and tells me her insurance required a sleep study.  She has a history of \"ADHD.\"  She \"does not sleep.\"  And is now on clonazepam for sleep.  She is gained weight since her diagnosis of MS.  She snores and has daytime sleepiness.  She has morning headaches and heartburn but not nocturia.  Hypertension but no edema or history of cardiovascular disease.  Chronic fatigue and tiredness.  No drowsy driving.  Review of Systems      No chest pain, dyspnea, cough.  No fever or chills.  Denies depression.  Physical Exam   Vitals & Measurements    T: 98.4   F (Tympanic)  HR: 86(Peripheral)  BP: 140/80     HT: 65 in  WT: 262.8 lb  BMI: 43.73       Patient appears comfortable.  Alert and oriented.  Eyes are normal with anicteric sclera.  HEENT exam is Mallampati 1.  Neck is not thick.  Chest clear.  Cardiac exam regular.  Extremities have no edema.  Cranial nerves are normal.  Assessment/Plan       Morbid (severe) obesity due to excess calories (E66.01)         Weight loss is encouraged with regard to medical problems.         Ordered:          74712 office outpatient new 30 minutes (Charge), Quantity: 1, Obstructive sleep apnea syndrome, moderate  Morbid (severe) obesity due to excess calories  Multiple sclerosis                Multiple sclerosis (G35)         Stable.         Ordered:          13792 office outpatient new 30 minutes (Charge), Quantity: 1, Obstructive sleep apnea syndrome, moderate  Morbid (severe) obesity due to excess calories  Multiple sclerosis                Obstructive sleep apnea syndrome, moderate (G47.33)         Certainly all of her sleep symptoms could be related to sleep apnea which could be worse than indicated by the study.        CPAP 8 cm of water for follow-up.         Ordered:     "      Miscellaneous Rx Supply, Replacement CPAP +8 cm H2o for use daily, See Instructions, Instructions: Heated humidifier x1; Humidifier chamber x1; Heated tubing x1; Full face mask of choice with headgear x1; Cushion x 1; Filters: Disposable x1pk & Reusable x1pk. Length of Ne..., (Ordered)          [id number] office outpatient new 30 minutes (Charge), Quantity: 1, Obstructive sleep apnea syndrome, moderate  Morbid (severe) obesity due to excess calories  Multiple sclerosis           Patient Information     Name:MARGARITA KAISER      Address:      [address] [place] [address] [address]      [place] [address]     Sex:Female     YOB: 1975     [phone]     Emergency Contact:ELISA HOPE     MRN:[id number]     FIN:[id number]     Location:[facility]     Date of Service:07/25/2019      Primary Care Physician:       Orestes Mendosa MD, [phone]      Attending Physician:       Erwin Smiley MD, [phone]  Problem List/Past Medical History    Ongoing     Anemia in chronic illness     Ataxia     Blues     Chronic low back pain     Fatigue     Headache, migraine       Comments: With aura.     History of renal stone     Knee osteoarthritis     Morbid (severe) obesity due to excess calories     Multiple sclerosis     Muscle weakness     Neuralgia     Obstructive sleep apnea syndrome, moderate       Comments: On 2/14/18 AHI 20.2 with RDI 41.2, and oximetry avis 88%. Optimal CPAP titration to 8 cm water.     Prediabetes     Seasonal allergies     Skin sensation disturbance     Urinary bladder neurogenic dysfunction     Vitamin D deficiency    Historical     Inpatient stay       Comments: @[place], [place] - Left renal stone     Inpatient stay       Comments: @[place], [place] - Complicated UTI     Inpatient stay       Comments: @[place], [place] - Acute pneumonitis; likely related to recent Lemtrada introduction     Pregnancy     Pregnancy      Pregnancy  Procedure/Surgical History     Port-a-cath in place - Right (2016)      Comments: Dx:  Multiple sclerosis..     cystoscopy left ureteroscopy with holmium laser left retrogrades stone extraction and left ureteral stent exchange (2016)     Ureteroscopy ()      Comments: with laser/stone extraction (inability to remove all of stone).     Appendectomy ()     Breast reduction ()     Tonsillectomy and adenoidectomy ()      section      Comments: x1.     Cholecystectomy     Tubal ligation  Medications        Excedrin: See Instructions, 0 Refill(s).        valACYclovir 500 mg oral tablet: 500 mg, 1 tab(s), po, bid, 0 Refill(s).        cholecalciferol 2000 intl units oral tablet: 2,000 International Unit, 1 tab(s), po, daily, 0 Refill(s).        B-D PEN NDL MINI [id number](3/16)PRPL: See Instructions, USE DAILY WITH SAXENDA AS DIRECTED, 100 unknown unit.        ergocalciferol 50,000 intl units (1.25 mg) oral capsule: 50,000 International Unit, 1 cap(s), Oral, qweek, for 30 day(s), 5 cap(s), 0 Refill(s).        clonazePAM 0.5 mg oral tablet: 1 mg, 2 tab(s), Oral, hs, 0 Refill(s).        Lemtrada: See Instructions, 1x/yr, 0 Refill(s).        Saxenda 18 mg/3 mL subcutaneous solution: 3 mg, subcutaneous, daily, take 3mg injection q am (goal dose), 15 mL, 6 Refill(s).        meloxicam 15 mg oral tablet: See Instructions, TAKE 1 TABLET BY MOUTH DAILY, 90 tab(s).        ergocalciferol 50,000 intl units (1.25 mg) oral capsule: See Instructions, TAKE 1 CAPSULE BY MOUTH EVERY WEEK, 5 cap(s).        amLODIPine 5 mg oral tablet: 1 tab(s), Oral, daily, 90 tab(s), 0 Refill(s).        ondansetron 4 mg oral tablet: 4 mg, 1 tab(s), PO, q8 hrs, PRN: for nausea and vomiting, 30 tab(s), 3 Refill(s).        B-D PEN NDL MINI [id number](3/16)PRPL: See Instructions, USE DAILY WITH SAXENDA AS DIRECTED, 100 unknown unit.        lidocaine 5% topical ointment: See Instructions, APPLY EXTERNALLY TO THE  AFFECTED AREA THREE TIMES DAILY AS NEEDED FOR PAIN, 30 gm.        cyclobenzaprine 10 mg oral tablet: 10 mg, 1 tab(s), Oral, tid, PRN: for spasm, 30 tab(s), 0 Refill(s).        methenamine hippurate 1 g oral tablet: 1 gm, 1 tab(s), Oral, bid, for 90 day(s), 180 tab(s), 3 Refill(s).        rizatriptan 10 mg oral tablet: See Instructions, TAKE 1 TABLET BY MOUTH ONCE AS NEEDED FOR MIGRAINE HEADACHE, 6 tab(s), 0 Refill(s).        tiZANidine 4 mg oral tablet: 2 TO 3 TABLETS, Oral, qhs, 90 tab(s), 0 Refill(s).        Replacement CPAP +8 cm H2o for use daily: See Instructions, Heated humidifier x1; Humidifier chamber x1;  Heated tubing x1; Full face mask of choice with headgear  x1; Cushion x 1;  Filters: Disposable x1pk & Reusable x1pk.  Length of Need = 99 Months, 1 EA, 11 Refill(s).         Allergies    adhesive tape    sulfa drugs (Diarrhea, Vomiting....., Rash)  Social History    Smoking Status - 07/25/2019     Never smoker     Alcohol      Current, 1 TIME PER WEEK, 1 drinks/episode maximum., 12/14/2018     Employment/School      Disability, 02/28/2017     Exercise - Does not exercise, 01/13/2016     Home/Environment      2 children., 01/13/2016     Nutrition/Health      Caffeine intake amount: 2 sodas, every day., 07/06/2016     Sexual      Sexually active: No. Sexual orientation: Heterosexual., 01/13/2016     Substance Abuse - Current, 12/12/2018      vaping THC and cannabis oil, 12/12/2018     Tobacco      Never smoker, 01/13/2016  Family History    Anxiety: Daughter and Daughter.    Bipolar: Father.    CA - Cancer: Father.    CHF - Congestive heart failure: Father.    Depression: Father and Daughter.    Diabetes mellitus type 2: Father.    Diabetes mellitus type II: Father.    Gallbladder problem: Mother.    Hypothyroidism: Sister.    Kidney stone: Daughter.    Melanoma: Father.    Migraine: Mother, Sister and Daughter.    Miscellaneous: Mother and Brother.    Thyroid: Sister.  Immunizations      Vaccine Date  Status      pneumococcal (PPSV23) 05/14/2012 Recorded      tetanus/diphth/pertuss (Tdap) adult/adol 07/16/2008 Recorded  Diagnostic Results   Polysomnogram from last year revealed moderate obstructive sleep apnea however no REM sleep could indicate poor sleep apnea.  Optimal CPAP titration to 8 cm of water pressure.

## 2022-02-15 NOTE — TELEPHONE ENCOUNTER
---------------------  From: Tati Will CMA   To: Orestes Mendosa MD;     Sent: 1/28/2019 12:18:49 PM CST  Subject: PT referral request     Pt called at 12:14pm    She is interested in PT/pool therapy for MS. She requested order to be faxed. I have done this to [facility].

## 2022-02-15 NOTE — NURSING NOTE
Comprehensive Intake Entered On:  3/9/2021 1:55 PM CST    Performed On:  3/9/2021 1:53 PM CST by Freya Mark CMA               Summary   Chief Complaint :   phone visit - needs to do something about hernia-painful, back issues - did pass a kidney stone 2/23, history of DJD in back.  Verbal consent for phone visit   Menstrual Status :   Menarcheal   Height Measured :   65 in(Converted to: 5 ft 5 in, 165.10 cm)    Height/Length Estimated :   65 in(Converted to: 5 ft 5 in, 165.10 cm)    Freya Mark CMA - 3/9/2021 1:53 PM CST   Health Status   Allergies Verified? :   Yes   Medication History Verified? :   Yes   Medical History Verified? :   Yes   Pre-Visit Planning Status :   Completed   Freya Mark CMA - 3/9/2021 1:53 PM CST   ID Risk Screen   Recent Travel History :   No recent travel   Family Member Travel History :   No recent travel   Other Exposure to Infectious Disease :   Unknown   COVID-19 Testing Status :   No positive COVID-19 test   Freya Mark CMA - 3/9/2021 1:53 PM CST   Social History   Social History   (As Of: 3/9/2021 1:55:56 PM CST)   Alcohol:        Current, 1 TIME PER WEEK, 1 drinks/episode maximum.   (Last Updated: 12/14/2018 12:13:00 PM CST by Brianna Parrish)          Tobacco:        Never smoker   (Last Updated: 1/13/2016 7:49:24 PM CST by Aminata Landon CMA)   Never (less than 100 in lifetime)   (Last Updated: 1/26/2021 1:09:01 PM CST by Freya Mark CMA)          Electronic Cigarette/Vaping:        Electronic Cigarette Use: Never.   (Last Updated: 1/26/2021 1:09:08 PM CST by Freya Mark CMA)          Substance Abuse:  Current      vaping THC and cannabis oil   (Last Updated: 12/12/2018 2:39:37 PM CST by Tati Will CMA)          Employment/School:        Disability   (Last Updated: 2/28/2017 2:54:15 PM CST by Dione Núñez RN)          Home/Environment:        2 children.   (Last Updated: 1/13/2016 7:50:00 PM CST by Aminata Landon CMA)          Nutrition/Health:         Caffeine intake amount: 2 sodas, every day.   (Last Updated: 7/6/2016 11:10:11 AM CDT by Katie Hatfield)          Exercise:  Does not exercise      (Last Updated: 1/13/2016 7:49:47 PM CST by Aminata Landon CMA )         Sexual:        Sexually active: No.  Sexual orientation: Heterosexual.   (Last Updated: 1/13/2016 7:50:46 PM CST by Aminata Landon CMA)

## 2022-02-15 NOTE — NURSING NOTE
Depression Screening Entered On:  7/24/2019 8:30 AM CDT    Performed On:  7/23/2019 8:30 AM CDT by Freya Mark CMA               Depression Screening   Little Interest - Pleasure in Activities :   Several days   Feeling Down, Depressed, Hopeless :   Several days   Initial Depression Screen Score :   2    Trouble Falling or Staying Asleep :   Nearly every day   Feeling Tired or Little Energy :   Nearly every day   Poor Appetite or Overeating :   More than half the days   Feeling Bad About Yourself :   Several days   Trouble Concentrating :   Several days   Moving or Speaking Slowly :   Not at all   Thoughts Better Off Dead or Hurting Self :   Not at all   Detailed Depression Screen Score :   10    Total Depression Screen Score :   12    PATRIA Difficulty with Work, Home, Others :   Somewhat difficult   Freya Mark CMA - 7/24/2019 8:30 AM CDT

## 2022-02-15 NOTE — TELEPHONE ENCOUNTER
---------------------  From: Emily Lan MD   To: Appointment Pool ([id number]);     Cc: HARSHAD Message Pool ([id number]);      Sent: 12/14/2021 10:31:54 AM CST  Subject: curbside covid test today      curbside covid test today pls, thanksPt scheduled.

## 2022-02-15 NOTE — PROGRESS NOTES
Patient:   KARLI KAISER            MRN: [id number]            FIN: [id number]               Age:   43 years     Sex:  Female     :  1975   Associated Diagnoses:   Multiple sclerosis; Prediabetes; Urinary bladder neurogenic dysfunction; Migraine; Morbid obesity   Author:   Orestes Mendosa MD      Visit Information      Date of Service: 2018 02:00 pm  Performing Location: [facility]  Encounter#: [id number]      Primary Care Provider (PCP):  Orestes Mendosa MD    NPI# [id number]      Referring Provider:  Orestes Mendosa MD, NPI# [id number]      Chief Complaint   2018 2:11 PM CST   1. HTN f/u and yearly labs  2. Disability paperwork              Additional Information:No additional information recorded during visit.   Chief complaint and symptoms as noted above and confirmed with patient.  Recent lab and diagnostic studies reviewed with patient      History of Present Illness   3/8/2016: Karli presents to clinic for ER follow-up after presentations on both Thursday and Friday this past week for urinary incontinence and suspected pyelonephritis.  She requested for Srivastava catheter to be placed this past week because of persistent incontinence.  She is scheduled to meet with the urologist on the  of this month.  She was originally prescribed Cipro which was later changed to Macrobid after discovery of extended spectrum beta-lactamase resistant E. coli bacteria.  She was started on Macrobid on .  Overall continues to have severe left-sided flank pain.  She is very distressed about her underlying multiple sclerosis and her ability to care for her daughter.  She does follow with a neurologist through [facility].  She previously was following with an internist through [facility] and now wishes to localize her cares here.    2018: Karli returns to clinic with one-month history of complaints of dysuria, urinary incontinence increased urinary frequency.  Last diagnosed with  urinary tract infection this past month through the [facility] emergency room.  She was prescribed Macrobid at that time based on sensitivities.  In the past has used fosfomycin related to previous history of multidrug-resistant UTIs.  Her multiple sclerosis continues to progress, primarily defined by lower extremity weakness, back pains and urinary changes.  She has failed all conventional means of therapy.  There is discussion about her beginning on alemtuzumab in the near future.  She needs to be infection free prior to beginning therapy.  Having trouble coping especially back pains and associated anxieties.  Would like to discuss other potential weight loss therapy options    3/1/2018: Karli presenting to clinic for hospital follow-up.  Since discharge no further episodes of hemoptysis.  Breathing at baseline.  Feeling well.  Scheduled follow-up with neurology this coming week for follow-up.      10/26/2018: Presents to clinic for follow-up from emergency room visit this past week.  She was seen with upper respiratory and sinus complaints.  Was given azithromycin at that time which has helped with considerable improvement in sinusitis features.  Also had a urinalysis done at that time for lower urinary tract symptoms.  Culture ultimately growing a Citrobacter species which was pansensitive to antibiotics.  Had not heard from the emergency room and culture follow-up.  Describes having subjective fever and chills.  Also describes a left-sided flank pain with continued lower urinary tract symptoms.  She is currently in remission from her MS.  No active new lesions regarding MRI surveillance.  Maintained on Lemtrada which she last received in March of this year.  No infectious complications since I've seen her last in March.    12/12/2018: Karli returns for follow-up.  Since her last visit she has introduced use recreational cannabis and CBD oil to help with her chronic anxieties, fatigue and her MS symptoms.   She has discontinued most of her other medications including all of her psychotropic medications.  Now questions whether she needs to resume on some of her medications including her amlodipine and her vitamin D replacement.  She continues to work through [place] [facility] for management of her MS and intends to continue with her ongoing Lemtrada therapy.  Also here to review disability policy related to her MS.         Review of Systems   Constitutional:  No fever, No chills, No weakness.    Eye:  Negative except as documented in history of present illness.    Ear/Nose/Mouth/Throat:  No nasal congestion.    Respiratory:  No shortness of breath.    Cardiovascular:  No chest pain, No palpitations, No peripheral edema, No syncope.    Gastrointestinal:  Nausea, No vomiting, No constipation.    Genitourinary:  No dysuria, No hematuria, No change in urine stream.    Hematology/Lymphatics:  Negative except as documented in history of present illness.    Endocrine:  Polyuria, No excessive thirst.    Immunologic:  No recurrent fevers.    Musculoskeletal:  No joint pain, No muscle pain.    Neurologic:  Alert and oriented X4, No abnormal balance, No confusion, No numbness, No tingling, No headache.    Psychiatric:  Anxiety, Depression.       Health Status   Allergies:    Allergic Reactions (Selected)  Severity Not Documented  Adhesive tape (No reactions were documented)  Sulfa drugs (Rash, vomiting..... and diarrhea)   Medications:  (Selected)   Prescriptions  Prescribed  B-D PEN NDL MINI 64BK2HY(3/16)PRPL: See Instructions, Instructions: USE DAILY WITH SAXENDA AS DIRECTED, # 100 unknown unit, Type: Soft Stop, Pharmacy: [facility] [id number], USE DAILY WITH SAXENDA AS DIRECTED  DULoxetine 60 mg oral delayed release capsule: = 2 cap(s) ( 120 mg ), Oral, daily, # 60 cap(s), 0 Refill(s), Type: Maintenance, Pharmacy: [facility] [id number], Needs appt for further refills  Saxenda 18 mg/3 mL subcutaneous  solution: ( 3 mg ), subcutaneous, daily, Instructions: take 3mg injection q am (goal dose), # 15 mL, 6 Refill(s), Type: Maintenance, Pharmacy: [facility] [id number], 3 mg Subcutaneous daily,Instr:take 3mg injection q am (goal dose)  amLODIPine 5 mg oral tablet: = 1 tab(s) ( 5 mg ), Oral, daily, # 30 tab(s), 0 Refill(s), Type: Maintenance, [facility], Needs appt for further refills  benzonatate 100 mg oral capsule: See Instructions, Instructions: TAKE 1 CAPSULE BY MOUTH THREE TIMES DAILY AS NEEDED FOR COUGH, # 30 cap(s), 1 Refill(s), Type: Soft Stop, Pharmacy: [facility] [id number], TAKE 1 CAPSULE BY MOUTH THREE TIMES DAILY AS NEEDED FOR COUGH  ergocalciferol 50,000 intl units (1.25 mg) oral capsule: 1 cap(s) ( 50,000 International Unit ), Oral, qweek, # 5 cap(s), 0 Refill(s), Type: Maintenance, Pharmacy: [facility] [id number]  lidocaine 5% topical ointment: 1 gabby, TOP, TID, PRN: for pain, # 50 gm, 2 Refill(s), Type: Maintenance, Pharmacy: [facility] [id number], 1 gabby top tid,PRN:for pain  medroxyPROGESTERone 10 mg oral tablet: See Instructions, Instructions: TAKE 1 TABLET BY MOUTH EVERY DAY FOR 10 DAYS ONCE PER MONTH PER PROVIDER, # 30 tab(s), Type: Soft Stop, Pharmacy: [facility] [id number], TAKE 1 TABLET BY MOUTH EVERY DAY FOR 10 DAYS ONCE PER MONTH PER PROVIDER  meloxicam 15 mg oral tablet: See Instructions, Instructions: TAKE 1 TABLET BY MOUTH DAILY, # 90 tab(s), Type: Soft Stop, Pharmacy: [facility] [id number]  methenamine hippurate 1 g oral tablet: See Instructions, Instructions: TAKE ONE TABLET BY MOUTH TWICE DAILY WITH FOOD, # 60 tab(s), Type: Soft Stop, Pharmacy: [facility] [address], TAKE ONE TABLET BY MOUTH TWICE DAILY WITH FOOD  modafinil 200 mg oral tablet: See Instructions, Instructions: TAKE 1 TABLET BY MOUTH TWICE DAILY, # 60 tab(s), Type: Soft Stop, Pharmacy: [facility] [id number], TAKE 1 TABLET BY MOUTH TWICE DAILY  ondansetron 4 mg  oral tablet: 1 tab(s) ( 4 mg ), PO, q8 hrs, PRN: for nausea and vomiting, # 10 tab(s), 0 Refill(s), Type: Maintenance, Pharmacy: [facility] [id number], 1 tab(s) po q8 hrs,PRN:for nausea and vomiting  rizatriptan 10 mg oral tablet: See Instructions, Instructions: TAKE 1 TABLET BY MOUTH ONCE AS NEEDED FOR MIGRAINE HEADACHE, # 6 tab(s), Type: Soft Stop, Pharmacy: [facility] [id number]  tiZANidine 4 mg oral tablet: See Instructions, Instructions: use PRN, # 90 tab(s), Type: Soft Stop, Pharmacy: [facility] [id number]  Documented Medications  Documented  Adderall: ( 45 mg ), po, bid, 0 Refill(s), Type: Maintenance  Excedrin: See Instructions, 0 Refill(s), Type: Maintenance  LORazepam 0.5 mg oral tablet: po, tid, Instructions: 0.5mg prn and 1-2 hs, PRN: for agitation, 0 Refill(s), Type: Maintenance  Lemtrada: See Instructions, Instructions: 1x/yr, 0 Refill(s), Type: Maintenance  cholecalciferol 2000 intl units oral tablet: 1 tab(s) ( 2,000 International Unit ), po, daily, 0 Refill(s), Type: Maintenance  clonazePAM 0.5 mg oral tablet: = 2 tab(s) ( 1 mg ), Oral, hs, 0 Refill(s), Type: Maintenance  valACYclovir 500 mg oral tablet: 1 tab(s) ( 500 mg ), po, bid, 0 Refill(s), Type: Maintenance,    Medications          *denotes recorded medication          DULoxetine 60 mg oral delayed release capsule: 120 mg, 2 cap(s), Oral, daily, 60 cap(s), 0 Refill(s).          *LORazepam 0.5 mg oral tablet: po, tid, 0.5mg prn and 1-2 hs, PRN: for agitation, 0 Refill(s).          B-D PEN NDL MINI 75OR9HZ(3/16)PRPL: See Instructions, USE DAILY WITH SAXENDA AS DIRECTED, 100 unknown unit.          *Excedrin: See Instructions, 0 Refill(s).          *Lemtrada: See Instructions, 1x/yr, 0 Refill(s).          amLODIPine 5 mg oral tablet: 5 mg, 1 tab(s), Oral, daily, 30 tab(s), 0 Refill(s).          *Adderall: 45 mg, po, bid, 0 Refill(s).          benzonatate 100 mg oral capsule: See Instructions, TAKE 1 CAPSULE BY MOUTH THREE TIMES  DAILY AS NEEDED FOR COUGH, 30 cap(s), 1 Refill(s).          *cholecalciferol 2000 intl units oral tablet: 2,000 International Unit, 1 tab(s), po, daily, 0 Refill(s).          *clonazePAM 0.5 mg oral tablet: 1 mg, 2 tab(s), Oral, hs, 0 Refill(s).          ergocalciferol 50,000 intl units (1.25 mg) oral capsule: 50,000 International Unit, 1 cap(s), Oral, qweek, for 30 day(s), 5 cap(s), 0 Refill(s).          lidocaine 5% topical ointment: 1 gabby, TOP, TID, PRN: for pain, 50 gm, 2 Refill(s).          Saxenda 18 mg/3 mL subcutaneous solution: 3 mg, subcutaneous, daily, take 3mg injection q am (goal dose), 15 mL, 6 Refill(s).          medroxyPROGESTERone 10 mg oral tablet: See Instructions, TAKE 1 TABLET BY MOUTH EVERY DAY FOR 10 DAYS ONCE PER MONTH PER PROVIDER, 30 tab(s).          meloxicam 15 mg oral tablet: See Instructions, TAKE 1 TABLET BY MOUTH DAILY, 90 tab(s).          methenamine hippurate 1 g oral tablet: See Instructions, TAKE ONE TABLET BY MOUTH TWICE DAILY WITH FOOD, 60 tab(s).          modafinil 200 mg oral tablet: See Instructions, TAKE 1 TABLET BY MOUTH TWICE DAILY, 60 tab(s).          ondansetron 4 mg oral tablet: 4 mg, 1 tab(s), PO, q8 hrs, PRN: for nausea and vomiting, 10 tab(s), 0 Refill(s).          rizatriptan 10 mg oral tablet: See Instructions, TAKE 1 TABLET BY MOUTH ONCE AS NEEDED FOR MIGRAINE HEADACHE, 6 tab(s).          tiZANidine 4 mg oral tablet: See Instructions, use PRN, 90 tab(s).          *valACYclovir 500 mg oral tablet: 500 mg, 1 tab(s), po, bid, 0 Refill(s).     Problem list:    All Problems  Anemia in chronic illness / SNOMED CT 639664641 / Confirmed  Ataxia / SNOMED CT 49939541 / Confirmed  Chronic low back pain / SNOMED CT 421554822 / Confirmed  Blues / SNOMED CT 291834097 / Confirmed  Fatigue / SNOMED CT 184929062 / Confirmed  History of renal stone / SNOMED CT 6538468641 / Confirmed  Headache, migraine / SNOMED CT 71182687 / Confirmed  Morbid (severe) obesity due to excess calories  / SNOMED CT 935976027 / Probable  Multiple sclerosis / SNOMED CT 51517719 / Confirmed  Muscle weakness / SNOMED CT 49483208 / Confirmed  Neuralgia / SNOMED CT 49477671 / Confirmed  Urinary bladder neurogenic dysfunction / SNOMED CT 8912432424 / Confirmed  Knee osteoarthritis / SNOMED CT 001275461 / Confirmed  Prediabetes / SNOMED CT 6405260812 / Confirmed  Skin sensation disturbance / SNOMED CT 751265232 / Confirmed  Seasonal allergies / SNOMED CT 692630180 / Confirmed  Vitamin D deficiency / SNOMED CT 70338124 / Confirmed  Resolved: Inpatient stay / SNOMED CT 484268974  Resolved: Inpatient stay / SNOMED CT 897288770  Resolved: Inpatient stay / SNOMED CT 357698168  Resolved: Pregnancy  Resolved: Pregnancy / SNOMED CT 820436792  Resolved: Pregnancy / SNOMED CT 311184521  Canceled: Headache / SNOMED CT 57109415      Histories   Past Medical History:    Active  Multiple sclerosis (90884455): Onset in 2012 at 37 years.  Blues (770282509)  History of renal stone (0572789362)  Urinary bladder neurogenic dysfunction (7850523696)  Fatigue (046135202)  Muscle weakness (91724560)  Vitamin D deficiency (10069738)  Ataxia (73058511)  Headache, migraine (22716177)  Comments:  7/6/2016 CDT 11:19 AM CDT - Katie Hatfield  With aura.  Skin sensation disturbance (289935704)  Seasonal allergies (202143168)  Resolved  Inpatient stay (634731095): Onset on 2/23/2018 at 42 years.  Resolved on 2/25/2018 at 42 years.  Comments:  2/27/2018 CST 7:59 AM La Serrano  @[facility], [place] - Acute pneumonitis; likely related to recent Lemtrada introduction  Inpatient stay (014769863): Onset on 6/14/2016 at 40 years.  Resolved on 6/18/2016 at 40 years.  Comments:  2/27/2018 CST 7:58 AM aL Serrano  @[facility], [place] - Complicated UTI  Inpatient stay (960936535): Onset in the month of 5/2016 at 40 years  Resolved.  Comments:  2/27/2018 CST 7:57 AM La Serrano  @[facility], [place] - Left renal  stone  Pregnancy:  Resolved.  Pregnancy (177130370):  Resolved in  at 30 years.  Pregnancy (684791134):  Resolved in  at 18 years.   Family History:    Melanoma  Father (Shakir)  Thyroid  Sister (Ana Cristina)  Kidney stone  Daughter (Zac)  Diabetes mellitus type II  Father (Shakir)  Hypothyroidism  Sister (Ana Cristina)  Diabetes mellitus type 2  Father (Shakir)  [place] - Cancer  Father (Shakir)  Comments:  2017 11:45 AM CDT - Katie Hatfield  skin  CHF - Congestive heart failure  Father (Shakir)  Migraine  Sister (Ana Cristina)  Daughter (Leda)  Mother (Soledad)  Depression  Daughter (Zac)  Father (Shakir)  Gallbladder problem  Mother (Soledad)  Bipolar  Father (Shakir)  Miscellaneous  Brother (Adrian)  Comments:  2017 11:43 AM CDT - Katie Hatfield  spinal stenosis  Mother (Soledad)  Comments:  2017 11:43 AM CDT - Katie Hatfield  spinal stenosis  Anxiety  Daughter (Leda)  Daughter (Zac)     Procedure history:    Port-a-cath in place - Right (10534808) on 2016 at 41 Years.  Comments:  2016 8:01 AM CDT - Ceci Engle  Dx:  Multiple sclerosis.  cystoscopy left ureteroscopy with holmium laser left retrogrades stone extraction and left ureteral stent exchange on 6/3/2016 at 40 Years.  Ureteroscopy (5738897261) in the month of 2016 at 40 Years.  Comments:  2016 4:56 PM CDT - La Garcia  with laser/stone extraction (inability to remove all of stone)  Appendectomy (295251101) in  at 39 Years.  Breast reduction (511977934) in  at 26 Years.  Tonsillectomy and adenoidectomy (403492535) in  at 14 Years.  Cholecystectomy (85758223).   section (02346614).  Comments:  2016 11:26 AM CDT - Freya Mark CMA  x1  Tubal ligation (860299302).   Social History:        Alcohol Assessment            Current, 1-2 times per month, 1 drinks/episode maximum.      Tobacco Assessment            Never smoker      Substance Abuse Assessment: Current            vaping THC and cannabis oil       Employment and Education Assessment            Disability      Home and Environment Assessment            2 children.      Nutrition and Health Assessment            Caffeine intake amount: 2 sodas, every day.      Exercise and Physical Activity Assessment: Does not exercise      Sexual Assessment            Sexually active: No.  Sexual orientation: Heterosexual.      Physical Examination   vital signs stable, as noted above   Vital Signs   12/12/2018 2:11 PM CST Temperature Tympanic 98.5 DegF    Peripheral Pulse Rate 94 bpm    Systolic Blood Pressure 152 mmHg  HI    Diastolic Blood Pressure 94 mmHg  HI    Mean Arterial Pressure 113 mmHg    BP Site Right arm    BP Method Manual    Oxygen Saturation 92 %  LOW      Measurements from flowsheet : Measurements   12/12/2018 2:11 PM CST Height Measured - Standard 65 in    Weight Measured - Standard 284 lb    BSA 2.43 m2    Body Mass Index 47.25 kg/m2  HI      General:  Alert and oriented, No acute distress.    HENT:  Normocephalic, Tympanic membranes are clear, No pharyngeal erythema.    Neck:  Supple.    Respiratory:  Lungs are clear to auscultation, Respirations are non-labored.    Cardiovascular:  Normal rate, Regular rhythm, No murmur, No edema.    Gastrointestinal:  Soft, Non-tender, Non-distended.    Genitourinary:  +CVA tenderness; left sided.    Musculoskeletal:  Infusaport in place; not engaged.    Integumentary:  No rash.    Neurologic:  Alert, Oriented.    Cognition and Speech:  Oriented, Speech clear and coherent.    Psychiatric:  Appropriate mood & affect.       Review / Management   Results review:  Lab results   12/12/2018 2:56 PM CST Sodium Level 140 mmol/L    Potassium Level 4.3 mmol/L    Chloride Level 107 mmol/L    CO2 Level 25 mmol/L    Glucose Level 133 mg/dL  HI    BUN 11 mg/dL    Creatinine 0.55 mg/dL    BUN/Creat Ratio NOT APPLICABLE    eGFR 113 mL/min/1.73m2    eGFR African American 131 mL/min/1.73m2    Calcium Level 9.3 mg/dL    LDL Direct 88  mg/dL   .       Impression and Plan   Diagnosis     Multiple sclerosis (UAF43-PA G35).     Prediabetes (ONW54-QQ R73.09).     Urinary bladder neurogenic dysfunction (OVP73-NG N31.9).     Migraine (WPY50-HL G43.019).     Morbid obesity (DGQ04-FM E66.01).         .) multiple sclerosis - progressively worsening - currently in remission  - started on Lemtrada (alemtuzumab) in spring, 2018; good clinical response and tolerance - associated with sustained T-cell immunosuppression  - follows with Dr. Webb, her neurologist  - started on recreational cannabis/CBD oil in October, 2018 at stopped most of her medications (Cymbalta, Adderall, clonazepam, modafanil)  - important to resume vitamin D    .) obesity, morbid; current BMI 49 - substantial weight gains since beginning therapies for multiple sclerosis   - previous referral to Oanh Mane to discuss medication options   - discussed weight loss management options including lifestyle, medication options, and potential bariatric surgery   -  has seen some weight loss success with Saxenda therapy; tolerates well - okay to continue    .) vitamin D deficiency; total level 15 (1/2017 through [facility])   - on cholecalciferol 5000 units/daily    .) hypertension   - advised to resume her amlodipine   - CCM/bp clinic referral    .) health maintenance   - prediabetes    - enrolled in CCM program    Disability ppwk completed with her today  RTC in 4 months

## 2022-02-15 NOTE — TELEPHONE ENCOUNTER
---------------------  From: Leighann BURNETT Yokasta TAQUERIA (Phone Messages Pool ([facility]))   To: BRM Message Pool ([address] [place] [address]);     Sent: 5/14/2021 3:43:31 PM CDT  Subject: port     Phone Message    PCP:   SHANNON      Time of Call:  3:40pm       Person Calling:  pt  Phone number:  [phone]    Note:   Pt calling stating she was seen today at the [facility] center and they were unable to access her port. Pt says they told her she should have it looked at to see if she needs another stitch or a new port. Pt says she has surgery scheduled 5/24 and was told this should be looked at before that.    Pt says it was placed at [facility] back in 2015.    Please advise.    Last office visit and reason:  4/14/21 hospital f/u---------------------  From: Raymond Mena LPN (BRM Message Pool ([facility]))   To: BR Message Pool ([facility]);     Sent: 5/14/2021 4:39:58 PM CDT  Subject: FW: port---------------------  From: Raymond Mena LPN ([facility]M Message Pool ([facility]))   To: Orestes Mendosa MD;     Sent: 5/14/2021 4:40:27 PM CDT  Subject: FW: port---------------------  From: Orestes Mendosa MD   To: Karen See RN; BR Message Pool ([address]);     Sent: 5/14/2021 5:22:16 PM CDT  Subject: FW: port     I'm forwarding message to Karen See to get input. Seeing that she's following with Dr. Cole, I'm wondering if he could help guide whether I-port needs to be assess prior to planned surgery---------------------  From: Esa SOLIZ, Karen   To: SHANNON Message Pool ([address]) (Trina BURNETT, Raymond); Deondre HART, Orestes;     Sent: 5/17/2021 7:58:19 AM CDT  Subject: RE: port     I reviewed her chart.  It seems that Infusion was unable to access her port but the anesthesia CRNA was able to insert a peripheral IV to be used for CT contrast on Friday.  I sent Dr. Cole a message to look at it and/or consider a revision of the port when he sees her for her hernia surgery on  5/24.  I'm confident that her hernia surgery can still move forward as planned since a peripheral IV was able to be placed by anesthesia.  Thanks,  Karen See RN  Cancer Nurse Navigator

## 2022-02-15 NOTE — TELEPHONE ENCOUNTER
Entered by Sue Mack CMA on July 01, 2021 6:36:57 AM CDT  ---------------------  From: Sue Mack CMA   To: [facility]    Sent: 7/1/2021 6:36:57 AM CDT  Subject: Medication Management     ** Not Approved: Patient has requested refill too soon, #90, 1 sent 2/19/21 **  amLODIPine (AMLODIPINE TAB 5MG)  TAKE 1 TABLET DAILY  Qty:  90 tab(s)        Days Supply:  90        Refills:  1          Substitutions Allowed     Route To Pharmacy - [facility]   Signed by Sue Mack CMA            ------------------------------------------  From: [facility]  To: Orestes Mendosa MD  Sent: June 30, 2021 5:47:56 PM CDT  Subject: Medication Management  Due: June 4, 2021 8:25:53 PM CDT     ** On Hold Pending Signature **     Drug: amLODIPine (amLODIPine 5 mg oral tablet), TAKE 1 TABLET DAILY  Quantity: 90 tab(s)  Days Supply: 90  Refills: 1  Substitutions Allowed  Notes from Pharmacy:     Dispensed Drug: amLODIPine (amLODIPine 5 mg oral tablet), TAKE 1 TABLET DAILY  Quantity: 90 tab(s)  Days Supply: 90  Refills: 1  Substitutions Allowed  Notes from Pharmacy:  ------------------------------------------

## 2022-02-15 NOTE — PROGRESS NOTES
Patient:   MARGARITA KAISER            MRN: [id number]            FIN: [id number]               Age:   42 years     Sex:  Female     :  1975   Associated Diagnoses:   Ataxia; Multiple sclerosis; Muscle weakness; Myalgia   Author:   Emily Lan MD      Visit Information      Date of Service: 2018 02:59 pm  Performing Location: [facility]  Encounter#: [id number]      Primary Care Provider (PCP):  Orestes Mendosa MD    NPI# [id number]      Referring Provider:  Emily Lan MD    NPI# [id number]      Chief Complaint       2018 3:10 PM CST Patient is here for possible accupuncture treatment.   2018 2:29 PM CST interested in sleep study   2018 2:29 PM CST consult  per BR         History of Present Illness   pt reports her pain right now is better than it has been in a log time.  her right lower back is not as good as right after the shots yesterday but better than before the shots, her left lower back feels much better today than yesterday .  overall pain is 5/10, she has also noticed she can stand much longer, was able to walk to get water twice yesterday without having pain which was such an unexpected surprise that she had some tears of casey.  she is also very relieved that this indicates that this muscle pain is not secondary to her MS and can get better.  she is now open to the idea of pursuing PT, she would like to do both more trigger point injections to some different trigger points and would like to pursue acupuncture.  for today we will focus on trigger point injections..        Review of Systems   Constitutional:  Negative except as documented in history of present illness.    Eye:  Negative except as documented in history of present illness.    Ear/Nose/Mouth/Throat:  Negative except as documented in history of present illness.    Respiratory:  Negative except as documented in history of present illness.    Cardiovascular:  Negative except as documented in history  of present illness.    Gastrointestinal:  Negative except as documented in history of present illness.    Immunologic:  Negative except as documented in history of present illness.    Integumentary:  Negative except as documented in history of present illness.              Health Status   Allergies:    Allergic Reactions (Selected)  Severity Not Documented  Adhesive tape (No reactions were documented)  Sulfa drugs (Rash, vomiting..... and diarrhea)   Medications:  (Selected)   Prescriptions  Prescribed  DULoxetine 60 mg oral delayed release capsule: 3 cap(s) ( 180 mg ), po, daily, # 180 cap(s), 0 Refill(s), Type: Soft Stop, Pharmacy: [id number], 3 cap(s) po daily  Keflex 500 mg oral capsule: 1 cap(s) ( 500 mg ), PO, QID, # 40 cap(s), 0 Refill(s), Type: Maintenance, Pharmacy: [email or web address] [id number], 1 cap(s) po qid,x10 day(s)  Lyrica 300 mg oral capsule: 1 cap(s) ( 300 mg ), po, tid, # 90 cap(s), 5 Refill(s), Type: Maintenance, Pharmacy: [email or web address] [address], 1 cap(s) po tid  Walker with large wheels and large sitting seat: Walker with large wheels and large sitting seat, See Instructions, Instructions: use as directed, Supply, # 1 EA, 0 Refill(s), Type: Maintenance  cholecalciferol 5000 intl units oral tablet: See Instructions, Instructions: 15,000 units daily, # 100 tab(s), 5 Refill(s), Type: Maintenance, Pharmacy: [email or web address] [id number]  ergocalciferol 50,000 intl units (1.25 mg) oral capsule: See Instructions, Instructions: 1 cap(s) po wkly, # 12 cap(s), 0 Refill(s), Type: Maintenance, Pharmacy: [email or web address] [id number]  lidocaine 5% topical ointment: 1 gabby, TOP, TID, PRN: for pain, # 50 gm, 2 Refill(s), Type: Maintenance, Pharmacy: [email or web address] [address], 1 gabby top tid,PRN:for pain  lidocaine-prilocaine 2.5%-2.5% topical cream: See Instructions, Instructions: APPLY 5 GRAMS TOPICALLY TO THE AFFECTED AREA(S) ONCE FOR A ONE TIME DOSE, # 30 gm, Type: Soft Stop, Pharmacy: [facility] [id number]  medroxyPROGESTERone 10 mg oral tablet: See Instructions, Instructions: TAKE 1 TABLET BY MOUTH EVERY DAY FOR 10 DAYS ONCE PER MONTH PER PROVIDER, # 30 tab(s), Type: Soft Stop, Pharmacy: [facility], TAKE 1 TABLET BY MOUTH EVERY DAY FOR 10 DAYS ONCE PER MONTH PER PROVIDER  meloxicam 15 mg oral tablet: 1 tab(s) ( 15 mg ), PO, Daily, # 90 tab(s), 0 Refill(s), Type: Maintenance, Pharmacy: [facility], 1 tab(s) po daily  methenamine hippurate 1 g oral tablet: See Instructions, Instructions: TAKE ONE TABLET BY MOUTH TWICE DAILY WITH FOOD, # 60 tab(s), Type: Soft Stop, Pharmacy: [facility], TAKE ONE TABLET BY MOUTH TWICE DAILY WITH FOOD  modafinil 200 mg oral tablet: See Instructions, Instructions: TAKE 1 TABLET BY MOUTH TWICE DAILY, # 60 tab(s), Type: Soft Stop, Pharmacy: [facility], TAKE 1 TABLET BY MOUTH TWICE DAILY  ondansetron 4 mg oral tablet: 1 tab(s) ( 4 mg ), PO, q8 hrs, PRN: for nausea and vomiting, # 10 tab(s), 0 Refill(s), Type: Maintenance, Pharmacy: [facility], 1 tab(s) po q8 hrs,PRN:for nausea and vomiting  rizatriptan 10 mg oral tablet: See Instructions, Instructions: TAKE 1 TABLET BY MOUTH 1 TIME AS NEEDED FOR MIGRAINE HEADACHE, # 6 tab(s), Type: Soft Stop, Pharmacy: [facility]  tiZANidine 4 mg oral tablet: See Instructions, Instructions: TAKE 2 TO 3 TABLETS BY MOUTH EVERY NIGHT AT BEDTIME, # 90 tab(s), Type: Soft Stop, [facility]  Documented Medications  Documented  Adderall: ( 45 mg ), po, bid, 0 Refill(s), Type: Maintenance  Excedrin: See Instructions, 0 Refill(s), Type: Maintenance  LORazepam 0.5 mg oral tablet: po, tid, Instructions: 0.5mg prn and 1-2 hs, PRN: for agitation, 0 Refill(s), Type: Maintenance  LaMICtal 100 mg oral tablet: 1 tab(s) ( 100 mg ), po, daily, 0 Refill(s), Type: Maintenance  Tysabri: iv, q4 wks, 0 Refill(s), Type: Maintenance  cyclobenzaprine:  ( 10 mg ), po, tid, PRN: as needed for muscle spasm, 0 Refill(s), Type: Maintenance  indomethacin 25 mg oral capsule: 1 cap(s) ( 25 mg ), po, q6 hrs, PRN: for gout pain, 0 Refill(s), Type: Maintenance   Problem list:    All Problems  Obesity / SNOMED CT 7456760213 / Probable  Anemia in chronic illness / SNOMED CT 793270952 / Confirmed  Ataxia / SNOMED CT 16266820 / Confirmed  Blues / SNOMED CT 144416087 / Confirmed  Fatigue / SNOMED CT 977448606 / Confirmed  Headache, migraine / SNOMED CT 17921134 / Confirmed  History of renal stone / SNOMED CT 2637739245 / Confirmed  Multiple sclerosis / SNOMED CT 81444703 / Confirmed  Muscle weakness / SNOMED CT 37770134 / Confirmed  Seasonal allergies / SNOMED CT 247487605 / Confirmed  Skin sensation disturbance / SNOMED CT 990463870 / Confirmed  Urinary bladder neurogenic dysfunction / SNOMED CT 4550848584 / Confirmed  Vitamin D deficiency / SNOMED CT 71549193 / Confirmed  Resolved: Pregnancy  Resolved: *Hospitalized@[facility] - Complicated UTI  Resolved: *Hospitalized@[place] - Left renal stone  Resolved: Pregnancy / SNOMED CT 640243577  Resolved: Pregnancy / SNOMED CT 337479874  Canceled: Headache / SNOMED CT 85014927      Histories   Past Medical History:    Active  Multiple sclerosis (91418592): Onset in 2012 at 37 years.  Blues (909665408)  History of renal stone (4421921118)  Urinary bladder neurogenic dysfunction (0692386919)  Fatigue (110891895)  Muscle weakness (29567465)  Vitamin D deficiency (49857865)  Ataxia (67127705)  Headache, migraine (31359457)  Comments:  7/6/2016 CDT 11:19 AM CDT - Katie Hatfield  With aura.  Skin sensation disturbance (291333544)  Seasonal allergies (079440452)  Resolved  *Hospitalized@[facility] - Complicated UTI: Onset on 6/14/2016 at 40 years.  Resolved on 6/18/2016 at 40 years.  *Hospitalized@[place] - Left renal stone: Onset in the month of 5/2016 at 40 years  Resolved.  Pregnancy:  Resolved.  Pregnancy (916700360):  Resolved in 2006 at 30  years.  Pregnancy (006116278):  Resolved in  at 18 years.   Family History:    Melanoma  Father (Shakir)  Thyroid  Sister (Ana Cristina)  Kidney stone  Daughter (Zac)  Diabetes mellitus type II  Father (Shakir)  Hypothyroidism  Sister (Ana Cristina)  Diabetes mellitus type 2  Father (Shakir)  CA - Cancer  Father (Shakir)  Comments:  2017 11:45 AM - Katie Hatfield  skin  CHF - Congestive heart failure  Father (Shakir)  Migraine  Sister (Ana Cristina)  Daughter (Leda)  Mother (Soledad)  Depression  Daughter (Zac)  Father (Shakir)  Gallbladder problem  Mother (Soledad)  Bipolar  Father (Shakir)  Miscellaneous  Brother (Adrian)  Comments:  2017 11:43 AM - Katie Hatfield  spinal stenosis  Mother (Soledad)  Comments:  2017 11:43 AM - Katie Hatfield  spinal stenosis  Anxiety  Daughter (Leda)  Daughter (Zac)     Procedure history:    Port-a-cath in place - Right (67055277) on 2016 at 41 Years.  Comments:  2016 8:01 AM - Ceci Engle  Dx:  Multiple sclerosis.  cystoscopy left ureteroscopy with holmium laser left retrogrades stone extraction and left ureteral stent exchange on 6/3/2016 at 40 Years.  Ureteroscopy (4110085496) in the month of 2016 at 40 Years.  Comments:  2016 4:56 PM - La Garcia  with laser/stone extraction (inability to remove all of stone)  Appendectomy (017712936) in  at 39 Years.  Breast reduction (127056102) in  at 26 Years.  Tonsillectomy and adenoidectomy (305885164) in  at 14 Years.  Cholecystectomy (10577565).   section (17787167).  Comments:  2016 11:26 AM - Freya Mark CMA  x1  Tubal ligation (078334429).   Social History:        Alcohol Assessment            Current, 1-2 times per month, 1 drinks/episode maximum.      Tobacco Assessment            Never smoker      Substance Abuse Assessment: Past            Marijuana      Employment and Education Assessment            Disability      Home and Environment Assessment            2 children.       Nutrition and Health Assessment            Caffeine intake amount: 2 sodas, every day.      Exercise and Physical Activity Assessment: Does not exercise      Sexual Assessment            Sexually active: No.  Sexual orientation: Heterosexual.        Physical Examination   Vital Signs   2/1/2018 3:10 PM CST Temperature Tympanic 98.5 DegF    Peripheral Pulse Rate 100 bpm    Pulse Site Radial artery    HR Method Manual    Systolic Blood Pressure 132 mmHg  HI    Diastolic Blood Pressure 82 mmHg  HI    Mean Arterial Pressure 99 mmHg    BP Site Right arm    BP Method Manual   2/1/2018 2:44 PM CST Systolic Blood Pressure 144 mmHg  HI    Diastolic Blood Pressure 80 mmHg    Mean Arterial Pressure 101 mmHg    BP Site Right arm    BP Method Manual   2/1/2018 2:29 PM CST Peripheral Pulse Rate 116 bpm  HI    Systolic Blood Pressure 149 mmHg  HI    Diastolic Blood Pressure 84 mmHg  HI    Mean Arterial Pressure 106 mmHg    BP Site Right arm   1/31/2018 2:29 PM CST Temperature Tympanic 98.1 DegF    Peripheral Pulse Rate 108 bpm  HI    Pulse Site Radial artery    HR Method Manual    Systolic Blood Pressure 146 mmHg  HI    Diastolic Blood Pressure 84 mmHg  HI    Mean Arterial Pressure 105 mmHg    BP Site Right arm    BP Method Manual      Measurements from flowsheet : Measurements   2/1/2018 3:10 PM CST Ht/Wt Measurement Refused by Patient? Yes   2/1/2018 2:29 PM CST Height Measured - Standard 65 in    Weight Measured - Standard 292 lb    BSA 2.46 m2    Body Mass Index 48.59 kg/m2  HI   1/31/2018 2:29 PM CST Weight Measured - Standard 292 lb      General:  Alert and oriented.    Eye:  Pupils are equal, round and reactive to light, Extraocular movements are intact, Normal conjunctiva.    HENT:  Normocephalic, hearing grossly normal during our conversation.    Respiratory:  Respirations are non-labored, Symmetrical chest wall expansion.    Cardiovascular:  Normal peripheral perfusion, brisk capillary refill.    Musculoskeletal:  tender  trigger points in rt lower back and upper back and occiptial head, gait much improved today.    Integumentary:  Warm, Dry.    Neurologic:  Alert, Oriented, No focal deficits.    Cognition and Speech:  Oriented, Speech clear and coherent.    Psychiatric:  Cooperative, Appropriate mood & affect, Normal judgment, Non-suicidal.       Health Maintenance      Recommendations     Pending (in the next year)        Due            Cervical Cancer Screen (if sexually active) due  02/01/18  and every 3  year(s)           Lipid Disorders Screen (Female) due  02/01/18  and every 1  year(s)           Type 2 Diabetes Mellitus Screen (Female) due  02/01/18  Variable frequency        Due In Future            Tetanus Vaccine not due until  07/16/18  and every 10  year(s)           Depression Screen (Female) not due until  01/26/19  and every 1  year(s)           Alcohol Misuse Screen (Female) not due until  01/26/19  and every 1  year(s)     Satisfied (in the past 1 year)        Satisfied            Alcohol Misuse Screen (Female) on  01/26/18.           Alcohol Misuse Screen (Female) on  02/28/17.           Body Mass Index Check (Female) on  02/01/18.           Body Mass Index Check (Female) on  03/01/17.           Body Mass Index Check (Female) on  03/01/17.           Body Mass Index Check (Female) on  02/28/17.           Depression Screen (Female) on  01/26/18.           Depression Screen (Female) on  01/26/18.           Depression Screen (Female) on  01/26/18.           Depression Screen (Female) on  02/28/17.           Depression Screen (Female) on  02/28/17.           Depression Screen (Female) on  02/28/17.           High Blood Pressure Screen (Female) on  02/01/18.           High Blood Pressure Screen (Female) on  02/01/18.           High Blood Pressure Screen (Female) on  02/01/18.           High Blood Pressure Screen (Female) on  01/31/18.           High Blood Pressure Screen (Female) on  01/26/18.           High Blood  Pressure Screen (Female) on  05/17/17.           High Blood Pressure Screen (Female) on  05/17/17.           High Blood Pressure Screen (Female) on  03/01/17.           High Blood Pressure Screen (Female) on  03/01/17.           High Blood Pressure Screen (Female) on  02/28/17.           Obesity Screen and Counseling (Female) on  02/01/18.           Obesity Screen and Counseling (Female) on  01/31/18.           Obesity Screen and Counseling (Female) on  01/26/18.           Obesity Screen and Counseling (Female) on  05/17/17.           Obesity Screen and Counseling (Female) on  03/01/17.           Obesity Screen and Counseling (Female) on  03/01/17.           Obesity Screen and Counseling (Female) on  02/28/17.           Tobacco Use Screen (Female) on  02/01/18.           Tobacco Use Screen (Female) on  02/01/18.           Tobacco Use Screen (Female) on  01/31/18.           Tobacco Use Screen (Female) on  01/26/18.           Tobacco Use Screen (Female) on  05/17/17.           Tobacco Use Screen (Female) on  03/01/17.           Tobacco Use Screen (Female) on  02/28/17.          Procedure   Procedure   Time.     Indication: myofascial pain.     Informed consent: signed by patient.     Confirmed: patient, procedure, side, site.     Type of procedure: trigger point injection.     Physical Exam: vital signs Vital Signs   2/1/2018 3:10 PM CST Temperature Tympanic 98.5 DegF    Peripheral Pulse Rate 100 bpm    Pulse Site Radial artery    HR Method Manual    Systolic Blood Pressure 132 mmHg  HI    Diastolic Blood Pressure 82 mmHg  HI    Mean Arterial Pressure 99 mmHg    BP Site Right arm    BP Method Manual   2/1/2018 2:44 PM CST Systolic Blood Pressure 144 mmHg  HI    Diastolic Blood Pressure 80 mmHg    Mean Arterial Pressure 101 mmHg    BP Site Right arm    BP Method Manual   2/1/2018 2:29 PM CST Peripheral Pulse Rate 116 bpm  HI    Systolic Blood Pressure 149 mmHg  HI    Diastolic Blood Pressure 84 mmHg  HI    Mean  Arterial Pressure 106 mmHg    BP Site Right arm   1/31/2018 2:29 PM CST Temperature Tympanic 98.1 DegF    Peripheral Pulse Rate 108 bpm  HI    Pulse Site Radial artery    HR Method Manual    Systolic Blood Pressure 146 mmHg  HI    Diastolic Blood Pressure 84 mmHg  HI    Mean Arterial Pressure 105 mmHg    BP Site Right arm    BP Method Manual      .     Preparation and technique: informed consent obtained, position sitting, sterile preparation of site with 70 % alcohol, hemostasis achieved using direct pressure, estimated blood loss minimal, adhesive bandagedressing applied.     Findings: painful and tender myofascial trigger points identified by palpation with communication from patient    Location: right occipital head, right trapezius, right lower back    Number of injections:3    pain prior to procedure:  5       pain following procedure:3    number of milliliters of the 1:1 solution of 1% lidocaine without epiniephrine and 0.5% marcaine without epinephrine used in total: 8.     Procedure tolerated: well.     No Complications.     No qualifying data available.          Review / Management   Results review      Impression and Plan   Diagnosis     Ataxia (ATM31-XX R27.0).     Multiple sclerosis (OHC86-GA G35).     Muscle weakness (HIV90-AV M62.81).     Myalgia (FWK37-ZQ M79.1).     Orders     Orders (Selected)   Outpatient Orders  Ordered  Physical Therapy Evaluation and Treatment (Request): Myalgia  Muscle weakness  Multiple sclerosis  Ataxia  RTC (Request): RFV: 40 min trigger point injections, Return in : As Needed  Prescriptions  Prescribed  meloxicam 15 mg oral tablet: 1 tab(s) ( 15 mg ), PO, Daily, # 90 tab(s), 0 Refill(s), Type: Maintenance, Pharmacy: [facility] [id number], 1 tab(s) po daily.     Diagnosis     return to clinic if symptoms worsen or do not improve.     Plan   Counseled:  Patient, Family, use ice or heat as needed to help with pain, continue with home exercise program, .

## 2022-02-15 NOTE — TELEPHONE ENCOUNTER
---------------------  From: Michelle Newton RN (Phone Messages Pool ([address] [place] [address]))   To: [facility] Message Pool ([address] [place] [address]);     Sent: 11/16/2021 1:28:16 PM CST  Subject: vicodin refill       PCP:   SHANNON      Time of Call:  1:21pm       Person Calling:  pt  Phone number:  [phone]    Note:  Pt called, requesting refill of Vicodin. Stated last prescription was in April 2021. Requested to use [facility] in [place].    Pt advised [facility] may require a visit for pain medication.  Hydrocodone-acetaminophen 5-325mg 1 tab q6hr prn documented medication    Please advise.    Last office visit and reason:  7/13/21 joint pain TFS---------------------  From: Freya Mark CMA ([facility] Message Pool ([address] [place] [address]))   To: Orestes Mendosa MD;     Sent: 11/16/2021 1:38:50 PM CST  Subject: FW: vicodin refill     nothing brought up in PDMP---------------------  From: Orestes Mendosa MD   To: [facility] Message Pool ([address] [place] [address]);     Sent: 11/16/2021 1:52:29 PM CST  Subject: RE: vicodin refill     she would need to be reassessed in clinicLM for pt at 1406 to let her know appt would be needed   asked her to c/b to schedule

## 2022-02-15 NOTE — NURSING NOTE
Comprehensive Intake Entered On:  2/4/2020 1:39 PM CST    Performed On:  2/4/2020 1:37 PM CST by Freya Mark CMA               Summary   Chief Complaint :   f/u meds -    Menstrual Status :   Menarcheal   Weight Measured :   266.8 lb(Converted to: 266 lb 13 oz, 121.02 kg)    Height Measured :   65 in(Converted to: 5 ft 5 in, 165.10 cm)    Body Mass Index :   44.39 kg/m2 (HI)    Body Surface Area :   2.35 m2   Systolic Blood Pressure :   132 mmHg (HI)    Diastolic Blood Pressure :   76 mmHg   Mean Arterial Pressure :   95 mmHg   Peripheral Pulse Rate :   80 bpm   Temperature Tympanic :   99.1 DegF(Converted to: 37.3 DegC)    Freya Mark CMA - 2/4/2020 1:37 PM CST   Health Status   Allergies Verified? :   Yes   Medication History Verified? :   Yes   Medical History Verified? :   Yes   Pre-Visit Planning Status :   Completed   Freya Mark CMA - 2/4/2020 1:37 PM CST   Social History   Social History   (As Of: 2/4/2020 1:39:59 PM CST)   Alcohol:        Current, 1 TIME PER WEEK, 1 drinks/episode maximum.   (Last Updated: 12/14/2018 12:13:00 PM CST by Brianna Parrish)          Tobacco:        Never smoker   (Last Updated: 1/13/2016 7:49:24 PM CST by Aminata Landon CMA)          Substance Abuse:  Current      vaping THC and cannabis oil   (Last Updated: 12/12/2018 2:39:37 PM CST by Tati Will CMA)          Employment/School:        Disability   (Last Updated: 2/28/2017 2:54:15 PM CST by Wilton SOLIZ, Dione)          Home/Environment:        2 children.   (Last Updated: 1/13/2016 7:50:00 PM CST by Aminata Landon CMA)          Nutrition/Health:        Caffeine intake amount: 2 sodas, every day.   (Last Updated: 7/6/2016 11:10:11 AM CDT by Katie Hatfield)          Exercise:  Does not exercise      (Last Updated: 1/13/2016 7:49:47 PM CST by Aminata Landon CMA )         Sexual:        Sexually active: No.  Sexual orientation: Heterosexual.   (Last Updated: 1/13/2016 7:50:46 PM CST by Aminata Landon CMA)

## 2022-02-15 NOTE — PROGRESS NOTES
Patient:   KARLI KAISER            MRN: [id number]            FIN: [id number]               Age:   42 years     Sex:  Female     :  1975   Associated Diagnoses:   Multiple sclerosis; Prediabetes; Urinary bladder neurogenic dysfunction; Migraine; Morbid obesity   Author:   Orestes Mendosa MD      Visit Information      Date of Service: 2018 01:20 pm  Performing Location: [facility]  Encounter#: [id number]      Primary Care Provider (PCP):  Orestes Mendosa MD    NPI# [id number]      Referring Provider:  Orestes Mendosa MD, NPI# [id number]      Chief Complaint   3/1/2018 1:33 PM [facility]     f/u [facility] for acute pneumonitis              Additional Information:No additional information recorded during visit.   Chief complaint and symptoms as noted above and confirmed with patient.  Recent lab and diagnostic studies reviewed with patient      History of Present Illness   3/8/2016: Karli presents to clinic for ER follow-up after presentations on both Thursday and Friday this past week for urinary incontinence and suspected pyelonephritis.  She requested for Srivastava catheter to be placed this past week because of persistent incontinence.  She is scheduled to meet with the urologist on the  of this month.  She was originally prescribed Cipro which was later changed to Macrobid after discovery of extended spectrum beta-lactamase resistant E. coli bacteria.  She was started on Macrobid on .  Overall continues to have severe left-sided flank pain.  She is very distressed about her underlying multiple sclerosis and her ability to care for her daughter.  She does follow with a neurologist through [facility].  She previously was following with an internist through [facility] and now wishes to localize her cares here.    12/15/2016: Karli presents to clinic with 2 week history of cold symptoms.  She presented to the emergency department earlier in this course for concerns of possible  urinary tract infection.  She has had increased urinary frequency with incontinence.  Urinalysis was normal in the emergency room.  She was referred on to a neurologist who ordered a CT of her neuro spine showing new MS lesions.  She was recommended to start on steroids though has had very significant psychiatric adverse reactions on corticosteroids.  She was prescribed corticotropin (Acthar) which she completed today.  Describing a 24-hour history of severe flulike symptoms including myalgias, extreme malaise, nonproductive cough.  She was advised to seek any medical attention with describes symptoms on Acthar.  Previously was treating with a variety of over-the-counter cold medicines including Robitussin, Tessalon Perles, Tylenol, Claritin    1/18/2017: Presents with migraine headaches now for the last 3 days.  She used up the last of her rizatriptan which generally works the best for her.  Currently with very limited funds for any other medication options.  Scheduled for infusion for her MS scheduled tomorrow.  Hoping for more immediate relief today, allowing her to get to infusion tomorrow.  After infusion, her deductible will be met.    3/1/2017: Karli presents to clinic after recent assessment by Dr. Girard earlier this week related to vaginal bleeding.  She underwent a vaginal ultrasound showing increased uterine lining thickness.  He recommended she be started on Provera to obtain more regular menses.  Shares having spontaneous menses about once/year. She voices concerns related to increased health risk from her obesity.  Specifically raises concerns to uterine cancer as well as metabolic complications.  She wonders if it is time to think more seriously about bariatric surgery options.  She shares having a good friend that went through the process has had some success.  She has not worked with Oanh Mane here in clinic.  She feels very physically limited related to her MS.  She states gaining over 80  pounds since beginning on MS therapy.    5/17/2017: Presents with 3 day history of nausea she is watery diarrhea.  She started taking Lomotil yesterday.  She took a total of 4 tablets with improvement in stool frequency though still having profuse diarrhea.  She still has been able to eat chicken soup and sparkling cider.  No fever chills.  No bloody component the diarrhea.  No described sick contacts.  No recent antibiotic exposure.    1/26/2018: Karli returns to clinic with one-month history of complaints of dysuria, urinary incontinence increased urinary frequency.  Last diagnosed with urinary tract infection this past month through the [facility] emergency room.  She was prescribed Macrobid at that time based on sensitivities.  In the past has used fosfomycin related to previous history of multidrug-resistant UTIs.  Her multiple sclerosis continues to progress, primarily defined by lower extremity weakness, back pains and urinary changes.  She has failed all conventional means of therapy.  There is discussion about her beginning on alemtuzumab in the near future.  She needs to be infection free prior to beginning therapy.  Having trouble coping especially back pains and associated anxieties.  Would like to discuss other potential weight loss therapy options    3/1/2018: Karli presenting to clinic for hospital follow-up.  Since discharge no further episodes of hemoptysis.  Breathing at baseline.  Feeling well.  Scheduled follow-up with neurology this coming week for follow-up.           Review of Systems   Constitutional:  No fever, No chills, No weakness.    Eye:  Negative except as documented in history of present illness.    Ear/Nose/Mouth/Throat:  Negative except as documented in history of present illness.    Respiratory:  No shortness of breath.    Cardiovascular:  No chest pain, No palpitations, No peripheral edema, No syncope.    Gastrointestinal:  No nausea, No vomiting, No constipation.     Genitourinary:  No dysuria, No hematuria, No change in urine stream.    Hematology/Lymphatics:  Negative except as documented in history of present illness.    Endocrine:  No excessive thirst, No polyuria.    Immunologic:  No recurrent fevers.    Musculoskeletal:  No joint pain, No muscle pain.    Neurologic:  Alert and oriented X4, No abnormal balance, No confusion, No numbness, No tingling, No headache.    Psychiatric:  Anxiety, Depression.       Health Status   Allergies:    Allergic Reactions (Selected)  Severity Not Documented  Adhesive tape (No reactions were documented)  Sulfa drugs (Rash, vomiting..... and diarrhea)   Medications:  (Selected)   Prescriptions  Prescribed  DULoxetine 60 mg oral delayed release capsule: 3 cap(s) ( 180 mg ), po, daily, # 180 cap(s), 0 Refill(s), Type: Soft Stop, Pharmacy: [id number], 3 cap(s) po daily  Saxenda 18 mg/3 mL subcutaneous solution: ( 3 mg ), subcutaneous, daily, Instructions: take 3mg injection q am (goal dose), # 15 mL, 6 Refill(s), Type: Maintenance, Pharmacy: [facility] [id number], 3 mg subcutaneous daily,Instr:take 3mg injection q am (goal dose)  cefdinir 300 mg oral capsule: 1 cap(s) ( 300 mg ), PO, q12hr, # 14 cap(s), 0 Refill(s), Type: Maintenance, Pharmacy: [facility] [id number], 1 cap(s) po q12 hrs,x7 day(s)  lidocaine 5% topical ointment: 1 gabby, TOP, TID, PRN: for pain, # 50 gm, 2 Refill(s), Type: Maintenance, Pharmacy: [facility] [id number], 1 gabby top tid,PRN:for pain  lidocaine-prilocaine 2.5%-2.5% topical cream: See Instructions, Instructions: APPLY 5 GRAMS TOPICALLY TO THE AFFECTED AREA(S) ONCE FOR A ONE TIME DOSE, # 30 gm, Type: Soft Stop, Pharmacy: [facility] [id number]  medroxyPROGESTERone 10 mg oral tablet: See Instructions, Instructions: TAKE 1 TABLET BY MOUTH EVERY DAY FOR 10 DAYS ONCE PER MONTH PER PROVIDER, # 30 tab(s), Type: Soft Stop, Pharmacy: [id number], TAKE 1 TABLET BY MOUTH EVERY DAY  FOR 10 DAYS ONCE PER MONTH PER PROVIDER  meloxicam 15 mg oral tablet: 1 tab(s) ( 15 mg ), PO, Daily, # 90 tab(s), 0 Refill(s), Type: Maintenance, Pharmacy: [id number], 1 tab(s) po daily  methenamine hippurate 1 g oral tablet: See Instructions, Instructions: TAKE ONE TABLET BY MOUTH TWICE DAILY WITH FOOD, # 60 tab(s), Type: Soft Stop, Pharmacy: [id number], TAKE ONE TABLET BY MOUTH TWICE DAILY WITH FOOD  modafinil 200 mg oral tablet: See Instructions, Instructions: TAKE 1 TABLET BY MOUTH TWICE DAILY, # 60 tab(s), Type: Soft Stop, Pharmacy: [facility] [id number], TAKE 1 TABLET BY MOUTH TWICE DAILY  ondansetron 4 mg oral tablet: 1 tab(s) ( 4 mg ), PO, q8 hrs, PRN: for nausea and vomiting, # 10 tab(s), 0 Refill(s), Type: Maintenance, Pharmacy: [id number], 1 tab(s) po q8 hrs,PRN:for nausea and vomiting  rizatriptan 10 mg oral tablet: See Instructions, Instructions: TAKE 1 TABLET BY MOUTH 1 TIME AS NEEDED FOR MIGRAINE HEADACHE, # 6 tab(s), Type: Soft Stop, Pharmacy: [facility] [id number]  tiZANidine 4 mg oral tablet: See Instructions, Instructions: TAKE 2 TO 3 TABLETS BY MOUTH EVERY NIGHT AT BEDTIME, # 90 tab(s), Type: Soft Stop, Pharmacy: [id number]  Documented Medications  Documented  Adderall: ( 45 mg ), po, bid, 0 Refill(s), Type: Maintenance  Excedrin: See Instructions, 0 Refill(s), Type: Maintenance  LORazepam 0.5 mg oral tablet: po, tid, Instructions: 0.5mg prn and 1-2 hs, PRN: for agitation, 0 Refill(s), Type: Maintenance  LaMICtal 100 mg oral tablet: 1 tab(s) ( 100 mg ), po, daily, 0 Refill(s), Type: Maintenance  Magic Mouthwash: Magic Mouthwash, Instructions: take 5-10ml po QID for stomatitis, Supply, 0 Refill(s), Type: Maintenance  cholecalciferol 2000 intl units oral tablet: 1 tab(s) ( 2,000 International Unit ), po, daily, 0 Refill(s), Type: Maintenance  hydrOXYzine hydrochloride 50 mg oral tablet: 1 tab(s) ( 50 mg ), PO, tid, PRN: for  anxiety, # 40 tab(s), 0 Refill(s), Type: Maintenance  meperidine 50 mg oral tablet: 1 tab(s) ( 50 mg ), po, q3 hr (int), PRN: for pain, 0 Refill(s), Type: Maintenance  raNITIdine 150 mg oral capsule: 1 cap(s) ( 150 mg ), po, daily, 0 Refill(s), Type: Maintenance  valACYclovir 500 mg oral tablet: 1 tab(s) ( 500 mg ), po, bid, 0 Refill(s), Type: Maintenance   Problem list:    All Problems  Anemia in chronic illness / SNOMED CT 262492439 / Confirmed  Ataxia / SNOMED CT 98516167 / Confirmed  Blues / SNOMED CT 533022794 / Confirmed  Fatigue / SNOMED CT 860031816 / Confirmed  History of renal stone / SNOMED CT 0196956906 / Confirmed  Headache, migraine / SNOMED CT 96492238 / Confirmed  Morbid (severe) obesity due to excess calories / SNOMED CT 755558251 / Probable  Multiple sclerosis / SNOMED CT 31941668 / Confirmed  Muscle weakness / SNOMED CT 08261693 / Confirmed  Urinary bladder neurogenic dysfunction / SNOMED CT 4722704897 / Confirmed  Prediabetes / SNOMED CT 7859353907 / Confirmed  Skin sensation disturbance / SNOMED CT 681822241 / Confirmed  Seasonal allergies / SNOMED CT 992224053 / Confirmed  Vitamin D deficiency / SNOMED CT 90787162 / Confirmed  Resolved: Inpatient stay / SNOMED CT 035008477  Resolved: Inpatient stay / SNOMED CT 669503582  Resolved: Inpatient stay / SNOMED CT 681003591  Resolved: Pregnancy  Resolved: Pregnancy / SNOMED CT 343359465  Resolved: Pregnancy / SNOMED CT 996079857  Canceled: Headache / SNOMED CT 83978530      Histories   Past Medical History:    Active  Multiple sclerosis (03918854): Onset in 2012 at 37 years.  Blues (402204167)  History of renal stone (9694699601)  Urinary bladder neurogenic dysfunction (5598744894)  Fatigue (977335171)  Muscle weakness (85963038)  Vitamin D deficiency (32309227)  Ataxia (11223934)  Headache, migraine (28090809)  Comments:  7/6/2016 CDT 11:19 AM [facility] - Katie Hatfield  With aura.  Skin sensation disturbance (273882556)  Seasonal allergies  (928428561)  Resolved  Inpatient stay (407345664): Onset on 2/23/2018 at 42 years.  Resolved on 2/25/2018 at 42 years.  Comments:  2/27/2018 CST 7:59 AM La Serrano  @[facility] [place], [place] - Acute pneumonitis; likely related to recent Lemtrada introduction  Inpatient stay (416295533): Onset on 6/14/2016 at 40 years.  Resolved on 6/18/2016 at 40 years.  Comments:  2/27/2018 CST 7:58 AM La Serrano  @[facility] [place], [place] - Complicated UTI  Inpatient stay (959878801): Onset in the month of 5/2016 at 40 years  Resolved.  Comments:  2/27/2018 CST 7:57 AM La Serrano  @[facility], [place] - Left renal stone  Pregnancy:  Resolved.  Pregnancy (913471676):  Resolved in 2006 at 30 years.  Pregnancy (908788995):  Resolved in 1994 at 18 years.   Family History:    Melanoma  Father (Shakir)  Thyroid  Sister (Ana Cristina)  Kidney stone  Daughter (Zac)  Diabetes mellitus type II  Father (Shakir)  Hypothyroidism  Sister (Ana Cristina)  Diabetes mellitus type 2  Father (Shakir)  CA - Cancer  Father (Shakir)  Comments:  4/5/2017 11:45 AM - Katie Hatfield  skin  CHF - Congestive heart failure  Father (Shakir)  Migraine  Sister (Ana Cristina)  Daughter (Leda)  Mother (Soledad)  Depression  Daughter (Zac)  Father (Shakir)  Gallbladder problem  Mother (Soledad)  Bipolar  Father (Shakir)  Miscellaneous  Brother (Adrian)  Comments:  4/5/2017 11:43 AM - Katie Hatfield  spinal stenosis  Mother (Soledad)  Comments:  4/5/2017 11:43 AM - Katie Hatfield  spinal stenosis  Anxiety  Daughter (Leda)  Daughter (Zac)     Procedure history:    Port-a-cath in place - Right (25374568) on 8/11/2016 at 41 Years.  Comments:  8/18/2016 8:01 AM - Ceci Engle  Dx:  Multiple sclerosis.  cystoscopy left ureteroscopy with holmium laser left retrogrades stone extraction and left ureteral stent exchange on 6/3/2016 at 40 Years.  Ureteroscopy (5626418285) in the month of 5/2016 at 40 Years.  Comments:  6/14/2016 4:56 PM - Jose  La  with laser/stone extraction (inability to remove all of stone)  Appendectomy (151788269) in  at 39 Years.  Breast reduction (264251565) in  at 26 Years.  Tonsillectomy and adenoidectomy (620127558) in  at 14 Years.  Cholecystectomy (34980945).   section (62508430).  Comments:  2016 11:26 AM - [place] CMA, Freya  x1  Tubal ligation (677980486).   Social History:        Alcohol Assessment            Current, 1-2 times per month, 1 drinks/episode maximum.      Tobacco Assessment            Never smoker      Substance Abuse Assessment: Past            Marijuana      Employment and Education Assessment            Disability      Home and Environment Assessment            2 children.      Nutrition and Health Assessment            Caffeine intake amount: 2 sodas, every day.      Exercise and Physical Activity Assessment: Does not exercise      Sexual Assessment            Sexually active: No.  Sexual orientation: Heterosexual.        Physical Examination   vital signs stable, as noted above   Vital Signs   3/1/2018 1:33 PM CST Temperature Tympanic 98.3 DegF    Peripheral Pulse Rate 92 bpm    Pulse Site Radial artery    HR Method Manual    Systolic Blood Pressure 144 mmHg  HI    Diastolic Blood Pressure 64 mmHg    Mean Arterial Pressure 91 mmHg    BP Site Right arm    BP Method Manual      Measurements from flowsheet : Measurements   3/1/2018 1:33 PM CST Height Measured - Standard 65 in    Weight Measured - Standard 291 lb    BSA 2.46 m2    Body Mass Index 48.42 kg/m2  HI      General:  Alert and oriented, No acute distress.    HENT:  Normocephalic.    Neck:  Supple.    Respiratory:  Lungs are clear to auscultation, Respirations are non-labored.    Cardiovascular:  Normal rate, Regular rhythm, No murmur, No edema.    Gastrointestinal:  Soft, Non-tender, Non-distended.    Genitourinary:  +CVA tenderness.    Musculoskeletal:  Infusaport in place; not engaged.    Integumentary:  No rash.     Neurologic:  Alert, Oriented.    Cognition and Speech:  Oriented, Speech clear and coherent.    Psychiatric:  Appropriate mood & affect.       Review / Management   Results review:  Lab results   2/21/2018 1:22 PM CST WBC 13.1    WBC 13.1    RBC 4.04    RBC 4.04    Hgb 11.1 g/dL    Hgb 11.1 g/dL    Hct 33.6 %    Hct 33.6 %    MCV 83 fL    MCV 83 fL    MCH 27.5 pg    MCH 27.5 pg    MCHC 33.0 g/dL    MCHC 33.0 g/dL    RDW 15.8 %    RDW 15.8 %    Platelet 218    Platelet 218    MPV 9.9 fL    MPV 9.9 fL    Lymphs 1.1 %    Abs Lymphs 0.2    Neutrophils 96.3 %    Abs Neutrophils 12.6    Monocytes 2.4 %    Abs Monocytes 0.3    Eosinophils 0.0 %    Abs Eosinophils 0.0    Basophils 0.2 %    Abs Basophils 0.0   2/6/2018 1:40 PM CST UA Epithelial Cells Few    UA Mucous Present    UA WBC 3-5    UA RBC 3-5    UA Bacteria Few   2/6/2018 12:38 PM CST UA pH 5.5    UA Specific Gravity > OR = 1.030    UA Glucose TRACE    UA Bilirubin NEGATIVE    UA Ketones NEGATIVE    U Occult Blood NEGATIVE    UA Protein 1+    UA Nitrite NEGATIVE    UA Leuk Est NEGATIVE   1/26/2018 2:32 PM CST UA Color Yellow    UA Clarity Slightly Cloudy    UA pH 6.0    UA Specific Gravity 1.025    UA Glucose Negative mg/dL    UA Bilirubin Negative    UA Ketones Negative mg/dL    U Occult Blood Negative    UA Protein Negative mg/dL    UA Nitrite Negative    UA Leuk Est Negative    UA Urobilinogen Normal    UA Epithelial Cells Moderate    UA WBC 6-10    UA RBC 0-2    UA CA Ox Crystal Present    UA Bacteria Moderate   1/26/2018 2:15 PM CST Culture Blood See comment   1/26/2018 2:14 PM CST Culture Urine See comment   1/26/2018 2:12 PM CST Sodium Level 138 mmol/L    Potassium Level 4.4 mmol/L    Chloride Level 104 mmol/L    CO2 Level 23 mmol/L    Glucose Level 139 mg/dL  HI    BUN 12 mg/dL    Creatinine 0.71 mg/dL    BUN/Creat Ratio NOT APPLICABLE    eGFR 104 mL/min/1.73m2    eGFR African American 120 mL/min/1.73m2    Calcium Level 9.5 mg/dL    C-Reactive Protein  (CRP) 44.0 mg/L  HI    WBC 10.1    RBC 4.50    Hgb 12.0 gm/dL    Hct 36.1 %    MCV 80.2 fL    MCH 26.7 pg  LOW    MCHC 33.2 gm/dL    RDW 15.0 %    Platelet 343    MPV 9.7 fL    Lymphs 24.4 %    Abs Lymphs 2,464    Neutrophils 68.1 %    Abs Neutrophils 6,878    Monocytes 4.0 %    Abs Monocytes 404    Eosinophils 2.8 %    Abs Eosinophils 283    Basophils 0.7 %    Abs Basophils 71   .       Impression and Plan   Diagnosis     Multiple sclerosis (AWL34-UG G35).     Prediabetes (LPA62-LO R73.09).     Urinary bladder neurogenic dysfunction (OAP08-OP N31.9).     Migraine (PRQ51-BM G43.019).     Morbid obesity (ZHO17-QT E66.01).         .) hospital f/u; multiple sclerosis - progressively worsening - currently feeling well  discharge summary reviewed and meds reconciled   - suspected infusion reaction to Lemtrada (pulmonary pnuemonitis) vs. preceding viral upper respiratory infection.  Timeframe sounds too early for infectious/reactivation disease related to immunosuppression  - reviewed inpatient work-up: blood Cx x2: no growth.  HSV IgM antibody negative.  Quantiferon: negative.  CMV PCR: negative  - no noted hemoptysis during hospitalization or after discharge  - I communicated with Dr. Webb, her neurologist, today.  Plans for her to return to neurology clinic this coming week to discuss further Lemtrada infusions    plan as previously outlined:     .) obesity, morbid; current BMI 48 - substantial weight gains since beginning therapies for multiple sclerosis   - referral to Oanh Mane to discuss medication options   - discussed weight loss management options including lifestyle, medication options, and potential bariatric surgery   - I think her MS and potential therapy directions limits active pursuits of bariatric surgery for now    .) vitamin D deficiency; total level 15 (1/2017 through [facility])   - on cholecalciferol 5000 units/daily    .) health maintenance   - prediabetes    - enrolled in [facility]  program    RTC as previously scheduled

## 2022-02-15 NOTE — CARE COORDINATION
Patient:   MARGARITA KAISER            MRN: [id number]            FIN: [id number]               Age:   42 years     Sex:  Female     :  1975   Associated Diagnoses:   None   Author:   Tati Will CMA      Sources of Information:  [ ] Patient, family member, or caregiver (Please list):  [ ] Hospital discharge summary  [ ] Hospital fax  [x ] List of recent hospitalizations or ED visits  [ ] Other:     Discharged From:  ED   Discharge Date: 17    Diagnosis/Problem: UTI    Medication Changes: [ ] Yes [ x] No   Medication List Updated: [ ] Yes [x ] No    Needs Referral or Lab: [ ] Yes [x ] No    Needs Follow-up Appointment:  [ x] Within 7 days of discharge (highly complex visit)  [ ] Within 14 days of discharge (moderately complex visit)    Appointment Made With: None made at this time  Date: n/a    LMTCB-was to see [facility] in 1 day to discuss treatment of ongoing UTI's/resistant to multiple drugs.

## 2022-02-15 NOTE — TELEPHONE ENCOUNTER
---------------------  From: Taty Bojorquez CMA   To: Orestes Mendosa MD;     Sent: 1/11/2019 2:49:36 PM CST  Subject: PTSD and referral req     Karli called and left a message on  vm at 1229pm.  She was very tearful.  She feels that her PTSD is being triggered and she needs to get in to see psychiatry, psychology and or counseling.  She is wondering where she can go as she has called a few places and they do not take her insurance.      called Karli back at 248pm.  NO answer LMTCB.      Will place a referral once she calls back with more details if that is advised?    Taty Bojorquez CMA.Karli called back at 326pm.  She wanted to thank us for calling back and recommending [facility] behavioral health.  She has an appointment at the [facility] on Wednesday.  She will let us know at that time if a formal referral is needed.    Taty Bojorquez CMA.
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
## 2022-02-15 NOTE — LETTER
(Inserted Image. Unable to display)     November 25, 2019      MARGARITA KAISER  [address]  [place], [place] [id number]          Dear MARGARITA,      Thank you for selecting [facility] (previously [place] [place] [facility]) for your healthcare needs.    Our records indicate you are due for the following services:    Follow-up office visit.    Non-Fasting Labs.    If you had your labs done at another facility or with Direct Access Lab Testing at [facility], please bring in a copy of the results to your next visit, mail a copy, or drop off a copy of your results to your Healthcare Provider.    You are due for lab work and an office visit; please schedule the lab appointment 1 week before the office visit.  This will assure all results are available to discuss with your provider during your visit.    **It is very helpful if you bring your medication bottles to your appointment.  This assures we have all of your current medications, including strength and dosing information, documented accurately in your medical record.    To schedule an appointment or if you have further questions, please contact your primary clinic:   [facility]       [phone]   [facility]       [phone]              [facility]     [phone]      Powered by [employer]    Sincerely,    Orestes Mendosa MD

## 2022-02-15 NOTE — TELEPHONE ENCOUNTER
---------------------  From: Sue Mack CMA (eRx Pool ([id number]_[facility]))   To: [facility]ANN[facility] Message Pool ([address]224_[place] [facility] [place]);     Sent: 12/27/2019 3:21:25 PM CST  Subject: FW: Medication Management   Due Date/Time: 12/27/2019 8:12:00 PM CST       Medication Refill needing approval    PCP:   SHANNON    Medication:   benzonatate  Last Filled:  12/2/19    Quantity:  30  Refills:  0  CSA on file?   n/a     Medication:   Saxenda  Last Filled:  12/2/19    Quantity:  15  Refills:  0    Medication:   Zofran  Last Filled:  12/2/19    Quantity:  30  Refills:  0    Medication:   rizatriptan  Last Filled:  12/2/19    Quantity:  6  Refills:  0    Date of last office visit and reason:   8/17/19; bronchitis  Date of last labs pertaining to condition:  8/17/19    Return to Clinic order placed?  yes; was due in November    ------------------------------------------  From: [employer] #[id number]  To: Orestes Mendosa MD  Sent: December 26, 2019 8:12:28 PM CST  Subject: Medication Management  Due: December 27, 2019 8:12:28 PM CST    ** On Hold Pending Signature **  Drug: benzonatate (benzonatate 100 mg oral capsule)  TAKE 1 CAPSULE BY MOUTH THREE TIMES DAILY FOR 10 DAYS AS NEEDED FOR COUGH  Quantity: 30 cap(s)  Days Supply: 10  Refills: 0  Substitutions Allowed  Notes from Pharmacy:     Dispensed Drug: benzonatate (benzonatate 100 mg oral capsule)  TAKE 1 CAPSULE BY MOUTH THREE TIMES DAILY FOR 10 DAYS AS NEEDED FOR COUGH  Quantity: 30 cap(s)  Days Supply: 10  Refills: 0  Substitutions Allowed  Notes from Pharmacy:     ** On Hold Pending Signature **  Drug: rizatriptan (rizatriptan 10 mg oral tablet)  TAKE 1 TABLET BY MOUTH 1 TIME AS NEEDED FOR MIGRAINE HEADACHE  Quantity: 6 tab(s)  Days Supply: 6  Refills: 0  Substitutions Allowed  Notes from Pharmacy:     Dispensed Drug: rizatriptan (rizatriptan 10 mg oral tablet)  TAKE 1 TABLET BY MOUTH 1 TIME AS NEEDED FOR MIGRAINE HEADACHE  Quantity: 6 tab(s)  Days Supply: 6  Refills:  0  Substitutions Allowed  Notes from Pharmacy:     ** On Hold Pending Signature **  Drug: ondansetron (ondansetron 4 mg oral tablet)  TAKE 1 TABLET BY MOUTH EVERY 8 HOURS AS NEEDED FOR NAUSEA OR VOMITING  Quantity: 30 tab(s)  Days Supply: 10  Refills: 0  Substitutions Allowed  Notes from Pharmacy:     Dispensed Drug: ondansetron (ondansetron 4 mg oral tablet)  TAKE 1 TABLET BY MOUTH EVERY 8 HOURS AS NEEDED FOR NAUSEA OR VOMITING  Quantity: 30 tab(s)  Days Supply: 10  Refills: 0  Substitutions Allowed  Notes from Pharmacy:     ** On Hold Pending Signature **  Drug: liraglutide (Saxenda 18 mg/3 mL subcutaneous solution)  INJECT 3MG SUBCUTANEOUSLY DAILY EVERY MORNING(GOAL DOSE)  Quantity: 15 mL  Days Supply: 30  Refills: 0  Substitutions Allowed  Notes from Pharmacy:     Dispensed Drug: liraglutide (Saxenda 18 mg/3 mL subcutaneous solution)  INJECT 3MG SUBCUTANEOUSLY DAILY EVERY MORNING(GOAL DOSE)  Quantity: 15 mL  Days Supply: 30  Refills: 0  Substitutions Allowed  Notes from Pharmacy:     ** On Hold Pending Signature **  Drug: B-D PEN NDL MINI [id number](3/16)PRPL  INJECT WITH SAXENDA EVERY DAY  Quantity: 100 EA  Days Supply: 30  Refills: 0  Substitutions Allowed  Notes from Pharmacy:     Dispensed Drug: B-D PEN NDL MINI [id number](3/16)PRPL  INJECT WITH SAXENDA EVERY DAY  Quantity: 100 EA  Days Supply: 30  Refills: 0  Substitutions Allowed  Notes from Pharmacy:     ** On Hold Pending Signature **  Drug: amLODIPine (amLODIPine 5 mg oral tablet)  TAKE ONE TABLET BY MOUTH DAILY  Quantity: 90 tab(s)  Days Supply: 30  Refills: 0  Substitutions Allowed  Notes from Pharmacy:     Dispensed Drug: amLODIPine (amLODIPine 5 mg oral tablet)  TAKE ONE TABLET BY MOUTH DAILY  Quantity: 90 tab(s)  Days Supply: 30  Refills: 0  Substitutions Allowed  Notes from Pharmacy:   ---------------------------------------------------------------  From: Freya Mark CMA ([facility] Message Pool ([id number]_[facility]))   To: Orestes Mendosa MD;     Sent:  12/27/2019 3:41:33 PM CST  Subject: FW: Medication Management   Due Date/Time: 12/27/2019 8:12:00 PM CST---------------------  From: Orestes Mendosa MD   To: [employer] #[id number]    Sent: 12/30/2019 3:05:29 PM CST  Subject: FW: Medication Management     ** Submitted: **  Complete:liraglutide (Saxenda 18 mg/3 mL subcutaneous solution)   Signed by Orestes Mendosa MD  12/30/2019 3:05:00 PM    ** Submitted: **  Complete:ondansetron (ondansetron 4 mg oral tablet)   Signed by Orestes Mendosa MD  12/30/2019 3:05:00 PM    ** Submitted: **  Complete:rizatriptan (rizatriptan 10 mg oral tablet)   Signed by Orestes Mendosa MD  12/30/2019 3:05:00 PM    ** Submitted: **  Complete:benzonatate (benzonatate 100 mg oral capsule)   Signed by Orestes Mendosa MD  12/30/2019 3:05:00 PM    ** Approved **  [employer] (B-D PEN NDL MINI 30SL5OY(3/16)PRPL)  INJECT WITH SAXENDA EVERY DAY  Qty:  100 EA        Days Supply:  30        Refills:  0          Substitutions Allowed     Route To [employer] #[id number]       ** Approved **  benzonatate (BENZONATATE 100MG CAPSULES)  TAKE 1 CAPSULE BY MOUTH THREE TIMES DAILY FOR 10 DAYS AS NEEDED FOR COUGH  Qty:  30 cap(s)        Days Supply:  10        Refills:  0          Substitutions Allowed     [facility] [employer] #[id number]       ** Approved **  rizatriptan (RIZATRIPTAN 10MG TABLETS)  TAKE 1 TABLET BY MOUTH 1 TIME AS NEEDED FOR MIGRAINE HEADACHE  Qty:  6 tab(s)        Days Supply:  6        Refills:  0          Substitutions Allowed     [facility] To [employer] #[id number]       ** Approved **  ondansetron (ONDANSETRON 4MG TABLETS)  TAKE 1 TABLET BY MOUTH EVERY 8 HOURS AS NEEDED FOR NAUSEA OR VOMITING  Qty:  30 tab(s)        Days Supply:  10        Refills:  0          Substitutions Allowed     [facility] To [employer] #[id number]       ** Approved **  liraglutide (SAXENDA 6MG/ML PEN INJ 3ML)  INJECT 3MG SUBCUTANEOUSLY DAILY EVERY MORNING(GOAL DOSE)  Qty:   15 mL        Days Supply:  30        Refills:  0          Substitutions Allowed     Route To Pharmacy - [facility] #[id number]

## 2022-02-15 NOTE — PROGRESS NOTES
Patient:   MARGARITA KAISER            MRN: [id number]            FIN: [id number]               Age:   42 years     Sex:  Female     :  1975   Associated Diagnoses:   Morbid obesity with BMI of 45.0-49.9, adult   Author:   Dianne Mane      Visit Information   Visit type:  Medical Nutrition Therapy.    Referral source:  Orestes Mendosa MD.       Chief Complaint   Morbid Obesity, Prediabetes      Interval History   Pt states she has been really working on her intake and trying to lose weight but her weight is not changing.  Pt does not record intake or follow any specific recommendations.    am is a flip yogurt, coffee with almond casey creamer, noon is a salad with a regular soda, eveing meal is a frozen prepared item (chimichanga, smart ones, or other microwavable meal), does not eat fruit daily  Exercise: none, does have MS  Sleep: fair       Health Status   Allergies:    Allergic Reactions (Selected)  Severity Not Documented  Adhesive tape (No reactions were documented)  Sulfa drugs (Rash, vomiting..... and diarrhea)   Medications:  (Selected)   Prescriptions  Prescribed  DULoxetine 60 mg oral delayed release capsule: 3 cap(s) ( 180 mg ), po, daily, # 180 cap(s), 0 Refill(s), Type: Soft Stop, Pharmacy: [facility] [id number], 3 cap(s) po daily  Keflex 500 mg oral capsule: 1 cap(s) ( 500 mg ), PO, QID, # 40 cap(s), 0 Refill(s), Type: Maintenance, Pharmacy: [facility] [id number], 1 cap(s) po qid,x10 day(s)  Lyrica 300 mg oral capsule: 1 cap(s) ( 300 mg ), po, tid, # 90 cap(s), 5 Refill(s), Type: Maintenance, Pharmacy: [facility] [id number], 1 cap(s) po tid  Saxenda 18 mg/3 mL subcutaneous solution: ( 3 mg ), subcutaneous, daily, Instructions: take 3mg injection q am (goal dose), # 15 mL, 6 Refill(s), Type: Maintenance, Pharmacy: [facility] [id number], 3 mg subcutaneous daily,Instr:take 3mg injection q am (goal dose)  Walker with large wheels and large sitting seat: Walker with large wheels and  large sitting seat, See Instructions, Instructions: use as directed, Supply, # 1 EA, 0 Refill(s), Type: Maintenance  cholecalciferol 5000 intl units oral tablet: See Instructions, Instructions: 15,000 units daily, # 100 tab(s), 5 Refill(s), Type: Maintenance, Pharmacy: [facility] [place] [address]  ergocalciferol 50,000 intl units (1.25 mg) oral capsule: See Instructions, Instructions: 1 cap(s) po wkly, # 12 cap(s), 0 Refill(s), Type: Maintenance, Pharmacy: [facility] [place] [address]  lidocaine 5% topical ointment: 1 gabby, TOP, TID, PRN: for pain, # 50 gm, 2 Refill(s), Type: Maintenance, Pharmacy: [facility] [place] [address], 1 gabby top tid,PRN:for pain  lidocaine-prilocaine 2.5%-2.5% topical cream: See Instructions, Instructions: APPLY 5 GRAMS TOPICALLY TO THE AFFECTED AREA(S) ONCE FOR A ONE TIME DOSE, # 30 gm, Type: Soft Stop, Pharmacy: [facility] [place] [address]  medroxyPROGESTERone 10 mg oral tablet: See Instructions, Instructions: TAKE 1 TABLET BY MOUTH EVERY DAY FOR 10 DAYS ONCE PER MONTH PER PROVIDER, # 30 tab(s), Type: Soft Stop, Pharmacy: [facility] [place] [address], TAKE 1 TABLET BY MOUTH EVERY DAY FOR 10 DAYS ONCE PER MONTH PER PROVIDER  meloxicam 15 mg oral tablet: 1 tab(s) ( 15 mg ), PO, Daily, # 90 tab(s), 0 Refill(s), Type: Maintenance, Pharmacy: [facility] [place] [address], 1 tab(s) po daily  methenamine hippurate 1 g oral tablet: See Instructions, Instructions: TAKE ONE TABLET BY MOUTH TWICE DAILY WITH FOOD, # 60 tab(s), Type: Soft Stop, Pharmacy: [facility] [place] [id number], TAKE ONE TABLET BY MOUTH TWICE DAILY WITH FOOD  modafinil 200 mg oral tablet: See Instructions, Instructions: TAKE 1 TABLET BY MOUTH TWICE DAILY, # 60 tab(s), Type: Soft Stop, Pharmacy: [facility] [place] [id number], TAKE 1 TABLET BY MOUTH TWICE DAILY  ondansetron 4 mg oral tablet: 1 tab(s) ( 4 mg ), PO, q8 hrs, PRN: for nausea and vomiting, # 10 tab(s), 0 Refill(s), Type: Maintenance, Pharmacy: [facility] [id number], 1 tab(s) po q8  hrs,PRN:for nausea and vomiting  rizatriptan 10 mg oral tablet: See Instructions, Instructions: TAKE 1 TABLET BY MOUTH 1 TIME AS NEEDED FOR MIGRAINE HEADACHE, # 6 tab(s), Type: Soft Stop, Pharmacy: [facility] [id number]  tiZANidine 4 mg oral tablet: See Instructions, Instructions: TAKE 2 TO 3 TABLETS BY MOUTH EVERY NIGHT AT BEDTIME, # 90 tab(s), Type: Soft Stop, Pharmacy: [facility] [id number]  Documented Medications  Documented  Adderall: ( 45 mg ), po, bid, 0 Refill(s), Type: Maintenance  Excedrin: See Instructions, 0 Refill(s), Type: Maintenance  LORazepam 0.5 mg oral tablet: po, tid, Instructions: 0.5mg prn and 1-2 hs, PRN: for agitation, 0 Refill(s), Type: Maintenance  LaMICtal 100 mg oral tablet: 1 tab(s) ( 100 mg ), po, daily, 0 Refill(s), Type: Maintenance  Tysabri: iv, q4 wks, 0 Refill(s), Type: Maintenance  cyclobenzaprine: ( 10 mg ), po, tid, PRN: as needed for muscle spasm, 0 Refill(s), Type: Maintenance  indomethacin 25 mg oral capsule: 1 cap(s) ( 25 mg ), po, q6 hrs, PRN: for gout pain, 0 Refill(s), Type: Maintenance   Problem list:    All Problems (Selected)  Anemia in chronic illness / SNOMED CT 434616133 / Confirmed  Ataxia / SNOMED CT 99467185 / Confirmed  Blues / SNOMED CT 196364459 / Confirmed  Fatigue / SNOMED CT 273031526 / Confirmed  History of renal stone / SNOMED CT 8489128521 / Confirmed  Headache, migraine / SNOMED CT 74206656 / Confirmed  Multiple sclerosis / SNOMED CT 94252611 / Confirmed  Muscle weakness / SNOMED CT 83370740 / Confirmed  Urinary bladder neurogenic dysfunction / SNOMED CT 6594218171 / Confirmed  Obesity / SNOMED CT 8402677089 / Probable  Skin sensation disturbance / SNOMED CT 298800011 / Confirmed  Seasonal allergies / SNOMED CT 081333124 / Confirmed  Vitamin D deficiency / SNOMED CT 98056224 / Confirmed      Histories   Past Medical History:    Active  Multiple sclerosis (84275694): Onset in 2012 at 37 years.  Blues (091292150)  History of renal stone  (5612694952)  Urinary bladder neurogenic dysfunction (2517441153)  Fatigue (355742207)  Muscle weakness (92582035)  Vitamin D deficiency (48599720)  Ataxia (06445642)  Headache, migraine (07724287)  Comments:  7/6/2016 CDT 11:19 AM CDT - [place] Elena sinclairan  With aura.  Skin sensation disturbance (040786480)  Seasonal allergies (655304881)  Resolved  *Hospitalized@[facility] - Complicated UTI: Onset on 6/14/2016 at 40 years.  Resolved on 6/18/2016 at 40 years.  *Hospitalized@[facility] - Left renal stone: Onset in the month of 5/2016 at 40 years  Resolved.  Pregnancy:  Resolved.  Pregnancy (655756725):  Resolved in 2006 at 30 years.  Pregnancy (654425987):  Resolved in 1994 at 18 years.   Family History:    Melanoma  Father (Shakir)  Thyroid  Sister (Ana Cristina)  Kidney stone  Daughter (Zac)  Diabetes mellitus type II  Father (Shakir)  Hypothyroidism  Sister (Ana Cristina)  Diabetes mellitus type 2  Father (Shakir)  CA - Cancer  Father (Shakir)  Comments:  4/5/2017 11:45 AM - Katie Hatfield  skin  CHF - Congestive heart failure  Father (Shakir)  Migraine  Sister (Ana Cristina)  Daughter (Leda)  Mother (Soledad)  Depression  Daughter (Zac)  Father (Shakir)  Gallbladder problem  Mother (Soledad)  Bipolar  Father (Shakir)  Miscellaneous  Brother (Adrian)  Comments:  4/5/2017 11:43 AM - Katie Hatfield  spinal stenosis  Mother (Soledad)  Comments:  4/5/2017 11:43 AM - Katie Hatfield  spinal stenosis  Anxiety  Daughter (Leda)  Daughter (Zac)     Procedure history:    Port-a-cath in place - Right (IMO 64156151) performed by Adriano Dougherty MD on 8/11/2016 at 41 Years.  Comments:  8/18/2016 8:01 AM - Ceci Engle  Dx:  Multiple sclerosis.  cystoscopy left ureteroscopy with holmium laser left retrogrades stone extraction and left ureteral stent exchange on 6/3/2016 at 40 Years.  Ureteroscopy (SNOMED CT 0038054507) in the month of 5/2016 at 40 Years.  Comments:  6/14/2016 4:56 PM - La Garcia  with laser/stone extraction (inability to remove all of  stone)  Appendectomy (SNOMED CT 941935084) in  at 39 Years.  Breast reduction (SNOMED CT 348624350) in  at 26 Years.  Tonsillectomy and adenoidectomy (SNOMED CT 697004977) in  at 14 Years.  Cholecystectomy (SNOMED CT 18193711).   section (SNOMED CT 89235467).  Comments:  2016 11:26 AM - [place] CMA, Freya  x1  Tubal ligation (SNOMED CT 351262073).   Social History:        Alcohol Assessment            Current, 1-2 times per month, 1 drinks/episode maximum.      Tobacco Assessment            Never smoker      Substance Abuse Assessment: Past            Marijuana      Employment and Education Assessment            Disability      Home and Environment Assessment            2 children.      Nutrition and Health Assessment            Caffeine intake amount: 2 sodas, every day.      Exercise and Physical Activity Assessment: Does not exercise      Sexual Assessment            Sexually active: No.  Sexual orientation: Heterosexual.        Review / Management   Results review:  Lab results   2018 2:32 PM CST UA Color Yellow    UA Clarity Slightly Cloudy    UA pH 6.0    UA Specific Gravity 1.025    UA Glucose Negative mg/dL    UA Bilirubin Negative    UA Ketones Negative mg/dL    U Occult Blood Negative    UA Protein Negative mg/dL    UA Nitrite Negative    UA Leuk Est Negative    UA Urobilinogen Normal    UA Epithelial Cells Moderate    UA WBC 6-10    UA RBC 0-2    UA CA Ox Crystal Present    UA Bacteria Moderate   2018 2:15 PM CST Culture Blood See comment   2018 2:14 PM CST Culture Urine See comment   2018 2:12 PM CST Sodium Level 138 mmol/L    Potassium Level 4.4 mmol/L    Chloride Level 104 mmol/L    CO2 Level 23 mmol/L    Glucose Level 139 mg/dL  HI    BUN 12 mg/dL    Creatinine 0.71 mg/dL    BUN/Creat Ratio NOT APPLICABLE    eGFR 104 mL/min/1.73m2    eGFR African American 120 mL/min/1.73m2    Calcium Level 9.5 mg/dL    C-Reactive Protein (CRP) 44.0 mg/L  HI    WBC 10.1     RBC 4.50    Hgb 12.0 gm/dL    Hct 36.1 %    MCV 80.2 fL    MCH 26.7 pg  LOW    MCHC 33.2 gm/dL    RDW 15.0 %    Platelet 343    MPV 9.7 fL    Lymphs 24.4 %    Abs Lymphs 2,464    Neutrophils 68.1 %    Abs Neutrophils 6,878    Monocytes 4.0 %    Abs Monocytes 404    Eosinophils 2.8 %    Abs Eosinophils 283    Basophils 0.7 %    Abs Basophils 71   .       Impression and Plan   Diagnosis     Morbid obesity with BMI of 45.0-49.9, adult (SRP12-PV E66.01).       Education of the following topis:  - Myplate, ~1300 - 1400 calorie meal plan, portion sizes, label reading, carb contolled with 30gm max per meal and 15 gm/ snack   - weight loss tips, mindful eating, motivational tips with reward system  - daily weights, recording intake  - exercise recommendations with slow initial steps to start, recommend a fitness tracker, chair exercises, utube   Today the patient is instructed on Saxenda: proper use, mechanism of action, indications, dosing, injection schedule, safety and side effects.  Pt is shown a demonstration of the use of the pen and was able to return demonstration without difficulty.   Dose Escalation Schedule   Week  Daily Dose    1  0.6 mg    2  1.2 mg    3  1.8 mg    4  2.4 mg    5 and onward  3 mg      Goals:   1.  Practice healthy stress management and get good quality sleep with the goal of 7-8 hours per night.    2.  Increase physical activity and make this a part of a daily routine.  Recommend slow steps to start, walk in place behind a chair.  .    3.  Eat in a healthy way, per food plate method .  Keep a food record.  Eat 3 meals/ day.  A meal is three or more food groups; make it colorful for better nutrition.  Focus on portion control.  ~1300 - 1400 calorie meal plan.  Follow weight loss tips and mindful eating.    4.  Goal weight 270 by 9/2018  5.  Start Saxenda if insurance approves             Professional Services   Time spent with pt 30 min   cc Dr. SHALINI Mendosa

## 2022-02-15 NOTE — NURSING NOTE
Comprehensive Intake Entered On:  8/17/2020 10:07 AM CDT    Performed On:  8/17/2020 10:05 AM CDT by Millie Espinoza CMA               Summary   Chief Complaint :   c/o Fever, HA, body aches, urinary urgency since Saturday, left preventaive meds at [facility], also wants covid testing to rule out due to fever; verbal consent given for video visit   Menstrual Status :   Menarcheal   Height/Length Estimated :   65 in(Converted to: 5 ft 5 in, 165.10 cm)    Millie Espinoza CMA - 8/17/2020 10:05 AM CDT   Health Status   Allergies Verified? :   Yes   Medication History Verified? :   Yes   Medical History Verified? :   Yes   Tobacco Use? :   Never smoker   Millie Espinoza CMA - 8/17/2020 10:05 AM CDT   Consents   Consent for Immunization Exchange :   Consent Granted   Consent for Immunizations to Providers :   Consent Granted   Millie Espinoza CMA - 8/17/2020 10:05 AM CDT   Meds / Allergies   (As Of: 8/17/2020 10:07:49 AM CDT)   Allergies (Active)   adhesive tape  Estimated Onset Date:   Unspecified ; Created By:   Katie Hatfield; Reaction Status:   Active ; Category:   Drug ; Substance:   adhesive tape ; Type:   Allergy ; Updated By:   Katie Hatfield; Reviewed Date:   8/17/2020 10:07 AM CDT      sulfa drugs  Estimated Onset Date:   Unspecified ; Reactions:   Rash, Vomiting....., Diarrhea ; Created By:   Michelle Pedersen CMA; Reaction Status:   Active ; Category:   Drug ; Substance:   sulfa drugs ; Type:   Allergy ; Updated By:   Michelle Pedersen CMA; Reviewed Date:   8/17/2020 10:07 AM CDT        Medication List   (As Of: 8/17/2020 10:07:49 AM CDT)   Prescription/Discharge Order    amLODIPine  :   amLODIPine ; Status:   Prescribed ; Ordered As Mnemonic:   amLODIPine 5 mg oral tablet ; Simple Display Line:   1 tab(s), Oral, daily, 90 tab(s), 0 Refill(s) ; Ordering Provider:   Orestes Mendosa MD; Catalog Code:   amLODIPine ; Order Dt/Tm:   5/27/2020 4:57:56 PM CDT          lidocaine 5% topical ointment  :   lidocaine 5% topical ointment ;  Status:   Prescribed ; Ordered As Mnemonic:   lidocaine 5% topical ointment ; Simple Display Line:   See Instructions, APPLY EXTERNALLY TO THE AFFECTED AREA THREE TIMES DAILY AS NEEDED FOR PAIN, 30 gm ; Ordering Provider:   Orestes Mendosa MD; Catalog Code:   lidocaine topical ; Order Dt/Tm:   12/30/2019 3:05:36 PM CST          liraglutide  :   liraglutide ; Status:   Prescribed ; Ordered As Mnemonic:   Victoza 18 mg/3 mL subcutaneous solution ; Simple Display Line:   1.8 mg, Subcutaneous, daily, take in am, 27 mL, 3 Refill(s) ; Ordering Provider:   Orestes Mendosa MD; Catalog Code:   liraglutide ; Order Dt/Tm:   2/5/2020 3:50:31 PM CST          meloxicam 15 mg oral tablet  :   meloxicam 15 mg oral tablet ; Status:   Prescribed ; Ordered As Mnemonic:   meloxicam 15 mg oral tablet ; Simple Display Line:   1 tab(s), Oral, daily, 90 tab(s) ; Ordering Provider:   Emily Lan MD; Catalog Code:   meloxicam ; Order Dt/Tm:   4/30/2020 1:56:42 PM CDT          metFORMIN  :   metFORMIN ; Status:   Prescribed ; Ordered As Mnemonic:   metFORMIN 500 mg oral tablet, extended release ; Simple Display Line:   1,000 mg, 2 tab(s), Oral, daily, 180 tab(s), 3 Refill(s) ; Ordering Provider:   Orestes Mendosa MD; Catalog Code:   metFORMIN ; Order Dt/Tm:   2/4/2020 2:08:22 PM CST          methenamine  :   methenamine ; Status:   Prescribed ; Ordered As Mnemonic:   methenamine hippurate 1 g oral tablet ; Simple Display Line:   1 gm, 1 tab(s), Oral, bid, for 90 day(s), 180 tab(s), 3 Refill(s) ; Ordering Provider:   Orestes Mendosa MD; Catalog Code:   methenamine ; Order Dt/Tm:   7/23/2019 8:18:37 AM CDT          Misc Prescription  :   Misc Prescription ; Status:   Prescribed ; Ordered As Mnemonic:   B-D PEN NDL MINI 63RJ6RN(3/16)PRPL ; Simple Display Line:   See Instructions, INJECT WITH SAXENDA EVERY DAY, 100 EA ; Ordering Provider:   Orestes Mendosa MD; Catalog Code:   Miscellaneous Prescription ; Order Dt/Tm:   12/30/2019 3:05:21 PM [facility]  Prescription  :   Misc Prescription ; Status:   Prescribed ; Ordered As Mnemonic:   B-D PEN NDL MINI 38PV5LQ(3/16)PRPL ; Simple Display Line:   See Instructions, INJECT WITH SAXENDA EVERY DAY, 100 unknown unit ; Ordering Provider:   Orestes Mendosa MD; Catalog Code:   Miscellaneous Prescription ; Order Dt/Tm:   12/2/2019 11:16:59 AM CST          Miscellaneous Prescription  :   Miscellaneous Prescription ; Status:   Prescribed ; Ordered As Mnemonic:   BD pen needle mini [id number] ; Simple Display Line:   See Instructions, Use daily with Saxenda as directed, 100 EA, 0 Refill(s) ; Ordering Provider:   Orestes Mendosa MD; Catalog Code:   Miscellaneous Prescription ; Order Dt/Tm:   10/30/2019 8:12:33 AM CDT          Miscellaneous Rx Supply  :   Miscellaneous Rx Supply ; Status:   Prescribed ; Ordered As Mnemonic:   Accu-check guide meter ; Simple Display Line:   See Instructions, test 2x daily, 1 EA, 0 Refill(s) ; Ordering Provider:   Orestes Mendosa MD; Catalog Code:   Miscellaneous Rx Supply ; Order Dt/Tm:   1/31/2020 3:05:08 PM CST          Miscellaneous Rx Supply  :   Miscellaneous Rx Supply ; Status:   Prescribed ; Ordered As Mnemonic:   Accu-check Guide Test Strips ; Simple Display Line:   See Instructions, Testing 2x daily, 200 EA, 11 Refill(s) ; Ordering Provider:   Orestes Mendosa MD; Catalog Code:   Miscellaneous Rx Supply ; Order Dt/Tm:   1/31/2020 3:06:14 PM CST          Miscellaneous Rx Supply  :   Miscellaneous Rx Supply ; Status:   Prescribed ; Ordered As Mnemonic:   contour next microlet lancets ; Simple Display Line:   See Instructions, testing 2x/ day, 1 box(es), 2 Refill(s) ; Ordering Provider:   Orestes Mendosa MD; Catalog Code:   Miscellaneous Rx Supply ; Order Dt/Tm:   1/27/2020 4:36:08 PM CST          Miscellaneous Rx Supply  :   Miscellaneous Rx Supply ; Status:   Prescribed ; Ordered As Mnemonic:   Replacement CPAP +8 cm H2o for use daily ; Simple Display Line:   See Instructions, Heated humidifier x1; Humidifier  chamber x1;  Heated tubing x1; Full face mask of choice with headgear  x1; Cushion x 1;  Filters: Disposable x1pk & Reusable x1pk.  Length of Need = 99 Months, 1 EA, 11 Refill(s) ; Ordering Provider:   Erwin Smiley MD; Catalog Code:   Miscellaneous Rx Supply ; Order Dt/Tm:   7/25/2019 1:49:35 PM CDT          ondansetron 4 mg oral tablet  :   ondansetron 4 mg oral tablet ; Status:   Prescribed ; Ordered As Mnemonic:   ondansetron 4 mg oral tablet ; Simple Display Line:   1 tab(s), Oral, q8 hrs, PRN: AS NEEDED FOR NAUSEA OR VOMITING, 30 tab(s) ; Ordering Provider:   Orestes Mendosa MD; Catalog Code:   ondansetron ; Order Dt/Tm:   12/30/2019 3:05:21 PM CST          rizatriptan 10 mg oral tablet  :   rizatriptan 10 mg oral tablet ; Status:   Prescribed ; Ordered As Mnemonic:   rizatriptan 10 mg oral tablet ; Simple Display Line:   See Instructions, TAKE 1 TABLET BY MOUTH 1 TIME AS NEEDED FOR MIGRAINE HEADACHE, 6 tab(s) ; Ordering Provider:   Orestes Mendosa MD; Catalog Code:   rizatriptan ; Order Dt/Tm:   12/30/2019 3:05:21 PM CST          tiZANidine 4 mg oral tablet  :   tiZANidine 4 mg oral tablet ; Status:   Prescribed ; Ordered As Mnemonic:   tiZANidine 4 mg oral tablet ; Simple Display Line:   See Instructions, TAKE 2 TO 3 TABLETS BY MOUTH EVERY NIGHT AT BEDTIME, 90 tab(s) ; Ordering Provider:   Orestes Mendosa MD; Catalog Code:   tiZANidine ; Order Dt/Tm:   2/5/2020 10:24:40 AM CST          tiZANidine 4 mg oral tablet  :   tiZANidine 4 mg oral tablet ; Status:   Prescribed ; Ordered As Mnemonic:   tiZANidine 4 mg oral tablet ; Simple Display Line:   2 TO 3 TABLETS, Oral, qhs, 90 tab(s) ; Ordering Provider:   Orestes Mendosa MD; Catalog Code:   tiZANidine ; Order Dt/Tm:   1/7/2020 7:00:19 AM CST            Home Meds    acetaminophen/aspirin/caffeine  :   acetaminophen/aspirin/caffeine ; Status:   Documented ; Ordered As Mnemonic:   Excedrin ; Simple Display Line:   See Instructions, 0 Refill(s) ; Catalog Code:    acetaminophen/aspirin/caffeine ; Order Dt/Tm:   9/6/2016 10:16:52 AM CDT          alemtuzumab  :   alemtuzumab ; Status:   Documented ; Ordered As Mnemonic:   Lemtrada ; Simple Display Line:   See Instructions, 1x/yr, 0 Refill(s) ; Catalog Code:   alemtuzumab ; Order Dt/Tm:   10/26/2018 3:52:14 PM CDT          baclofen  :   baclofen ; Status:   Documented ; Ordered As Mnemonic:   baclofen 20 mg oral tablet ; Simple Display Line:   20 mg, 1 tab(s), Oral, hs, 0 Refill(s) ; Catalog Code:   baclofen ; Order Dt/Tm:   2/4/2020 1:36:03 PM CST          cholecalciferol  :   cholecalciferol ; Status:   Documented ; Ordered As Mnemonic:   Vitamin D3 5000 intl units oral tablet ; Simple Display Line:   20,000 International Unit, 4 tab(s), Oral, daily, 0 Refill(s) ; Catalog Code:   cholecalciferol ; Order Dt/Tm:   2/4/2020 1:35:21 PM CST          cyclobenzaprine  :   cyclobenzaprine ; Status:   Documented ; Ordered As Mnemonic:   cyclobenzaprine 10 mg oral tablet ; Simple Display Line:   10 mg, 1 tab(s), Oral, tid, PRN: for spasm, 30 tab(s), 0 Refill(s) ; Catalog Code:   cyclobenzaprine ; Order Dt/Tm:   7/23/2019 8:00:07 AM CDT          escitalopram  :   escitalopram ; Status:   Documented ; Ordered As Mnemonic:   escitalopram 10 mg oral tablet ; Simple Display Line:   10 mg, 1 tab(s), Oral, daily, 30 tab(s), 0 Refill(s) ; Catalog Code:   escitalopram ; Order Dt/Tm:   2/4/2020 1:36:48 PM CST          liraglutide  :   liraglutide ; Status:   Documented ; Ordered As Mnemonic:   Saxenda 18 mg/3 mL subcutaneous solution ; Simple Display Line:   1.2 mg, Subcutaneous, daily, 0 Refill(s) ; Catalog Code:   liraglutide ; Order Dt/Tm:   2/4/2020 1:37:24 PM CST          traZODone  :   traZODone ; Status:   Documented ; Ordered As Mnemonic:   traZODone ; Simple Display Line:   25 mg, Oral, hs, 0 Refill(s) ; Catalog Code:   traZODone ; Order Dt/Tm:   2/4/2020 1:36:28 PM CST          valACYclovir  :   valACYclovir ; Status:   Documented ;  Ordered As Mnemonic:   valACYclovir 500 mg oral tablet ; Simple Display Line:   500 mg, 1 tab(s), po, bid, 0 Refill(s) ; Catalog Code:   valACYclovir ; Order Dt/Tm:   2/23/2018 2:39:43 PM CST            ID Risk Screen   Recent Travel History :   No recent travel   Family Member Travel History :   No recent travel   Other Exposure to Infectious Disease :   Unknown   Millie Espinoza CMA - 8/17/2020 10:05 AM CDT

## 2022-02-15 NOTE — TELEPHONE ENCOUNTER
"---------------------  From: Tati Will CMA   Sent: 1/26/2021 10:26:33 AM CST  Subject: nasal congestion     Karli calls c/o nasal congestion and runny nose the past few days. She has been taking her allergy meds on a daily basis and it is not helping. She also c/o diarrhea and she says she \"knows she has an infection.\" Wondering if [facility] would rx a Zpack. No known exposure to COVID. I advised a phone vs video visit with [facility] and pt in agreement. Transferred to schedule.  "

## 2022-02-15 NOTE — CARE COORDINATION
Karli called wanting to know if [facility] sent in for Methenamine Hippurate yesterday. I confirmed that yes, he filled to [facility] yesterday (7/23).

## 2022-02-15 NOTE — LETTER
(Inserted Image. Unable to display)     January 21, 2019      MARGARITA KAISER  [address]  [place], [place] [id number]          Dear MARGARITA,      Thank you for selecting [facility] (previously [place] [place] [facility]) for your healthcare needs.      Our records indicate you are due for the following services:     Clinical Support Staff (CSS)-Only Blood Pressure Check ~ Please stop in anytime to have your blood pressure rechecked. This is a free service and no appointment necessary.     So we can best determine if your medications are effective in lowering your blood pressure, please make sure your blood pressure medicine has been in your system for at least 1-2 hours prior to coming in.  We encourage you to avoid caffeine or other stimulants prior to having your blood pressure checked and come at a time when you are not feeling rushed.     If you check your blood pressure at home, please bring in your blood pressure monitor and home blood pressure readings.  We will check your machine for accuracy and also share your home readings with your Healthcare Provider.       To schedule an appointment or if you have further questions, please contact your primary clinic:   [facility]       [phone]   [facility]       [phone]              [facility]     [phone]      Powered by [employer]    Sincerely,    Orestes Mendosa MD   Home

## 2022-02-15 NOTE — TELEPHONE ENCOUNTER
---------------------  From: Tati Will CMA   Sent: 8/17/2020 4:04:57 PM CDT  Subject: u/a results     Karli called wondering if her results for u/a have returned. It does not look like test was ordered urgent. I advised results should be back tomorrow. She will start taking abx and push fluids.

## 2022-02-15 NOTE — NURSING NOTE
Patient presented for [facility] COVID/UA testing per [facility].  O2 sat: 97%.  Specimen sent to [facility] as Priority #2.  Forms faxed to [facility].

## 2022-02-15 NOTE — TELEPHONE ENCOUNTER
"---------------------  From: Tim/Jenny DOUGHERTY (Phone Messages Pool ([address] [place] [address]))   To: [facility] Message Pool ([address] [place] [address]);     Sent: 4/14/2021 10:27:53 AM CDT  Subject: General Message     Phone Message    PCP: [id number]    Time of Call: 1022    Phone Number: [phone]    Returned call at: n/a    Note: Stormy @  [facility] calls stating that they need some verbal orders for Pt 2x/week x 2 weeks and 1x/week x 3 weeks. Also requesting a social work visit.    Stormy wanted [facility] to be aware that there is an alert that pops up stating there could be a severe reaction between Naltrex and Tizanidine. Also for Lexapro and Zofran.     Please advise---------------------  From: Freya Mark CMA ([facility] Message Pool ([facility]))   To: Orestes Mendosa MD;     Sent: 4/14/2021 10:36:04 AM CDT  Subject: FW: General Message---------------------  From: Orestes Mendosa MD   To: [facility] Message Pool ([id number]);     Sent: 4/14/2021 10:56:52 AM CDT  Subject: RE: General Message     notedtried calling x 2- call can't be completed, \"hang up and try again\"called again 4/16 and unable to complete callnot able to complete call  "

## 2022-02-15 NOTE — LETTER
(Inserted Image. Unable to display)                                                                                                                                                        March 17, 2021Re: MARGARITA KAISER1975  [facility]  [address] [place], [place] [address]Hj:   [facility]The following patient has been referred to your office/practice:  MARGARITA KAISER Appointment:  Appointment is PendingPlease refer to the attached  clinical documentation for a summary of MARGARITA's care.  Please do not hesitate to contact our office if any additional clinical questions arise.  All relevant records and transition of care documents should be mailed or faxed.Your assistance in providing continuity of care is appreciated. Sincerely, [address] [address](P) [phone](F) [phone]

## 2022-02-15 NOTE — TELEPHONE ENCOUNTER
---------------------  From: Sue Mack CMA (eRx Pool ([id number]_[facility]))   To: [facility] Message Pool ([id number]_[place]);     Sent: 1/4/2020 2:51:03 PM CST  Subject: FW: Medication Management   Due Date/Time: 1/5/2020 12:43:00 PM CST     Medication Refill needing approval    PCP:   SHANNON    Medication:   tizanidine  Last Filled:  12/2/19    Quantity:  90  Refills:  0  CSA on file?   no     Date of last office visit and reason:   8/17/19; bronchitis  Date of last labs pertaining to condition:  8/17/19    Return to Clinic order placed?  yes; due now. Pt has an appt scheduled with [facility] on 1/14/19        ------------------------------------------  From: [facility] #[id number]  To: Orestes Mendosa MD  Sent: January 4, 2020 12:43:38 PM CST  Subject: Medication Management  Due: January 5, 2020 12:43:38 PM CST    ** On Hold Pending Signature **  Drug: tiZANidine (tiZANidine 4 mg oral tablet)  TAKE 2 TO 3 TABLETS BY MOUTH EVERY NIGHT AT BEDTIME  Quantity: 90 tab(s)  Days Supply: 30  Refills: 0  Substitutions Allowed  Notes from Pharmacy:     Dispensed Drug: tiZANidine (tiZANidine 4 mg oral tablet)  TAKE 2 TO 3 TABLETS BY MOUTH EVERY NIGHT AT BEDTIME  Quantity: 90 tab(s)  Days Supply: 30  Refills: 0  Substitutions Allowed  Notes from Pharmacy:   ---------------------------------------------------------------  From: Tati Will CMA ([facility] Message Pool ([id number]_[facility]))   To: Orestes Mendosa MD;     Sent: 1/6/2020 8:31:56 AM CST  Subject: FW: Medication Management   Due Date/Time: 1/5/2020 12:43:00 PM CST---------------------  From: Orestes Mendosa MD   [id number]    Sent: 1/7/2020 7:00:20 AM CST  Subject: FW: Medication Management     ** Submitted: **  Complete:tiZANidine (tiZANidine 4 mg oral tablet)   Signed by Orestes Mendosa MD  1/7/2020 7:00:00 AM    ** Approved **  tiZANidine (TIZANIDINE 4MG TABLETS)  TAKE 2 TO 3 TABLETS BY MOUTH EVERY NIGHT AT BEDTIME  Qty:  90 tab(s)        Days Supply:  30         Refills:  0          Substitutions Allowed     Route To Pharmacy - [facility] #[id number]

## 2022-02-15 NOTE — PROGRESS NOTES
Chief Complaint    f/u sleep study.  History of Present Illness      Patient with MS and obesity here for follow-up of a polysomnogram done October 2019.  She has chronic drowsiness felt to be due to her MS.  Previous sleep study had showed mild to moderate obstructive sleep apnea.  She is lost 20 to 30 pounds with help of Saxenda diet since the previous sleep study.  Review of Systems      No fever or chills.  Chronic fatigue.  Physical Exam   Vitals & Measurements    T: 98.1   F (Tympanic)  HR: 93(Peripheral)  BP: 123/78     HT: 65 in  WT: 269.0 lb  BMI: 44.76       Patient is an obese young woman in no distress.  Alert and oriented.  Assessment/Plan       Morbid (severe) obesity due to excess calories (Probable) (E66.01)         Encouraged continued weight loss.         Ordered:          83777 office outpatient visit 15 minutes (Charge), Quantity: 1, Obstructive sleep apnea syndrome, moderate  Multiple sclerosis  Morbid (severe) obesity due to excess calories                Multiple sclerosis (G35)         Stable.  Likely the cause of her chronic fatigue.         Ordered:          43607 office outpatient visit 15 minutes (Charge), Quantity: 1, Obstructive sleep apnea syndrome, moderate  Multiple sclerosis  Morbid (severe) obesity due to excess calories                Obstructive sleep apnea syndrome, moderate (G47.33)         Sleep apnea appears to have resolved with weight loss.  Encouraged continued weight loss.         Ordered:          86665 office outpatient visit 15 minutes (Charge), Quantity: 1, Obstructive sleep apnea syndrome, moderate  Multiple sclerosis  Morbid (severe) obesity due to excess calories           Patient Information     Name:MARGARITA KAISER      Address:      [address] [place] [id number]     Sex:Female     YOB: 1975     [phone]     Emergency Contact:ELISA HOPE     MRN:[id number]     FIN:[id number]     Location:[facility]     Date of Service:2020      Primary Care Physician:       Deondre HART, Orestes, [phone]      Attending Physician:       Erwin Smiley MD, [phone]  Problem List/Past Medical History    Ongoing     Anemia in chronic illness     Ataxia     Blues     Chronic low back pain     DM (diabetes mellitus), type 2     Fatigue     Headache, migraine       Comments: With aura.     History of renal stone     Knee osteoarthritis     Morbid (severe) obesity due to excess calories     Multiple sclerosis     Muscle weakness     Neuralgia     Prediabetes     Seasonal allergies     Skin sensation disturbance     Urinary bladder neurogenic dysfunction     Vitamin D deficiency    Historical     Inpatient stay       Comments: @[facility], [place] - Left renal stone     Inpatient stay       Comments: @[facility], [place] - Complicated UTI     Inpatient stay       Comments: @[facility], [place] - Acute pneumonitis; likely related to recent Lemtrada introduction     Obstructive sleep apnea syndrome, moderate       Comments: On 18 AHI 20.2 with RDI 41.2, and oximetry avis 88%. Optimal CPAP titration to 8 cm water.     Pregnancy     Pregnancy     Pregnancy  Procedure/Surgical History     Port-a-cath in place - Right (2016)      Comments: Dx:  Multiple sclerosis..     cystoscopy left ureteroscopy with holmium laser left retrogrades stone extraction and left ureteral stent exchange (2016)     Ureteroscopy ()      Comments: with laser/stone extraction (inability to remove all of stone).     Appendectomy ()     Breast reduction ()     Tonsillectomy and adenoidectomy ()      section      Comments: x1.     Cholecystectomy     Tubal ligation  Medications    amLODIPine 5 mg oral tablet, 1 tab(s), Oral, daily    B-D PEN NDL MINI [id number](3/16)PRPL, See Instructions    B-D PEN NDL MINI [id number](3/16)PRPL, See Instructions    jaz contour next test strips, See  Instructions, 2 refills    BD pen needle mini [id number], See Instructions    benzonatate 100 mg oral capsule, 1 cap(s), Oral, tid, PRN    cholecalciferol 2000 intl units oral tablet, 2000 International Unit= 1 tab(s), Oral, daily    clonazePAM 0.5 mg oral tablet, 1 mg= 2 tab(s), Oral, hs    contour next microlet lancets, See Instructions, 2 refills    cyclobenzaprine 10 mg oral tablet, 10 mg= 1 tab(s), Oral, tid, PRN    ergocalciferol 50,000 intl units (1.25 mg) oral capsule, See Instructions    ergocalciferol 50,000 intl units (1.25 mg) oral capsule, 96045 International Unit= 1 cap(s), Oral, qweek,   Not taking    Excedrin, See Instructions    Lemtrada, See Instructions    lidocaine 5% topical ointment, See Instructions    meloxicam 15 mg oral tablet, 1 tab(s), Oral, daily    methenamine hippurate 1 g oral tablet, 1 gm= 1 tab(s), Oral, bid, 3 refills    ondansetron 4 mg oral tablet, 1 tab(s), Oral, q8 hrs, PRN    Replacement CPAP +8 cm H2o for use daily, See Instructions, 11 refills    rizatriptan 10 mg oral tablet, See Instructions    tiZANidine 4 mg oral tablet, 2 TO 3 TABLETS, Oral, qhs    valACYclovir 500 mg oral tablet, 500 mg= 1 tab(s), Oral, bid    Victoza 18 mg/3 mL subcutaneous solution, 1.8 mg, Subcutaneous, daily, 3 refills    Zithromax 250 mg oral tablet, 1 packet(s), Oral, once  Allergies    adhesive tape    sulfa drugs (Diarrhea, Vomiting....., Rash)  Social History    Smoking Status - 01/31/2020     Never smoker     Alcohol      Current, 1 TIME PER WEEK, 1 drinks/episode maximum., 12/14/2018     Employment/School      Disability, 02/28/2017     Exercise - Does not exercise, 01/13/2016     Home/Environment      2 children., 01/13/2016     Nutrition/Health      Caffeine intake amount: 2 sodas, every day., 07/06/2016     Sexual      Sexually active: No. Sexual orientation: Heterosexual., 01/13/2016     Substance Abuse - Current, 12/12/2018      vaping THC and cannabis oil, 12/12/2018     Tobacco       Never smoker, 01/13/2016  Family History    Anxiety: Daughter and Daughter.    Bipolar: Father.    CA - Cancer: Father.    CHF - Congestive heart failure: Father.    Depression: Father and Daughter.    Diabetes mellitus type 2: Father.    Diabetes mellitus type II: Father.    Gallbladder problem: Mother.    Hypothyroidism: Sister.    Kidney stone: Daughter.    Melanoma: Father.    Migraine: Mother, Sister and Daughter.    Miscellaneous: Mother and Brother.    Thyroid: Sister.  Immunizations      Vaccine Date Status          pneumococcal (PPSV23) 05/14/2012 Recorded          tetanus/diphth/pertuss (Tdap) adult/adol 07/16/2008 Recorded  Lab Results          Lab Results (Last 4 results within 90 days)           Sodium Level TR: 140 mEq/L (01/08/20 16:52:00)          Potassium Level TR: 3.9 mEq/L (01/08/20 16:52:00)          Chloride TR: 109 mEq/L (01/08/20 16:52:00)          CO2 TR: 23 mEq/L (01/08/20 16:52:00)          Anion Gap TR: 8 mEq/L (01/08/20 16:52:00)          Glucose Level TR: 148 mg/dL (01/08/20 16:52:00)          BUN TR: 15 mg/dL (01/08/20 16:52:00)          Creatinine TR: 0.68 mg/dL (01/08/20 16:52:00)          BUN/Creatinine Ratio TR: 22 (01/08/20 16:52:00)          Calcium TR: 9.1 mEq/dL (01/08/20 16:52:00)          Hgb A1c TR: 6.9 % (01/08/20 16:52:00)          Vitamin D 25-OH, Total TR: 89.1 ng/mL (01/08/20 16:52:00)          WBC TR: 9.3 x10^3/uL (01/08/20 16:52:00)          RBC TR: 4.31 x10^6/uL (01/08/20 16:52:00)          Hgb TR: 12.2 g/dL (01/08/20 16:52:00)          Hct TR: 36.6 % (01/08/20 16:52:00)          MCV TR: 85 fL (01/08/20 16:52:00)          MCH TR: 28.3 pg (01/08/20 16:52:00)          MCHC TR: 33.3 gm/dL (01/08/20 16:52:00)          RDW TR: 14.3 % (01/08/20 16:52:00)          Platelet TR: 300 x10^3/uL (01/08/20 16:52:00)          MPV (Mean Platelet Volume) TR: 10 fL (01/08/20 16:52:00)  Diagnostic Results   Polysomnogram October 8, 2019 reveals an AHI of 2.6 and oximetry avis of 80%  no significant PLM arousals.

## 2022-02-15 NOTE — TELEPHONE ENCOUNTER
---------------------  From: Tati Will CMA   To: Orestes Mendosa MD;     Sent: 8/20/2019 10:13:37 AM CDT  Subject: Cough/ED f/u     I followed up with Karli after her ED visit on 8/19 for weakness/cough.     She saw [facility] on 8/17 for bronchitis/exposure to pneumonia and was started on a Z-pack (chest x-ray done).    At her ED visit she was advised to d/c Z-pack as there was no indication for use. Not given anything for cough-was advised to f/u with her neurologist for her arm weakness.     She c/o ongoing cough/feeling awful. She coughed during our entire phone call. She has opted herself to cont. z-pack. Could we rx something to help her cough?---------------------  From: Orestes Mendosa MD   To: Tati Will CMA;     Sent: 8/20/2019 1:37:48 PM CDT  Subject: RE: Cough/ED f/u     I would not prescribe anything differently.  You can off throat lozenges, robitussin, OTC cold therapiesI spoke to BRM face to face and we discussed trialing Tessalon and he agreed to 100mg TID # 30 tabs. I LM for Karli letting her know this and to call if anything needed.

## 2022-02-15 NOTE — TELEPHONE ENCOUNTER
---------------------  From: Jenny Ballesteros (eRx Pool ([id number]_[facility]))   To: [facility] Message Pool ([id number]_[place]);     Sent: 11/10/2021 11:14:53 AM CST  Subject: FW: Medication Management   Due Date/Time: 11/10/2021 7:25:00 PM CST     Amlodipine LF 2/19/21 #90 R1  Meloxicam LF 3/10/21 #90 R1  Methenamine LF 2/19/21 #180 R1    LV 7/13/21 joint pain     Please advise on fill    ------------------------------------------  From: [facility]  To: Orestes Mendosa MD  Sent: November 8, 2021 7:25:44 PM CST  Subject: Medication Management  Due: October 21, 2021 8:18:04 PM CDT     ** On Hold Pending Signature **     Drug: meloxicam (meloxicam 15 mg oral tablet), TAKE 1 TABLET DAILY  Quantity: 90 tab(s)  Days Supply: 90  Refills: 1  Substitutions Allowed  Notes from Pharmacy:     Dispensed Drug: meloxicam (meloxicam 15 mg oral tablet), TAKE 1 TABLET DAILY  Quantity: 90 tab(s)  Days Supply: 90  Refills: 1  Substitutions Allowed  Notes from Pharmacy:     ** On Hold Pending Signature **     Drug: amLODIPine (amLODIPine 5 mg oral tablet), TAKE 1 TABLET DAILY  Quantity: 90 tab(s)  Days Supply: 90  Refills: 1  Substitutions Allowed  Notes from Pharmacy:     Dispensed Drug: amLODIPine (amLODIPine 5 mg oral tablet), TAKE 1 TABLET DAILY  Quantity: 90 tab(s)  Days Supply: 90  Refills: 1  Substitutions Allowed  Notes from Pharmacy:     ** On Hold Pending Signature **     Drug: metFORMIN (MetFORMIN (Eqv-Glucophage XR) 500 mg oral tablet, extended release), TAKE 2 TABLETS DAILY  Quantity: 180 tab(s)  Days Supply: 90  Refills: 3  Substitutions Allowed  Notes from Pharmacy:     Dispensed Drug: metFORMIN (MetFORMIN (Eqv-Glucophage XR) 500 mg oral tablet, extended release), TAKE 2 TABLETS DAILY  Quantity: 180 tab(s)  Days Supply: 90  Refills: 3  Substitutions Allowed  Notes from Pharmacy:     ** On Hold Pending Signature **     Drug: methenamine (methenamine hippurate 1 g oral tablet), TAKE 1 TABLET TWICE A  DAY  Quantity: 180 tab(s)  Days Supply: 90  Refills: 1  Substitutions Allowed  Notes from Pharmacy:     Dispensed Drug: methenamine (methenamine hippurate 1 g oral tablet), TAKE 1 TABLET TWICE A DAY  Quantity: 180 tab(s)  Days Supply: 90  Refills: 1  Substitutions Allowed  Notes from Pharmacy:  ---------------------------------------------------------------  From: Freya Mark CMA ([employer] Message Pool ([id number]_[facility]))   To: Orestes Mendosa MD;     Sent: 11/10/2021 12:34:02 PM CST  Subject: FW: Medication Management   Due Date/Time: 11/10/2021 7:25:00 PM CST     please advise when you want to see pt  please advise on meloxicam and methenamine---------------------  From: Orestes Mendosa MD   To:  Pharmacy    Sent: 11/10/2021 1:33:03 PM CST  Subject: FW: Medication Management     ** Submitted: **  Complete:meloxicam (meloxicam 15 mg oral tablet)   Signed by Orestes Mendosa MD  11/10/2021 7:33:00 PM UT    ** Submitted: **  Complete:methenamine (methenamine hippurate 1 g oral tablet)   Signed by Orestes Mendosa MD  11/10/2021 7:33:00 PM UT    ** Submitted: **  Complete:amLODIPine (amLODIPine 5 mg oral tablet)   Signed by Orestes Mendosa MD  11/10/2021 7:33:00 PM UT    ** Submitted: **  Complete:metFORMIN (metFORMIN 500 mg oral tablet, extended release)   Signed by Orestes Mendosa MD  11/10/2021 7:33:00 PM UT    ** Approved with modifications: **  meloxicam (MELOXICAM TAB 15MG)  TAKE 1 TABLET DAILY  Qty:  90 tab(s)        Days Supply:  90        Refills:  1          Substitutions Allowed     Route To Pharmacy -  Pharmacy       ** Approved with modifications: **  amLODIPine (AMLODIPINE TAB 5MG)  TAKE 1 TABLET DAILY  Qty:  90 tab(s)        Days Supply:  90        Refills:  1          Substitutions Allowed     Route To Pharmacy -  Pharmacy       ** Approved with modifications: **  metFORMIN (METFORMIN ER TAB 500MG GP)  TAKE 2 TABLETS DAILY  Qty:  180 tab(s)         Days Supply:  90        Refills:  3          Substitutions Allowed     Route To Pharmacy - [facility]       ** Approved with modifications: **  methenamine (METHENAM HIP TAB 1GM)  TAKE 1 TABLET TWICE A DAY  Qty:  180 tab(s)        Days Supply:  90        Refills:  1          Substitutions Allowed     Route To Pharmacy - [facility]

## 2022-02-15 NOTE — PROGRESS NOTES
Paged 1754 and called back 1755. She received call from Neurologist that her UA had bacteria, RBC's and WBC's. She has been having some symptoms signalling a possible infection with urinary frequency and weakness. She is going out of town [facility]. Will send in Macrobid. Needs to see Dr Mendosa next week for urine culture and plan to discuss monthly UA's being done by Neurologist.

## 2022-02-15 NOTE — TELEPHONE ENCOUNTER
---------------------  From: Sue Mack CMA (eRx Pool ([id number]_[facility]))   To: [facility] Message Pool ([id number]_[place]);     Sent: 1/27/2020 7:57:47 AM CST  Subject: FW: Medication Management   Due Date/Time: 1/28/2020 1:24:00 AM CST     Medication Refill needing approval    PCP:   [facility]    Medication:   meloxicam  Last Filled: 10/30/19    Quantity:  90  Refills:  0  CSA on file?   no     Date of last office visit and reason:   8/17/19; bronchitis  Date of last labs pertaining to condition:  1/8/20    Note:  Please advise refill.    Return to Clinic order placed?  yes      ------------------------------------------  From: [facility] #[id number]  To: Emily Lan MD  Sent: January 27, 2020 1:24:56 AM CST  Subject: Medication Management  Due: January 28, 2020 1:24:56 AM CST    ** On Hold Pending Signature **  Drug: meloxicam (meloxicam 15 mg oral tablet)  TAKE 1 TABLET BY MOUTH DAILY  Quantity: 90 tab(s)  Days Supply: 30  Refills: 0  Substitutions Allowed  Notes from Pharmacy:     Dispensed Drug: meloxicam (meloxicam 15 mg oral tablet)  TAKE 1 TABLET BY MOUTH DAILY  Quantity: 90 tab(s)  Days Supply: 30  Refills: 0  Substitutions Allowed  Notes from Pharmacy:   ------------------------------------------She is overdue for a visit and has been notified multiple times.---------------------  From: Tati Will CMA ([facility] Message Pool ([id number]_[facility]))   To: Orestes Mendosa MD;     Sent: 1/27/2020 3:32:25 PM CST  Subject: FW: Medication Management   Due Date/Time: 1/28/2020 1:24:00 AM CST---------------------  From: Orestes Mendosa MD   To: [facility] #[id number]    Sent: 1/28/2020 8:21:42 AM CST  Subject: FW: Medication Management     ** Submitted: **  Complete:meloxicam (meloxicam 15 mg oral tablet)   Signed by Orestes Mendosa MD  1/28/2020 8:21:00 AM    ** Approved **  meloxicam (MELOXICAM 15MG TABLETS)  TAKE 1 TABLET BY MOUTH DAILY  Qty:  90 tab(s)        Days Supply:  30        Refills:  0           Substitutions Allowed     Route To Pharmacy - [facility] #[id number]   Signed by Orestes Mendosa MD

## 2022-02-15 NOTE — LETTER
(Inserted Image. Unable to display)   October 12, 2020      MARGARITA KAISER  [address]  [place], [place] [id number]        Dear MARGARITA,     Thank you for selecting [facility] ([facility]viously [facility] for your healthcare needs. Below you will find the results of your recent test(s) done at our clinic.       Labs for your review.  We can discuss in more detail at future clinic visit.       Result Name Current Result Previous Result Reference Range   Hgb A1c ((H)) 7.0 10/9/2020   - <5.7   Cholesterol (mg/dL)  168 10/9/2020   - <200   Non-HDL Cholesterol  116 10/9/2020   - <130   HDL (mg/dL)  52 10/9/2020  > OR = 50 -    Cholesterol/HDL Ratio  3.2 10/9/2020   - <5.0   LDL  92 10/9/2020     Triglyceride (mg/dL)  143 10/9/2020   - <150   UA pH  7.0 10/9/2020  5.5 8/17/2020 5.0 - 8.0   UA Specific [place]  1.020 10/9/2020  1.020 8/17/2020 1.001 - 1.035   UA Glucose  NEGATIVE 10/9/2020 ((A)) 1+ 8/17/2020 NEGATIVE - NEGATIVE   UA Bilirubin  NEGATIVE 10/9/2020  NEGATIVE 8/17/2020 NEGATIVE - NEGATIVE   UA Ketones  NEGATIVE 10/9/2020  NEGATIVE 8/17/2020 NEGATIVE - NEGATIVE   Urine Occult Blood  NEGATIVE 10/9/2020  NEGATIVE 8/17/2019 NEGATIVE - NEGATIVE   UA Protein  NEGATIVE 10/9/2020  NEGATIVE 8/17/2020 NEGATIVE - NEGATIVE   UA Nitrite  NEGATIVE 10/9/2020  NEGATIVE 8/17/2020 NEGATIVE - NEGATIVE   UA Leukocyte Esterase  NEGATIVE 10/9/2020  NEGATIVE 8/17/2020 NEGATIVE - NEGATIVE       Please contact me or my assistant at [phone] if you have any questions or concerns.     Sincerely,        Orestes Mendosa MD    What do your labs mean?  Below is a glossary of commonly ordered labs:  LDL - Bad Cholesterol  HDL - Good Cholesterol  AST/ALT - Liver Function  Cr/Creatinine - Kidney Function  Microalbumin - Kidney Function  BUN - Kidney Function  PSA - Prostate   TSH - Thyroid Hormone  HgbA1c - Diabetes Test  Hgb (Hemoglobin) - Red Blood Cells

## 2022-02-15 NOTE — PROGRESS NOTES
Patient:   KARLI KAISER            MRN: [id number]            FIN: [id number]               Age:   43 years     Sex:  Female     :  1975   Associated Diagnoses:   Multiple sclerosis; Prediabetes; Urinary bladder neurogenic dysfunction; Migraine; Morbid obesity   Author:   Orestes Mendosa MD      Visit Information      Date of Service: 2019 07:51 am  Performing Location: [facility]  Encounter#: [id number]      Primary Care Provider (PCP):  Orestes Mendosa MD    NPI# [id number]      Referring Provider:  Orestes Mendosa MD# [id number]      Chief Complaint   2019 8:01 AM CDT    would like to go back on Adderall - has been off x 2yrs- family counseling and counselor jarvis she would benefit going back on med              Additional Information:No additional information recorded during visit.   Chief complaint and symptoms as noted above and confirmed with patient.  Recent lab and diagnostic studies reviewed with patient      History of Present Illness   3/8/2016: Karli presents to clinic for ER follow-up after presentations on both Thursday and Friday this past week for urinary incontinence and suspected pyelonephritis.  She requested for Srivastava catheter to be placed this past week because of persistent incontinence.  She is scheduled to meet with the urologist on the  of this month.  She was originally prescribed Cipro which was later changed to Macrobid after discovery of extended spectrum beta-lactamase resistant E. coli bacteria.  She was started on Macrobid on .  Overall continues to have severe left-sided flank pain.  She is very distressed about her underlying multiple sclerosis and her ability to care for her daughter.  She does follow with a neurologist through [facility].  She previously was following with an internist through [place] and now wishes to localize her cares here.    10/26/2018: Presents to clinic for follow-up from emergency room visit this  past week.  She was seen with upper respiratory and sinus complaints.  Was given azithromycin at that time which has helped with considerable improvement in sinusitis features.  Also had a urinalysis done at that time for lower urinary tract symptoms.  Culture ultimately growing a Citrobacter species which was pansensitive to antibiotics.  Had not heard from the emergency room and culture follow-up.  Describes having subjective fever and chills.  Also describes a left-sided flank pain with continued lower urinary tract symptoms.  She is currently in remission from her MS.  No active new lesions regarding MRI surveillance.  Maintained on Lemtrada which she last received in March of this year.  No infectious complications since I've seen her last in March.    12/12/2018: Karli returns for follow-up.  Since her last visit she has introduced use recreational cannabis and CBD oil to help with her chronic anxieties, fatigue and her MS symptoms.  She has discontinued most of her other medications including all of her psychotropic medications.  Now questions whether she needs to resume on some of her medications including her amlodipine and her vitamin D replacement.  She continues to work through [place] [facility] for management of her MS and intends to continue with her ongoing Lemtrada therapy.  Also here to review disability policy related to her MS.    7/23/2019: Karli presents for follow-up.  Struggles at times with balance and ambulation primarily with right-sided leg weakness.  Does have occasional falls.  Otherwise doing relatively well with her multiple sclerosis.  Anticipates having follow-up MRI imaging in August.  She is nearly 18 months out from Lemtrada therapy introduction.  Brings up question specifically about ADHD concerns.  Previously had been prescribed neuro stimulants through her psychiatrist, Dr. Blunt though has not taken any Adderall and some time.  She originally remembers  undergoing a battery of neuropsychological testing.  Does see a counselor currently.  Her daughter has been though diagnosed with ADHD and is currently on neuro stimulant.         Review of Systems   Constitutional:  Weakness, No fever, No chills.    Eye:  Negative except as documented in history of present illness.    Ear/Nose/Mouth/Throat:  No nasal congestion.    Respiratory:  No shortness of breath.    Cardiovascular:  No chest pain, No palpitations, No peripheral edema, No syncope.    Gastrointestinal:  No nausea, No vomiting, No constipation.    Genitourinary:  No dysuria, No hematuria, No change in urine stream.    Hematology/Lymphatics:  Negative except as documented in history of present illness.    Endocrine:  Polyuria, No excessive thirst.    Immunologic:  No recurrent fevers.    Musculoskeletal:  No joint pain, No muscle pain.    Neurologic:  Alert and oriented X4, Abnormal balance, No confusion, No numbness, No tingling, No headache.    Psychiatric:  Anxiety, Depression.       Health Status   Allergies:    Allergic Reactions (Selected)  Severity Not Documented  Adhesive tape (No reactions were documented)  Sulfa drugs (Rash, vomiting..... and diarrhea)   Medications:  (Selected)   Prescriptions  Prescribed  B-D PEN NDL MINI 96AW9EP(3/16)PRPL: See Instructions, Instructions: USE DAILY WITH SAXENDA AS DIRECTED, # 100 unknown unit, Type: Soft Stop, Pharmacy: [facility] [id number], USE DAILY WITH SAXENDA AS DIRECTED  B-D PEN NDL MINI [id number](3/16)PRPL: See Instructions, Instructions: USE DAILY WITH SAXENDA AS DIRECTED, # 100 unknown unit, Type: Soft Stop, Pharmacy: [facility] [id number], USE DAILY WITH SAXENDA AS DIRECTED  Saxenda 18 mg/3 mL subcutaneous solution: ( 3 mg ), subcutaneous, daily, Instructions: take 3mg injection q am (goal dose), # 15 mL, 6 Refill(s), Type: Maintenance, Pharmacy: [facility] [id number], 3 mg Subcutaneous daily,Instr:take 3mg injection q am (goal  dose)  amLODIPine 5 mg oral tablet: = 1 tab(s), Oral, daily, # 90 tab(s), 0 Refill(s), Type: Maintenance, Pharmacy: [facility] [id number]  ergocalciferol 50,000 intl units (1.25 mg) oral capsule: 1 cap(s) ( 50,000 International Unit ), Oral, qweek, # 5 cap(s), 0 Refill(s), Type: Maintenance, Pharmacy: [facility] [id number]  ergocalciferol 50,000 intl units (1.25 mg) oral capsule: See Instructions, Instructions: TAKE 1 CAPSULE BY MOUTH EVERY WEEK, # 5 cap(s), Type: Soft Stop, Pharmacy: [facility] [id number], TAKE 1 CAPSULE BY MOUTH EVERY WEEK  lidocaine 5% topical ointment: See Instructions, Instructions: APPLY EXTERNALLY TO THE AFFECTED AREA THREE TIMES DAILY AS NEEDED FOR PAIN, # 30 gm, Type: Soft Stop, Pharmacy: [facility] [id number]  meloxicam 15 mg oral tablet: See Instructions, Instructions: TAKE 1 TABLET BY MOUTH DAILY, # 90 tab(s), Type: Soft Stop, Pharmacy: [facility] [id number]  methenamine hippurate 1 g oral tablet: = 1 tab(s) ( 1 gm ), Oral, bid, # 180 tab(s), 3 Refill(s), Type: Maintenance, Pharmacy: [facility] [address], 1 tab(s) Oral bid,x90 day(s)  modafinil 200 mg oral tablet: See Instructions, Instructions: TAKE 1 TABLET BY MOUTH TWICE DAILY, # 60 tab(s), Type: Soft Stop, Pharmacy: [facility] [id number], TAKE 1 TABLET BY MOUTH TWICE DAILY  ondansetron 4 mg oral tablet: = 1 tab(s) ( 4 mg ), PO, q8 hrs, PRN: for nausea and vomiting, # 30 tab(s), 3 Refill(s), Type: Maintenance, Pharmacy: [facility] [id number], 1 tab(s) Oral q8 hrs,PRN:for nausea and vomiting  rizatriptan 10 mg oral tablet: See Instructions, Instructions: TAKE 1 TABLET BY MOUTH ONCE AS NEEDED FOR MIGRAINE HEADACHE, # 6 tab(s), Type: Soft Stop, Pharmacy: [facility] [id number]  tiZANidine 4 mg oral tablet: 2 TO 3 TABLETS, Oral, qhs, # 90 tab(s), Type: Soft Stop, Pharmacy: [facility] [id number]  Documented Medications  Documented  Adderall: ( 45 mg ), po, bid, 0 Refill(s), Type:  Maintenance  Excedrin: See Instructions, 0 Refill(s), Type: Maintenance  Lemtrada: See Instructions, Instructions: 1x/yr, 0 Refill(s), Type: Maintenance  cholecalciferol 2000 intl units oral tablet: 1 tab(s) ( 2,000 International Unit ), po, daily, 0 Refill(s), Type: Maintenance  clonazePAM 0.5 mg oral tablet: = 2 tab(s) ( 1 mg ), Oral, hs, 0 Refill(s), Type: Maintenance  cyclobenzaprine 10 mg oral tablet: = 1 tab(s) ( 10 mg ), Oral, tid, PRN: for spasm, # 30 tab(s), 0 Refill(s), Type: Maintenance  valACYclovir 500 mg oral tablet: 1 tab(s) ( 500 mg ), po, bid, 0 Refill(s), Type: Maintenance,    Medications          *denotes recorded medication          B-D PEN NDL MINI [id number](3/16)PRPL: See Instructions, USE DAILY WITH SAXENDA AS DIRECTED, 100 unknown unit.          B-D PEN NDL MINI [id number](3/16)PRPL: See Instructions, USE DAILY WITH SAXENDA AS DIRECTED, 100 unknown unit.          *Excedrin: See Instructions, 0 Refill(s).          *Lemtrada: See Instructions, 1x/yr, 0 Refill(s).          amLODIPine 5 mg oral tablet: 1 tab(s), Oral, daily, 90 tab(s), 0 Refill(s).          *Adderall: 45 mg, po, bid, 0 Refill(s).          *cholecalciferol 2000 intl units oral tablet: 2,000 International Unit, 1 tab(s), po, daily, 0 Refill(s).          *clonazePAM 0.5 mg oral tablet: 1 mg, 2 tab(s), Oral, hs, 0 Refill(s).          *cyclobenzaprine 10 mg oral tablet: 10 mg, 1 tab(s), Oral, tid, PRN: for spasm, 30 tab(s), 0 Refill(s).          ergocalciferol 50,000 intl units (1.25 mg) oral capsule: 50,000 International Unit, 1 cap(s), Oral, qweek, for 30 day(s), 5 cap(s), 0 Refill(s).          ergocalciferol 50,000 intl units (1.25 mg) oral capsule: See Instructions, TAKE 1 CAPSULE BY MOUTH EVERY WEEK, 5 cap(s).          lidocaine 5% topical ointment: See Instructions, APPLY EXTERNALLY TO THE AFFECTED AREA THREE TIMES DAILY AS NEEDED FOR PAIN, 30 gm.          Saxenda 18 mg/3 mL subcutaneous solution: 3 mg, subcutaneous, daily, take 3mg  injection q am (goal dose), 15 mL, 6 Refill(s).          meloxicam 15 mg oral tablet: See Instructions, TAKE 1 TABLET BY MOUTH DAILY, 90 tab(s).          methenamine hippurate 1 g oral tablet: 1 gm, 1 tab(s), Oral, bid, for 90 day(s), 180 tab(s), 3 Refill(s).          modafinil 200 mg oral tablet: See Instructions, TAKE 1 TABLET BY MOUTH TWICE DAILY, 60 tab(s).          ondansetron 4 mg oral tablet: 4 mg, 1 tab(s), PO, q8 hrs, PRN: for nausea and vomiting, 30 tab(s), 3 Refill(s).          rizatriptan 10 mg oral tablet: See Instructions, TAKE 1 TABLET BY MOUTH ONCE AS NEEDED FOR MIGRAINE HEADACHE, 6 tab(s).          tiZANidine 4 mg oral tablet: 2 TO 3 TABLETS, Oral, qhs, 90 tab(s).          *valACYclovir 500 mg oral tablet: 500 mg, 1 tab(s), po, bid, 0 Refill(s).     Problem list:    All Problems  Anemia in chronic illness / SNOMED CT 397057568 / Confirmed  Ataxia / SNOMED CT 80727635 / Confirmed  Chronic low back pain / SNOMED CT 940052645 / Confirmed  Blues / SNOMED CT 101072097 / Confirmed  Fatigue / SNOMED CT 080148740 / Confirmed  History of renal stone / SNOMED CT 2691658658 / Confirmed  Headache, migraine / SNOMED CT 61933140 / Confirmed  Morbid (severe) obesity due to excess calories / SNOMED CT 453947757 / Probable  Multiple sclerosis / SNOMED CT 44513993 / Confirmed  Muscle weakness / SNOMED CT 10909811 / Confirmed  Neuralgia / SNOMED CT 68746016 / Confirmed  Urinary bladder neurogenic dysfunction / SNOMED CT 8681858010 / Confirmed  Knee osteoarthritis / SNOMED CT 993958192 / Confirmed  Prediabetes / SNOMED CT 9993683433 / Confirmed  Skin sensation disturbance / SNOMED CT 331227217 / Confirmed  Seasonal allergies / SNOMED CT 104318599 / Confirmed  Vitamin D deficiency / SNOMED CT 73768779 / Confirmed  Resolved: Inpatient stay / SNOMED CT 397684638  Resolved: Inpatient stay / SNOMED CT 358484492  Resolved: Inpatient stay / SNOMED CT 650894003  Resolved: Pregnancy  Resolved: Pregnancy / SNOMED CT  153798321  Resolved: Pregnancy / SNOMED CT 465912377  Canceled: Headache / SNOMED CT 09209325      Histories   Past Medical History:    Active  Multiple sclerosis (30330343): Onset in 2012 at 37 years.  Blues (884742153)  History of renal stone (3322598464)  Urinary bladder neurogenic dysfunction (0030091106)  Fatigue (253120225)  Muscle weakness (77614512)  Vitamin D deficiency (93810225)  Ataxia (37239316)  Headache, migraine (34379505)  Comments:  7/6/2016 CDT 11:19 AM CHARLEET - Katie Hatfield  With aura.  Skin sensation disturbance (641924189)  Seasonal allergies (748693154)  Resolved  Inpatient stay (656051954): Onset on 2/23/2018 at 42 years.  Resolved on 2/25/2018 at 42 years.  Comments:  2/27/2018 CST 7:59 AM La Serrano  @[facility] [place], [place] - Acute pneumonitis; likely related to recent Lemtrada introduction  Inpatient stay (546474407): Onset on 6/14/2016 at 40 years.  Resolved on 6/18/2016 at 40 years.  Comments:  2/27/2018 CST 7:58 AM La Serrano  @[facility] [place], [place] - Complicated UTI  Inpatient stay (171497281): Onset in the month of 5/2016 at 40 years  Resolved.  Comments:  2/27/2018 CST 7:57 AM La Serrano  @[facility], [place] - Left renal stone  Pregnancy:  Resolved.  Pregnancy (646336406):  Resolved in 2006 at 30 years.  Pregnancy (914208344):  Resolved in 1994 at 18 years.   Family History:    Melanoma  Father (Shakir)  Thyroid  Sister (Ana Cristina)  Kidney stone  Daughter (Zac)  Diabetes mellitus type II  Father (Shakir)  Hypothyroidism  Sister (Ana Cristina)  Diabetes mellitus type 2  Father (Shakir)  CA - Cancer  Father (Shakir)  Comments:  4/5/2017 11:45 AM KYLE - Katie Hatfield  skin  CHF - Congestive heart failure  Father (Shakir)  Migraine  Sister (Ana Cristina)  Daughter (Leda)  Mother (Soledad)  Depression  Daughter (Zac)  Father (Shakir)  Gallbladder problem  Mother (Soledad)  Bipolar  Father (Shakir)  Miscellaneous  Brother (Adrian)  Comments:  4/5/2017 11:43 AM CDT -  Katie Hatfield  spinal stenosis  Mother (Soledad)  Comments:  2017 11:43 AM CDT - Katie Hatfield  spinal stenosis  Anxiety  Daughter (Leda)  Daughter (Zac)     Procedure history:    Port-a-cath in place - Right (86972469) on 2016 at 41 Years.  Comments:  2016 8:01 AM CDT - Ceci Engle  Dx:  Multiple sclerosis.  cystoscopy left ureteroscopy with holmium laser left retrogrades stone extraction and left ureteral stent exchange on 6/3/2016 at 40 Years.  Ureteroscopy (6328865341) in the month of 2016 at 40 Years.  Comments:  2016 4:56 PM CDT - La Garcia  with laser/stone extraction (inability to remove all of stone)  Appendectomy (187757618) in  at 39 Years.  Breast reduction (463148206) in  at 26 Years.  Tonsillectomy and adenoidectomy (811073207) in  at 14 Years.  Cholecystectomy (24959710).   section (63161495).  Comments:  2016 11:26 AM CDT - Leelee Mark CMAe  x1  Tubal ligation (548540324).   Social History:        Alcohol Assessment            Current, 1 TIME PER WEEK, 1 drinks/episode maximum.      Tobacco Assessment            Never smoker      Substance Abuse Assessment: Current            vaping THC and cannabis oil      Employment and Education Assessment            Disability      Home and Environment Assessment            2 children.      Nutrition and Health Assessment            Caffeine intake amount: 2 sodas, every day.      Exercise and Physical Activity Assessment: Does not exercise      Sexual Assessment            Sexually active: No.  Sexual orientation: Heterosexual.      Physical Examination   vital signs stable, as noted above   Vital Signs   2019 8:01 AM CDT Temperature Tympanic 98.3 DegF    Peripheral Pulse Rate 64 bpm    Systolic Blood Pressure 110 mmHg    Diastolic Blood Pressure 72 mmHg    Mean Arterial Pressure 85 mmHg    BP Site Right arm      Measurements from flowsheet : Measurements   2019 8:01 AM CDT    Weight Measured  - Standard                267 lb     General:  Alert and oriented, No acute distress.    HENT:  Normocephalic, Tympanic membranes are clear, No pharyngeal erythema.    Neck:  Supple.    Respiratory:  Lungs are clear to auscultation, Respirations are non-labored.    Cardiovascular:  Normal rate, Regular rhythm, No murmur, No edema.    Gastrointestinal:  Soft, Non-tender, Non-distended.    Genitourinary:  +CVA tenderness; left sided.    Musculoskeletal:  Infusaport in place; not engaged.    Integumentary:  No rash.    Neurologic:  Alert, Oriented, right sided lower extremity weakness, 4/5.    Cognition and Speech:  Oriented, Speech clear and coherent.    Psychiatric:  Appropriate mood & affect.       Review / Management   Results review      Impression and Plan   Diagnosis     Multiple sclerosis (KNJ50-HG G35).     Prediabetes (IRB39-OW R73.09).     Urinary bladder neurogenic dysfunction (SWB01-TC N31.9).     Migraine (NBW86-AI G43.019).     Morbid obesity (STE04-VO E66.01).         .) multiple sclerosis - progressively worsening - currently in remission  - started on Lemtrada (alemtuzumab) in spring, 2018; good clinical response and tolerance - associated with sustained T-cell immunosuppression  - follows with Dr. Webb, her neurologist  - started on recreational cannabis/CBD oil in October, 2018 at stopped most of her medications (Cymbalta, Adderall, clonazepam, modafanil)   - generally poor response; since stopped cannabis    .) ADHD - historically maintained on Adderall through psychiatrist - no longer seeing   - had been on high dose 45mg BID   - advised she repeat formal ADHD neuro-cognitive testing; open to resuming Adderall if diagnosis supported, though would aim for lower dosing given age    .) obesity, morbid; current BMI 47 - substantial weight gains since beginning therapies for multiple sclerosis   - previous referral to Oanh Mane to discuss medication options   - discussed weight loss management  options including lifestyle, medication options, and potential bariatric surgery   -  has seen significant weight loss with Saxenda therapy; tolerates well - okay to continue    .) vitamin D deficiency   - currently on high dose, weekly replacement    .) hypertension   - advised to resume her amlodipine   - CCM/bp clinic referral    .) health maintenance   - prediabetes    - enrolled in CCM program      RTC in 4 months

## 2022-02-15 NOTE — TELEPHONE ENCOUNTER
"---------------------  From: Dimas Dougherty CMA ([facility] Message Pool ([id number]_[facility]))   To: [facility] Message Pool ([id number]_[place] [facility] [place]);     Sent: 3/3/2021 1:57:42 PM CST  Subject: General Message     Phone Message    PCP:   SHANNON      Time of Call:  1330       Person Calling:  self  Phone number:  [phone]    Reason for call:  referral   Returned call at: _    Note:   Pt called clinic stating she needed a referral to Psychiatry and NOT with a Therapist.  Pt states she \"already has a therapist\"  Please see 3/3 referral encounter from earlier today.  Please advise.  Dimas Dougherty CMa    Last office visit and reason:  2/16/21 [facility]    Transferred to: [facility] message pool---------------------  From: Freya Mark CMA ([facility] Message Pool ([id number]_[facility]))   To: Referral Coordinators Pool (32224_[facility]);     Sent: 3/8/2021 10:22:07 AM CST  Subject: FW: General Message     are you able to assist?  please let me know what I need for referral---------------------  From: Disha Macdonald (Referral Coordinators Pool ([id number]_[facility]))   To: [facility] Message Pool ([id number]_[place] [facility] [place]);     Sent: 3/8/2021 11:25:25 AM CST  Subject: RE: General Message     We were told they meet with Dai mariscal and she assess and then gets patient set up with psychiatry, no one in our area for psychiatry takes Medicare.discussed with Karli, she expressed understanding.  She will discuss with her Therapist who she is seeing tomorrow  "

## 2022-02-15 NOTE — NURSING NOTE
Comprehensive Intake Entered On:  4/7/2021 9:12 AM CDT    Performed On:  4/7/2021 9:02 AM CDT by Rosa Liang               Summary   Chief Complaint :   Pt c/o L knee barely able to put weight on it. She feels weak. 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 She also tells me she has a cough. She thinks she has an infection in her body.    Menstrual Status :   Menarcheal   Height Measured :   65 in(Converted to: 5 ft 5 in, 165.10 cm)    Weight Estimated :   292 lb(Converted to: 292 lb 0 oz, 132 kg)    Height/Length Estimated :   65 in(Converted to: 5 ft 5 in, 165.10 cm)    Ht/Wt Measurement Refused by Patient? :   Yes   Body Mass Index Estimated :   48.59 kg/m2   BSA Estimated :   2.46 m2   Systolic Blood Pressure :   124 mmHg   Diastolic Blood Pressure :   74 mmHg   Mean Arterial Pressure :   91 mmHg   Peripheral Pulse Rate :   97 bpm   BP Site :   Right arm   BP Method :   Manual   Temperature Tympanic :   98.4 DegF(Converted to: 36.9 DegC)    Respiratory Rate :   16 br/min   Oxygen Saturation :   96 %   Rosa Liang - 4/7/2021 9:02 AM CDT   Health Status   Allergies Verified? :   Yes   Medication History Verified? :   Yes   Tobacco Use? :   Never smoker   Rosa Liang - 4/7/2021 9:02 AM CDT   Consents   Consent for Immunization Exchange :   Consent Granted   Consent for Immunizations to Providers :   Consent Granted   Rosa Liang - 4/7/2021 9:02 AM CDT   Meds / Allergies   (As Of: 4/7/2021 9:12:25 AM CDT)   Allergies (Active)   adhesive tape  Estimated Onset Date:   Unspecified ; Created By:   Katie Hatfield; Reaction Status:   Active ; Category:   Drug ; Substance:   adhesive tape ; Type:   Allergy ; Updated By:   Katie Hatfield; Reviewed Date:   4/7/2021 9:10 AM CDT      sulfa drugs  Estimated Onset Date:   Unspecified ; Reactions:   Rash, Vomiting....., Diarrhea ; Created By:   Michelle Pedersen CMA; Reaction Status:   Active ; Category:   Drug ;  Substance:   sulfa drugs ; Type:   Allergy ; Updated By:   Michelle Pedersen CMA; Reviewed Date:   4/7/2021 9:10 AM CDT        Medication List   (As Of: 4/7/2021 9:12:25 AM CDT)   Prescription/Discharge Order    meloxicam  :   meloxicam ; Status:   Prescribed ; Ordered As Mnemonic:   meloxicam 15 mg oral tablet ; Simple Display Line:   1 tab(s), Oral, daily, 90 tab(s), 1 Refill(s) ; Ordering Provider:   Orestes Mendosa MD; Catalog Code:   meloxicam ; Order Dt/Tm:   3/10/2021 8:33:10 AM CST          amLODIPine  :   amLODIPine ; Status:   Prescribed ; Ordered As Mnemonic:   amLODIPine 5 mg oral tablet ; Simple Display Line:   1 tab(s), Oral, daily, 90 tab(s), 1 Refill(s) ; Ordering Provider:   Orestes Mendosa MD; Catalog Code:   amLODIPine ; Order Dt/Tm:   2/19/2021 8:52:29 AM CST          methenamine  :   methenamine ; Status:   Prescribed ; Ordered As Mnemonic:   methenamine hippurate 1 g oral tablet ; Simple Display Line:   1 tab(s), Oral, bid, 180 tab(s), 1 Refill(s) ; Ordering Provider:   Orestes Mendosa MD; Catalog Code:   methenamine ; Order Dt/Tm:   2/19/2021 8:52:30 AM CST          clonazePAM  :   clonazePAM ; Status:   Prescribed ; Ordered As Mnemonic:   clonazePAM 1 mg oral tablet ; Simple Display Line:   1 mg, 1 tab(s), Oral, q8 hrs, PRN: for anxiety, 24 tab(s), 0 Refill(s) ; Ordering Provider:   Orestes Mendosa MD; Catalog Code:   clonazePAM ; Order Dt/Tm:   2/16/2021 2:04:30 PM CST ; Comment:   Responsible Provider: OPAL THORNTON          metFORMIN  :   metFORMIN ; Status:   Prescribed ; Ordered As Mnemonic:   metFORMIN 500 mg oral tablet, extended release ; Simple Display Line:   1,000 mg, 2 tab(s), Oral, daily, 180 tab(s), 3 Refill(s) ; Ordering Provider:   Orestes Mendosa MD; Catalog Code:   metFORMIN ; Order Dt/Tm:   1/26/2021 1:35:52 PM CST          liraglutide  :   liraglutide ; Status:   Prescribed ; Ordered As Mnemonic:   Victoza 18 mg/3 mL subcutaneous solution ; Simple Display Line:   1.8 mg, Subcutaneous,  daily, take in am, 27 mL, 3 Refill(s) ; Ordering Provider:   Orestes Mendosa MD; Catalog Code:   liraglutide ; Order Dt/Tm:   2/5/2020 3:50:31 PM CST          tiZANidine 4 mg oral tablet  :   tiZANidine 4 mg oral tablet ; Status:   Prescribed ; Ordered As Mnemonic:   tiZANidine 4 mg oral tablet ; Simple Display Line:   See Instructions, TAKE 2 TO 3 TABLETS BY MOUTH EVERY NIGHT AT BEDTIME, 90 tab(s) ; Ordering Provider:   Orestes Mendosa MD; Catalog Code:   tiZANidine ; Order Dt/Tm:   2/5/2020 10:24:40 AM CST          Miscellaneous Rx Supply  :   Miscellaneous Rx Supply ; Status:   Prescribed ; Ordered As Mnemonic:   Accu-check Guide Test Strips ; Simple Display Line:   See Instructions, Testing 2x daily, 200 EA, 11 Refill(s) ; Ordering Provider:   Orestes Mendosa MD; Catalog Code:   Miscellaneous Rx Supply ; Order Dt/Tm:   1/31/2020 3:06:14 PM CST          Miscellaneous Rx Supply  :   Miscellaneous Rx Supply ; Status:   Prescribed ; Ordered As Mnemonic:   Accu-check guide meter ; Simple Display Line:   See Instructions, test 2x daily, 1 EA, 0 Refill(s) ; Ordering Provider:   Orestes Mendosa MD; Catalog Code:   Miscellaneous Rx Supply ; Order Dt/Tm:   1/31/2020 3:05:08 PM CST          Miscellaneous Rx Supply  :   Miscellaneous Rx Supply ; Status:   Prescribed ; Ordered As Mnemonic:   contour next microlet lancets ; Simple Display Line:   See Instructions, testing 2x/ day, 1 box(es), 2 Refill(s) ; Ordering Provider:   Orestes Mendosa MD; Catalog Code:   Miscellaneous Rx Supply ; Order Dt/Tm:   1/27/2020 4:36:08 PM CST          tiZANidine 4 mg oral tablet  :   tiZANidine 4 mg oral tablet ; Status:   Prescribed ; Ordered As Mnemonic:   tiZANidine 4 mg oral tablet ; Simple Display Line:   2 TO 3 TABLETS, Oral, qhs, 90 tab(s) ; Ordering Provider:   Orestes Mendosa MD; Catalog Code:   tiZANidine ; Order Dt/Tm:   1/7/2020 7:00:19 AM CST          lidocaine 5% topical ointment  :   lidocaine 5% topical ointment ; Status:   Prescribed ; Ordered As  Mnemonic:   lidocaine 5% topical ointment ; Simple Display Line:   See Instructions, APPLY EXTERNALLY TO THE AFFECTED AREA THREE TIMES DAILY AS NEEDED FOR PAIN, 30 gm ; Ordering Provider:   Orestes Mendosa MD; Catalog Code:   lidocaine topical ; Order Dt/Tm:   12/30/2019 3:05:36 PM CST          Misc Prescription  :   Misc Prescription ; Status:   Prescribed ; Ordered As Mnemonic:   B-D PEN NDL MINI 53QL7KW(3/16)PRPL ; Simple Display Line:   See Instructions, INJECT WITH SAXENDA EVERY DAY, 100 EA ; Ordering Provider:   Orestes Mendosa MD; Catalog Code:   Miscellaneous Prescription ; Order Dt/Tm:   12/30/2019 3:05:21 PM CST          ondansetron 4 mg oral tablet  :   ondansetron 4 mg oral tablet ; Status:   Prescribed ; Ordered As Mnemonic:   ondansetron 4 mg oral tablet ; Simple Display Line:   1 tab(s), Oral, q8 hrs, PRN: AS NEEDED FOR NAUSEA OR VOMITING, 30 tab(s) ; Ordering Provider:   Orestes Mendosa MD; Catalog Code:   ondansetron ; Order Dt/Tm:   12/30/2019 3:05:21 PM CST          rizatriptan 10 mg oral tablet  :   rizatriptan 10 mg oral tablet ; Status:   Prescribed ; Ordered As Mnemonic:   rizatriptan 10 mg oral tablet ; Simple Display Line:   See Instructions, TAKE 1 TABLET BY MOUTH 1 TIME AS NEEDED FOR MIGRAINE HEADACHE, 6 tab(s) ; Ordering Provider:   Orestes Mendosa MD; Catalog Code:   rizatriptan ; Order Dt/Tm:   12/30/2019 3:05:21 PM CST          Misc Prescription  :   Misc Prescription ; Status:   Prescribed ; Ordered As Mnemonic:   B-D PEN NDL MINI [id number](3/16)PRPL ; Simple Display Line:   See Instructions, INJECT WITH SAXENDA EVERY DAY, 100 unknown unit ; Ordering Provider:   Orestes Mendosa MD; Catalog Code:   Miscellaneous Prescription ; Order Dt/Tm:   12/2/2019 11:16:59 AM CST          Miscellaneous Prescription  :   Miscellaneous Prescription ; Status:   Prescribed ; Ordered As Mnemonic:   BD pen needle mini [id number] ; Simple Display Line:   See Instructions, Use daily with Saxenda as directed, 100 EA, 0 Refill(s) ;  Ordering Provider:   Orestes Mendosa MD; Catalog Code:   Miscellaneous Prescription ; Order Dt/Tm:   10/30/2019 8:12:33 AM CDT          Miscellaneous Rx Supply  :   Miscellaneous Rx Supply ; Status:   Prescribed ; Ordered As Mnemonic:   Replacement CPAP +8 cm H2o for use daily ; Simple Display Line:   See Instructions, Heated humidifier x1; Humidifier chamber x1;  Heated tubing x1; Full face mask of choice with headgear  x1; Cushion x 1;  Filters: Disposable x1pk & Reusable x1pk.  Length of Need = 99 Months, 1 EA, 11 Refill(s) ; Ordering Provider:   Erwin Smiley MD; Catalog Code:   Miscellaneous Rx Supply ; Order Dt/Tm:   7/25/2019 1:49:35 PM CDT            Home Meds    aripiprazole  :   aripiprazole ; Status:   Documented ; Ordered As Mnemonic:   Abilify 5 mg oral tablet ; Simple Display Line:   5 mg, 1 tab(s), Oral, daily, 0 Refill(s) ; Catalog Code:   aripiprazole ; Order Dt/Tm:   4/7/2021 9:10:37 AM CDT          acetaminophen  :   acetaminophen ; Status:   Documented ; Ordered As Mnemonic:   acetaminophen 325 mg oral tablet ; Simple Display Line:   1-2 tabs, Oral, q4 hrs, 0 Refill(s) ; Catalog Code:   acetaminophen ; Order Dt/Tm:   2/10/2021 11:14:05 AM CST          escitalopram  :   escitalopram ; Status:   Documented ; Ordered As Mnemonic:   escitalopram 20 mg oral tablet ; Simple Display Line:   0 Refill(s) ; Catalog Code:   escitalopram ; Order Dt/Tm:   1/26/2021 1:07:14 PM CST ; Comment:   Responsible Provider: SHARONA CARBAJAL          oxyCODONE  :   oxyCODONE ; Status:   Documented ; Ordered As Mnemonic:   oxyCODONE 5 mg oral tablet ; Simple Display Line:   1-2 tabs, Oral, q4 hrs, PRN: as needed for pain, 0 Refill(s) ; Catalog Code:   oxyCODONE ; Order Dt/Tm:   1/26/2021 1:07:07 PM CST ; Comment:   Responsible Provider: TE GALLAGHER          cholecalciferol  :   cholecalciferol ; Status:   Documented ; Ordered As Mnemonic:   Vitamin D3 5000 intl units oral tablet ; Simple Display Line:    10,000 International Unit, 2 tab(s), Oral, daily, 0 Refill(s) ; Catalog Code:   cholecalciferol ; Order Dt/Tm:   2/4/2020 1:35:21 PM CST          cyclobenzaprine  :   cyclobenzaprine ; Status:   Documented ; Ordered As Mnemonic:   cyclobenzaprine 10 mg oral tablet ; Simple Display Line:   10 mg, 1 tab(s), Oral, bid, PRN: for spasm, 30 tab(s), 0 Refill(s) ; Catalog Code:   cyclobenzaprine ; Order Dt/Tm:   7/23/2019 8:00:07 AM CDT          alemtuzumab  :   alemtuzumab ; Status:   Documented ; Ordered As Mnemonic:   Lemtrada ; Simple Display Line:   See Instructions, 1x/yr, 0 Refill(s) ; Catalog Code:   alemtuzumab ; Order Dt/Tm:   10/26/2018 3:52:14 PM CDT          valACYclovir  :   valACYclovir ; Status:   Documented ; Ordered As Mnemonic:   valACYclovir 500 mg oral tablet ; Simple Display Line:   500 mg, 1 tab(s), po, bid, 0 Refill(s) ; Catalog Code:   valACYclovir ; Order Dt/Tm:   2/23/2018 2:39:43 PM CST          acetaminophen/aspirin/caffeine  :   acetaminophen/aspirin/caffeine ; Status:   Documented ; Ordered As Mnemonic:   Excedrin ; Simple Display Line:   See Instructions, 0 Refill(s) ; Catalog Code:   acetaminophen/aspirin/caffeine ; Order Dt/Tm:   9/6/2016 10:16:52 AM CDT            ID Risk Screen   Recent Travel History :   No recent travel   Family Member Travel History :   No recent travel   Other Exposure to Infectious Disease :   Unknown   COVID-19 Testing Status :   No COVID-19 test performed   Rosa Liang - 4/7/2021 9:02 AM CDT   Social History   Social History   (As Of: 4/7/2021 9:12:25 AM CDT)   Alcohol:        Current, 1 TIME PER WEEK, 1 drinks/episode maximum.   (Last Updated: 12/14/2018 12:13:00 PM CST by Brianna Parrish)          Tobacco:        Never smoker   (Last Updated: 1/13/2016 7:49:24 PM CST by Aminata Landon CMA)   Never (less than 100 in lifetime)   (Last Updated: 1/26/2021 1:09:01 PM CST by Freya Mark CMA)          Electronic Cigarette/Vaping:        Electronic Cigarette  Use: Never.   (Last Updated: 1/26/2021 1:09:08 PM CST by Freya Mark CMA)          Substance Abuse:  Current      vaping THC and cannabis oil   (Last Updated: 12/12/2018 2:39:37 PM CST by Tati Will CMA)          Employment/School:        Disability   (Last Updated: 2/28/2017 2:54:15 PM CST by Dione Núñez RN)          Home/Environment:        2 children.   (Last Updated: 1/13/2016 7:50:00 PM CST by Aminata Landon CMA)          Nutrition/Health:        Caffeine intake amount: 2 sodas, every day.   (Last Updated: 7/6/2016 11:10:11 AM CDT by Katie Hatfield)          Exercise:  Does not exercise      (Last Updated: 1/13/2016 7:49:47 PM CST by Aminata Landon CMA )         Sexual:        Sexually active: No.  Sexual orientation: Heterosexual.   (Last Updated: 1/13/2016 7:50:46 PM CST by Aminata Landon CMA)

## 2022-02-15 NOTE — CARE COORDINATION
Pt called [facility] c/o ongoing diarrhea since Monday-she has already taken 4 Imodium AD today w/ no relief. Also c/o slight fever. Wondering if she needs to be seen. Advised eval would be good/may need to complete stool studies-concerns w/ hydration and pt is trying to eat and drink as much as she can. Pt was transferred to schedule w/ [facility].Pt does c/o abdominal cramping and gas. Declines blood or mucus in stool
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
## 2022-02-15 NOTE — NURSING NOTE
Follow up with patient about port maintenance.  Pt will schedule monthly port flushes.  Maya Duran RN

## 2022-02-15 NOTE — TELEPHONE ENCOUNTER
---------------------From: Freya Mark CMA ([facility] ([address])) To: Orestes Mendosa MD;   Sent: 9/24/2019 12:03:02 PM CDTSubject: FW: Medication Management Due Date/Time: 9/22/2019 6:39:00 PM CDT see 8/20 note ---------------------From: Deepti Kelly CMA ([facility] ([facility])) To: [facility] ([facility]);   Sent: 9/24/2019 10:32:50 AM CDTSubject: FW: Medication Management Due Date/Time: 9/22/2019 6:39:00 PM CDT Entered by Deepti Kelly CMA on September 24, 2019 10:32:45 AM CDTPCP:   BRMMedication:   Benzonatate Last Filled:  12/12/18 Quantity:  30 Refills:  1*Completed off med list*- 8/20/19Medication:   TizanidineLast Filled:  8/27/19Quantity:  90   Refills:  0Date of last office visit and reason:   8/17/19 bronchitisNote:  Please advise on refillsReturn to Clinic order placed?  11/2019- labsResource:   eRx poolPhone:   [phone]** Submitted: **Order:amLODIPine (amLODIPine 5 mg oral tablet)  1 tab(s)  Oral  dailyQty:  90 tab(s)        Days Supply:  30        Refills:  0        Substitutions Allowed     [facility][id number]  Signed by Sommer Gipson  9/24/2019 10:11:00 AM** Submitted: **Complete:amLODIPine (amLODIPine 5 mg oral tablet) Signed by Sommer Gipson  9/24/2019 10:11:00 AM** Not Approved:  **amLODIPine (AMLODIPINE BESYLATE 5MG TABLETS)  TAKE 1 TABLET BY MOUTH DAILYQty:  90 tab(s)        Days Supply:  30        Refills:  0        Substitutions Allowed     [facility] #[id number] Signed by Sommer Gipson------------------------------------------[id number]: Deondre HART, OrestesSent: September 21, 2019 6:39:52 PM CDTSubject: Medication ManagementDue: September 22, 2019 6:39:52 PM CDT** On Hold Pending Signature **Drug: benzonatate (Tessalon Perles 100 mg oral capsule)  1 CAP(S) ORAL TID,X10 DAY(S),PRN:AS NEEDED FOR COUGHQuantity: 30 cap(s)     Days  Supply: 0         Refills: 0Substitutions AllowedNotes from Pharmacy: Dispensed Drug: benzonatate (benzonatate 100 mg oral capsule)  TAKE 1 CAPSULE BY MOUTH THREE TIMES DAILY FOR 10 DAYS AS NEEDED FOR COUGHQuantity: 30 cap(s)     Days Supply: 10        Refills: 0Substitutions AllowedNotes from Pharmacy: ** On Hold Pending Signature **Drug: amLODIPine (amLODIPine 5 mg oral tablet)  1 TAB(S) ORAL DAILYQuantity: 90 tab(s)     Days Supply: 0         Refills: 0Substitutions AllowedNotes from Pharmacy: Dispensed Drug: amLODIPine (amLODIPine 5 mg oral tablet)  TAKE 1 TABLET BY MOUTH DAILYQuantity: 90 tab(s)     Days Supply: 30        Refills: 0Substitutions AllowedNotes from Pharmacy: ** On Hold Pending Signature **Drug: tiZANidine (tiZANidine 4 mg oral tablet)  2 TO 3 TABLETS ORAL QHSQuantity: 90 tab(s)     Days Supply: 0         Refills: 0Substitutions AllowedNotes from Pharmacy: Dispensed Drug: tiZANidine (tiZANidine 4 mg oral tablet)  TAKE 2 TO 3 TABLETS BY MOUTH EVERY NIGHT AT BEDTIMEQuantity: 90 tab(s)     Days Supply: 30        Refills: 0Substitutions AllowedNotes from Pharmacy: ---------------------------------------------------------------From: Orestes Mendosa MD To: SHANNON Geodruid ([address]);   Sent: 9/24/2019 1:22:32 PM CDTSubject: RE: Medication Management okay with refills of both

## 2022-02-15 NOTE — TELEPHONE ENCOUNTER
---------------------  From: Tati Will CMA   Sent: 1/28/2020 8:42:39 AM CST  Subject: Tizanadine refill     Pt's Tizanidine refill was denied yesterday due to 1.) overdue for office visit and 2.) refill requested too soon (LR on 1/7/20) for 1 mo supply. I talked to pt this AM about this-she said she still has pills left/ was just requesting to have her next supply. She did schedule an appt w/ [facility] on 1/30. I asked if she has enough pills to get her by until her appt with [facility] and she said yes. I advised we would review her meds at the appt and refills meds that are needed. Pt was fine with this.

## 2022-02-15 NOTE — CARE COORDINATION
[facility] called stating pt is there to have her port flushed and pt said she was due for some labs for [facility] and wonders if she can just have them drawn off her port. It looks like she is due for routine labs and I let infusion center know this and faxed order to them.

## 2022-02-15 NOTE — CARE COORDINATION
Patient:   KARLI KAISER            MRN: [id number]            FIN: [id number]               Age:   43 years     Sex:  Female     :  1975   Associated Diagnoses:   None   Author:   Taty Bojorquez CMA      Care Coordinator ER Discharge Note      Sources of Information:  [X ] Patient, family member, or caregiver (Please list):  Spoke with Karli at 0328pm.  She still isn't feeling well, but the diarrhea and vomiting have stopped.  Karli has eaten some pretzels and chicken soup today and drank two gatorades.  She stated, She didn't feel she should have left the hospital yesterday because when she got home she fell and had to crawl in the snow to her front steps as she wasnt able to get up on her own.  She also did not have control of her urine.  She other wise has no further questions or concerns and will call back if anything changes.   [ ] Hospital discharge summary  [ ] Hospital fax  [ ] List of recent hospitalizations or ED visits  [ ] Other:     Discharged From: [facility] ER  Discharge Date: 19    Diagnosis/Problem: Nausea and Vomiting    Medication Changes: [ ] Yes [X ] No   Medication List Updated: [ ] Yes [X ] No: Hospital Med List at Discharge Reconciled with Current Med List   Medications          *denotes recorded medication          DULoxetine 60 mg oral delayed release capsule: 120 mg, 2 cap(s), Oral, daily, 60 cap(s), 0 Refill(s).          *LORazepam 0.5 mg oral tablet: po, tid, 0.5mg prn and 1-2 hs, PRN: for agitation, 0 Refill(s).          B-D PEN NDL MINI [id number](3/16)PRPL: See Instructions, USE DAILY WITH SAXENDA AS DIRECTED, 100 unknown unit.          *Excedrin: See Instructions, 0 Refill(s).          *Lemtrada: See Instructions, 1x/yr, 0 Refill(s).          amLODIPine 5 mg oral tablet: 1 tab(s), Oral, daily, 90 tab(s), 0 Refill(s).          *Adderall: 45 mg, po, bid, 0 Refill(s).          *Abilify 10 mg oral tablet: 10 mg, 1 tab(s), Oral, daily, 0 Refill(s).          Azithromycin 5 Day  Dose Pack 250 mg oral tablet: 250 mg, 1 tab(s), Oral, daily, 2 tabs day one.  1 tab day 2-5   as directed on package labeling, 6 tab(s), 0 Refill(s).          benzonatate 100 mg oral capsule: See Instructions, TAKE 1 CAPSULE BY MOUTH THREE TIMES DAILY AS NEEDED FOR COUGH, 30 cap(s), 1 Refill(s).          *cholecalciferol 2000 intl units oral tablet: 2,000 International Unit, 1 tab(s), po, daily, 0 Refill(s).          *clonazePAM 0.5 mg oral tablet: 1 mg, 2 tab(s), Oral, hs, 0 Refill(s).          ergocalciferol 50,000 intl units (1.25 mg) oral capsule: 50,000 International Unit, 1 cap(s), Oral, qweek, for 30 day(s), 5 cap(s), 0 Refill(s).          ergocalciferol 50,000 intl units (1.25 mg) oral capsule: See Instructions, TAKE 1 CAPSULE BY MOUTH EVERY WEEK, 5 cap(s).          lidocaine 5% topical ointment: 1 gabby, TOP, TID, PRN: for pain, 50 gm, 2 Refill(s).          Saxenda 18 mg/3 mL subcutaneous solution: 3 mg, subcutaneous, daily, take 3mg injection q am (goal dose), 15 mL, 6 Refill(s).          medroxyPROGESTERone 10 mg oral tablet: See Instructions, TAKE 1 TABLET BY MOUTH EVERY DAY FOR 10 DAYS ONCE PER MONTH PER PROVIDER, 30 tab(s).          meloxicam 15 mg oral tablet: See Instructions, TAKE 1 TABLET BY MOUTH DAILY, 90 tab(s).          methenamine hippurate 1 g oral tablet: See Instructions, TAKE ONE TABLET BY MOUTH TWICE DAILY WITH FOOD, 60 tab(s).          modafinil 200 mg oral tablet: See Instructions, TAKE 1 TABLET BY MOUTH TWICE DAILY, 60 tab(s).          Macrobid 100 mg oral capsule: 100 mg, 1 cap(s), Oral, bid, for 7 day(s), 14 cap(s), 0 Refill(s).          ondansetron 4 mg oral tablet: 4 mg, 1 tab(s), PO, q8 hrs, PRN: for nausea and vomiting, 10 tab(s), 0 Refill(s).          rizatriptan 10 mg oral tablet: See Instructions, TAKE 1 TABLET BY MOUTH ONCE AS NEEDED FOR MIGRAINE HEADACHE, 6 tab(s).          tiZANidine 4 mg oral tablet: 2 TO 3 TABLETS, Oral, qhs, 90 tab(s).          *traZODone: Oral, 0  Refill(s).          *valACYclovir 500 mg oral tablet: 500 mg, 1 tab(s), po, bid, 0 Refill(s).      Needs Referral or Lab: [ ] Yes [ X] No    Needs Follow-up Appointment:  [ ] Within 7 days of discharge (highly complex visit)  [ ] Within 14 days of discharge (moderately complex visit)    Appointment Made With:  RUBÉNM as needed  Date:    Additional Information Needed and Requested:  [X ] Yes: home to rest, zofran, fluids, [place]/BRAT diet, see PMD as needed  [ ] No

## 2022-02-15 NOTE — TELEPHONE ENCOUNTER
---------------------  From: Rosa Liang   To: Edyta HART Emily;     Sent: 12/15/2021 4:43:58 PM CST !  Subject: Covid results     Pt notified of positive Covid results. She states understanding.She states she is still having diarrhea, no taste or smell and sweats when moving.

## 2022-02-15 NOTE — PROGRESS NOTES
Chief Complaint    Video visit consent. Pt c/o poss Covid, fever, sore throat, chills, HA, muscle aches, loss of taste and smell. She is requesting a Covid test.  History of Present Illness      Today's visit was conducted via telemedicine, video, from my clinic office to patient's location in [place],  due to the COVID-19 pandemic.       [id number]      HPI      as per CC      pt has not had taste or smell since last wed, fully vaccinated but no booster shot, no previous covid, last MS meds have been a few years ago, massage therapist won't see her until she has a negh covid test      Review of systems is negative except as per HPI  and CC      Exam:      General: alert and oriented ×3 no acute distress.      Chest: has bilateral rise with no increased work of breathing.      Cardiovascular: normal perfusion .      Psychiatric: speech is clear and coherent and fluent. Patient dressed appropriately for the weather. Mood is appropriate and affect is full.  judgement and insight are normal, no A/V hallucinations, no S/H ideation.  thought process linear                     Discussed with patient to return to clinic if symptoms worsen or do not improve  Physical Exam   Vitals & Measurements    HT: 65 in   Assessment/Plan       DM (diabetes mellitus), type 2 (E11.9)         noted and taken into account for medical decision making         Ordered:          99976 office o/p est mod 30-39 min (Charge), Quantity: 1, Loss of taste  Loss of smell  DM (diabetes mellitus), type 2  Multiple sclerosis  Nasal congestion  Obesity                Loss of smell (R43.0)         noted and taken into account for medical decision making         Ordered:          51833 office o/p est mod 30-39 min (Charge), Quantity: 1, Loss of taste  Loss of smell  DM (diabetes mellitus), type 2  Multiple sclerosis  Nasal congestion  Obesity                Loss of taste (R43.2)         noted and taken into account for medical decision making          Ordered:          56866 office o/p est mod 30-39 min (Charge), Quantity: 1, Loss of taste  Loss of smell  DM (diabetes mellitus), type 2  Multiple sclerosis  Nasal congestion  Obesity                Multiple sclerosis (G35)         noted and taken into account for medical decision making         Ordered:          48698 office o/p est mod 30-39 min (Charge), Quantity: 1, Loss of taste  Loss of smell  DM (diabetes mellitus), type 2  Multiple sclerosis  Nasal congestion  Obesity                Nasal congestion (R09.81)         see below         Ordered:          fluticasone nasal, See Instructions, Instructions: 2 spray(s) per notstril daily, # 1 EA, 1 Refill(s), Type: Maintenance, Pharmacy: [facility] #[id number], 2 spray(s) per notstril daily, 65, in, 05/18/21 13:06:00 CDT, Height Measured, 282, lb, 11/24/21 8:14:00 CST,..., (Ordered)          07565 office o/p est mod 30-39 min (Charge), Quantity: 1, Loss of taste  Loss of smell  DM (diabetes mellitus), type 2  Multiple sclerosis  Nasal congestion  Obesity                Obesity (E66.9)         noted and taken into account for medical decision making         Ordered:          05730 office o/p est mod 30-39 min (Charge), Quantity: 1, Loss of taste  Loss of smell  DM (diabetes mellitus), type 2  Multiple sclerosis  Nasal congestion  Obesity               Sx c/w covid, mutliple risk factors for progression to severe covid  including DM, multiple scerosis, obesity, , would prefer to get test here rather than try to get a same day test and monoclonal antibodies at [place], will have pt get added to appt list for curbside testing.  Patient will be given appointment for curbside covid testing today, results are typically taking 48-72 hours, stay quarantined until results are back then per CDC/HD recommendations.  Discussed with patient to go to ED if pulse ox gets less than 90% or if pt has decreased urine output or seems to be working hard to  breath.  we have discussed the EUA for monoclonal antibodies.  Patient Information     Name:MARGARITA KAISER      Address:      [address] [place] [address] [address]      [place], [place] [id number]     Sex:Female     YOB: 1975     [phone]     Emergency Contact:JAYY PÉREZ     MRN:[id number]     FIN:[id number]     Location:[facility]     Date of Service:12/14/2021      Primary Care Physician:       Deondre HART, Orestes, [phone]      Attending Physician:       Edyta HART, Emily, [phone]  Problem List/Past Medical History    Ongoing     Anemia in chronic illness     Ataxia     Blues     Chronic low back pain     DM (diabetes mellitus), type 2     Fatigue     Headache, migraine       Comments: With aura.     History of renal stone     Knee osteoarthritis     Medication management     Morbid (severe) obesity due to excess calories     Multiple sclerosis     Muscle weakness     Neuralgia     Prediabetes     Seasonal allergies     Skin sensation disturbance     Urinary bladder neurogenic dysfunction     Vitamin D deficiency    Historical     Inpatient stay       Comments: @[place], [place] - Left renal stone     Inpatient stay       Comments: @[place], [place] - Complicated UTI     Inpatient stay       Comments: @[place], [place] - Acute pneumonitis; likely related to recent Lemtrada introduction     Inpatient stay       Comments: [place], [place] - Malaise and fatigue.     Obstructive sleep apnea syndrome, moderate       Comments: On 2/14/18 AHI 20.2 with RDI 41.2, and oximetry avis 88%. Optimal CPAP titration to 8 cm water.     Pregnancy     Pregnancy     Pregnancy  Procedure/Surgical History     Repair of ventral hernia (05/24/2021)     Excision of inguinal lymph nodes (02/23/2021)      Comments: Right..     Excision of inguinal lymph nodes (11/10/2020)     Bone marrow biopsy (10/30/2020)      Comments: Dx: Lymphadenopathy..     Port-a-cath in  place - Right (2016)      Comments: Dx:  Multiple sclerosis..     cystoscopy left ureteroscopy with holmium laser left retrogrades stone extraction and left ureteral stent exchange (2016)     Ureteroscopy (2016)      Comments: with laser/stone extraction (inability to remove all of stone).     Appendectomy ()     Breast reduction ()     Tonsillectomy and adenoidectomy ()      section      Comments: x1.     Cholecystectomy     Tubal ligation  Medications    Abilify 5 mg oral tablet, 5 mg= 1 tab(s), Oral, daily    Accu-check guide meter, See Instructions    Accu-check Guide Test Strips, See Instructions, 11 refills    acetaminophen 325 mg oral tablet, 1-2 tabs, Oral, q4 hrs    amantadine 100 mg oral capsule, 100 mg= 1 cap(s), Oral, bid, PRN, 1 refills    amLODIPine 5 mg oral tablet, 1 tab(s), Oral, daily    B-D PEN NDL MINI [id number](3/16)PRPL, See Instructions    B-D PEN NDL MINI [id number](3/16)PRPL, See Instructions    BD pen needle mini [id number], See Instructions    busPIRone 15 mg oral tablet, 15 mg= 1 tab(s), Oral, bid    clonazePAM 1 mg oral tablet, 1 mg= 1 tab(s), Oral, qhs    contour next microlet lancets, See Instructions, 2 refills    cyclobenzaprine 10 mg oral tablet, 10 mg= 1 tab(s), Oral, bid, PRN    escitalopram 20 mg oral tablet, 20 mg= 1 tab(s), Oral, daily    hydrOXYzine hydrochloride 10 mg oral tablet, 10 mg= 1 tab(s), Oral, PRN    Lemtrada, See Instructions    lidocaine 5% topical ointment, See Instructions    meloxicam 15 mg oral tablet, 1 tab(s), Oral, daily    MetFORMIN (Eqv-Glucophage XR) 500 mg oral tablet, extended release, 2 tab(s), Oral, daily    methenamine hippurate 1 g oral tablet, 1 tab(s), Oral, bid    Norco 5 mg-325 mg oral tablet, 1 tab(s), Oral, q4 hrs, PRN    ondansetron 4 mg oral tablet, 1 tab(s), Oral, q8 hrs, PRN    prazosin 1 mg oral capsule, 1 mg= 1 cap(s), Oral, hs    Replacement CPAP +8 cm H2o for use daily, See Instructions, 11 refills     rizatriptan 10 mg oral tablet, See Instructions    tiZANidine 4 mg oral tablet, 2 TO 3 TABLETS, Oral, qhs    Victoza 18 mg/3 mL subcutaneous solution, 1.8 mg, Subcutaneous, daily, 3 refills    Vitamin D3 5000 intl units oral tablet, 125 mcg= 1 tab(s), Oral, daily  Allergies    adhesive tape    sulfa drugs (Diarrhea, Vomiting....., Rash)  Social History    Smoking Status     Never smoker     Alcohol      Current, 1 TIME PER WEEK, 1 drinks/episode maximum.     Electronic Cigarette/Vaping      Electronic Cigarette Use: Never.     Employment/School      Disability     Exercise - Does not exercise     Home/Environment      2 children.     Nutrition/Health      Caffeine intake amount: 2 sodas, every day.     Sexual      Sexually active: No. Sexual orientation: Heterosexual.     Substance Abuse - Current      vaping THC and cannabis oil     Tobacco      Never (less than 100 in lifetime)  Family History    Anxiety: Daughter and Daughter.    Bipolar: Father.    CA - Cancer: Father.    CHF - Congestive heart failure: Father.    Depression: Father and Daughter.    Diabetes mellitus type 2: Father.    Diabetes mellitus type II: Father.    Gallbladder problem: Mother.    Hypothyroidism: Sister.    Kidney stone: Daughter.    Melanoma: Father.    Migraine: Mother, Sister and Daughter.    Miscellaneous: Mother and Brother.    Thyroid: Sister.  Lab Results       Lab Results (Last 4 results within 90 days)        Sodium Level: 140 mmol/L [135 mmol/L - 146 mmol/L] (11/24/21 08:53:00)       Potassium Level: 4.2 mmol/L [3.5 mmol/L - 5.3 mmol/L] (11/24/21 08:53:00)       Chloride Level: 108 mmol/L [98 mmol/L - 110 mmol/L] (11/24/21 08:53:00)       CO2 Level: 22 mmol/L [20 mmol/L - 32 mmol/L] (11/24/21 08:53:00)       Glucose Level: 149 mg/dL High [65 mg/dL - 99 mg/dL] (11/24/21 08:53:00)       BUN: 12 mg/dL [7 mg/dL - 25 mg/dL] (11/24/21 08:53:00)       Creatinine Level: 0.63 mg/dL [0.5 mg/dL - 1.1 mg/dL] (11/24/21 08:53:00)        BUN/Creat Ratio: NOT APPLICABLE [6  - 22] (11/24/21 08:53:00)       eGFR: 106 mL/min/1.73m2 (11/24/21 08:53:00)       eGFR : 123 mL/min/1.73m2 (11/24/21 08:53:00)       Calcium Level: 9.4 mg/dL [8.6 mg/dL - 10.2 mg/dL] (11/24/21 08:53:00)       Hgb A1c: 6.7 High (11/24/21 08:53:00)       LDL Direct: 83 mg/dL (11/24/21 08:53:00)       U Microalbumin: 1.5 mg/dL (11/24/21 08:53:00)       Microalbumin Comment: See comment (11/24/21 08:53:00)  Immunizations       Scheduled Immunizations       Dose Date(s)       tetanus/diphth/pertuss (Tdap) adult/adol       07/16/2008       Other Immunizations               pneumococcal (PPSV23)       05/14/2012       SARS-CoV-2 (COVID-19) Moderna-[id number]       07/27/2021, 08/24/2021

## 2022-02-15 NOTE — CARE COORDINATION
Pt called and LM at 9:23am stating that she will probably not make it in for her hospital f/u appt w/ [facility] today. She requested a r/c. I LMTCB @ 9:33amPt returned call-see msg to RUBÉN

## 2022-02-15 NOTE — PROGRESS NOTES
Patient:   KARLI KAISER            MRN: [id number]            FIN: [id number]               Age:   43 years     Sex:  Female     :  1975   Associated Diagnoses:   Multiple sclerosis; Prediabetes; Urinary bladder neurogenic dysfunction; Migraine; Morbid obesity   Author:   Orestes Mendosa MD      Visit Information      Date of Service: 10/26/2018 03:38 pm  Performing Location: [facility]  Encounter#: [id number]      Primary Care Provider (PCP):  Orestes Mendosa MD    NPI# [id number]      Referring Provider:  Orestes Mendosa MD# [id number]      Chief Complaint            Additional Information:No additional information recorded during visit.   Chief complaint and symptoms as noted above and confirmed with patient.  Recent lab and diagnostic studies reviewed with patient      History of Present Illness   3/8/2016: Karli presents to clinic for ER follow-up after presentations on both Thursday and Friday this past week for urinary incontinence and suspected pyelonephritis.  She requested for Srivastava catheter to be placed this past week because of persistent incontinence.  She is scheduled to meet with the urologist on the  of this month.  She was originally prescribed Cipro which was later changed to Macrobid after discovery of extended spectrum beta-lactamase resistant E. coli bacteria.  She was started on Macrobid on .  Overall continues to have severe left-sided flank pain.  She is very distressed about her underlying multiple sclerosis and her ability to care for her daughter.  She does follow with a neurologist through [facility].  She previously was following with an internist through [facility] and now wishes to localize her cares here.    2018: Karli returns to clinic with one-month history of complaints of dysuria, urinary incontinence increased urinary frequency.  Last diagnosed with urinary tract infection this past month through the [place] emergency room.   She was prescribed Macrobid at that time based on sensitivities.  In the past has used fosfomycin related to previous history of multidrug-resistant UTIs.  Her multiple sclerosis continues to progress, primarily defined by lower extremity weakness, back pains and urinary changes.  She has failed all conventional means of therapy.  There is discussion about her beginning on alemtuzumab in the near future.  She needs to be infection free prior to beginning therapy.  Having trouble coping especially back pains and associated anxieties.  Would like to discuss other potential weight loss therapy options    3/1/2018: Karli presenting to clinic for hospital follow-up.  Since discharge no further episodes of hemoptysis.  Breathing at baseline.  Feeling well.  Scheduled follow-up with neurology this coming week for follow-up.      10/26/2018: Presents to clinic for follow-up from emergency room visit this past week.  She was seen with upper respiratory and sinus complaints.  Was given azithromycin at that time which has helped with considerable improvement in sinusitis features.  Also had a urinalysis done at that time for lower urinary tract symptoms.  Culture ultimately growing a Citrobacter species which was pansensitive to antibiotics.  Had not heard from the emergency room and culture follow-up.  Describes having subjective fever and chills.  Also describes a left-sided flank pain with continued lower urinary tract symptoms.  She is currently in remission from her MS.  No active new lesions regarding MRI surveillance.  Maintained on Lemtrada which she last received in March of this year.  No infectious complications since I've seen her last in March.         Review of Systems   Constitutional:  Chills, No fever, No weakness.    Eye:  Negative except as documented in history of present illness.    Ear/Nose/Mouth/Throat:  Nasal congestion.    Respiratory:  No shortness of breath.    Cardiovascular:  No chest pain, No  palpitations, No peripheral edema, No syncope.    Gastrointestinal:  Nausea, No vomiting, No constipation.    Genitourinary:  Dysuria, Change in urine stream, No hematuria.    Hematology/Lymphatics:  Negative except as documented in history of present illness.    Endocrine:  No excessive thirst, No polyuria.    Immunologic:  No recurrent fevers.    Musculoskeletal:  No joint pain, No muscle pain.    Neurologic:  Alert and oriented X4, No abnormal balance, No confusion, No numbness, No tingling, No headache.    Psychiatric:  Anxiety, Depression.       Health Status   Allergies:    Allergic Reactions (Selected)  Severity Not Documented  Adhesive tape (No reactions were documented)  Sulfa drugs (Rash, vomiting..... and diarrhea)   Medications:  (Selected)   Prescriptions  Prescribed  B-D PEN NDL MINI 93IW3AR(3/16)PRPL: See Instructions, Instructions: USE DAILY WITH SAXENDA AS DIRECTED, # 100 unknown unit, Type: Soft Stop, Pharmacy: [facility], USE DAILY WITH SAXENDA AS DIRECTED  DULoxetine 60 mg oral delayed release capsule: = 2 cap(s) ( 120 mg ), Oral, daily, # 60 cap(s), 0 Refill(s), Type: Maintenance, [facility]: [id number], Needs appt for further refills  Saxenda 18 mg/3 mL subcutaneous solution: ( 3 mg ), subcutaneous, daily, Instructions: take 3mg injection q am (goal dose), # 15 mL, 6 Refill(s), Type: Maintenance, Pharmacy: [facility], 3 mg Subcutaneous daily,Instr:take 3mg injection q am (goal dose)  amLODIPine 5 mg oral tablet: = 1 tab(s) ( 5 mg ), Oral, daily, # 30 tab(s), 0 Refill(s), Type: Maintenance, Pharmacy: [facility] [id number], Needs appt for further refills  benzonatate 100 mg oral capsule: See Instructions, Instructions: TAKE 1 CAPSULE BY MOUTH THREE TIMES DAILY AS NEEDED FOR COUGH, # 30 cap(s), Type: Soft Stop, Pharmacy: [facility] [id number]  cefuroxime 500 mg oral tablet: = 1 tab(s) ( 500 mg ), PO, BID, # 20 tab(s), 0 Refill(s), Type:  Maintenance, [id number], 1 tab(s) Oral bid,x10 day(s)  ergocalciferol 50,000 intl units (1.25 mg) oral capsule: 1 cap(s) ( 50,000 International Unit ), Oral, qweek, # 5 cap(s), 0 Refill(s), Type: Maintenance, [facility]  lidocaine 5% topical ointment: 1 gabby, TOP, TID, PRN: for pain, # 50 gm, 2 Refill(s), Type: Maintenance, [facility], 1 gabby top tid,PRN:for pain  lidocaine-prilocaine 2.5%-2.5% topical cream: See Instructions, Instructions: APPLY 5 GRAMS TOPICALLY TO THE AFFECTED AREA(S) ONCE FOR A ONE TIME DOSE, # 30 gm, Type: Soft Stop, [facility]  medroxyPROGESTERone 10 mg oral tablet: See Instructions, Instructions: TAKE 1 TABLET BY MOUTH EVERY DAY FOR 10 DAYS ONCE PER MONTH PER PROVIDER, # 30 tab(s), Type: Soft Stop, [facility] [id number], TAKE 1 TABLET BY MOUTH EVERY DAY FOR 10 DAYS ONCE PER MONTH PER PROVIDER  meloxicam 15 mg oral tablet: See Instructions, Instructions: TAKE 1 TABLET BY MOUTH DAILY, # 90 tab(s), Type: Soft Stop, [facility] [id number]  methenamine hippurate 1 g oral tablet: See Instructions, Instructions: TAKE ONE TABLET BY MOUTH TWICE DAILY WITH FOOD, # 60 tab(s), Type: Soft Stop, [facility] [id number], TAKE ONE TABLET BY MOUTH TWICE DAILY WITH FOOD  modafinil 200 mg oral tablet: See Instructions, Instructions: TAKE 1 TABLET BY MOUTH TWICE DAILY, # 60 tab(s), Type: Soft Stop, Pharmacy: [facility] [id number], TAKE 1 TABLET BY MOUTH TWICE DAILY  ondansetron 4 mg oral tablet: 1 tab(s) ( 4 mg ), PO, q8 hrs, PRN: for nausea and vomiting, # 10 tab(s), 0 Refill(s), Type: Maintenance, Pharmacy: [id number], 1 tab(s) po q8 hrs,PRN:for nausea and vomiting  rizatriptan 10 mg oral tablet: See Instructions, Instructions: TAKE 1 TABLET BY MOUTH ONCE AS NEEDED FOR MIGRAINE HEADACHE, # 6 tab(s), Type: Soft Stop, Pharmacy: [facility]  [id number]  tiZANidine 4 mg oral tablet: See Instructions, Instructions: use PRN, # 90 tab(s), Type: Soft Stop, Pharmacy: [facility] [id number]  Documented Medications  Documented  Adderall: ( 45 mg ), po, bid, 0 Refill(s), Type: Maintenance  Excedrin: See Instructions, 0 Refill(s), Type: Maintenance  LORazepam 0.5 mg oral tablet: po, tid, Instructions: 0.5mg prn and 1-2 hs, PRN: for agitation, 0 Refill(s), Type: Maintenance  LaMICtal 100 mg oral tablet: 1 tab(s) ( 100 mg ), po, daily, 0 Refill(s), Type: Maintenance  Lemtrada: See Instructions, Instructions: 1x/yr, 0 Refill(s), Type: Maintenance  Magic Mouthwash: Magic Mouthwash, Instructions: take 5-10ml po QID for stomatitis, Supply, 0 Refill(s), Type: Maintenance  cholecalciferol 2000 intl units oral tablet: 1 tab(s) ( 2,000 International Unit ), po, daily, 0 Refill(s), Type: Maintenance  clonazePAM 0.5 mg oral tablet: = 2 tab(s) ( 1 mg ), Oral, hs, 0 Refill(s), Type: Maintenance  hydrOXYzine hydrochloride 50 mg oral tablet: 1 tab(s) ( 50 mg ), PO, tid, PRN: for anxiety, # 40 tab(s), 0 Refill(s), Type: Maintenance  meperidine 50 mg oral tablet: 1 tab(s) ( 50 mg ), po, q3 hr (int), PRN: for pain, 0 Refill(s), Type: Maintenance  raNITIdine 150 mg oral capsule: 1 cap(s) ( 150 mg ), po, daily, 0 Refill(s), Type: Maintenance  valACYclovir 500 mg oral tablet: 1 tab(s) ( 500 mg ), po, bid, 0 Refill(s), Type: Maintenance   Problem list:    All Problems  Anemia in chronic illness / SNOMED CT 677150914 / Confirmed  Ataxia / SNOMED CT 09659623 / Confirmed  Chronic low back pain / SNOMED CT 283091149 / Confirmed  Blues / SNOMED CT 749485859 / Confirmed  Fatigue / SNOMED CT 084321327 / Confirmed  History of renal stone / SNOMED CT 2243965932 / Confirmed  Headache, migraine / SNOMED CT 37632980 / Confirmed  Morbid (severe) obesity due to excess calories / SNOMED CT 216360783 / Probable  Multiple sclerosis / SNOMED CT 78192391 / Confirmed  Muscle weakness / SNOMED CT 86780258 /  Confirmed  Neuralgia / SNOMED CT 09032084 / Confirmed  Urinary bladder neurogenic dysfunction / SNOMED CT 5477287075 / Confirmed  Knee osteoarthritis / SNOMED CT 208905185 / Confirmed  Prediabetes / SNOMED CT 2640955317 / Confirmed  Skin sensation disturbance / SNOMED CT 956163354 / Confirmed  Seasonal allergies / SNOMED CT 117453383 / Confirmed  Vitamin D deficiency / SNOMED CT 69807448 / Confirmed  Resolved: Inpatient stay / SNOMED CT 875236880  Resolved: Inpatient stay / SNOMED CT 944749009  Resolved: Inpatient stay / SNOMED CT 417480267  Resolved: Pregnancy  Resolved: Pregnancy / SNOMED CT 316979952  Resolved: Pregnancy / SNOMED CT 918765577  Canceled: Headache / SNOMED CT 60138423      Histories   Past Medical History:    Active  Multiple sclerosis (05437545): Onset in 2012 at 37 years.  Blues (763087300)  History of renal stone (0818382486)  Urinary bladder neurogenic dysfunction (5128425905)  Fatigue (488492753)  Muscle weakness (22362347)  Vitamin D deficiency (75494286)  Ataxia (90110028)  Headache, migraine (27877903)  Comments:  7/6/2016 CDT 11:19 AM CDT - Katie Hatfield  With aura.  Skin sensation disturbance (890698228)  Seasonal allergies (951746219)  Resolved  Inpatient stay (054826543): Onset on 2/23/2018 at 42 years.  Resolved on 2/25/2018 at 42 years.  Comments:  2/27/2018 CST 7:59 AM La Serrano  @[facility], [place] - Acute pneumonitis; likely related to recent Lemtrada introduction  Inpatient stay (904368859): Onset on 6/14/2016 at 40 years.  Resolved on 6/18/2016 at 40 years.  Comments:  2/27/2018 CST 7:58 AM La Serrano  @[facility], [place] - Complicated UTI  Inpatient stay (983290366): Onset in the month of 5/2016 at 40 years  Resolved.  Comments:  2/27/2018 CST 7:57 AM La Serrano  @[facility], [place] - Left renal stone  Pregnancy:  Resolved.  Pregnancy (267263607):  Resolved in 2006 at 30 years.  Pregnancy (122710833):  Resolved in 1994 at 18  years.   Family History:    Melanoma  Father (Shakir)  Thyroid  Sister (Ana Cristina)  Kidney stone  Daughter (Zac)  Diabetes mellitus type II  Father (Shakir)  Hypothyroidism  Sister (Ana Cristina)  Diabetes mellitus type 2  Father (Shakir)  CA - Cancer  Father (Shakir)  Comments:  2017 11:45 AM - Katie Hatfield  skin  CHF - Congestive heart failure  Father (Shakir)  Migraine  Sister (Ana Cristina)  Daughter (Leda)  Mother (Soledad)  Depression  Daughter (Zac)  Father (Shakir)  Gallbladder problem  Mother (Soledad)  Bipolar  Father (Shakir)  Miscellaneous  Brother (Adrian)  Comments:  2017 11:43 AM - Katie Hatfield  spinal stenosis  Mother (Soledad)  Comments:  2017 11:43 AM - Katie Hatfield  spinal stenosis  Anxiety  Daughter (Leda)  Daughter (Zac)     Procedure history:    Port-a-cath in place - Right (75058857) on 2016 at 41 Years.  Comments:  2016 8:01 AM - Ceci Engle  Dx:  Multiple sclerosis.  cystoscopy left ureteroscopy with holmium laser left retrogrades stone extraction and left ureteral stent exchange on 6/3/2016 at 40 Years.  Ureteroscopy (3375183106) in the month of 2016 at 40 Years.  Comments:  2016 4:56 PM - La Garcia  with laser/stone extraction (inability to remove all of stone)  Appendectomy (097676595) in  at 39 Years.  Breast reduction (587293536) in  at 26 Years.  Tonsillectomy and adenoidectomy (352840505) in  at 14 Years.  Cholecystectomy (13097069).   section (08067327).  Comments:  2016 11:26 AM - Deedee LERMA, Freya  x1  Tubal ligation (850478289).   Social History:        Alcohol Assessment            Current, 1-2 times per month, 1 drinks/episode maximum.      Tobacco Assessment            Never smoker      Substance Abuse Assessment: Past            Marijuana      Employment and Education Assessment            Disability      Home and Environment Assessment            2 children.      Nutrition and Health Assessment            Caffeine intake  amount: 2 sodas, every day.      Exercise and Physical Activity Assessment: Does not exercise      Sexual Assessment            Sexually active: No.  Sexual orientation: Heterosexual.        Physical Examination   vital signs stable, as noted above   VS/Measurements   General:  Alert and oriented, No acute distress.    HENT:  Normocephalic, Tympanic membranes are clear, No pharyngeal erythema.    Neck:  Supple.    Respiratory:  Lungs are clear to auscultation, Respirations are non-labored.    Cardiovascular:  Normal rate, Regular rhythm, No murmur, No edema.    Gastrointestinal:  Soft, Non-tender, Non-distended.    Genitourinary:  +CVA tenderness; left sided.    Musculoskeletal:  Infusaport in place; not engaged.    Integumentary:  No rash.    Neurologic:  Alert, Oriented.    Cognition and Speech:  Oriented, Speech clear and coherent.    Psychiatric:  Appropriate mood & affect.       Review / Management   Results review      Impression and Plan   Diagnosis     Multiple sclerosis (CZF34-LT G35).     Prediabetes (ZIC22-LK R73.09).     Urinary bladder neurogenic dysfunction (LOZ62-UF N31.9).     Migraine (TSU28-MC G43.019).     Morbid obesity (PCV50-MK E66.01).         .) multiple sclerosis - progressively worsening - currently in remission  - started on Lemtrada (alemtuzumab) in spring, 2018; good clinical response and tolerance - associated with sustained T-cell immunosuppression  - follows with Dr. Webb, her neurologist    .) recent sinusitis/URI and active UTI (complicated, questionable pyelo features)   - seen in ED on 10/19; Urine Cx isolated Citrobacter species (pansensitive)    - recently completed 5 day azithromycin course    - sulfa allergy    - will start on cefuroxime 500mg BID for 10 days, given residual URI features and complicated UTI/possible pyelo    .) obesity, morbid; current BMI 49 - substantial weight gains since beginning therapies for multiple sclerosis   - referral to Oanh Mane to discuss  medication options   - discussed weight loss management options including lifestyle, medication options, and potential bariatric surgery   -  has seen some weight loss success with Saxenda therapy; tolerates well - okay to continue    .) vitamin D deficiency; total level 15 (1/2017 through [facility])   - on cholecalciferol 5000 units/daily    .) health maintenance   - prediabetes    - enrolled in CCM program    RTC in 2 months

## 2022-02-15 NOTE — TELEPHONE ENCOUNTER
---------------------  From: Millie Espinoza CMA (Phone Messages Pool ([address] [place] [address]))   To: [facility] Message Pool ([address] [place] [address]);     Sent: 6/24/2021 1:00:07 PM CDT  Subject: phone note- order     Time: 12:57 pm  Note: Patient called stating she needs another order for a back injection @ [facility]. Please advise.  Last seen: 5/18/21 preop---------------------  From: Patrica Valencia MA ([facility] Message Pool ([address] [place] [address]))   To: Orestes Mendosa MD;     Sent: 6/24/2021 2:20:20 PM CDT  Subject: FW: phone note- order---------------------  From: Orestes Mendosa MD   To: [facility] Message Pool ([address] [place] [address]);     Cc: Referral Coordinators Pool ([address] [place] [address]);      Sent: 6/24/2021 2:43:01 PM CDT  Subject: RE: phone note- order     Please place referral order for interventional pain.  It would not be typical for [facility] radiology to perform outpatient epidural injections but I'm okay with referrals trying  Otherwise may need to explore more usual channels (Ortho spine, Dr. Dr. Arango)---------------------  From: Disha Macdonald (Referral Coordinators Pool ([address] [place] [address]))   To: [facility] Message Pool ([address] [place] [address]) (Partica Valencia MA); Orestes Mendosa MD;     Sent: 6/24/2021 2:58:26 PM CDT  Subject: RE: phone note- order     [facility] can do it if a specific injection and location is ordered from what I understand.---------------------  From: Orestes Mendosa MD   To: Referral Coordinators Pool ([address] [place] [address]);     Sent: 6/29/2021 6:59:01 AM CDT  Subject: RE: phone note- order     I placed referral order hopefully directing same epidural steroid injection procedure as completed on 4/12/2021.

## 2022-02-15 NOTE — TELEPHONE ENCOUNTER
---------------------  From: Erwin Smiley MD   To: Sleep Message Pool ([id number] - [place]);     Sent: 7/25/2019 1:51:15 PM CDT  Subject: General Message     CPAP setup with compliance report in 1-2 months.

## 2022-02-15 NOTE — PROGRESS NOTES
Chief Complaint    c/o cough with mucus, increase in SOB since Wed, difficulty walking, fever since yesterday, exposure to pneumonia  History of Present Illness      Chief complaint as above reviewed and confirmed with patient.  Pt presents to the clinic with concerns re: cough.  Has had cough for 3 days, worsening.  Tight congested and productive with yellow sputum. No wheezing but does feel sob at times.  Low grade fever at home.  She has exposure to pneumonia by her 13 year old daughter with xray confirmed pneumonia.  She has known MS and when she is ill has exacerbation thus feeling weak, increased problems with her gait making ambulation difficult which is typical exacerbation.  tolerating po well.  no urinary sx but would like to make sure she has no uti as she is prone to uti without sx given her ms and neurogenic bladder.  tolerating po at home but weak making it difficult with ADLS.  Mother is able to come and help at home if needed.  Review of Systems      Review of systems is negative with the exception of those noted in HPI          Physical Exam   Vitals & Measurements    T: 100   F (Tympanic)  HR: 93(Peripheral)  BP: 130/76  SpO2: 94%     HT: 65 in  WT: 260.2 lb  BMI: 43.29           Vitals as above per nursing documentation           Constitutional : nad appears well          Ears: ears patent B, TMS intact, noninjected           Nose: nasal mucosa is non-ededmatous. no discharge           Throat: pharynx is nonerythematous, no tonsillar hypertrophy, no exudate           Neck: neck supple, no adenopathy, no thyromegaly, no rigidity           Lungs: lungs CTA', no Wheezes, rhonchi or rales           Heart: heart RRR, nl S1, S2 no murmur           skin:  No rashes            CXR: normal CXR no infiltrate  Normal heart size.       cbc with Diff WNL      UA unremarkable Culture pending  Assessment/Plan       1. Bronchitis (J40)         given comorbidities, difficulty with strong cough due to MS flare and  exposure to pneumonia will cover with abx : zithromax as ordered. Push fluids, rest, ibuprofen or tylenol for comfort.  Pt instructed to return to clinic for persistent or worsening symptoms.  Pt will get assistance at home.  If worsening may need further evaluation.  Pt agreeable with plan.                          Cough (R05)         Ordered:          CBC w/ Diff/Plt (Request), Priority: Urgent, Cough  Fever          XR Chest PA/Lat (Request), Priority: STAT, Cough  Fever                Fever (R50.9)         Ordered:          CBC w/ Diff/Plt (Request), Priority: Urgent, Cough  Fever          XR Chest PA/Lat (Request), Priority: STAT, Cough  Fever                Orders:         azithromycin, Take 2 tablets on Day 1 and then 1 tablet, Oral, daily, Instructions: until finished, # 6 tab(s), 0 Refill(s), Type: Acute, Pharmacy: [id number], Take 2 tablets on Day 1 and then 1 tablet Oral daily,Instr:until finished, (Ordered)         POC URINALYSIS, UA* ([facility]), Specimen Type: Urine, Collection Date: 08/17/19 15:02:00 CDT  Patient Information     Name:MARGARITA KAISER      Address:      [address] [place] [id number]     Sex:Female     YOB: 1975     [phone]     Emergency Contact:ELISA HOPE     MRN:[id number]     FIN:[id number]     Location:[facility]     Date of Service:08/17/2019      Primary Care Physician:       Orestes Mendosa MD, [phone]      Attending Physician:       Hazel Escobedo PA-C, [phone]  Problem List/Past Medical History    Ongoing     Anemia in chronic illness     Ataxia     Blues     Chronic low back pain     Fatigue     Headache, migraine       Comments: With aura.     History of renal stone     Knee osteoarthritis     Morbid (severe) obesity due to excess calories     Multiple sclerosis     Muscle weakness     Neuralgia     Obstructive sleep apnea syndrome, moderate       Comments: On 2/14/18 AHI  20.2 with RDI 41.2, and oximetry avis 88%. Optimal CPAP titration to 8 cm water.     Prediabetes     Seasonal allergies     Skin sensation disturbance     Urinary bladder neurogenic dysfunction     Vitamin D deficiency    Historical     Inpatient stay       Comments: @[facility], [place] - Left renal stone     Inpatient stay       Comments: @[facility], [place] - Complicated UTI     Inpatient stay       Comments: @[facility], [place] - Acute pneumonitis; likely related to recent Lemtrada introduction     Pregnancy     Pregnancy     Pregnancy  Procedure/Surgical History     Port-a-cath in place - Right (2016)            Comments: Dx:  Multiple sclerosis..     cystoscopy left ureteroscopy with holmium laser left retrogrades stone extraction and left ureteral stent exchange (2016)           Ureteroscopy ()            Comments: with laser/stone extraction (inability to remove all of stone).     Appendectomy ()           Breast reduction ()           Tonsillectomy and adenoidectomy ()            section            Comments: x1.     Cholecystectomy           Tubal ligation        Medications    amLODIPine 5 mg oral tablet, 1 tab(s), Oral, daily    B-D PEN NDL MINI [id number](3/16)PRPL, See Instructions    B-D PEN NDL MINI [id number](3/16)PRPL, See Instructions, 1 refills    B-D PEN NDL MINI [id number](3/16)PRPL, See Instructions    cholecalciferol 2000 intl units oral tablet, 2000 International Unit= 1 tab(s), Oral, daily    clonazePAM 0.5 mg oral tablet, 1 mg= 2 tab(s), Oral, hs    cyclobenzaprine 10 mg oral tablet, 10 mg= 1 tab(s), Oral, tid, PRN    ergocalciferol 50,000 intl units (1.25 mg) oral capsule, See Instructions    ergocalciferol 50,000 intl units (1.25 mg) oral capsule, 65104 International Unit= 1 cap(s), Oral, qweek,   Not taking    Excedrin, See Instructions    Lemtrada, See Instructions    lidocaine 5% topical ointment, See Instructions    meloxicam 15 mg  oral tablet, See Instructions    methenamine hippurate 1 g oral tablet, 1 gm= 1 tab(s), Oral, bid, 3 refills    ondansetron 4 mg oral tablet, 4 mg= 1 tab(s), Oral, q8 hrs, PRN, 3 refills    Replacement CPAP +8 cm H2o for use daily, See Instructions, 11 refills    rizatriptan 10 mg oral tablet, See Instructions    Saxenda 18 mg/3 mL subcutaneous solution, 3 mg, Subcutaneous, daily, 6 refills    tiZANidine 4 mg oral tablet, 2 TO 3 TABLETS, Oral, qhs    valACYclovir 500 mg oral tablet, 500 mg= 1 tab(s), Oral, bid    Zithromax Z-Joe 250 mg oral tablet, Take 2 tablets on Day 1 and then 1 tablet, Oral, daily  Allergies    adhesive tape    sulfa drugs (Diarrhea, Vomiting....., Rash)  Social History    Smoking Status - 08/17/2019     Never smoker     Alcohol      Current, 1 TIME PER WEEK, 1 drinks/episode maximum., 12/14/2018     Employment/School      Disability, 02/28/2017     Exercise - Does not exercise, 01/13/2016     Home/Environment      2 children., 01/13/2016     Nutrition/Health      Caffeine intake amount: 2 sodas, every day., 07/06/2016     Sexual      Sexually active: No. Sexual orientation: Heterosexual., 01/13/2016     Substance Abuse - Current, 12/12/2018      vaping THC and cannabis oil, 12/12/2018     Tobacco      Never smoker, 01/13/2016  Family History    Anxiety: Daughter and Daughter.    Bipolar: Father.    CA - Cancer: Father.    CHF - Congestive heart failure: Father.    Depression: Father and Daughter.    Diabetes mellitus type 2: Father.    Diabetes mellitus type II: Father.    Gallbladder problem: Mother.    Hypothyroidism: Sister.    Kidney stone: Daughter.    Melanoma: Father.    Migraine: Mother, Sister and Daughter.    Miscellaneous: Mother and Brother.    Thyroid: Sister.  Immunizations      Vaccine Date Status      pneumococcal (PPSV23) 05/14/2012 Recorded      tetanus/diphth/pertuss (Tdap) adult/adol 07/16/2008 Recorded  Lab Results       Lab Results (Last 4 results within 90 days)         WBC: 7.8 [4.5  - 11] (08/17/19 15:14:00)       RBC: 4.36 [4  - 5.2] (08/17/19 15:14:00)       Hgb: 12.3 [12 g/dL - 16 g/dL] (08/17/19 15:14:00)       Hct: 37.0 [33 % - 51 %] (08/17/19 15:14:00)       MCV: 85 [80 fL - 100 fL] (08/17/19 15:14:00)       MCH: 28.2 [26 pg - 34 pg] (08/17/19 15:14:00)       MCHC: 33.2 [32 g/dL - 36 g/dL] (08/17/19 15:14:00)       RDW: 14.1 [11.5 % - 15.5 %] (08/17/19 15:14:00)       Platelet: 281 [140  - 440] (08/17/19 15:14:00)       MPV: 9.7 [6.5 fL - 11 fL] (08/17/19 15:14:00)       Lymphocytes: 9.6 (08/17/19 15:14:00)       Abs Lymphocytes: 0.8 Low [0.9  - 2.9] (08/17/19 15:14:00)       Neutrophils: 80.9 (08/17/19 15:14:00)       Abs Neutrophils: 6.3 [1.7  - 7] (08/17/19 15:14:00)       Monocytes: 5.1 (08/17/19 15:14:00)       Abs Monocytes: 0.4 (08/17/19 15:14:00)       Eosinophils: 4.1 (08/17/19 15:14:00)       Abs Eosinophils: 0.3 (08/17/19 15:14:00)       Basophils: 0.3 (08/17/19 15:14:00)       Abs Basophils: 0.0 (08/17/19 15:14:00)       UA pH: 6 [5  - 8] (08/17/19 15:09:00)       UA Specific Gravity: 1.02 [1.001  - 1.035] (08/17/19 15:09:00)       UA Glucose: NEGATIVE [NEGATIVE  - NEGATIVE] (08/17/19 15:09:00)       UA Bilirubin: NEGATIVE [NEGATIVE  - NEGATIVE] (08/17/19 15:09:00)       UA Ketones: NEGATIVE [NEGATIVE  - NEGATIVE] (08/17/19 15:09:00)       Urine Occult Blood: NEGATIVE [NEGATIVE  - NEGATIVE] (08/17/19 15:09:00)       UA Protein: NEGATIVE [NEGATIVE  - NEGATIVE] (08/17/19 15:09:00)       UA Nitrite: NEGATIVE [NEGATIVE  - NEGATIVE] (08/17/19 15:09:00)       UA Leukocyte Esterase: NEGATIVE [NEGATIVE  - NEGATIVE] (08/17/19 15:09:00)

## 2022-02-15 NOTE — PROGRESS NOTES
Chief Complaint    Was getting chemo and not feeling well. Had to come in to see MD per oncology MD, would like her to have chest x-ray CT due to lung sounds.  History of Present Illness      Started chemotherapy for MS Brandan with 5 days of chemotherapy. Developed sinus symptoms 5 days ago. Then started coughing. Began chemotherapy two days ago. Neurologist concerned about lung exam and unable to continue chemotherapy until better.  She is on steroids for the MS.  Review of Systems      No fevers       No vomiting       No rashes  Physical Exam   Vitals & Measurements    T: 98.8(Tympanic)  HR: 88(Peripheral)  BP: 188/88  SpO2: 96%     HT: 65 in  WT: 296.6 lb  BMI: 49.35       General: No acute distress.      HENT: Tympanic membranes are clear, No pharyngeal erythema.      Neck: No lymphadenopathy.      Respiratory: Lungs are clear to auscultation with mild expiratory wheezing      Cardiovascular: Normal rate, Regular rhythm.      Musculoskeletal: Normal gait.       CXR: Small nodule seen in the left mid lung.       CT chest: Large areas of groundglass opacity involving the bilateral upper lobes and left lower lobe.  Findings appear infectious or inflammatory       WBC: 13.1 with 96% neutrophils  Assessment/Plan   Cough: Possible pneumonia.  Given being on chemotherapy with elevated WBC and neutrophils will start Zithromax and Cefdinir.  Patient Information     Name:MARGARITA KAISER      Address:      [address] [place] [id number]     Sex:Female     YOB: 1975     [phone]     Emergency Contact:ELISA HOPE     MRN:[id number]     FIN:[id number]     Location:[facility]     Date of Service:02/21/2018      Primary Care Physician:       Deondre HART, Orestes, [phone]  Medications        DULoxetine 60 mg oral delayed release capsule: 180 mg, 3 cap(s), po, daily, 180 cap(s), 0 Refill(s).        LORazepam 0.5 mg oral tablet: po, tid, 0.5mg prn and 1-2  hs, PRN: for agitation, 0 Refill(s).        Walker with large wheels and large sitting seat: See Instructions, use as directed, 1 EA, 0 Refill(s).        Excedrin: See Instructions, 0 Refill(s).        Adderall: 45 mg, po, bid, 0 Refill(s).        cholecalciferol 5000 intl units oral tablet: See Instructions, 15,000 units daily, 100 tab(s), 5 Refill(s).        cyclobenzaprine: 10 mg, po, tid, PRN: as needed for muscle spasm, 0 Refill(s).        ergocalciferol 50,000 intl units (1.25 mg) oral capsule: See Instructions, 1 cap(s) po wkly, 12 cap(s), 0 Refill(s).        indomethacin 25 mg oral capsule: 25 mg, 1 cap(s), po, q6 hrs, PRN: for gout pain, 0 Refill(s).        LaMICtal 100 mg oral tablet: 100 mg, 1 tab(s), po, daily, 0 Refill(s).        lidocaine 5% topical ointment: 1 gabby, TOP, TID, PRN: for pain, 50 gm, 2 Refill(s).        lidocaine-prilocaine 2.5%-2.5% topical cream: See Instructions, APPLY 5 GRAMS TOPICALLY TO THE AFFECTED AREA(S) ONCE FOR A ONE TIME DOSE, 30 gm.        Saxenda 18 mg/3 mL subcutaneous solution: 3 mg, subcutaneous, daily, take 3mg injection q am (goal dose), 15 mL, 6 Refill(s).        medroxyPROGESTERone 10 mg oral tablet: See Instructions, TAKE 1 TABLET BY MOUTH EVERY DAY FOR 10 DAYS ONCE PER MONTH PER PROVIDER, 30 tab(s).        meloxicam 15 mg oral tablet: 15 mg, 1 tab(s), PO, Daily, 90 tab(s), 0 Refill(s).        methenamine hippurate 1 g oral tablet: See Instructions, TAKE ONE TABLET BY MOUTH TWICE DAILY WITH FOOD, 60 tab(s).        modafinil 200 mg oral tablet: See Instructions, TAKE 1 TABLET BY MOUTH TWICE DAILY, 60 tab(s).        ondansetron 4 mg oral tablet: 4 mg, 1 tab(s), PO, q8 hrs, PRN: for nausea and vomiting, 10 tab(s), 0 Refill(s).        Lyrica 300 mg oral capsule: 300 mg, 1 cap(s), po, tid, 90 cap(s), 5 Refill(s).        rizatriptan 10 mg oral tablet: See Instructions, TAKE 1 TABLET BY MOUTH 1 TIME AS NEEDED FOR MIGRAINE HEADACHE, 6 tab(s).        tiZANidine 4 mg oral  tablet: See Instructions, TAKE 2 TO 3 TABLETS BY MOUTH EVERY NIGHT AT BEDTIME, 90 tab(s).                Allergies    adhesive tape    sulfa drugs (Rash, Vomiting....., Diarrhea)

## 2022-02-15 NOTE — LETTER
(Inserted Image. Unable to display)                                                                                                                                                        April 19, 2021Re: MARGARITA KAISER1975  [facility]  [facility] [address] [place] [address]Ut:   [facility]The following patient has been referred to your office/practice:  MARGARITA KAISER Appointment:  Appointment is PendingPlease refer to the attached  clinical documentation for a summary of MARGARITA's care.  Please do not hesitate to contact our office if any additional clinical questions arise.  All relevant records and transition of care documents should be mailed or faxed.Your assistance in providing continuity of care is appreciated. Sincerely, [address] [address](P) [phone](F) [phone]

## 2022-02-15 NOTE — TELEPHONE ENCOUNTER
---------------------  From: Jimbo SOLIZ, Zeina POP (Phone Messages Pool ([id number]_[facility]))   To: [facility] Message Pool ([id number] [facility] [place]);     Sent: 11/12/2020 8:22:00 AM CST  Subject: UTI symptoms     Phone message    PCP:   SHANNON      Time of Call:  2001 on 11/11/20       Person Calling:  Pt  Phone number:  [phone]    Returned call at: 0817 on 11/12/20    Note:   Pt states she had surgery Tuesday. She is having UTI symptoms (frequency, urgency, weakness when walking). She is hoping [facility] will send in Rx for antibiotic.    She is not on an antibiotic post surgery. She cannot come in to clinic to give urine sample but thinks her mom may be able to drop off sample later today (she has to ask her mom). Pt states she does have sample cups at home.    I told pt I would send message to [facility]. Pt will call back if mom can bring sample in.    Last office visit and reason:  10/9/20, right quad mass---------------------  From: Freya Mark CMA ([facility] Message Pool ([id number]_[facility]))   To: Orestes Mendosa MD;     Sent: 11/12/2020 11:40:10 AM CST  Subject: FW: UTI symptoms     s/p excision of right inguinal lymph node 11/10  allergic to sulfa drugs    please advise---------------------  From: Orestes Mendosa MD   To: [facility] Message Pool ([id number] [facility] [place]);     Sent: 11/12/2020 11:57:13 AM CST  Subject: RE: UTI symptoms     yes, recommending home collection.  Karli dave had similar symptoms related to bladder function without infectionpt contacted at 1205 and advised, she hasn't had a chance to check with her mom, but will  POC UA and UC order placed

## 2022-02-15 NOTE — PROGRESS NOTES
Patient:   MARGARITA KAISER            MRN: [id number]            FIN: [id number]               Age:   44 years     Sex:  Female     :  1975   Associated Diagnoses:   DM (diabetes mellitus), type 2; Morbid (severe) obesity due to excess calories   Author:   Dianne Mane      Visit Information   Visit type:  Diabetes Self Management Education .    Referral source:  Deondre HART, Orestes.       Chief Complaint   DM Type II, Obesity       History of Present Illness   Pt had a health screening at work.  A1c 6.9% = 152.  Pt with known prediabetes, had been on and off Saxenda since 2018.  Pt only fills the rx when she meets her deductible due to high cost.  Pt had been off of it for ~3 mo prior to lab testing.  Pt had been able to tolerate the 1.8mg dose and felt ill at the higher doses - will order Victoza 1.8mg today to see if that has improved coverage.      Discussed nutrition, diabetes, and lifestyle management with pt  Nutrition:  Over the past few months pt has been steadily losing weight, watching calorie and carb intake, tracking on an gabby (eat this much) also tracking weight on the Alloc[employer] gabby through work.    Pt had been drinking 1-2 cans regular soda daily and has stopped, also now living alone which affects what she brings in her house  more lean protein, nonstarchy vegetables, likes high fiber beans   Fluids: water  Physical Activity:  none at this time   Stress:   mod/ high   Sleep: good  Support: family   BG Testing: education today   DM related medication: will change Saxenda to Victoza 1.8mg daily   Insulin: none at this time   Routine diabetes care:  will discuss during follow up consults    Dilated Retinal Eye Exam:    Skin:   Dental:   Feet:   Immunizations:  Hgb A1c: 6.9% = 152 mg/dL estimated average glucose      Health Status   Allergies:    Allergic Reactions (Selected)  Severity Not Documented  Adhesive tape (No reactions were documented)  Sulfa drugs (Rash, vomiting..... and diarrhea)    Medications:  (Selected)   Prescriptions  Prescribed  B-D PEN NDL MINI [id number](3/16)PRPL: See Instructions, Instructions: INJECT WITH SAXENDA EVERY DAY, # 100 EA, Type: Soft Stop, Pharmacy: [facility] #[id number], INJECT WITH SAXENDA EVERY DAY  B-D PEN NDL MINI [id number](3/16)PRPL: See Instructions, Instructions: INJECT WITH SAXENDA EVERY DAY, # 100 unknown unit, Type: Soft Stop, Pharmacy: [facility] #[id number], INJECT WITH SAXENDA EVERY DAY  BD pen needle mini [id number]: BD pen needle mini [id number], See Instructions, Instructions: Use daily with Saxenda as directed, Supply, # 100 EA, 0 Refill(s), Type: Maintenance, Pharmacy: [facility] #[id number], Use daily with Saxenda as directed  Replacement CPAP +8 cm H2o for use daily: Replacement CPAP +8 cm H2o for use daily, See Instructions, Instructions: Heated humidifier x1; Humidifier chamber x1;  Heated tubing x1; Full face mask of choice with headgear  x1; Cushion x 1;  Filters: Disposable x1pk & Reusable x1pk.  Length of Ne...  Victoza 18 mg/3 mL subcutaneous solution: ( 1.8 mg ), Subcutaneous, daily, Instructions: take in am, # 27 mL, 3 Refill(s), Type: Maintenance, Pharmacy: [facility] #[id number], 1.8 mg Subcutaneous daily,Instr:take in am  Zithromax 250 mg oral tablet: = 1 packet(s), Oral, once, Instructions: as directed on package labeling, # 6 tab(s), 0 Refill(s), Type: Soft Stop, Pharmacy: [facility] #[id number], 1 packet(s) Oral once,Instr:as directed on package labeling  amLODIPine 5 mg oral tablet: = 1 tab(s), Oral, daily, # 30 tab(s), 0 Refill(s), Type: Maintenance, Pharmacy: [facility] #[id number], Needs appt for further refills  jaz contour next test strips: jaz contour next test strips, See Instructions, Instructions: testing 2x/ day, Supply, # 4 box(es), 2 Refill(s), Type: Maintenance, Pharmacy: [facility] #[id number], testing 2x/ day  benzonatate 100 mg oral capsule: 1 cap(s), Oral, tid, PRN: AS NEEDED FOR COUGH, #  30 cap(s), Type: Soft Stop, Pharmacy: [facility] #[id number]  contour next microlet lancets: contour next microlet lancets, See Instructions, Instructions: testing 2x/ day, Supply, # 1 box(es), 2 Refill(s), Type: Maintenance, Pharmacy: [facility] #[id number], testing 2x/ day  ergocalciferol 50,000 intl units (1.25 mg) oral capsule: 1 cap(s) ( 50,000 International Unit ), Oral, qweek, # 5 cap(s), 0 Refill(s), Type: Maintenance, Pharmacy: [id number]  ergocalciferol 50,000 intl units (1.25 mg) oral capsule: See Instructions, Instructions: TAKE 1 CAPSULE BY MOUTH EVERY WEEK, # 5 cap(s), Type: Soft Stop, Pharmacy: [id number], TAKE 1 CAPSULE BY MOUTH EVERY WEEK  lidocaine 5% topical ointment: See Instructions, Instructions: APPLY EXTERNALLY TO THE AFFECTED AREA THREE TIMES DAILY AS NEEDED FOR PAIN, # 30 gm, Type: Soft Stop, Pharmacy: [facility] #[id number]  meloxicam 15 mg oral tablet: 1 tab(s), Oral, daily, # 90 tab(s), Type: Soft Stop, Pharmacy: [facility] #[id number]  methenamine hippurate 1 g oral tablet: = 1 tab(s) ( 1 gm ), Oral, bid, # 180 tab(s), 3 Refill(s), Type: Maintenance, Pharmacy: [id number], 1 tab(s) Oral bid,x90 day(s)  ondansetron 4 mg oral tablet: 1 tab(s), Oral, q8 hrs, PRN: AS NEEDED FOR NAUSEA OR VOMITING, # 30 tab(s), Type: Soft Stop, Pharmacy: [facility] #[id number]  rizatriptan 10 mg oral tablet: See Instructions, Instructions: TAKE 1 TABLET BY MOUTH 1 TIME AS NEEDED FOR MIGRAINE HEADACHE, # 6 tab(s), Type: Soft Stop, Pharmacy: [facility] #[id number]  tiZANidine 4 mg oral tablet: 2 TO 3 TABLETS, Oral, qhs, # 90 tab(s), Type: Soft Stop, Pharmacy: [facility] #[id number]  Documented Medications  Documented  Excedrin: See Instructions, 0 Refill(s), Type: Maintenance  Lemtrada: See Instructions, Instructions: 1x/yr, 0 Refill(s), Type: Maintenance  cholecalciferol 2000 intl units oral tablet: 1 tab(s) ( 2,000  International Unit ), po, daily, 0 Refill(s), Type: Maintenance  clonazePAM 0.5 mg oral tablet: = 2 tab(s) ( 1 mg ), Oral, hs, 0 Refill(s), Type: Maintenance  cyclobenzaprine 10 mg oral tablet: = 1 tab(s) ( 10 mg ), Oral, tid, PRN: for spasm, # 30 tab(s), 0 Refill(s), Type: Maintenance  valACYclovir 500 mg oral tablet: 1 tab(s) ( 500 mg ), po, bid, 0 Refill(s), Type: Maintenance,    Medications          *denotes recorded medication          BD pen needle mini [id number]: See Instructions, Use daily with Saxenda as directed, 100 EA, 0 Refill(s).          B-D PEN NDL MINI [id number](3/16)PRPL: See Instructions, INJECT WITH SAXENDA EVERY DAY, 100 unknown unit.          B-D PEN NDL MINI 82OF1GH(3/16)PRPL: See Instructions, INJECT WITH SAXENDA EVERY DAY, 100 EA.          Replacement CPAP +8 cm H2o for use daily: See Instructions, Heated humidifier x1; Humidifier chamber x1;  Heated tubing x1; Full face mask of choice with headgear  x1; Cushion x 1;  Filters: Disposable x1pk & Reusable x1pk.  Length of Need = 99 Months, 1 EA, 11 Refill(s).          jaz contour next test strips: See Instructions, testing 2x/ day, 4 box(es), 2 Refill(s).          contour next microlet lancets: See Instructions, testing 2x/ day, 1 box(es), 2 Refill(s).          *Excedrin: See Instructions, 0 Refill(s).          *Lemtrada: See Instructions, 1x/yr, 0 Refill(s).          amLODIPine 5 mg oral tablet: 1 tab(s), Oral, daily, 30 tab(s), 0 Refill(s).          Zithromax 250 mg oral tablet: 1 packet(s), Oral, once, as directed on package labeling, 6 tab(s), 0 Refill(s).          benzonatate 100 mg oral capsule: 1 cap(s), Oral, tid, for 10 day(s), PRN: AS NEEDED FOR COUGH, 30 cap(s).          *cholecalciferol 2000 intl units oral tablet: 2,000 International Unit, 1 tab(s), po, daily, 0 Refill(s).          *clonazePAM 0.5 mg oral tablet: 1 mg, 2 tab(s), Oral, hs, 0 Refill(s).          *cyclobenzaprine 10 mg oral tablet: 10 mg, 1 tab(s), Oral, tid, PRN: for  spasm, 30 tab(s), 0 Refill(s).          ergocalciferol 50,000 intl units (1.25 mg) oral capsule: 50,000 International Unit, 1 cap(s), Oral, qweek, for 30 day(s), 5 cap(s), 0 Refill(s).          ergocalciferol 50,000 intl units (1.25 mg) oral capsule: See Instructions, TAKE 1 CAPSULE BY MOUTH EVERY WEEK, 5 cap(s).          lidocaine 5% topical ointment: See Instructions, APPLY EXTERNALLY TO THE AFFECTED AREA THREE TIMES DAILY AS NEEDED FOR PAIN, 30 gm.          Victoza 18 mg/3 mL subcutaneous solution: 1.8 mg, Subcutaneous, daily, take in am, 27 mL, 3 Refill(s).          meloxicam 15 mg oral tablet: 1 tab(s), Oral, daily, 90 tab(s).          methenamine hippurate 1 g oral tablet: 1 gm, 1 tab(s), Oral, bid, for 90 day(s), 180 tab(s), 3 Refill(s).          ondansetron 4 mg oral tablet: 1 tab(s), Oral, q8 hrs, PRN: AS NEEDED FOR NAUSEA OR VOMITING, 30 tab(s).          rizatriptan 10 mg oral tablet: See Instructions, TAKE 1 TABLET BY MOUTH 1 TIME AS NEEDED FOR MIGRAINE HEADACHE, 6 tab(s).          tiZANidine 4 mg oral tablet: 2 TO 3 TABLETS, Oral, qhs, 90 tab(s).          *valACYclovir 500 mg oral tablet: 500 mg, 1 tab(s), po, bid, 0 Refill(s).       Problem list:    All Problems (Selected)  Anemia in chronic illness / SNOMED CT 124790067 / Confirmed  Ataxia / SNOMED CT 94681305 / Confirmed  Chronic low back pain / SNOMED CT 724276619 / Confirmed  Blues / SNOMED CT 368185927 / Confirmed  Fatigue / SNOMED CT 858874952 / Confirmed  History of renal stone / SNOMED CT 3059350230 / Confirmed  Headache, migraine / SNOMED CT 72794299 / Confirmed  Morbid (severe) obesity due to excess calories / SNOMED CT 048485233 / Probable  Multiple sclerosis / SNOMED CT 27500050 / Confirmed  Muscle weakness / SNOMED CT 62772149 / Confirmed  Neuralgia / SNOMED CT 12178170 / Confirmed  Urinary bladder neurogenic dysfunction / SNOMED CT 3332443182 / Confirmed  Obstructive sleep apnea syndrome, moderate / SNOMED CT 302687842 / Confirmed  Knee  osteoarthritis / SNOMED CT 111557315 / Confirmed  Skin sensation disturbance / SNOMED CT 367744024 / Confirmed  Seasonal allergies / SNOMED CT 149432813 / Confirmed  DM (diabetes mellitus), type 2 / SNOMED CT 447141916 / Confirmed  Vitamin D deficiency / SNOMED CT 50424255 / Confirmed      Histories   Past Medical History:    Active  DM (diabetes mellitus), type 2 (848987684): Onset on 1/8/2019 at 43 years.  Multiple sclerosis (29167634): Onset in 2012 at 37 years.  Blues (149435320)  History of renal stone (8397476240)  Urinary bladder neurogenic dysfunction (9984459215)  Fatigue (306339866)  Muscle weakness (39927748)  Vitamin D deficiency (20091633)  Ataxia (02730023)  Headache, migraine (47099092)  Comments:  7/6/2016 CDT 11:19 AM [facility] - Katie Hatfield  With aura.  Skin sensation disturbance (105249157)  Seasonal allergies (702824439)  Resolved  Inpatient stay (511789249): Onset on 2/23/2018 at 42 years.  Resolved on 2/25/2018 at 42 years.  Comments:  2/27/2018 CST 7:59 AM La Serrano  @[facility] [place], [place] - Acute pneumonitis; likely related to recent Lemtrada introduction  Inpatient stay (119874386): Onset on 6/14/2016 at 40 years.  Resolved on 6/18/2016 at 40 years.  Comments:  2/27/2018 CST 7:58 AM La Serrano  @[facility] [place] - Complicated UTI  Inpatient stay (723240067): Onset in the month of 5/2016 at 40 years  Resolved.  Comments:  2/27/2018 CST 7:57 AM La Serrano  @[facility], [place] - Left renal stone  Pregnancy:  Resolved.  Pregnancy (395365860):  Resolved in 2006 at 30 years.  Pregnancy (361717323):  Resolved in 1994 at 18 years.   Family History:    Melanoma  Father (Shakir)  Thyroid  Sister (Ana Cristina)  Kidney stone  Daughter (Zac)  Diabetes mellitus type II  Father (Shakir)  Hypothyroidism  Sister (Ana Cristina)  Diabetes mellitus type 2  Father (Shakir)  CA - Cancer  Father (Shakir)  Comments:  4/5/2017 11:45 AM CDT - Katie Hatfield  CHF - Congestive  heart failure  Father (Shakir)  Migraine  Sister (Ana Cristina)  Daughter (Leda)  Mother (Soledad)  Depression  Daughter (Zac)  Father (Shakir)  Gallbladder problem  Mother (Soledad)  Bipolar  Father (Shakir)  Miscellaneous  Brother (Adrian)  Comments:  2017 11:43 AM CDT - Katie Hatfield  spinal stenosis  Mother (Soledad)  Comments:  2017 11:43 AM CDT - Katie Hatfield  spinal stenosis  Anxiety  Daughter (Leda)  Daughter (Zac)     Procedure history:    Port-a-cath in place - Right (IMO [id number]) performed by Adriano Dougherty MD on 2016 at 41 Years.  Comments:  2016 8:01 AM CDT - Ceci Engle  Dx:  Multiple sclerosis.  cystoscopy left ureteroscopy with holmium laser left retrogrades stone extraction and left ureteral stent exchange on 6/3/2016 at 40 Years.  Ureteroscopy (SNOMED CT 8592481700) in the month of 2016 at 40 Years.  Comments:  2016 4:56 PM CDT - La Garcia  with laser/stone extraction (inability to remove all of stone)  Appendectomy (SNOMED CT 551864401) in  at 39 Years.  Breast reduction (SNOMED CT 718292443) in  at 26 Years.  Tonsillectomy and adenoidectomy (SNOMED CT 017610513) in  at 14 Years.  Cholecystectomy (SNOMED CT 84185592).   section (SNOMED CT 03819106).  Comments:  2016 11:26 AM CDT - Freya Mark CMA  x1  Tubal ligation (SNOMED CT 828810667).   Social History:        Alcohol Assessment            Current, 1 TIME PER WEEK, 1 drinks/episode maximum.      Tobacco Assessment            Never smoker      Substance Abuse Assessment: Current            vaping THC and cannabis oil      Employment and Education Assessment            Disability      Home and Environment Assessment            2 children.      Nutrition and Health Assessment            Caffeine intake amount: 2 sodas, every day.      Exercise and Physical Activity Assessment: Does not exercise      Sexual Assessment            Sexually active: No.  Sexual orientation: Heterosexual.         Physical Examination   Measurements from flowsheet : Measurements   1/27/2020 4:38 PM CST Height Measured - Standard 65 in    Weight Measured - Standard 259 lb    BSA 2.32 m2    Body Mass Index 43.1 kg/m2  HI         Health Maintenance      Recommendations     Pending (in the next year)        OverDue           Tetanus Vaccine due  07/16/18  and every 10  year(s)           Influenza Vaccine due  08/31/19  and every 1  year(s)           Lipid Disorders Screen (Female) due  12/12/19  and every 1  year(s)        Due            Cervical Cancer Screen (if sexually active) due  01/27/20  Variable frequency           DM - Communication with Managing Provider due  01/27/20  and every 1  year(s)           DM - Eye Exam due  01/27/20  and every 1  year(s)           DM - Foot Exam due  01/27/20  and every 1  year(s)           DM - Microalbumin due  01/27/20  and every 1  year(s)        Due In Future            DM - HgbA1c not due until  04/08/20  and every 3  month(s)           Alcohol Misuse Screen (Female) not due until  07/23/20  and every 1  year(s)           Depression Screen (Female) not due until  07/23/20  and every 1  year(s)           High Blood Pressure Screen (Female) not due until  08/17/20  and every 1  year(s)           Tobacco Use Screen (Female) not due until  08/17/20  and every 1  year(s)           Type 2 Diabetes Mellitus Screen (Female) not due until  01/08/21  and every 1  year(s)     Satisfied (in the past 1 year)        Satisfied            Alcohol Misuse Screen (Female) on  07/23/19.           Body Mass Index Check (Female) on  01/27/20.           Body Mass Index Check (Female) on  08/17/19.           Body Mass Index Check (Female) on  07/25/19.           DM - HgbA1c on  01/08/20.           Depression Screen (Female) on  07/23/19.           Depression Screen (Female) on  07/23/19.           Depression Screen (Female) on  07/23/19.           High Blood Pressure Screen (Female) on  08/17/19.            High Blood Pressure Screen (Female) on  07/25/19.           High Blood Pressure Screen (Female) on  07/23/19.           Obesity Screen and Counseling (Female) on  01/27/20.           Obesity Screen and Counseling (Female) on  08/17/19.           Obesity Screen and Counseling (Female) on  07/25/19.           Obesity Screen and Counseling (Female) on  07/23/19.           Tobacco Use Screen (Female) on  08/17/19.           Tobacco Use Screen (Female) on  07/25/19.           Tobacco Use Screen (Female) on  07/23/19.           Tobacco Use Screen (Female) on  03/16/19.           Type 2 Diabetes Mellitus Screen (Female) on  01/08/20.          Review / Management   Results review:  Lab results   1/8/2020 4:52 PM CST Sodium Level  mEq/L    Potassium Level TR 3.9 mEq/L    Chloride  mEq/L    CO2 TR 23 mEq/L    Anion Gap TR 8 mEq/L    Glucose Level  mg/dL    BUN TR 15 mg/dL    Creatinine TR 0.68 mg/dL    BUN/Creatinine Ratio TR 22    Calcium TR 9.1 mEq/dL    Hgb A1c TR 6.9 %    Vitamin D 25-OH, Total TR 89.1 ng/mL    WBC TR 9.3 x10^3/uL    RBC TR 4.31 x10^6/uL    Hgb TR 12.2 g/dL    Hct TR 36.6 %    MCV TR 85 fL    MCH TR 28.3 pg    MCHC TR 33.3 gm/dL    RDW TR 14.3 %    Platelet  x10^3/uL    MPV (Mean Platelet Volume) TR 10.0 fL   .       Impression and Plan   Diagnosis     DM (diabetes mellitus), type 2 (RZL91-PE E11.9).     Morbid (severe) obesity due to excess calories (TRG84-ZH E66.01).       Counseled: Patient, AADE7 Self Care Behaviors   Today the patient was instructed on the basic physiology of diabetes mellitus, BG testing, and lifestyle guidelines.  Healthy Eating - Education on what foods are primary sources of carbohydrates and how intake affects BG readings, edu on carbohydrate counting, reading food labels, appropriate portion sizes, well balanced meals, diabetic plate method as a general rule.  Patient is provided with numerous ideas to incorporate dietary recommendations, meal planning  and preparation, mindful eating techniques and weight loss tips.    Being Active - Education on how exercise helps with :    - lowering BG by increasing the muscles ability to take up and use glucose   - weight loss   - healthier heart (improve lipid profile)   - improve sleep, mood, energy   - decrease stress      Monitoring - Today the patient is instructed on home blood glucose monitoring.  Pt is provided with a meter.  Pt is instructed on the proper use of the meter, recommended testing schedule and blood glucose target levels.  The patient is able to return appropriate demonstration of the use of the meter.  Pt is instructed on proper disposal of lancets.    Taking Medication - Pt on Saxenda - tolerating 1.8mg dose will change to Victoza 1.8mg dose and evaluate prescription coverage   Problem Solving - medical support team assistance, resources provided (written and apps, internet); good control of stress  Healthy Coping - support of friends, family, and medical support team   Reducing Risks - Will discuss at f/u apts.  Weight loss goal of 7 - 10% of current body weight pounds as a reasonable goal to help increase insulin sensitivity   .      Goals:   1.  Practice healthy stress management and get good quality sleep with the goal of 7-8 hours per night.    2.   Physical activity: as able 15 min of walking daily   3.  Eat in a healthy way, per diabetic plate method.  Nutrition reference: Eat 3 meals a day; snacks are optional.  A meal is three or more food groups; make it colorful for better nutrition.    4.  Total Carbohydrate intake 30 grams for meals, snacks ~ 15 grams  5.  Pt to monitor BG BID.  BG goals: Fasting and before meals 80 - 120 mg/dL, 2 hrs after the start of a meal 80 - 140 mg/dL.    6.  Goal weight 140# by 7/2020  7.  Read handouts provided.  F/u in 2 mo       Professional Services   Time spent with pt 60 min   cc Dr. SHALINI Mendosa

## 2022-02-15 NOTE — LETTER
(Inserted Image. Unable to display)   [address] [place] [address]  May 19, 2021      MARGARITA KAISER  [address]  [place] [address]        Dear MARGARITA,     Thank you for selecting [facility] (previously [facility]) for your healthcare needs. Below you will find the results of your recent test(s) done at our clinic.      Pre-op labs look fine.        Result Name Current Result Previous Result Reference Range   Sodium Level (mmol/L)  139 5/18/2021  139 4/7/2021 135 - 146   Potassium Level (mmol/L)  4.2 5/18/2021  4.0 4/7/2021 3.5 - 5.3   Chloride Level (mmol/L)  106 5/18/2021  105 4/7/2021 98 - 110   CO2 Level (mmol/L)  24 5/18/2021  25 4/7/2021 20 - 32   Glucose Level (mg/dL) ((H)) 118 5/18/2021 ((H)) 190 4/7/2021 65 - 99   BUN (mg/dL)  10 5/18/2021  13 4/7/2021 7 - 25   Creatinine Level (mg/dL)  0.67 5/18/2021  0.62 4/7/2021 0.50 - 1.10   BUN/Creat Ratio  NOT APPLICABLE 5/18/2021  NOT APPLICABLE 4/7/2021 6 - 22   eGFR (mL/min/1.73m2)  105 5/18/2021  107 4/7/2021 > OR = 60 -    eGFR  (mL/min/1.73m2)  121 5/18/2021  124 4/7/2021 > OR = 60 -    Calcium Level (mg/dL)  9.3 5/18/2021  9.0 4/7/2021 8.6 - 10.2   WBC  8.2 5/18/2021  6.6 10/9/2020 3.8 - 10.8   RBC  4.51 5/18/2021  4.31 10/9/2020 3.80 - 5.10   Hgb (gm/dL)  12.8 5/18/2021  12.5 10/9/2020 11.7 - 15.5   Hct (%)  39.3 5/18/2021  37.4 10/9/2020 35.0 - 45.0   MCV (fL)  87.1 5/18/2021  87 10/9/2020 80.0 - 100.0   MCH (pg)  28.4 5/18/2021  29.0 10/9/2020 27.0 - 33.0   MCHC (gm/dL)  32.6 5/18/2021  33.4 10/9/2020 32.0 - 36.0   RDW (%)  14.7 5/18/2021  13.8 10/9/2020 11.0 - 15.0   Platelet  301 5/18/2021  274 10/9/2020 140 - 400   MPV (fL)  10.5 5/18/2021  9.9 10/9/2020 7.5 - 12.5   Lymphocytes (%)  14.9 5/18/2021  13.6 10/9/2020    Abs Lymphocytes  1,222 5/18/2021  0.9 10/9/2020 850 - 3,900   Neutrophils (%)  76.8 5/18/2021  77.3 10/9/2020    Abs Neutrophils  6,298 5/18/2021  5.1  10/9/2020 1,500 - 7,800   Monocytes (%)  4.1 5/18/2021  4.7 10/9/2020    Abs Monocytes  336 5/18/2021  0.3 10/9/2020 200 - 950   Eosinophils (%)  3.5 5/18/2021  3.5 10/9/2020    Abs Eosinophils  287 5/18/2021  0.2 10/9/2020 15 - 500   Basophils (%)  0.7 5/18/2021  0.9 10/9/2020    Abs Basophils  57 5/18/2021  0.1 10/9/2020 0 - 200       Please contact me or my assistant at [phone] if you have any questions or concerns.     Sincerely,        Orestes Mendosa MD    What do your labs mean?  Below is a glossary of commonly ordered labs:  LDL - Bad Cholesterol  HDL - Good Cholesterol  AST/ALT - Liver Function  Cr/Creatinine - Kidney Function  Microalbumin - Kidney Function  BUN - Kidney Function  PSA - Prostate   TSH - Thyroid Hormone  HgbA1c - Diabetes Test  Hgb (Hemoglobin) - Red Blood Cells

## 2022-02-15 NOTE — CARE COORDINATION
Karli called and LM at 11:02am-R/c at 1:30pm    She is filling out some paperwork for disability and had some questions related to her visits. All her questions were answered.

## 2022-02-15 NOTE — NURSING NOTE
Comprehensive Intake Entered On:  12/14/2021 9:51 AM CST    Performed On:  12/14/2021 9:45 AM CST by Rosa Liang               Summary   Chief Complaint :   Video visit consent. Pt c/o poss Covid, fever, sore throat, chills, HA, muscle aches, loss of taste and smell. She is requesting a Covid test.    Menstrual Status :   Menarcheal   Height/Length Estimated :   65 in(Converted to: 5 ft 5 in, 165.10 cm)    Rosa Liang - 12/14/2021 9:45 AM CST   Health Status   Allergies Verified? :   Yes   Medication History Verified? :   Yes   Rosa Liang - 12/14/2021 9:45 AM CST   Consents   Consent for Immunization Exchange :   Consent Denied   Consent for Immunizations to Providers :   Consent Granted   Rosa Liang - 12/14/2021 9:45 AM CST   Meds / Allergies   (As Of: 12/14/2021 9:51:29 AM CST)   Allergies (Active)   adhesive tape  Estimated Onset Date:   Unspecified ; Created By:   Katie Hatfield; Reaction Status:   Active ; Category:   Drug ; Substance:   adhesive tape ; Type:   Allergy ; Updated By:   Katie Hatfield; Reviewed Date:   12/14/2021 9:47 AM CST      sulfa drugs  Estimated Onset Date:   Unspecified ; Reactions:   Rash, Vomiting....., Diarrhea ; Created By:   Michelle Pedersen CMA; Reaction Status:   Active ; Category:   Drug ; Substance:   sulfa drugs ; Type:   Allergy ; Updated By:   Michelle Pedersen CMA; Reviewed Date:   12/14/2021 9:47 AM CST        Medication List   (As Of: 12/14/2021 9:51:29 AM CST)   Prescription/Discharge Order    liraglutide  :   liraglutide ; Status:   Prescribed ; Ordered As Mnemonic:   Victoza 18 mg/3 mL subcutaneous solution ; Simple Display Line:   1.8 mg, Subcutaneous, daily, 9 mL, 3 Refill(s) ; Ordering Provider:   Orestes Mendosa MD; Catalog Code:   liraglutide ; Order Dt/Tm:   11/24/2021 8:39:14 AM CST          acetaminophen-hydrocodone  :   acetaminophen-hydrocodone ; Status:   Prescribed ; Ordered As Mnemonic:   Norco 5 mg-325 mg oral tablet ; Simple Display Line:   1  tab(s), Oral, q4 hrs, PRN: for pain, 40 tab(s), 0 Refill(s) ; Ordering Provider:   Orestes Mendosa MD; Catalog Code:   acetaminophen-hydrocodone ; Order Dt/Tm:   11/24/2021 8:26:58 AM CST          metFORMIN  :   metFORMIN ; Status:   Prescribed ; Ordered As Mnemonic:   MetFORMIN (Eqv-Glucophage XR) 500 mg oral tablet, extended release ; Simple Display Line:   2 tab(s), Oral, daily, 180 tab(s), 3 Refill(s) ; Ordering Provider:   Orestes Mendosa MD; Catalog Code:   metFORMIN ; Order Dt/Tm:   11/10/2021 1:32:50 PM CST          methenamine  :   methenamine ; Status:   Prescribed ; Ordered As Mnemonic:   methenamine hippurate 1 g oral tablet ; Simple Display Line:   1 tab(s), Oral, bid, 180 tab(s), 1 Refill(s) ; Ordering Provider:   Orestes Mendosa MD; Catalog Code:   methenamine ; Order Dt/Tm:   11/10/2021 1:32:50 PM CST          amLODIPine  :   amLODIPine ; Status:   Prescribed ; Ordered As Mnemonic:   amLODIPine 5 mg oral tablet ; Simple Display Line:   1 tab(s), Oral, daily, 90 tab(s), 1 Refill(s) ; Ordering Provider:   Orestes Mendosa MD; Catalog Code:   amLODIPine ; Order Dt/Tm:   11/10/2021 1:32:50 PM CST          meloxicam  :   meloxicam ; Status:   Prescribed ; Ordered As Mnemonic:   meloxicam 15 mg oral tablet ; Simple Display Line:   1 tab(s), Oral, daily, 90 tab(s), 1 Refill(s) ; Ordering Provider:   Orestes Mendosa MD; Catalog Code:   meloxicam ; Order Dt/Tm:   11/10/2021 1:32:49 PM CST          amantadine  :   amantadine ; Status:   Prescribed ; Ordered As Mnemonic:   amantadine 100 mg oral capsule ; Simple Display Line:   100 mg, 1 cap(s), Oral, bid, PRN: Other (see comment), 180 cap(s), 1 Refill(s) ; Ordering Provider:   Orestes Mendosa MD; Catalog Code:   amantadine ; Order Dt/Tm:   4/14/2021 11:45:59 AM CDT          Miscellaneous Rx Supply  :   Miscellaneous Rx Supply ; Status:   Prescribed ; Ordered As Mnemonic:   Accu-check Guide Test Strips ; Simple Display Line:   See Instructions, Testing 2x daily, 200 EA, 11 Refill(s) ;  Ordering Provider:   Orestes Mendosa MD; Catalog Code:   Miscellaneous Rx Supply ; Order Dt/Tm:   1/31/2020 3:06:14 PM CST          Miscellaneous Rx Supply  :   Miscellaneous Rx Supply ; Status:   Prescribed ; Ordered As Mnemonic:   Accu-check guide meter ; Simple Display Line:   See Instructions, test 2x daily, 1 EA, 0 Refill(s) ; Ordering Provider:   Orestes Mendosa MD; Catalog Code:   Miscellaneous Rx Supply ; Order Dt/Tm:   1/31/2020 3:05:08 PM CST          Miscellaneous Rx Supply  :   Miscellaneous Rx Supply ; Status:   Prescribed ; Ordered As Mnemonic:   contour next microlet lancets ; Simple Display Line:   See Instructions, testing 2x/ day, 1 box(es), 2 Refill(s) ; Ordering Provider:   Orestes Mendosa MD; Catalog Code:   Miscellaneous Rx Supply ; Order Dt/Tm:   1/27/2020 4:36:08 PM CST          tiZANidine 4 mg oral tablet  :   tiZANidine 4 mg oral tablet ; Status:   Prescribed ; Ordered As Mnemonic:   tiZANidine 4 mg oral tablet ; Simple Display Line:   2 TO 3 TABLETS, Oral, qhs, 90 tab(s) ; Ordering Provider:   Orestes Mendosa MD; Catalog Code:   tiZANidine ; Order Dt/Tm:   1/7/2020 7:00:19 AM CST          lidocaine 5% topical ointment  :   lidocaine 5% topical ointment ; Status:   Prescribed ; Ordered As Mnemonic:   lidocaine 5% topical ointment ; Simple Display Line:   See Instructions, APPLY EXTERNALLY TO THE AFFECTED AREA THREE TIMES DAILY AS NEEDED FOR PAIN, 30 gm ; Ordering Provider:   Orestes Mendosa MD; Catalog Code:   lidocaine topical ; Order Dt/Tm:   12/30/2019 3:05:36 PM CST          Misc Prescription  :   Misc Prescription ; Status:   Prescribed ; Ordered As Mnemonic:   B-D PEN NDL MINI 38JC3KA(3/16)PRPL ; Simple Display Line:   See Instructions, INJECT WITH SAXENDA EVERY DAY, 100 EA ; Ordering Provider:   Orestes Mendosa MD; Catalog Code:   Miscellaneous Prescription ; Order Dt/Tm:   12/30/2019 3:05:21 PM CST          ondansetron 4 mg oral tablet  :   ondansetron 4 mg oral tablet ; Status:   Prescribed ; Ordered As  Mnemonic:   ondansetron 4 mg oral tablet ; Simple Display Line:   1 tab(s), Oral, q8 hrs, PRN: AS NEEDED FOR NAUSEA OR VOMITING, 30 tab(s) ; Ordering Provider:   Orestes Mendosa MD; Catalog Code:   ondansetron ; Order Dt/Tm:   12/30/2019 3:05:21 PM CST          rizatriptan 10 mg oral tablet  :   rizatriptan 10 mg oral tablet ; Status:   Prescribed ; Ordered As Mnemonic:   rizatriptan 10 mg oral tablet ; Simple Display Line:   See Instructions, TAKE 1 TABLET BY MOUTH 1 TIME AS NEEDED FOR MIGRAINE HEADACHE, 6 tab(s) ; Ordering Provider:   Orestes Mendosa MD; Catalog Code:   rizatriptan ; Order Dt/Tm:   12/30/2019 3:05:21 PM CST          Misc Prescription  :   Misc Prescription ; Status:   Prescribed ; Ordered As Mnemonic:   B-D PEN NDL MINI [id number](3/16)PRPL ; Simple Display Line:   See Instructions, INJECT WITH SAXENDA EVERY DAY, 100 unknown unit ; Ordering Provider:   Orestes Mendosa MD; Catalog Code:   Miscellaneous Prescription ; Order Dt/Tm:   12/2/2019 11:16:59 AM CST          Miscellaneous Prescription  :   Miscellaneous Prescription ; Status:   Prescribed ; Ordered As Mnemonic:   BD pen needle mini [id number] ; Simple Display Line:   See Instructions, Use daily with Saxenda as directed, 100 EA, 0 Refill(s) ; Ordering Provider:   Orestes Mendosa MD; Catalog Code:   Miscellaneous Prescription ; Order Dt/Tm:   10/30/2019 8:12:33 AM CDT          Miscellaneous Rx Supply  :   Miscellaneous Rx Supply ; Status:   Prescribed ; Ordered As Mnemonic:   Replacement CPAP +8 cm H2o for use daily ; Simple Display Line:   See Instructions, Heated humidifier x1; Humidifier chamber x1;  Heated tubing x1; Full face mask of choice with headgear  x1; Cushion x 1;  Filters: Disposable x1pk & Reusable x1pk.  Length of Need = 99 Months, 1 EA, 11 Refill(s) ; Ordering Provider:   Erwin Smiley MD; Catalog Code:   Miscellaneous Rx Supply ; Order Dt/Tm:   7/25/2019 1:49:35 PM CDT            Home Meds    clonazePAM  :   clonazePAM ; Status:   Documented ;  Ordered As Mnemonic:   clonazePAM 1 mg oral tablet ; Simple Display Line:   1 mg, 1 tab(s), Oral, qhs, 0 Refill(s) ; Catalog Code:   clonazePAM ; Order Dt/Tm:   4/14/2021 8:09:06 AM CDT          busPIRone  :   busPIRone ; Status:   Documented ; Ordered As Mnemonic:   busPIRone 15 mg oral tablet ; Simple Display Line:   15 mg, 1 tab(s), Oral, bid, 0 Refill(s) ; Catalog Code:   busPIRone ; Order Dt/Tm:   4/14/2021 8:07:11 AM CDT ; Comment:   Responsible Provider: JD SANCHEZ          hydrOXYzine  :   hydrOXYzine ; Status:   Documented ; Ordered As Mnemonic:   hydrOXYzine hydrochloride 10 mg oral tablet ; Simple Display Line:   10 mg, 1 tab(s), Oral, PRN: as needed for anxiety, 0 Refill(s) ; Catalog Code:   hydrOXYzine ; Order Dt/Tm:   4/14/2021 8:07:07 AM CDT ; Comment:   Responsible Provider: SHARONA CARBAJAL          prazosin  :   prazosin ; Status:   Documented ; Ordered As Mnemonic:   prazosin 1 mg oral capsule ; Simple Display Line:   1 mg, 1 cap(s), Oral, hs, 0 Refill(s) ; Catalog Code:   prazosin ; Order Dt/Tm:   4/14/2021 8:07:15 AM CDT ; Comment:   Responsible Provider: JD SANCHEZ          aripiprazole  :   aripiprazole ; Status:   Documented ; Ordered As Mnemonic:   Abilify 5 mg oral tablet ; Simple Display Line:   5 mg, 1 tab(s), Oral, daily, 0 Refill(s) ; Catalog Code:   aripiprazole ; Order Dt/Tm:   4/7/2021 9:10:37 AM CDT          acetaminophen  :   acetaminophen ; Status:   Documented ; Ordered As Mnemonic:   acetaminophen 325 mg oral tablet ; Simple Display Line:   1-2 tabs, Oral, q4 hrs, 0 Refill(s) ; Catalog Code:   acetaminophen ; Order Dt/Tm:   2/10/2021 11:14:05 AM CST          escitalopram  :   escitalopram ; Status:   Documented ; Ordered As Mnemonic:   escitalopram 20 mg oral tablet ; Simple Display Line:   20 mg, 1 tab(s), Oral, daily, 0 Refill(s) ; Catalog Code:   escitalopram ; Order Dt/Tm:   1/26/2021 1:07:14 PM CST ; Comment:   Responsible Provider: RAVEN  SHARONA AYOUB          cholecalciferol  :   cholecalciferol ; Status:   Documented ; Ordered As Mnemonic:   Vitamin D3 5000 intl units oral tablet ; Simple Display Line:   125 mcg, 1 tab(s), Oral, daily, 0 Refill(s) ; Catalog Code:   cholecalciferol ; Order Dt/Tm:   2/4/2020 1:35:21 PM CST          cyclobenzaprine  :   cyclobenzaprine ; Status:   Documented ; Ordered As Mnemonic:   cyclobenzaprine 10 mg oral tablet ; Simple Display Line:   10 mg, 1 tab(s), Oral, bid, PRN: for spasm, 30 tab(s), 0 Refill(s) ; Catalog Code:   cyclobenzaprine ; Order Dt/Tm:   7/23/2019 8:00:07 AM CDT          alemtuzumab  :   alemtuzumab ; Status:   Documented ; Ordered As Mnemonic:   Lemtrada ; Simple Display Line:   See Instructions, 1x/yr, 0 Refill(s) ; Catalog Code:   alemtuzumab ; Order Dt/Tm:   10/26/2018 3:52:14 PM CDT

## 2022-02-15 NOTE — TELEPHONE ENCOUNTER
---------------------  From: Tati Will CMA   Sent: 1/29/2020 2:39:15 PM CST  Subject: [facility]     Jose Alberto Duffy.    Would you ask Dr Mendosa to review this? I would like to stay in the [facility] program until  offers the [facility].    I will be in tomorrow for my check up.    Thank you.    Karli     (Pt is coming in tomorrow for an appt with SHANNON) Form can be reviewed at that time.

## 2022-02-15 NOTE — PROGRESS NOTES
Patient:   MARGARITA KAISER            MRN: [id number]            FIN: [id number]               Age:   42 years     Sex:  Female     :  1975   Associated Diagnoses:   Ataxia; Knee osteoarthritis; Morbid (severe) obesity due to excess calories; Multiple sclerosis; Myalgia   Author:   Emily Lan MD      Visit Information      Date of Service: 04/10/2018 02:19 pm  Performing Location: [facility]  Encounter#: [id number]      Primary Care Provider (PCP):  Orestes Mendosa MD    NPI# [id number]      Referring Provider:  Emily Lan MD    NPI# [id number]      Chief Complaint   4/10/2018 2:25 PM CDT    Pt here for lidocaine injections.        History of Present Illness             The patient presents for Patient presents to clinic today with desire to have repeated trigger point injections.  She says that she has found this to be very helpful and was feeling pretty good but she is recently had a couple of falls.  Once was due to tripping over her foot at round level of the second was due to tripping over her foot when it got caught as she stepped over a suitcase.  The most recent was 2 days ago.  She says that she is got dropfoot related to her multiple sclerosis.  She does not currently have a physical medicine and rehab doctor.  She does report that she has a history of bilateral knee osteoarthritis that was found on x-ray at an outside clinic.  She has had knee steroid injections in the past for this and is wondering if I might be able to help her with this again because her knees are feeling incredibly painful at this time.  She is aware that with any procedure there are risks of pain bleeding infection injury to surrounding tissue and need for further treatment.  Also a steroid can cause somebody to become more irritable.  Hyperglycemia.  She reports that she is now she also reports that since her chemotherapy she developed some changes with urination.  Approximately 2 weeks ago she had an  episode where she stood up and immediately had complete loss of control of her bladder.  Since then she has been able to control her bladder.  She also reports that she no longer has any voids.  She knows that the year and is leaving her body because she is able to hear it splashing into the toilet by does not have any sensation when she voids.  She has not yet spoken with her neurologist about this new finding.  This is not new following the fall but rather has been ever since she completed her chemotherapy.   .        Review of Systems   Constitutional:  Negative except as documented in history of present illness.    Eye:  Negative except as documented in history of present illness.    Ear/Nose/Mouth/Throat:  Negative except as documented in history of present illness.    Respiratory:  Negative except as documented in history of present illness.    Cardiovascular:  Negative except as documented in history of present illness.    Gastrointestinal:  Negative except as documented in history of present illness.    Immunologic:  Negative except as documented in history of present illness.    Integumentary:  Negative except as documented in history of present illness.              Health Status   Allergies:    Allergic Reactions (Selected)  Severity Not Documented  Adhesive tape (No reactions were documented)  Sulfa drugs (Rash, vomiting..... and diarrhea)   Medications:  (Selected)   Prescriptions  Prescribed  DULoxetine 60 mg oral delayed release capsule: 3 cap(s) ( 180 mg ), po, daily, # 180 cap(s), 0 Refill(s), Type: Soft Stop, Pharmacy: [facility] [id number], 3 cap(s) po daily  Saxenda 18 mg/3 mL subcutaneous solution: ( 3 mg ), subcutaneous, daily, Instructions: take 3mg injection q am (goal dose), # 15 mL, 6 Refill(s), Type: Maintenance, Pharmacy: [facility] [id number], 3 mg subcutaneous daily,Instr:take 3mg injection q am (goal dose)  amLODIPine 5 mg oral tablet: 1 tab(s) ( 5 mg ), PO, Daily, # 30  tab(s), 1 Refill(s), Type: Maintenance, Pharmacy: [id number], 1 tab(s) po daily  cefdinir 300 mg oral capsule: 1 cap(s) ( 300 mg ), PO, q12hr, # 14 cap(s), 0 Refill(s), Type: Maintenance, [id number], 1 cap(s) po q12 hrs,x7 day(s)  ergocalciferol 50,000 intl units (1.25 mg) oral capsule: See Instructions, Instructions: TAKE 1 CAPSULE BY MOUTH WEEKLY, # 12 cap(s), Type: Soft Stop, Pharmacy: [id number], TAKE 1 CAPSULE BY MOUTH WEEKLY  lidocaine 5% topical ointment: 1 gabby, TOP, TID, PRN: for pain, # 50 gm, 2 Refill(s), Type: Maintenance, [id number], 1 gabby top tid,PRN:for pain  lidocaine-prilocaine 2.5%-2.5% topical cream: See Instructions, Instructions: APPLY 5 GRAMS TOPICALLY TO THE AFFECTED AREA(S) ONCE FOR A ONE TIME DOSE, # 30 gm, Type: Soft Stop, Pharmacy: [facility] [id number]  medroxyPROGESTERone 10 mg oral tablet: See Instructions, Instructions: TAKE 1 TABLET BY MOUTH EVERY DAY FOR 10 DAYS ONCE PER MONTH PER PROVIDER, # 30 tab(s), Type: Soft Stop, [id number], TAKE 1 TABLET BY MOUTH EVERY DAY FOR 10 DAYS ONCE PER MONTH PER PROVIDER  meloxicam 15 mg oral tablet: 1 tab(s) ( 15 mg ), PO, Daily, # 90 tab(s), 0 Refill(s), Type: Maintenance, Pharmacy: [id number], 1 tab(s) po daily  methenamine hippurate 1 g oral tablet: See Instructions, Instructions: TAKE ONE TABLET BY MOUTH TWICE DAILY WITH FOOD, # 60 tab(s), Type: Soft Stop, Pharmacy: [id number], TAKE ONE TABLET BY MOUTH TWICE DAILY WITH FOOD  modafinil 200 mg oral tablet: See Instructions, Instructions: TAKE 1 TABLET BY MOUTH TWICE DAILY, # 60 tab(s), Type: Soft Stop, Pharmacy: [facility] [address], TAKE 1 TABLET BY MOUTH TWICE DAILY  ondansetron 4 mg oral tablet: 1 tab(s) ( 4 mg ), PO, q8 hrs, PRN: for nausea and vomiting, # 10 tab(s), 0 Refill(s), Type: Maintenance, Pharmacy: [facility] [id number], 1 tab(s)  po q8 hrs,PRN:for nausea and vomiting  rizatriptan 10 mg oral tablet: See Instructions, Instructions: TAKE 1 TABLET BY MOUTH 1 TIME AS NEEDED FOR MIGRAINE HEADACHE, # 6 tab(s), Type: Soft Stop, Pharmacy: [email or web address] [id number]  tiZANidine 4 mg oral tablet: See Instructions, Instructions: TAKE 2 TO 3 TABLETS BY MOUTH EVERY NIGHT AT BEDTIME, # 90 tab(s), Type: Soft Stop, Pharmacy: [email or web address] [id number]  Documented Medications  Documented  Adderall: ( 45 mg ), po, bid, 0 Refill(s), Type: Maintenance  Excedrin: See Instructions, 0 Refill(s), Type: Maintenance  LORazepam 0.5 mg oral tablet: po, tid, Instructions: 0.5mg prn and 1-2 hs, PRN: for agitation, 0 Refill(s), Type: Maintenance  LaMICtal 100 mg oral tablet: 1 tab(s) ( 100 mg ), po, daily, 0 Refill(s), Type: Maintenance  Magic Mouthwash: Magic Mouthwash, Instructions: take 5-10ml po QID for stomatitis, Supply, 0 Refill(s), Type: Maintenance  cholecalciferol 2000 intl units oral tablet: 1 tab(s) ( 2,000 International Unit ), po, daily, 0 Refill(s), Type: Maintenance  hydrOXYzine hydrochloride 50 mg oral tablet: 1 tab(s) ( 50 mg ), PO, tid, PRN: for anxiety, # 40 tab(s), 0 Refill(s), Type: Maintenance  meperidine 50 mg oral tablet: 1 tab(s) ( 50 mg ), po, q3 hr (int), PRN: for pain, 0 Refill(s), Type: Maintenance  raNITIdine 150 mg oral capsule: 1 cap(s) ( 150 mg ), po, daily, 0 Refill(s), Type: Maintenance  valACYclovir 500 mg oral tablet: 1 tab(s) ( 500 mg ), po, bid, 0 Refill(s), Type: Maintenance   Problem list:    All Problems  Anemia in chronic illness / SNOMED CT 616247269 / Confirmed  Ataxia / SNOMED CT 05721437 / Confirmed  Blues / SNOMED CT 012753375 / Confirmed  Fatigue / SNOMED CT 462177817 / Confirmed  Headache, migraine / SNOMED CT 36934404 / Confirmed  History of renal stone / SNOMED CT 6602551063 / Confirmed  Knee osteoarthritis / SNOMED CT 209139376 / Confirmed  Morbid (severe) obesity due to excess calories / SNOMED CT 148464306 /  Probable  Multiple sclerosis / SNOMED CT 07256960 / Confirmed  Muscle weakness / SNOMED CT 63621515 / Confirmed  Prediabetes / SNOMED CT 4438736239 / Confirmed  Seasonal allergies / SNOMED CT 574449654 / Confirmed  Skin sensation disturbance / SNOMED CT 043911173 / Confirmed  Urinary bladder neurogenic dysfunction / SNOMED CT 8826402160 / Confirmed  Vitamin D deficiency / SNOMED CT 76489747 / Confirmed  Resolved: Pregnancy  Resolved: Inpatient stay / SNOMED CT 445989333  Resolved: Inpatient stay / SNOMED CT 671193963  Resolved: Inpatient stay / SNOMED CT 969580857  Resolved: Pregnancy / SNOMED CT 036760165  Resolved: Pregnancy / SNOMED CT 686186297  Canceled: Headache / SNOMED CT 82224649      Histories   Past Medical History:    Active  Multiple sclerosis (12128209): Onset in 2012 at 37 years.  Blues (544712625)  History of renal stone (8504683999)  Urinary bladder neurogenic dysfunction (0295651506)  Fatigue (961215555)  Muscle weakness (80371311)  Vitamin D deficiency (09240042)  Ataxia (76479631)  Headache, migraine (11572146)  Comments:  7/6/2016 CDT 11:19 AM CDT - Katie Hatfield  With aura.  Skin sensation disturbance (370889638)  Seasonal allergies (066499862)  Resolved  Inpatient stay (111854975): Onset on 2/23/2018 at 42 years.  Resolved on 2/25/2018 at 42 years.  Comments:  2/27/2018 CST 7:59 AM La Serrano  @[facility], [place] - Acute pneumonitis; likely related to recent Lemtrada introduction  Inpatient stay (584463393): Onset on 6/14/2016 at 40 years.  Resolved on 6/18/2016 at 40 years.  Comments:  2/27/2018 CST 7:58 AM La Serrano  @[facility], [place] - Complicated UTI  Inpatient stay (179574990): Onset in the month of 5/2016 at 40 years  Resolved.  Comments:  2/27/2018 CST 7:57 AM La Serrano  @[facility], [place] - Left renal stone  Pregnancy:  Resolved.  Pregnancy (530395455):  Resolved in 2006 at 30 years.  Pregnancy (803381704):  Resolved in 1994 at 18  years.   Family History:    Melanoma  Father (Shakir)  Thyroid  Sister (Ana Cristina)  Kidney stone  Daughter (Zac)  Diabetes mellitus type II  Father (Shakir)  Hypothyroidism  Sister (Ana Cristina)  Diabetes mellitus type 2  Father (Shakir)  [place] - Cancer  Father (Shakir)  Comments:  2017 11:45 AM - Katie Hatfield  skin  CHF - Congestive heart failure  Father (Shakir)  Migraine  Sister (Ana Cristina)  Daughter (Leda)  Mother (Soledad)  Depression  Daughter (Zac)  Father (Shakir)  Gallbladder problem  Mother (Soledad)  Bipolar  Father (Shakir)  Miscellaneous  Brother (Adrian)  Comments:  2017 11:43 AM - Katie Hatfield  spinal stenosis  Mother (Soledad)  Comments:  2017 11:43 AM - Katie Hatfield  spinal stenosis  Anxiety  Daughter (Leda)  Daughter (Zac)     Procedure history:    Port-a-cath in place - Right (42642380) on 2016 at 41 Years.  Comments:  2016 8:01 AM - Ceci Engle  Dx:  Multiple sclerosis.  cystoscopy left ureteroscopy with holmium laser left retrogrades stone extraction and left ureteral stent exchange on 6/3/2016 at 40 Years.  Ureteroscopy (7978016417) in the month of 2016 at 40 Years.  Comments:  2016 4:56 PM - La Garcia  with laser/stone extraction (inability to remove all of stone)  Appendectomy (791793265) in  at 39 Years.  Breast reduction (307596557) in  at 26 Years.  Tonsillectomy and adenoidectomy (547465720) in  at 14 Years.  Cholecystectomy (89825248).   section (83372880).  Comments:  2016 11:26 AM - Deedee LERMA, Freya  x1  Tubal ligation (361197816).   Social History:        Alcohol Assessment            Current, 1-2 times per month, 1 drinks/episode maximum.      Tobacco Assessment            Never smoker      Substance Abuse Assessment: Past            Marijuana      Employment and Education Assessment            Disability      Home and Environment Assessment            2 children.      Nutrition and Health Assessment            Caffeine intake  amount: 2 sodas, every day.      Exercise and Physical Activity Assessment: Does not exercise      Sexual Assessment            Sexually active: No.  Sexual orientation: Heterosexual.        Physical Examination   Vital Signs   4/10/2018 2:25 PM CDT Temperature Tympanic 98.8 DegF    Peripheral Pulse Rate 88 bpm    Pulse Site Radial artery    HR Method Manual    Systolic Blood Pressure 130 mmHg    Diastolic Blood Pressure 66 mmHg    Mean Arterial Pressure 87 mmHg    BP Site Right arm    BP Method Manual      Measurements from flowsheet : Measurements   4/10/2018 2:25 PM CDT Height Measured - Standard 65 in    Weight Measured - Standard 298.2 lb    BSA 2.49 m2    Body Mass Index 49.62 kg/m2  HI      General:  Alert and oriented, No acute distress.    Eye:  Pupils are equal, round and reactive to light, Extraocular movements are intact, Normal conjunctiva.    HENT:  Normocephalic, hearing grossly normal during our conversation.    Respiratory:  Respirations are non-labored, Symmetrical chest wall expansion.    Cardiovascular:  Normal peripheral perfusion, brisk capillary refill.    Musculoskeletal:  tender to palpation bilateral upper trapezius and rhomboid trigger points, also bilateral knees show mild effusion but difficult to quantify secondary to habitus.  no ecchymosis or rash.    Integumentary:  Warm, Dry, No rash.    Neurologic:  Alert, Oriented, No focal deficits, Cranial Nerves II-XII are grossly intact.    Cognition and Speech:  Speech clear and coherent, Functional cognition intact.    Psychiatric:  Cooperative, Appropriate mood & affect, Normal judgment, Non-suicidal.       Health Maintenance      Recommendations     Pending (in the next year)        Due            Cervical Cancer Screen (if sexually active) due  04/10/18  and every 3  year(s)           Lipid Disorders Screen (Female) due  04/10/18  and every 1  year(s)           Type 2 Diabetes Mellitus Screen (Female) due  04/10/18  Variable frequency         Due In Future            Tetanus Vaccine not due until  07/16/18  and every 10  year(s)           Depression Screen (Female) not due until  01/26/19  and every 1  year(s)           Alcohol Misuse Screen (Female) not due until  01/26/19  and every 1  year(s)     Satisfied (in the past 1 year)        Satisfied            Alcohol Misuse Screen (Female) on  01/26/18.           Body Mass Index Check (Female) on  04/10/18.           Body Mass Index Check (Female) on  03/01/18.           Body Mass Index Check (Female) on  02/21/18.           Body Mass Index Check (Female) on  02/02/18.           Body Mass Index Check (Female) on  02/01/18.           Depression Screen (Female) on  01/26/18.           Depression Screen (Female) on  01/26/18.           Depression Screen (Female) on  01/26/18.           High Blood Pressure Screen (Female) on  04/10/18.           High Blood Pressure Screen (Female) on  03/01/18.           High Blood Pressure Screen (Female) on  02/23/18.           High Blood Pressure Screen (Female) on  02/21/18.           High Blood Pressure Screen (Female) on  02/06/18.           High Blood Pressure Screen (Female) on  02/01/18.           High Blood Pressure Screen (Female) on  02/01/18.           High Blood Pressure Screen (Female) on  02/01/18.           High Blood Pressure Screen (Female) on  01/31/18.           High Blood Pressure Screen (Female) on  01/26/18.           High Blood Pressure Screen (Female) on  05/17/17.           High Blood Pressure Screen (Female) on  05/17/17.           Obesity Screen and Counseling (Female) on  04/10/18.           Obesity Screen and Counseling (Female) on  03/01/18.           Obesity Screen and Counseling (Female) on  02/23/18.           Obesity Screen and Counseling (Female) on  02/21/18.           Obesity Screen and Counseling (Female) on  02/06/18.           Obesity Screen and Counseling (Female) on  02/02/18.           Obesity Screen and Counseling (Female) on   02/01/18.           Obesity Screen and Counseling (Female) on  01/31/18.           Obesity Screen and Counseling (Female) on  01/26/18.           Obesity Screen and Counseling (Female) on  05/17/17.           Tobacco Use Screen (Female) on  04/10/18.           Tobacco Use Screen (Female) on  03/01/18.           Tobacco Use Screen (Female) on  02/23/18.           Tobacco Use Screen (Female) on  02/21/18.           Tobacco Use Screen (Female) on  02/06/18.           Tobacco Use Screen (Female) on  02/01/18.           Tobacco Use Screen (Female) on  02/01/18.           Tobacco Use Screen (Female) on  01/31/18.           Tobacco Use Screen (Female) on  01/26/18.           Tobacco Use Screen (Female) on  05/17/17.          Procedure   Procedure   Time.     Indication: myofascial pain.     Informed consent: signed by patient.     Confirmed: patient, procedure, side, site.     Type of procedure: trigger point injection.     Physical Exam: vital signs Vital Signs   4/10/2018 2:25 PM CDT Temperature Tympanic 98.8 DegF    Peripheral Pulse Rate 88 bpm    Pulse Site Radial artery    HR Method Manual    Systolic Blood Pressure 130 mmHg    Diastolic Blood Pressure 66 mmHg    Mean Arterial Pressure 87 mmHg    BP Site Right arm    BP Method Manual      .     Preparation and technique: informed consent obtained, position sitting, sterile preparation of site with 70 % alcohol, hemostasis achieved using direct pressure, estimated blood loss minimal, adhesive bandagedressing applied.     Findings: painful and tender myofascial trigger points identified by palpation with communication from patient    Location:bilateral upper trapezius and rhomboids    Number of injections:4    pain prior to procedure:     severe    pain following procedure:much improved    number of milliliters of the 1:1 solution of 1% lidocaine without epiniephrine and 0.5% marcaine without epinephrine used in total: 8.     Procedure tolerated: well.     No  Complications.     Joint aspiration/ injection procedure   Confirmed: patient, site.     Informed consent: signed by patient.     Indication: symptomatic relief.     Location: the left knee, the right knee.     Preparation and technique: skin prep povidone iodine (Betadine), approach left posterior lateral, right posterior lateral.     Joint injected: with   4 ml of 1:1 solution of 1% lidocaine without epiniephrine and 0.5% marcaine without epinephrine with 1 ml of 40 mg triamcinalone/1 ml.     Procedure tolerated: well.     No Complications.     No qualifying data available.          Impression and Plan   Diagnosis     Ataxia (MBQ53-CZ R27.0).     Knee osteoarthritis (PSO70-ZP M17.10).     Morbid (severe) obesity due to excess calories (FTZ25-PJ E66.01).     Multiple sclerosis (YTF74-VQ G35).     Myalgia (ENI00-ZM M79.1).     Orders     Orders (Selected)   Outpatient Orders  Ordered  Referral (Request): 04/10/18 15:12:00 CDT, Referred to: Physical Medicine & Rehabilitation, Multiple sclerosis  Ataxia.     Patient Instructions:       Counseled: Patient, Regarding diagnosis, Regarding treatment, Verbalized understanding, she vincenzo check with her neurologist about where they recommend she see PM and R, and will also be starting PT at [facility] therapy.  she can returnt o cl[facility]i for repeat trigger point injections asneeded, .    Diagnosis     return to clinic if symptoms worsen or do not improve.     Counseled:  Patient, Family, use ice or heat as needed to help with pain, continue with home exercise program, .

## 2022-02-15 NOTE — TELEPHONE ENCOUNTER
Entered by Freya Mark CMA on March 10, 2021 8:38:48 AM CST  done      ---------------------  From: Orestes Mendosa MD   To: Freya Mark CMA;     Sent: 3/9/2021 4:32:02 PM CST  Subject: RE: General Message     we discussed.  Okay to fill meloxicam  I'm placing order for MRI of L-spine w/ IV contrast.  I'd like better clarity on source/cause of pains  we discussed potential gabapentin introduction after MRI  plans for her to f/u after MRI      ---------------------  From: Freya Mark CMA   To: Orestes Mendosa MD;     Sent: 3/9/2021 2:24:31 PM CST  Subject: General Message     Karli was asking about refilling Meloxicam or getting something stronger for pain, did you discuss?
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
## 2022-02-15 NOTE — PROGRESS NOTES
"   Patient:   MARGARITA KAISER            MRN: [id number]            FIN: [id number]               Age:   44 years     Sex:  Female     :  1975   Associated Diagnoses:   DM (diabetes mellitus), type 2; Morbid (severe) obesity due to excess calories   Author:   Dianne Mane      Visit Information   Visit type:  Diabetes Self Management Education visit was conducted via telephone due to the COVID-19 pandemic. Patient consent to telephone visit was obtained and documented..    Referral source:  Deondre HART, Orestes.       Chief Complaint   DM Type II, Obesity       History of Present Illness   2020  Pt had a health screening at work.  A1c 6.9% = 152.  Pt with known prediabetes, had been on and off Saxenda since 2018.  Pt only fills the rx when she meets her deductible due to high cost.  Pt had been off of it for ~3 mo prior to lab testing.  Pt had been able to tolerate the 1.8mg dose and felt ill at the higher doses - will order Victoza 1.8mg today to see if that has improved coverage.      3/30/2020 -   Discussed nutrition, diabetes, and lifestyle management with pt  Since the start of the Covid19 Pandemic pt's anxiety has increased significantly and stress for pt is affecting her intake   Nutrition:  Pt reports weight gain due to higher carb/ comfort food intake.  Pt is purchasing it herself or having it delivered.    Pt is understanding how to read labels but also notes she is in somewhat of a denial phase.  Pt's meal prepping has fallen to the wayside and has not been tracking calorie and carb intake on gabby (eat this much), will use the [employer] gabby through work.  Pt is not drinking any regular soda or juice   Fluids: water  Physical Activity:  none at this time, MS related pain \"body hurts\" has been cleaning and organizing more while in quarantine   Stress:   mod/ high   Sleep: good  Support: family   BG Testing: per pt range 99 - 166mg/dL with the avg of 113 mg/dL out of 7 readings with last reading being Feb " 11th   DM related medication: Saxenda 1.8mg until she runs out then will use Victoza 1.8mg daily (pt states cost was still high but she will be meeting her deductible)  Insulin: none at this time   Routine diabetes care:    Dilated Retinal Eye Exam:  Routine eye exam in December and now recommended to go every 3 months for further testing related to MS, will have report sent to clinic   Skin: no issues   Dental: does not floss daily, dental exams q 6 mo   Feet: decreased sensation per pt related to MS - encourages to use a mirror to visualize feet   Immunizations: refusal for influenza vaccine, pneumonia vaccine 5/14/2012   Hgb A1c: 6.9% = 152 mg/dL estimated average glucose 1/8/2020      Health Status   Allergies:    Allergic Reactions (Selected)  Severity Not Documented  Adhesive tape (No reactions were documented)  Sulfa drugs (Rash, vomiting..... and diarrhea)   Medications:  (Selected)   Prescriptions  Prescribed  Accu-check Guide Test Strips: Accu-check Guide Test Strips, See Instructions, Instructions: Testing 2x daily, Supply, # 200 EA, 11 Refill(s), Type: Maintenance, Pharmacy: [facility] #[id number], Testing 2x daily  Accu-check guide meter: Accu-check guide meter, See Instructions, Instructions: test 2x daily, Supply, # 1 EA, 0 Refill(s), Type: Maintenance, Pharmacy: [facility] #[id number], test 2x daily  B-D PEN NDL MINI [id number](3/16)PRPL: See Instructions, Instructions: INJECT WITH SAXENDA EVERY DAY, # 100 EA, Type: Soft Stop, Pharmacy: [facility] #[id number], INJECT WITH SAXENDA EVERY DAY  B-D PEN NDL MINI [id number](3/16)PRPL: See Instructions, Instructions: INJECT WITH SAXENDA EVERY DAY, # 100 unknown unit, Type: Soft Stop, Pharmacy: [facility] #[id number], INJECT WITH SAXENDA EVERY DAY  BD pen needle mini [id number]: BD pen needle mini [id number], See Instructions, Instructions: Use daily with Saxenda as directed, Supply, # 100 EA, 0 Refill(s), Type: Maintenance, Pharmacy: [facility] #[id number], Use daily with Saxenda as directed  Replacement CPAP +8 cm H2o for use daily: Replacement CPAP +8 cm H2o for use daily, See Instructions, Instructions: Heated humidifier x1; Humidifier chamber x1;  Heated tubing x1; Full face mask of choice with headgear  x1; Cushion x 1;  Filters: Disposable x1pk & Reusable x1pk.  Length of Ne...  Victoza 18 mg/3 mL subcutaneous solution: ( 1.8 mg ), Subcutaneous, daily, Instructions: take in am, # 27 mL, 3 Refill(s), Type: Maintenance, Pharmacy: [facility] #[id number], in place of Saxenda, 1.8 mg Subcutaneous daily,Instr:take in am  amLODIPine 5 mg oral tablet: = 1 tab(s), Oral, daily, # 90 tab(s), 0 Refill(s), Type: Maintenance, Pharmacy: [facility] #[id number]  contour next microlet lancets: contour next microlet lancets, See Instructions, Instructions: testing 2x/ day, Supply, # 1 box(es), 2 Refill(s), Type: Maintenance, Pharmacy: [facility] #[id number], testing 2x/ day  lidocaine 5% topical ointment: See Instructions, Instructions: APPLY EXTERNALLY TO THE AFFECTED AREA THREE TIMES DAILY AS NEEDED FOR PAIN, # 30 gm, Type: Soft Stop, Pharmacy: [facility] #[id number]  meloxicam 15 mg oral tablet: 1 tab(s), Oral, daily, # 90 tab(s), Type: Soft Stop, Pharmacy: [facility] #[id number]  metFORMIN 500 mg oral tablet, extended release: = 2 tab(s) ( 1,000 mg ), Oral, daily, # 180 tab(s), 3 Refill(s), Type: Maintenance, Pharmacy: [facility] #[id number], 2 tab(s) Oral daily  methenamine hippurate 1 g oral tablet: = 1 tab(s) ( 1 gm ), Oral, bid, # 180 tab(s), 3 Refill(s), Type: Maintenance, Pharmacy: [id number], 1 tab(s) Oral bid,x90 day(s)  ondansetron 4 mg oral tablet: 1 tab(s), Oral, q8 hrs, PRN: AS NEEDED FOR NAUSEA OR VOMITING, # 30 tab(s), Type: Soft Stop, Pharmacy: [facility] #[id number]  rizatriptan 10 mg oral tablet: See Instructions, Instructions: TAKE 1 TABLET BY MOUTH 1 TIME AS NEEDED FOR MIGRAINE HEADACHE, # 6  tab(s), Type: Soft Stop, Pharmacy: [facility] #[id number]  tiZANidine 4 mg oral tablet: 2 TO 3 TABLETS, Oral, qhs, # 90 tab(s), Type: Soft Stop, Pharmacy: [facility] #[id number]  tiZANidine 4 mg oral tablet: See Instructions, Instructions: TAKE 2 TO 3 TABLETS BY MOUTH EVERY NIGHT AT BEDTIME, # 90 tab(s), Type: Soft Stop, Pharmacy: [facility] #[id number], TAKE 2 TO 3 TABLETS BY MOUTH EVERY NIGHT AT BEDTIME  Documented Medications  Documented  Excedrin: See Instructions, 0 Refill(s), Type: Maintenance  Lemtrada: See Instructions, Instructions: 1x/yr, 0 Refill(s), Type: Maintenance  Saxenda 18 mg/3 mL subcutaneous solution: ( 1.2 mg ), Subcutaneous, daily, 0 Refill(s), Type: Maintenance  Vitamin D3 5000 intl units oral tablet: = 4 tab(s) ( 20,000 International Unit ), Oral, daily, 0 Refill(s), Type: Maintenance  baclofen 20 mg oral tablet: = 1 tab(s) ( 20 mg ), Oral, hs, 0 Refill(s), Type: Maintenance  cyclobenzaprine 10 mg oral tablet: = 1 tab(s) ( 10 mg ), Oral, tid, PRN: for spasm, # 30 tab(s), 0 Refill(s), Type: Maintenance  escitalopram 10 mg oral tablet: = 1 tab(s) ( 10 mg ), Oral, daily, # 30 tab(s), 0 Refill(s), Type: Maintenance  traZODone: ( 25 mg ), Oral, hs, 0 Refill(s), Type: Maintenance  valACYclovir 500 mg oral tablet: 1 tab(s) ( 500 mg ), po, bid, 0 Refill(s), Type: Maintenance,    Medications          *denotes recorded medication          BD pen needle mini [id number]: See Instructions, Use daily with Saxenda as directed, 100 EA, 0 Refill(s).          B-D PEN NDL MINI [id number](3/16)PRPL: See Instructions, INJECT WITH SAXENDA EVERY DAY, 100 unknown unit.          B-D PEN NDL MINI [id number](3/16)PRPL: See Instructions, INJECT WITH SAXENDA EVERY DAY, 100 EA.          Replacement CPAP +8 cm H2o for use daily: See Instructions, Heated humidifier x1; Humidifier chamber x1;  Heated tubing x1; Full face mask of choice with headgear  x1; Cushion x 1;  Filters: Disposable x1pk & Reusable x1pk.  Length  of Need = 99 Months, 1 EA, 11 Refill(s).          contour next microlet lancets: See Instructions, testing 2x/ day, 1 box(es), 2 Refill(s).          Accu-check guide meter: See Instructions, test 2x daily, 1 EA, 0 Refill(s).          Accu-check Guide Test Strips: See Instructions, Testing 2x daily, 200 EA, 11 Refill(s).          *Excedrin: See Instructions, 0 Refill(s).          *Lemtrada: See Instructions, 1x/yr, 0 Refill(s).          amLODIPine 5 mg oral tablet: 1 tab(s), Oral, daily, 90 tab(s), 0 Refill(s).          *baclofen 20 mg oral tablet: 20 mg, 1 tab(s), Oral, hs, 0 Refill(s).          *Vitamin D3 5000 intl units oral tablet: 20,000 International Unit, 4 tab(s), Oral, daily, 0 Refill(s).          *cyclobenzaprine 10 mg oral tablet: 10 mg, 1 tab(s), Oral, tid, PRN: for spasm, 30 tab(s), 0 Refill(s).          *escitalopram 10 mg oral tablet: 10 mg, 1 tab(s), Oral, daily, 30 tab(s), 0 Refill(s).          lidocaine 5% topical ointment: See Instructions, APPLY EXTERNALLY TO THE AFFECTED AREA THREE TIMES DAILY AS NEEDED FOR PAIN, 30 gm.          *Saxenda 18 mg/3 mL subcutaneous solution: 1.2 mg, Subcutaneous, daily, 0 Refill(s).          Victoza 18 mg/3 mL subcutaneous solution: 1.8 mg, Subcutaneous, daily, take in am, 27 mL, 3 Refill(s).          meloxicam 15 mg oral tablet: 1 tab(s), Oral, daily, 90 tab(s).          metFORMIN 500 mg oral tablet, extended release: 1,000 mg, 2 tab(s), Oral, daily, 180 tab(s), 3 Refill(s).          methenamine hippurate 1 g oral tablet: 1 gm, 1 tab(s), Oral, bid, for 90 day(s), 180 tab(s), 3 Refill(s).          ondansetron 4 mg oral tablet: 1 tab(s), Oral, q8 hrs, PRN: AS NEEDED FOR NAUSEA OR VOMITING, 30 tab(s).          rizatriptan 10 mg oral tablet: See Instructions, TAKE 1 TABLET BY MOUTH 1 TIME AS NEEDED FOR MIGRAINE HEADACHE, 6 tab(s).          tiZANidine 4 mg oral tablet: 2 TO 3 TABLETS, Oral, qhs, 90 tab(s).          tiZANidine 4 mg oral tablet: See Instructions, TAKE 2  TO 3 TABLETS BY MOUTH EVERY NIGHT AT BEDTIME, 90 tab(s).          *traZODone: 25 mg, Oral, hs, 0 Refill(s).          *valACYclovir 500 mg oral tablet: 500 mg, 1 tab(s), po, bid, 0 Refill(s).       Problem list:    All Problems  Anemia in chronic illness / SNOMED CT 792074735 / Confirmed  Ataxia / SNOMED CT 93544370 / Confirmed  Chronic low back pain / SNOMED CT 183924155 / Confirmed  Blues / SNOMED CT 826479547 / Confirmed  Fatigue / SNOMED CT 987546495 / Confirmed  History of renal stone / SNOMED CT 0403125706 / Confirmed  Headache, migraine / SNOMED CT 34635872 / Confirmed  Morbid (severe) obesity due to excess calories / SNOMED CT 122069429 / Probable  Multiple sclerosis / SNOMED CT 30894119 / Confirmed  Muscle weakness / SNOMED CT 66445106 / Confirmed  Neuralgia / SNOMED CT 26381401 / Confirmed  Urinary bladder neurogenic dysfunction / SNOMED CT 5694037626 / Confirmed  Knee osteoarthritis / SNOMED CT 422731096 / Confirmed  Skin sensation disturbance / SNOMED CT 544080656 / Confirmed  Seasonal allergies / SNOMED CT 178906782 / Confirmed  DM (diabetes mellitus), type 2 / SNOMED CT 048980621 / Confirmed  Vitamin D deficiency / SNOMED CT 97917268 / Confirmed  Inactive: Prediabetes / SNOMED CT 0776130714  Resolved: Inpatient stay / SNOMED CT 479508615  Resolved: Inpatient stay / SNOMED CT 032719383  Resolved: Inpatient stay / SNOMED CT 183752261  Resolved: Obstructive sleep apnea syndrome, moderate / SNOMED CT 404604606  Resolved: Pregnancy  Resolved: Pregnancy / SNOMED CT 272817912  Resolved: Pregnancy / SNOMED CT 082860881  Canceled: Headache / SNOMED CT 59347687      Histories   Past Medical History:    Active  DM (diabetes mellitus), type 2 (441295611): Onset on 1/8/2019 at 43 years.  Multiple sclerosis (34101866): Onset in 2012 at 37 years.  Blues (031376214)  History of renal stone (9292559083)  Urinary bladder neurogenic dysfunction (7869605513)  Fatigue (922905973)  Muscle weakness (92762456)  Vitamin D  deficiency (38758022)  Ataxia (01538787)  Headache, migraine (36125832)  Comments:  7/6/2016 CDT 11:19 AM CDT - Katie Hatfield  With aura.  Skin sensation disturbance (816829106)  Seasonal allergies (143666136)  Resolved  Inpatient stay (612470118): Onset on 2/23/2018 at 42 years.  Resolved on 2/25/2018 at 42 years.  Comments:  2/27/2018 CST 7:59 AM La Serrano  @[facility], [place] - Acute pneumonitis; likely related to recent Lemtrada introduction  Obstructive sleep apnea syndrome, moderate (373265084): Onset on 2/21/2018 at 42 years.  Resolved.  Comments:  7/25/2019 CDT 12:23 PM CDT - Erwin Smiley MD  On 2/14/18 AHI 20.2 with RDI 41.2, and oximetry avis 88%. Optimal CPAP titration to 8 cm water.  Inpatient stay (101245174): Onset on 6/14/2016 at 40 years.  Resolved on 6/18/2016 at 40 years.  Comments:  2/27/2018 CST 7:58 AM La Serrano  @[facility], [place] - Complicated UTI  Inpatient stay (021614376): Onset in the month of 5/2016 at 40 years  Resolved.  Comments:  2/27/2018 CST 7:57 AM La Serrano  @, [place] - Left renal stone  Pregnancy:  Resolved.  Pregnancy (144096633):  Resolved in 2006 at 30 years.  Pregnancy (804882497):  Resolved in 1994 at 18 years.   Family History:    Melanoma  Father (Shakir)  Thyroid  Sister (Ana Cristina)  Kidney stone  Daughter (Zac)  Diabetes mellitus type II  Father (Shakir)  Hypothyroidism  Sister (Ana Cristina)  Diabetes mellitus type 2  Father (Shakir)  CA - Cancer  Father (Shakir)  Comments:  4/5/2017 11:45 AM KYLE - Katie Hatfield  skin  CHF - Congestive heart failure  Father (Shakir)  Migraine  Sister (Ana Cristina)  Daughter (Leda)  Mother (Soledad)  Depression  Daughter (Zac)  Father (Shakir)  Gallbladder problem  Mother (Soledad)  Bipolar  Father (Shakir)  Miscellaneous  Brother (Adrian)  Comments:  4/5/2017 11:43 Katie Diaz CDT  spinal stenosis  Mother (Soledad)  Comments:  4/5/2017 11:43 Katie Diaz CDT  spinal  stenosis  Anxiety  Daughter (Leda)  Daughter (Zac)     Procedure history:    Port-a-cath in place - Right (IMO 17599130) performed by Adriano Dougherty MD on 2016 at 41 Years.  Comments:  2016 8:01 AM CDT - Ceci Engle  Dx:  Multiple sclerosis.  cystoscopy left ureteroscopy with holmium laser left retrogrades stone extraction and left ureteral stent exchange on 6/3/2016 at 40 Years.  Ureteroscopy (SNOMED CT 5668856202) in the month of 2016 at 40 Years.  Comments:  2016 4:56 PM CDT - La Garcia  with laser/stone extraction (inability to remove all of stone)  Appendectomy (SNOMED CT 616658448) in  at 39 Years.  Breast reduction (SNOMED CT 085642396) in  at 26 Years.  Tonsillectomy and adenoidectomy (SNOMED CT 467568137) in  at 14 Years.  Cholecystectomy (SNOMED CT 69543643).   section (SNOMED CT 59925789).  Comments:  2016 11:26 AM CDT - Deedee LERMA, Freya  x1  Tubal ligation (SNOMED CT 795593516).   Social History:        Alcohol Assessment            Current, 1 TIME PER WEEK, 1 drinks/episode maximum.      Tobacco Assessment            Never smoker      Substance Abuse Assessment: Current            vaping THC and cannabis oil      Employment and Education Assessment            Disability      Home and Environment Assessment            2 children.      Nutrition and Health Assessment            Caffeine intake amount: 2 sodas, every day.      Exercise and Physical Activity Assessment: Does not exercise      Sexual Assessment            Sexually active: No.  Sexual orientation: Heterosexual.        Physical Examination   VS/Measurements      Review / Management   Results review:  Lab results   2020 4:52 PM CST Sodium Level  mEq/L    Potassium Level TR 3.9 mEq/L    Chloride  mEq/L    CO2 TR 23 mEq/L    Anion Gap TR 8 mEq/L    Glucose Level  mg/dL    BUN TR 15 mg/dL    Creatinine TR 0.68 mg/dL    BUN/Creatinine Ratio TR 22    Calcium TR 9.1 mEq/dL     Hgb A1c TR 6.9 %    Vitamin D 25-OH, Total TR 89.1 ng/mL    WBC TR 9.3 x10^3/uL    RBC TR 4.31 x10^6/uL    Hgb TR 12.2 g/dL    Hct TR 36.6 %    MCV TR 85 fL    MCH TR 28.3 pg    MCHC TR 33.3 gm/dL    RDW TR 14.3 %    Platelet  x10^3/uL    MPV (Mean Platelet Volume) TR 10.0 fL   .       Impression and Plan   Diagnosis     DM (diabetes mellitus), type 2 (NJP38-PQ E11.9).     Morbid (severe) obesity due to excess calories (HHB28-DM E66.01).       Counseled: Patient, MAGEN Self Care Behaviors   Today the patient was provided with a review and further instruction on the progression of diabetes mellitus, prevention of heart disease, prevention of complications, and lifestyle guidelines.  Healthy Eating - review of carbohydrate counting, reading food labels, appropriate portion sizes, well balanced meals, diabetic plate method as a general rule.  Patient is provided with additional ideas to incorporate dietary recommendations, increase meal planning and preparation.  Mindful eating, finding other coping mechanisms besides food  Instructed on Therapeutic Lifestyle Changes (TLC) nutrition plan for heart healthy eating.     Low cholesterol (<200mg), low fat, low saturated fat, zero trans fat   Low sodium (2000mg)   High fiber diet (20 - 30gm); Soluble fiber 10+ gm/ day    Eat more omega 3 fats  Vegetarian at least 1 day per week  Being Active - Education on how exercise helps with :    - lowering BG by increasing the muscles ability to take up and use glucose   - weight loss   - healthier heart (improve lipid profile)   - improve sleep, mood, energy   - decrease stress  Monitoring -  Reviewed recommended testing schedule and blood glucose target levels.    Taking Medication - Pt on Saxenda - tolerating 1.8mg dose and once home supply is out use Victoza 1.8mg dose and evaluate prescription coverage     Problem Solving - medical support team assistance, resources provided (written and apps, internet); good control of  stress  Healthy Coping - support of friends, family, and medical support team   Reducing Risks - Detailed education on potential complications associated with uncontrolled diabetes and prevention recommendations.  Recommendations include: annual eye exam, good oral cares with brushing BID and flossing daily, routine dental appointments, good foot care tips and shoe wear - discussed monofilament test yearly.  Importance of adequate sleep and good control of BG to prevent developing complications related to uncontrolled DM including nephropathy, neuropathy, retinopathy, and cardiovascular disease.  Weight loss goal of 7 - 10% of current body weight pounds as a reasonable goal to help increase insulin sensitivity         Goals:   1.  Practice healthy stress management and get good quality sleep with the goal of 7-8 hours per night.    2.   Physical activity: as able 15 min of walking daily   3.  Eat in a healthy way, per diabetic plate method.  Nutrition reference: Eat 3 meals a day; snacks are optional.  A meal is three or more food groups; make it colorful for better nutrition.  Follow TLC dietary guidelines for heart health.    4.  Total Carbohydrate intake 30 grams for meals, snacks ~ 15 grams  5.  Pt to monitor BG BID on 3 days/ week.  BG goals: Fasting and before meals 80 - 120 mg/dL, 2 hrs after the start of a meal 80 - 140 mg/dL.    6.  Goal weight 240# by 7/2020  7.  F/u in 3 mo          Professional Services   Call start time 10:20  Call end time 10:50  Time spent 30 min      cc Dr. SHALINI Mendosa

## 2022-02-15 NOTE — TELEPHONE ENCOUNTER
"---------------------  From: Tati Will CMA   To: [facility] Message Pool ([id number]_[place]);     Sent: 12/18/2019 10:58:17 AM CST  Subject: Cough      Pt calls requesting a Zpack for her cough that she has had since Friday and she woke up this AM w/ a sore throat. She doesnt believe she has a sinus infection because her mucous is clear. I advised [facility] is not in today and will not return until next Tues. She then asked if MJP would be willing to fill as she has seen her before. CC offered an appt w/ MJP but she declined as she really doesnt want to come in for a visit and this is \"nothing new for her\" and just wants a Zpack. I advised a message would be sent to see if MJP is OK with filling.    Last in for bronchitis on 8/17/19 and saw [facility]  Last visit w/ MJP on 4/2018 for trigger point injection  She is overdue for an appt w/ BR[facility] and labs---------------------  From: Deepti Kelly CMA ([facility] Message Pool ([id number]_[facility]))   To: Emily Lan MD;     Sent: 12/18/2019 11:01:11 AM CST  Subject: FW: Cough      Please advise.---------------------  From: Emily Lan MD   To: [facility] Message Pool ([id number]_[facility]); Phone Messages Pool ([id number]);     Sent: 12/18/2019 7:04:52 PM CST  Subject: RE: Cough      pls let pt know I sent it, Kwabena for pt with info. Also reminded about [facility] appt.  "

## 2022-02-15 NOTE — NURSING NOTE
Quick Intake Entered On:  1/27/2020 4:38 PM CST    Performed On:  1/27/2020 4:38 PM CST by Dianne Mane               Summary   Menstrual Status :   Menarcheal   Weight Measured :   259 lb(Converted to: 259 lb 0 oz, 117.48 kg)    Height Measured :   65 in(Converted to: 5 ft 5 in, 165.10 cm)    Body Mass Index :   43.1 kg/m2 (HI)    Body Surface Area :   2.32 m2   Dianne Mane - 1/27/2020 4:38 PM CST

## 2022-02-15 NOTE — TELEPHONE ENCOUNTER
---------------------  From: Junior Donis PA-C   To: Appointment Pool ([id number]_[place]);     Sent: 8/17/2020 10:20:30 AM CDT  Subject: General Message     Please set up for drive through [employer] testing    KAH

## 2022-02-15 NOTE — TELEPHONE ENCOUNTER
---------------------  From: Tati Will CMA (Phone Messages Pool ([id number]_[facility]))   To: Orestes Mendosa MD;     Sent: 2/8/2021 3:30:42 PM CST  Subject: MRI/back pain     PCP:  SHANNON    Time of Call:  924am (LM on CC )       Person Calling:  Pt  Phone number:  [phone]    Returned call at: 325pm    Note:   Karli calls c/o R lower back pain. Unsure if it is muscle related vs disc related. She was hoping SHANNON would order an MRI. I advised a visit would be needed and she said she does have an appt scheduled w/ BRM on 2/10 and will discuss back pain at that time. She also wanted to let SHANNON know that Dr. Mosquera is going to be ordering a doppler u/s for her R leg swelling to r/o DVT. She will discuss further at upcoming visit.

## 2022-02-15 NOTE — NURSING NOTE
Comprehensive Intake Entered On:  4/14/2021 8:15 AM CDT    Performed On:  4/14/2021 8:13 AM CDT by Freya Mark CMA               Summary   Chief Complaint :   hosp f/u - feeling much better - wants to continue with amantadine would like to take up to TID for fatigue.  Spinal injection Monday - much relief, Home care/PT started yesterday   Menstrual Status :   Menarcheal   Weight Measured :   297.2 lb(Converted to: 297 lb 3 oz, 134.808 kg)    Height Measured :   65 in(Converted to: 5 ft 5 in, 165.10 cm)    Body Mass Index :   49.45 kg/m2 (HI)    Body Surface Area :   2.48 m2   Height/Length Estimated :   65 in(Converted to: 5 ft 5 in, 165.10 cm)    Systolic Blood Pressure :   126 mmHg   Diastolic Blood Pressure :   84 mmHg (HI)    Mean Arterial Pressure :   98 mmHg   Peripheral Pulse Rate :   81 bpm   Oxygen Saturation :   97 %   Freya Mark CMA - 4/14/2021 8:13 AM CDT   Health Status   Allergies Verified? :   Yes   Medication History Verified? :   Yes   Medical History Verified? :   Yes   Pre-Visit Planning Status :   Completed   Tobacco Use? :   Never smoker   Freya Mark CMA - 4/14/2021 8:13 AM CDT   ID Risk Screen   Recent Travel History :   No recent travel   Family Member Travel History :   No recent travel   Other Exposure to Infectious Disease :   Unknown   COVID-19 Testing Status :   No positive COVID-19 test   Freya Mark CMA - 4/14/2021 8:13 AM CDT   Social History   Social History   (As Of: 4/14/2021 8:15:01 AM CDT)   Alcohol:        Current, 1 TIME PER WEEK, 1 drinks/episode maximum.   (Last Updated: 12/14/2018 12:13:00 PM CST by Brianna Parrish)          Tobacco:        Never smoker   (Last Updated: 1/13/2016 7:49:24 PM CST by Aminata Landon CMA)   Never (less than 100 in lifetime)   (Last Updated: 1/26/2021 1:09:01 PM CST by Freya Mark CMA)          Electronic Cigarette/Vaping:        Electronic Cigarette Use: Never.   (Last Updated: 1/26/2021 1:09:08 PM CST by Freya Mark CMA)           Substance Abuse:  Current      vaping THC and cannabis oil   (Last Updated: 12/12/2018 2:39:37 PM CST by Tati Will CMA)          Employment/School:        Disability   (Last Updated: 2/28/2017 2:54:15 PM CST by Wilton SOLIZ, Dione)          Home/Environment:        2 children.   (Last Updated: 1/13/2016 7:50:00 PM CST by Aminata Landon CMA)          Nutrition/Health:        Caffeine intake amount: 2 sodas, every day.   (Last Updated: 7/6/2016 11:10:11 AM CDT by Katie Hatfield)          Exercise:  Does not exercise      (Last Updated: 1/13/2016 7:49:47 PM CST by Aminata Landon CMA )         Sexual:        Sexually active: No.  Sexual orientation: Heterosexual.   (Last Updated: 1/13/2016 7:50:46 PM CST by Aminata Landon CMA)

## 2022-02-15 NOTE — PROGRESS NOTES
Patient:   KARLI KAISER            MRN: [id number]            FIN: [id number]               Age:   41 years     Sex:  Female     :  1975   Associated Diagnoses:   Multiple sclerosis; Prediabetes; Neurogenic bladder; Migraine; Morbid obesity   Author:   Orestes Mendosa MD      Visit Information      Date of Service: 2017 03:41 pm  Performing Location: [facility]  Encounter#: [id number]      Primary Care Provider (PCP):  Orestes Mendosa MD    NPI# [id number]      Referring Provider:  No referring provider recorded for selected visit.      Chief Complaint   2017 3:50 PM CDT    Diarrhea startiing  night, very watery stools, Started Imodium D and took her max imodium D at 10 am today.              Additional Information:No additional information recorded during visit.   Chief complaint and symptoms as noted above and confirmed with patient.  Recent lab and diagnostic studies reviewed with patient      History of Present Illness   3/8/2016: Karli presents to clinic for ER follow-up after presentations on both Thursday and Friday this past week for urinary incontinence and suspected pyelonephritis.  She requested for Srivastava catheter to be placed this past week because of persistent incontinence.  She is scheduled to meet with the urologist on the  of this month.  She was originally prescribed Cipro which was later changed to Macrobid after discovery of extended spectrum beta-lactamase resistant E. coli bacteria.  She was started on Macrobid on .  Overall continues to have severe left-sided flank pain.  She is very distressed about her underlying multiple sclerosis and her ability to care for her daughter.  She does follow with a neurologist through [facility].  She previously was following with an internist through [place] and now wishes to localize her cares here.    2016: Karli returns to clinic for follow-up after recent hospitalization with  pyelonephritis.  She has grown out ESBL E. coli strains her last 2 urine cultures.  She was empirically started on ertapenem and change to fosfomycin as an outpatient.  She feels well.  Previous ureteral stenting and stone extraction is completed.  Scheduled to follow-up with urology in the near future for urodynamic studies.    8/23/2016: Karli returns to clinic for follow-up after evaluation this past week with Dr. Radford.  She did present with febrile illness and CVA tenderness.  Urinalysis was normal.  Urine culture isolating 10-50,000 colonies of normal genitalia colonizing bacteria.  She was empirically started on both fosfomycin and ertapenem given her history of a previous ESBL organisms for presumptive treatment of pyelonephritis.  Her fever is resolved.  She continues to have some urinary frequency and incontinence.  She is not clear this is related to her residual MS versus infection related.  Does describe having antibiotic related diarrhea.  Does bring up questions related to underlying anemia.    9/6/2016: Presents to clinic with migraine complaints for the past 5 days.  No improvement with Excedrin originally.  Since then she has used both indomethacin and Flexeril with no improvement.  She did have a availability to Percocet which helped transiently.  Historically has not been responsive to sumatriptan and.  No other triptan use    12/15/2016: Karli presents to clinic with 2 week history of cold symptoms.  She presented to the emergency department earlier in this course for concerns of possible urinary tract infection.  She has had increased urinary frequency with incontinence.  Urinalysis was normal in the emergency room.  She was referred on to a neurologist who ordered a CT of her neuro spine showing new MS lesions.  She was recommended to start on steroids though has had very significant psychiatric adverse reactions on corticosteroids.  She was prescribed corticotropin (Acthar) which she  completed today.  Describing a 24-hour history of severe flulike symptoms including myalgias, extreme malaise, nonproductive cough.  She was advised to seek any medical attention with describes symptoms on Acthar.  Previously was treating with a variety of over-the-counter cold medicines including Robitussin, Tessalon Perles, Tylenol, Claritin    1/18/2017: Presents with migraine headaches now for the last 3 days.  She used up the last of her rizatriptan which generally works the best for her.  Currently with very limited funds for any other medication options.  Scheduled for infusion for her MS scheduled tomorrow.  Hoping for more immediate relief today, allowing her to get to infusion tomorrow.  After infusion, her deductible will be met.    3/1/2017: Karli presents to clinic after recent assessment by Dr. Girard earlier this week related to vaginal bleeding.  She underwent a vaginal ultrasound showing increased uterine lining thickness.  He recommended she be started on Provera to obtain more regular menses.  Shares having spontaneous menses about once/year. She voices concerns related to increased health risk from her obesity.  Specifically raises concerns to uterine cancer as well as metabolic complications.  She wonders if it is time to think more seriously about bariatric surgery options.  She shares having a good friend that went through the process has had some success.  She has not worked with Oanh Mane here in clinic.  She feels very physically limited related to her MS.  She states gaining over 80 pounds since beginning on MS therapy.    5/17/2017: Presents with 3 day history of nausea she is watery diarrhea.  She started taking Lomotil yesterday.  She took a total of 4 tablets with improvement in stool frequency though still having profuse diarrhea.  She still has been able to eat chicken soup and sparkling cider.  No fever chills.  No bloody component the diarrhea.  No described sick contacts.  No  recent antibiotic exposure.         Review of Systems   Constitutional:  No fever, No chills, No weakness.    Eye:  Negative except as documented in history of present illness.    Ear/Nose/Mouth/Throat:  Negative except as documented in history of present illness.    Respiratory:  No shortness of breath.    Cardiovascular:  No chest pain, No palpitations, No peripheral edema, No syncope.    Gastrointestinal:  Nausea, Constipation, No vomiting.    Genitourinary:  No dysuria, No hematuria, No change in urine stream.    Hematology/Lymphatics:  Negative except as documented in history of present illness.    Endocrine:  No excessive thirst, No polyuria.    Immunologic:  No recurrent fevers.    Musculoskeletal:  No joint pain, No muscle pain.    Neurologic:  Alert and oriented X4, Abnormal balance, No numbness, No tingling, No headache.    Psychiatric:  Anxiety.       Health Status   Allergies:    Allergic Reactions (Selected)  Severity Not Documented  Adhesive tape (No reactions were documented)  Sulfa drugs (Rash, vomiting..... and diarrhea)   Medications:  (Selected)   Prescriptions  Prescribed  Lomotil 2.5 mg-0.025 mg oral tablet: 1 tab(s), po, q6 hrs, PRN: as needed for loose stool, # 30 tab(s), 0 Refill(s), Type: Maintenance, Pharmacy: [facility] [id number], 1 tab(s) po q6 hrs,PRN:as needed for loose stool  Lyrica 300 mg oral capsule: 1 cap(s) ( 300 mg ), po, tid, # 90 cap(s), 5 Refill(s), Type: Maintenance, Pharmacy: [facility] [id number], 1 cap(s) po tid  Provera 10 mg oral tablet: 1 tab(s) ( 10 mg ), PO, Daily, Instructions: take for 10 days once per month per provider, # 30 tab(s), 0 Refill(s), Type: Maintenance, Pharmacy: [facility] [id number], 1 tab(s) po daily,Instr:take for 10 days once per month per provider  Walker with large wheels and large sitting seat: Walker with large wheels and large sitting seat, See Instructions, Instructions: use as directed, Supply, # 1 EA, 0 Refill(s), Type:  Maintenance  cholecalciferol 5000 intl units oral tablet: See Instructions, Instructions: 5-20,000 units daily, # 100 tab(s), 5 Refill(s), Type: Maintenance, Pharmacy: [facility] [place] [address]  ergocalciferol 50,000 intl units (1.25 mg) oral capsule: See Instructions, Instructions: 1 cap(s) PO 1x/Wk, # 12 cap(s), 0 Refill(s), Type: Maintenance, Pharmacy: [facility] [place] [address], 1 cap(s) PO 1x/Wk  methenamine hippurate 1 g oral tablet: See Instructions, Instructions: TAKE ONE TABLET BY MOUTH TWICE DAILY WITH FOOD, # 60 tab(s), Type: Soft Stop, Pharmacy: [facility], TAKE ONE TABLET BY MOUTH TWICE DAILY WITH FOOD  modafinil 200 mg oral tablet: ( 200 mg ), po, bid, # 60 tab(s), 5 Refill(s), Type: Maintenance, called to pharmacy (Rx)  ondansetron 4 mg oral tablet: 1 tab(s) ( 4 mg ), PO, q8 hrs, PRN: for nausea and vomiting, # 10 tab(s), 0 Refill(s), Type: Maintenance, Pharmacy: [facility] [place] [address], 1 tab(s) po q8 hrs,PRN:for nausea and vomiting  rizatriptan 10 mg oral tablet: See Instructions, Instructions: TAKE 1 TABLET BY MOUTH 1 TIME AS NEEDED FOR MIGRAINE HEADACHE, # 6 tab(s), 1 Refill(s), Type: Maintenance, Pharmacy: [facility]  tiZANidine 4 mg oral tablet: See Instructions, Instructions: 2-3 tab(s) PO qhs, # 90 tab(s), 3 Refill(s), Type: Maintenance, Pharmacy: [facility] [id number], 2-3 tab(s) PO qhs  Documented Medications  Documented  Adderall: ( 45 mg ), po, bid, 0 Refill(s), Type: Maintenance  Cymbalta 60 mg oral delayed release capsule: 2 cap(s) ( 120 mg ), po, daily, 0 Refill(s), Type: Maintenance  Excedrin: See Instructions, 0 Refill(s), Type: Maintenance  LORazepam 0.5 mg oral tablet: po, tid, Instructions: 0.5mg prn and 1-2 hs, PRN: for agitation, 0 Refill(s), Type: Maintenance  LaMICtal 100 mg oral tablet: 1 tab(s) ( 100 mg ), po, daily, 0 Refill(s), Type: Maintenance  Tysabri: iv, q4 wks, 0 Refill(s), Type: Maintenance  cyclobenzaprine: ( 10 mg ), po,  tid, PRN: as needed for muscle spasm, 0 Refill(s), Type: Maintenance  indomethacin 25 mg oral capsule: 1 cap(s) ( 25 mg ), po, q6 hrs, PRN: for gout pain, 0 Refill(s), Type: Maintenance   Problem list:    All Problems  Anemia in chronic illness / SNOMED CT 635612654 / Confirmed  Ataxia / SNOMED CT 98436551 / Confirmed  Blues / SNOMED CT 338458857 / Confirmed  Fatigue / SNOMED CT 548988331 / Confirmed  History of renal stone / SNOMED CT 0808790784 / Confirmed  Headache, migraine / SNOMED CT 46654040 / Confirmed  Multiple sclerosis / SNOMED CT 55168371 / Confirmed  Muscle weakness / SNOMED CT 39561934 / Confirmed  Urinary bladder neurogenic dysfunction / SNOMED CT 7533949732 / Confirmed  Obesity / SNOMED CT 3003379192 / Probable  Skin sensation disturbance / SNOMED CT 958490287 / Confirmed  Seasonal allergies / SNOMED CT 211301343 / Confirmed  Vitamin D deficiency / SNOMED CT 74246066 / Confirmed  Resolved: *Hospitalized@[facility] - Complicated UTI  Resolved: *Hospitalized@[facility] - Left renal stone  Resolved: Pregnancy  Resolved: Pregnancy / SNOMED CT 077428155  Resolved: Pregnancy / SNOMED CT 943261890  Canceled: Headache / SNOMED CT 44414218      Histories   Past Medical History:    Active  Multiple sclerosis (46512603): Onset in 2012 at 37 years.  Blues (476697292)  History of renal stone (4873886190)  Urinary bladder neurogenic dysfunction (5614413503)  Fatigue (784050720)  Muscle weakness (83037000)  Vitamin D deficiency (33707299)  Ataxia (56156872)  Headache, migraine (80419829)  Comments:  7/6/2016 CDT 11:19 AM CDT - Katie Hatfield  With aura.  Skin sensation disturbance (149223290)  Seasonal allergies (806699867)  Resolved  *Hospitalized@[facility] - Complicated UTI: Onset on 6/14/2016 at 40 years.  Resolved on 6/18/2016 at 40 years.  *Hospitalized@[facility] - Left renal stone: Onset in the month of 5/2016 at 40 years  Resolved.  Pregnancy:  Resolved.  Pregnancy (180582806):  Resolved in 2006 at 30 years.  Pregnancy  (234197066):  Resolved in  at 18 years.   Family History:    Melanoma  Father (Shakir)  Thyroid  Sister (Ana Cristina)  Kidney stone  Daughter (Zac)  Diabetes mellitus type II  Father (Shakir)  Hypothyroidism  Sister (Ana Cristina)  Diabetes mellitus type 2  Father (Shakir)  CA - Cancer  Father (Shakir)  Comments:  2017 11:45 AM - Katie Hatfield  skin  CHF - Congestive heart failure  Father (Shakir)  Migraine  Sister (Ana Cristina)  Daughter (Leda)  Mother (Soledad)  Depression  Daughter (Zac)  Father (Shakir)  Gallbladder problem  Mother (Soledad)  Bipolar  Father (Shakir)  Miscellaneous  Brother (Adrian)  Comments:  2017 11:43 AM - Katie Hatfield  spinal stenosis  Mother (Soledad)  Comments:  2017 11:43 AM - Katie Hatfield  spinal stenosis  Anxiety  Daughter (Leda)  Daughter (Zac)     Procedure history:    Port-a-cath in place - Right (52041736) on 2016 at 41 Years.  Comments:  2016 8:01 AM - Ceci Engle  Dx:  Multiple sclerosis.  cystoscopy left ureteroscopy with holmium laser left retrogrades stone extraction and left ureteral stent exchange on 6/3/2016 at 40 Years.  Ureteroscopy (0215249140) in the month of 2016 at 40 Years.  Comments:  2016 4:56 PM - La Garcia  with laser/stone extraction (inability to remove all of stone)  Appendectomy (203184391) in  at 39 Years.  Breast reduction (165774912) in  at 26 Years.  Tonsillectomy and adenoidectomy (507533897) in  at 14 Years.  Cholecystectomy (56858060).   section (04975786).  Comments:  2016 11:26 AM - Deedee LERMA, Freya  x1  Tubal ligation (448598426).   Social History:        Alcohol Assessment            Current, 1-2 times per week, 1 drinks/episode maximum.      Tobacco Assessment            Never smoker      Substance Abuse Assessment: Past            Marijuana      Employment and Education Assessment            Disability      Home and Environment Assessment            2 children.      Nutrition and Health  Assessment            Caffeine intake amount: 2 sodas, every day.      Exercise and Physical Activity Assessment: Does not exercise      Sexual Assessment            Sexually active: No.  Sexual orientation: Heterosexual.        Physical Examination   vital signs stable, as noted above   Vital Signs   5/17/2017 3:50 PM CDT Temperature Tympanic 99.2 DegF    Peripheral Pulse Rate 106 bpm  HI    Systolic Blood Pressure 132 mmHg    Diastolic Blood Pressure 82 mmHg    Mean Arterial Pressure 99 mmHg    Systolic Blood Pressure Standing 124 mmHg    Diastolic Blood Pressure Standing 79 mmHg    Pulse Standing 131 bpm      Measurements from flowsheet : Measurements   5/17/2017 3:50 PM CDT    Weight Measured - Standard                279.4 lb     General:  Alert and oriented, No acute distress.    HENT:  Normocephalic.    Neck:  Supple.    Respiratory:  Lungs are clear to auscultation, Respirations are non-labored.    Cardiovascular:  Normal rate, Regular rhythm, No murmur, No edema.    Gastrointestinal:  Soft, Non-distended, left sided upper quadrant tenderness.    Musculoskeletal:  Infusaport in place; not engaged.    Neurologic:  Alert, Oriented.    Cognition and Speech:  Oriented, Speech clear and coherent.    Psychiatric:  Appropriate mood & affect.       Review / Management   Results review:  Lab results   5/17/2017 4:30 PM CDT Sodium Level 142 mmol/L    Potassium Level 3.7 mmol/L    Chloride Level 107 mmol/L    CO2 Level 21 mmol/L    AGAP 14    Glucose Level 103 mg/dL    BUN 14 mg/dL    Creatinine 0.79 mg/dL    BUN/Creat Ratio 18    eGFR  >60    eGFR Non-African American >60    Calcium Level 9.9 mg/dL    WBC 12.0    RBC 4.87    Hgb 13.3 g/dL    Hct 39.8 %    MCV 82 fL    MCH 27.3 pg    MCHC 33.4 g/dL    RDW 15.1 %    Platelet 342    MPV 10.0 fL   .       Impression and Plan   Diagnosis     Multiple sclerosis (IML49-BJ G35).     Prediabetes (SZR47-DW R73.09).     Neurogenic bladder (LQF51-FN N31.9).      Migraine (UOV51-TU G43.019).     Morbid obesity (WAQ99-ZQ E66.01).         .) gastroenteritis   - BMP: unremarkable; CBC: isolated leukocytosis   - process C. difficile assay; stool culture   - zofran for nausea   - use of either imodium vs. lomotil for diarrhea   - encourage oral salt hydration; if symptomatically worsening, could consider arrangements for IVFs in clinic    plan as previously outlined:     .) obesity, morbid; current BMI 48 - substantial weight gains since beginning therapies for multiple sclerosis   - referral to Oanh Mane   - discussed weight loss management options including lifestyle, medication options, and potential bariatric surgery   - I would like to adopt a dedicated plan for the next 1 yr; if not seeing sufficient momentum in terms of weight loss and generally healthier lifestyle, I think referral to bariatric center is warranted    .) vitamin D deficiency; total level 15 (1/2017 through [place])   - completing ergocalciferol 50,000 units/week x 12 weeks   - on cholecalciferol 5000 units/daily    .) multiple sclerosis   - currently on Tysabri   - following with Dr. Tucker through [facility] neurology group in [place]     .) chronic anemia   - last CBC for review with Hgb 11.2 (1/2017)    .) health maintenance   - prediabetes    - enrolled in [facility] program

## 2022-02-15 NOTE — LETTER
(Inserted Image. Unable to display)            April 14, 2021        MARGARITA KAISER  [address] [place] [address] [place] [id number]        Dear MARGARITA,     Thank you for selecting [facility] for your healthcare needs. Below you will find the results of your recent test(s) done at our clinic.       Your urine cultre from here is normal.  I hope you are feeling better.   Please let me know how you are and what they found as the cause of your recent worsening.        Result Name Current Result Previous Result   Urine Culture  See comment 4/7/2021  See comment 8/17/2020     Please contact my practice at [phone] if you have any questions or concerns.     Sincerely,        Emily Lan MD      What do your labs mean?  Below is a glossary of commonly ordered labs:  LDL   Bad Cholesterol   HDL   Good Cholesterol  AST/ALT   Liver Function   Cr/Creatinine   Kidney Function  Microalbumin   Kidney Function  BUN   Kidney Function  PSA   Prostate    TSH   Thyroid Hormone  HgbA1c   Diabetes Test   Hgb (Hemoglobin)   Red Blood Cells

## 2022-02-15 NOTE — NURSING NOTE
Comprehensive Intake Entered On:  2021 1:12 PM CST    Performed On:  2021 1:08 PM CST by Deedee LERMA Freya               Summary   Chief Complaint :   ? sinus infection - sinus/nasal congestion startig this wknd -  clear/yellow/green drainage, worse yesterday, h/a, sl temp, very fatigued - verbal consent for video visit given   Menstrual Status :   Menarcheal   Height Measured :   65 in(Converted to: 5 ft 5 in, 165.10 cm)    Height/Length Estimated :   65 in(Converted to: 5 ft 5 in, 165.10 cm)    Clifsonja LERMA Freya - 2021 1:08 PM CST   Health Status   Allergies Verified? :   Yes   Medication History Verified? :   Yes   Medical History Verified? :   Yes   Pre-Visit Planning Status :   Completed   Tobacco Use? :   Never smoker   Clifsonja LERMAFreya - 2021 1:08 PM CST   Demographics   Last Name :   JOB   Address :   [address], [address]   First Name :   MARGARITA   Dominguez Initial :   A   Responsible Party Date of Birth () :   1975 CDT   City :   [place]   State :   [place]   Zip Code :   [id number]   Denisesonja LERMA Freya - 2021 1:08 PM CST   Providers Grid   Provider Name :    Dr. Javi Ramon     Provider Specialty :    Neurologist   Psychiatrist   Internal Medicine PCP   Urologist     Comments :    [facility]--[phone]   [facility]             Deedee LERMA Freya - 2021 1:08 PM CST  Freya Mark CMA - 2021 1:08 PM CST  Freya Mark CMA - 2021 1:08 PM CST  Clifsonja LERMA Freya - 2021 1:08 PM CST      Ancillary Services Grid   Name :    [facility]   [facility]           Type of Service :    Other: Pharmacy   Other: Pharmacy             Deedee LERMA Freya - 2021 1:08 PM CST  Clifsonja LERMA Freya - 2021 1:08 PM CST        ID Risk Screen   Recent Travel History :   No recent travel   Family Member Travel History :   No recent travel   Other Exposure to  Infectious Disease :   Unknown   COVID-19 Testing Status :   No positive COVID-19 test   Freya Mark CMA - 1/26/2021 1:08 PM CST   Social History   Social History   (As Of: 1/26/2021 1:12:13 PM CST)   Alcohol:        Current, 1 TIME PER WEEK, 1 drinks/episode maximum.   (Last Updated: 12/14/2018 12:13:00 PM CST by Brianna Parrish)          Tobacco:        Never smoker   (Last Updated: 1/13/2016 7:49:24 PM CST by Aminata Landon CMA)   Never (less than 100 in lifetime)   (Last Updated: 1/26/2021 1:09:01 PM CST by Freya Mark CMA)          Electronic Cigarette/Vaping:        Electronic Cigarette Use: Never.   (Last Updated: 1/26/2021 1:09:08 PM CST by Freya Mark CMA)          Substance Abuse:  Current      vaping THC and cannabis oil   (Last Updated: 12/12/2018 2:39:37 PM CST by Tati Will CMA)          Employment/School:        Disability   (Last Updated: 2/28/2017 2:54:15 PM CST by Wilton SOLIZ, Dione)          Home/Environment:        2 children.   (Last Updated: 1/13/2016 7:50:00 PM CST by Aminata Landon CMA)          Nutrition/Health:        Caffeine intake amount: 2 sodas, every day.   (Last Updated: 7/6/2016 11:10:11 AM CDT by Katie Hatfield)          Exercise:  Does not exercise      (Last Updated: 1/13/2016 7:49:47 PM CST by Aminata Landon CMA )         Sexual:        Sexually active: No.  Sexual orientation: Heterosexual.   (Last Updated: 1/13/2016 7:50:46 PM CST by Aminata Landon CMA)

## 2022-02-15 NOTE — TELEPHONE ENCOUNTER
---------------------  From: Zac Villalobos   To: Deondre HART, Orestes;     Sent: 1/28/2021 3:19:12 PM CST  Subject: Covid results     Left a detailed message on identified phone about pt's negative COVID results. Told pt to call the clinic back if she had any questions or concerns.

## 2022-02-15 NOTE — NURSING NOTE
Comprehensive Intake Entered On:  2/16/2021 1:37 PM CST    Performed On:  2/16/2021 1:33 PM CST by Freya Mark CMA               Summary   Chief Complaint :   1.  Preop - scheduled for right or left ing lymph node excisional bx 2/23  with Dr Cole at [facility]  2.  worsening right lower back pain - had a bad fall 2yrs ago   Menstrual Status :   Menarcheal   Weight Measured :   296 lb(Converted to: 296 lb 0 oz, 134.263 kg)    Height Measured :   65 in(Converted to: 5 ft 5 in, 165.10 cm)    Body Mass Index :   49.25 kg/m2 (HI)    Body Surface Area :   2.48 m2   Height/Length Estimated :   65 in(Converted to: 5 ft 5 in, 165.10 cm)    Systolic Blood Pressure :   151 mmHg (HI)    Diastolic Blood Pressure :   84 mmHg (HI)    Mean Arterial Pressure :   106 mmHg   Peripheral Pulse Rate :   93 bpm   Temperature Tympanic :   98.1 DegF(Converted to: 36.7 DegC)    Oxygen Saturation :   97 %   Freya Mark CMA - 2/16/2021 1:33 PM CST   Health Status   Allergies Verified? :   Yes   Medication History Verified? :   Yes   Medical History Verified? :   Yes   Pre-Visit Planning Status :   Completed   Tobacco Use? :   Never smoker   Freya Mark CMA - 2/16/2021 1:33 PM CST   ID Risk Screen   Recent Travel History :   No recent travel   Family Member Travel History :   No recent travel   Other Exposure to Infectious Disease :   Unknown   COVID-19 Testing Status :   No positive COVID-19 test   Freya Mark CMA - 2/16/2021 1:33 PM CST   Social History   Social History   (As Of: 2/16/2021 1:37:39 PM CST)   Alcohol:        Current, 1 TIME PER WEEK, 1 drinks/episode maximum.   (Last Updated: 12/14/2018 12:13:00 PM CST by Brianna Parrish)          Tobacco:        Never smoker   (Last Updated: 1/13/2016 7:49:24 PM CST by Aminata Landon CMA)   Never (less than 100 in lifetime)   (Last Updated: 1/26/2021 1:09:01 PM CST by Freya Mark CMA)          Electronic Cigarette/Vaping:        Electronic Cigarette Use: Never.   (Last Updated:  1/26/2021 1:09:08 PM CST by Freya Mark CMA)          Substance Abuse:  Current      vaping THC and cannabis oil   (Last Updated: 12/12/2018 2:39:37 PM CST by Tati Will CMA)          Employment/School:        Disability   (Last Updated: 2/28/2017 2:54:15 PM CST by Wilton SOLIZ, Dione)          Home/Environment:        2 children.   (Last Updated: 1/13/2016 7:50:00 PM CST by Aminata Landon CMA)          Nutrition/Health:        Caffeine intake amount: 2 sodas, every day.   (Last Updated: 7/6/2016 11:10:11 AM CDT by Katie Hatfield)          Exercise:  Does not exercise      (Last Updated: 1/13/2016 7:49:47 PM CST by Aminata Landon CMA )         Sexual:        Sexually active: No.  Sexual orientation: Heterosexual.   (Last Updated: 1/13/2016 7:50:46 PM CST by Aminata Landon CMA)

## 2022-02-15 NOTE — TELEPHONE ENCOUNTER
---------------------  From: Tim/Jenny DOUGHERTY (JDL Message Pool ([id number]_[facility]))   To: Sleep Message Pool ([id number]_[place] [facility] [place]);     Sent: 7/25/2019 2:59:54 PM CDT  Subject: CPAP Order     New CPAP order in chart.Spoke with patient she is wanting to go through [facility]. The following information was faxed to [facility]:    Provider order from Dr. Smiley dated 07.25.19, sleep study dated 02.14.18, provider notes from Dr. Smiley dated 07.25.19, 02.01.18, demographic information and insurance card.

## 2022-02-15 NOTE — PROGRESS NOTES
Patient:   MARGARITA KAISER            MRN: [id number]            FIN: [id number]               Age:   41 years     Sex:  Female     :  1975   Associated Diagnoses:   Screening for cervical cancer; Well woman exam   Author:   Michelle Howe      Visit Information      Date of Service: 2017 01:16 pm  Performing Location: [facility]  Encounter#: [id number]      Chief Complaint   2017 2:47 PM CST    Patient is here for annual exam.  Mammogram was done earlier today.  Hx of LEEP in , irregular period since then.  Mom having hysterectomy due to mass in Uterus.      Well Adult History   Chief complaint reviewed and confirmed with patient. Patient is here today for well woman visit.  Last Pap 10 years ago.  History of high-grade cervical lesion with LEEP.  Has not had a Pap since the LEEP.  Her menstrual cycles  became highly irregular after the LEEP (once per year).  She currently has 1 cycle per year.  Tubal ligation for contraceptive purposes.  Not currently sexually active.  Mammogram was done today.  She had a breast reduction done several years ago that revealed precancerous breast changes.  She has a history of multiple sclerosis.  Multiple medications for this.  She is having problems with increased urinary frequency and pressure on her bladder.  This is concerning to her as her mother has a uterine mass and is having a hysterectomy.  With her history of cervical dysplasia and mother s history of uterine mass she is understandably concerned.  Not certain if the urinary pressure and frequency is due to MS or to other causes.  She is unable to exercise due to her MS.  Lives alone.  Has a 10-year-old daughter that she is raising.  Also has a 22-year-old daughter as a support system.  She has 1 glass of wine once a week.  Does not smoke.  No drug use.  Dr. Mendosa is her primary care provider and follows her for her general medical needs.       Review of Systems    Constitutional:  Negative except as documented in history of present illness.    Respiratory:  Negative.    Cardiovascular:  Negative.    Breast:  Negative.    Gastrointestinal:  Negative.    Genitourinary:  Negative except as documented in history of present illness.    Gynecologic:  Negative except as documented in history of present illness.    Psychiatric:  PHQ-9 and CAGE reviewed and discussed with patient..       Health Status   Allergies:    Allergic Reactions (Selected)  Severity Not Documented  Adhesive tape (No reactions were documented)  Sulfa drugs (Rash, vomiting..... and diarrhea)   Medications:  (Selected)   Prescriptions  Prescribed  Lyrica 300 mg oral capsule: 1 cap(s) ( 300 mg ), po, tid, # 90 cap(s), 5 Refill(s), Type: Maintenance, Pharmacy: [id number], 1 cap(s) po tid  Tessalon Perles 100 mg oral capsule: 2 cap(s) ( 200 mg ), po, q4 hrs, Instructions: Can increase to 400mg q 4 hrs if needed, # 60 cap(s), 0 Refill(s), Type: Maintenance, Pharmacy: [id number], 2 cap(s) po q4 hrs,Instr:Can increase to 400mg q 4 hrs if needed  Walker with large wheels and large sitting seat: Walker with large wheels and large sitting seat, See Instructions, Instructions: use as directed, Supply, # 1 EA, 0 Refill(s), Type: Maintenance  cefuroxime 500 mg oral tablet: 1 tab(s) ( 500 mg ), PO, BID, # 14 tab(s), 0 Refill(s), Type: Maintenance, Pharmacy: [id number], 1 tab(s) po bid,x7 day(s)  methenamine hippurate 1 g oral tablet: See Instructions, Instructions: TAKE ONE TABLET BY MOUTH TWICE DAILY WITH FOOD, # 60 tab(s), Type: Soft Stop, Pharmacy: [facility] [id number], TAKE ONE TABLET BY MOUTH TWICE DAILY WITH FOOD  modafinil 200 mg oral tablet: ( 200 mg ), po, bid, # 60 tab(s), 5 Refill(s), Type: Maintenance, called to pharmacy (Rx)  rizatriptan 10 mg oral tablet: See Instructions, Instructions: TAKE 1 TABLET BY MOUTH 1 TIME AS NEEDED FOR MIGRAINE HEADACHE, # 6 tab(s), Type:  Soft Stop, Pharmacy: [id number]  Documented Medications  Documented  Adderall: ( 45 mg ), po, bid, 0 Refill(s), Type: Maintenance  Cymbalta 30 mg oral delayed release capsule: 1 cap(s) ( 30 mg ), po, daily, 0 Refill(s), Type: Maintenance  Cymbalta 60 mg oral delayed release capsule: 1 cap(s) ( 60 mg ), po, daily, 0 Refill(s), Type: Maintenance  Excedrin: See Instructions, 0 Refill(s), Type: Maintenance  LORazepam 0.5 mg oral tablet: po, tid, Instructions: 0.5mg prn and 1-2 hs, PRN: for agitation, 0 Refill(s), Type: Maintenance  LaMICtal 100 mg oral tablet: 1 tab(s) ( 100 mg ), po, daily, 0 Refill(s), Type: Maintenance  Tysabri: iv, q4 wks, 0 Refill(s), Type: Maintenance  Vitamin C: ( 500 mg ), bid, 0 Refill(s), Type: Maintenance  Vitamin D3 2000 intl units oral tablet: 1 tab(s) ( 2,000 International Unit ), po, tid, 0 Refill(s), Type: Maintenance  cyclobenzaprine: ( 10 mg ), po, tid, PRN: as needed for muscle spasm, 0 Refill(s), Type: Maintenance  indomethacin 25 mg oral capsule: 1 cap(s) ( 25 mg ), po, q6 hrs, PRN: for gout pain, 0 Refill(s), Type: Maintenance  tiZANidine 2 mg oral tablet: 2 tab(s) ( 4 mg ), po, tid, 0 Refill(s), Type: Maintenance   Problem list:    All Problems (Selected)  Anemia in chronic illness / SNOMED CT 696505950 / Confirmed  Ataxia / SNOMED CT 40851332 / Confirmed  Blues / SNOMED CT 128848813 / Confirmed  Fatigue / SNOMED CT 870707156 / Confirmed  History of renal stone / SNOMED CT 0362459408 / Confirmed  Headache, migraine / SNOMED CT 39033345 / Confirmed  Multiple sclerosis / SNOMED CT 56298719 / Confirmed  Muscle weakness / SNOMED CT 97131074 / Confirmed  Urinary bladder neurogenic dysfunction / SNOMED CT 4928209937 / Confirmed  Obesity / SNOMED CT 0073801351 / Probable  Skin sensation disturbance / SNOMED CT 583556991 / Confirmed  Vitamin D deficiency / SNOMED CT 20840475 / Confirmed      Histories   Past Medical History:    Active  Multiple sclerosis (12291241): Onset  in  at 37 years.  Blues (811375424)  History of renal stone (4180419270)  Urinary bladder neurogenic dysfunction (6417471928)  Fatigue (779781345)  Muscle weakness (13752384)  Vitamin D deficiency (51377722)  Ataxia (52962032)  Headache, migraine (00319681)  Comments:  2016 CDT 11:19 AM CDT - Katie Hatfield  With aura.  Skin sensation disturbance (379837538)  Resolved  *Hospitalized@[facility] - Complicated UTI: Onset on 2016 at 40 years.  Resolved on 2016 at 40 years.  *Hospitalized@[facility] - Left renal stone: Onset in the month of 2016 at 40 years  Resolved.  Pregnancy:  Resolved.  Pregnancy (729088587):  Resolved in  at 30 years.   Family History:    Melanoma  Father (Shakir)  Diabetes mellitus type II  Father (Shakir)  Heart disease  Father (Shakir)  Hypothyroidism  Sister (Ana Cristina)  Diabetes mellitus type 2  Father (Shakir)  Heart failure  Father (Shakir)  Migraine  Mother (Soledad)  Sister (Ana Cristina)  Daughter     Procedure history:    Port-a-cath in place - Right (20223012) on 2016 at 41 Years.  Comments:  2016 8:01 AM - Ceci Engle  Dx:  Multiple sclerosis.  cystoscopy left ureteroscopy with holmium laser left retrogrades stone extraction and left ureteral stent exchange on 6/3/2016 at 40 Years.  Ureteroscopy (6427363789) in the month of 2016 at 40 Years.  Comments:  2016 4:56 PM - La Garcia  with laser/stone extraction (inability to remove all of stone)  Appendectomy (616750935) in  at 39 Years.  Breast reduction (549294293) in  at 26 Years.  Tonsillectomy and adenoidectomy (667672218) in  at 14 Years.  Cholecystectomy (24722962).   section (29508901).  Comments:  2016 11:26 AM - Deedee LERMA, Freya  x1  Tubal ligation (471786063).      Physical Examination   Last Menstrual Period: 2/10/2017   Vital Signs   2017 2:47 PM CST Peripheral Pulse Rate 95 bpm    HR Method Electronic    Systolic Blood Pressure 143 mmHg  HI    Diastolic Blood Pressure 84 mmHg     Mean Arterial Pressure 104 mmHg    BP Site Right arm    BP Method Electronic      Measurements from flowsheet : Measurements   2/28/2017 2:47 PM CST Height Measured - Standard 65 in    Weight Measured - Standard 287 lb    BSA 2.44 m2    Body Mass Index 47.75 kg/m2    Ht/Wt Measurement Refused by Patient? No      General:  Alert and oriented, No acute distress.    Eye:  Normal conjunctiva.    HENT:  Normocephalic.    Neck:  Supple, Non-tender, No jugular venous distention, No lymphadenopathy, No thyromegaly.    Respiratory:  Lungs are clear to auscultation, Respirations are non-labored.    Cardiovascular:  Normal rate, Regular rhythm, No murmur.    Breast:  No mass, No tenderness.    Gastrointestinal:  Soft, Non-tender.    Genitourinary:  No costovertebral angle tenderness, No inguinal tenderness, No urethral discharge, No lesions.         Labia: Bilateral, Within normal limits.         Cervix: Mucosa ( Within normal limits ), Os ( WNL ).    Integumentary:  Warm, Dry, Pink.    Neurologic:  Alert, Oriented.    Psychiatric:  Cooperative, Appropriate mood & affect.       Impression and Plan   Diagnosis     Screening for cervical cancer (NLS19-OD Z12.4).     Well woman exam (QNP37-VJ Z01.419).     Plan:  1.  Await Pap  2.  Discussed difficulty with determining the size of uterus and ovaries.  Have recommended an ultrasound to evaluate uterus because of issues related to urinary pressure and frequency.   3.  Continue with yearly mammograms and monthly breast self-exam.  4.  If Pap is normal next one is due in 3 years.  5.  Return to clinic to see Dr. Mendosa as needed and for well woman visit 1 time per year   .

## 2022-02-15 NOTE — NURSING NOTE
Comprehensive Intake Entered On:  5/18/2021 1:13 PM CDT    Performed On:  5/18/2021 1:06 PM CDT by Millie Rivas LPN               Summary   Chief Complaint :   5.24.21, United with Dr. Cole, Hernia. pain in right side of head.    Menstrual Status :   Menarcheal   Weight Measured :   292 lb(Converted to: 292 lb 0 oz, 132.449 kg)    Height Measured :   65 in(Converted to: 5 ft 5 in, 165.10 cm)    Body Mass Index :   48.59 kg/m2 (HI)    Body Surface Area :   2.46 m2   Height/Length Estimated :   65 in(Converted to: 5 ft 5 in, 165.10 cm)    Systolic Blood Pressure :   126 mmHg   Diastolic Blood Pressure :   84 mmHg (HI)    Mean Arterial Pressure :   98 mmHg   Peripheral Pulse Rate :   76 bpm   BP Site :   Right arm   Pulse Site :   Radial artery   BP Method :   Manual   HR Method :   Manual   Millie Rivas LPN - 5/18/2021 1:06 PM CDT   Health Status   Allergies Verified? :   Yes   Medication History Verified? :   Yes   Pre-Visit Planning Status :   Completed   Tobacco Use? :   Never smoker   Millie Rivas LPN - 5/18/2021 1:06 PM CDT   Consents   Consent for Immunization Exchange :   Consent Granted   Consent for Immunizations to Providers :   Consent Granted   Millie Rivas LPN - 5/18/2021 1:06 PM CDT   Meds / Allergies   (As Of: 5/18/2021 1:13:24 PM CDT)   Allergies (Active)   adhesive tape  Estimated Onset Date:   Unspecified ; Created By:   Katie Hatfield; Reaction Status:   Active ; Category:   Drug ; Substance:   adhesive tape ; Type:   Allergy ; Updated By:   Katie Hatfield; Reviewed Date:   4/7/2021 9:10 AM CDT      sulfa drugs  Estimated Onset Date:   Unspecified ; Reactions:   Rash, Vomiting....., Diarrhea ; Created By:   Michelle Pedersen CMA; Reaction Status:   Active ; Category:   Drug ; Substance:   sulfa drugs ; Type:   Allergy ; Updated By:   Michelle Pedersen CMA; Reviewed Date:   4/7/2021 9:10 AM CDT        Medication List   (As Of: 5/18/2021 1:13:24 PM CDT)   Prescription/Discharge  Order    amantadine  :   amantadine ; Status:   Prescribed ; Ordered As Mnemonic:   amantadine 100 mg oral capsule ; Simple Display Line:   100 mg, 1 cap(s), Oral, bid, PRN: Other (see comment), 180 cap(s), 1 Refill(s) ; Ordering Provider:   Orestes Mendosa MD; Catalog Code:   amantadine ; Order Dt/Tm:   4/14/2021 11:45:59 AM CDT          amLODIPine  :   amLODIPine ; Status:   Prescribed ; Ordered As Mnemonic:   amLODIPine 5 mg oral tablet ; Simple Display Line:   1 tab(s), Oral, daily, 90 tab(s), 1 Refill(s) ; Ordering Provider:   Orestes Mendosa MD; Catalog Code:   amLODIPine ; Order Dt/Tm:   2/19/2021 8:52:29 AM CST          clonazePAM  :   clonazePAM ; Status:   Prescribed ; Ordered As Mnemonic:   clonazePAM 1 mg oral tablet ; Simple Display Line:   1 mg, 1 tab(s), Oral, q8 hrs, PRN: for anxiety, 24 tab(s), 0 Refill(s) ; Ordering Provider:   Orestes Mendosa MD; Catalog Code:   clonazePAM ; Order Dt/Tm:   2/16/2021 2:04:30 PM CST ; Comment:   Responsible Provider: OPAL THORNTON          lidocaine 5% topical ointment  :   lidocaine 5% topical ointment ; Status:   Prescribed ; Ordered As Mnemonic:   lidocaine 5% topical ointment ; Simple Display Line:   See Instructions, APPLY EXTERNALLY TO THE AFFECTED AREA THREE TIMES DAILY AS NEEDED FOR PAIN, 30 gm ; Ordering Provider:   Orestes Mendosa MD; Catalog Code:   lidocaine topical ; Order Dt/Tm:   12/30/2019 3:05:36 PM CST          meloxicam  :   meloxicam ; Status:   Prescribed ; Ordered As Mnemonic:   meloxicam 15 mg oral tablet ; Simple Display Line:   1 tab(s), Oral, daily, 90 tab(s), 1 Refill(s) ; Ordering Provider:   Orestes Mendosa MD; Catalog Code:   meloxicam ; Order Dt/Tm:   3/10/2021 8:33:10 AM CST          metFORMIN  :   metFORMIN ; Status:   Prescribed ; Ordered As Mnemonic:   metFORMIN 500 mg oral tablet, extended release ; Simple Display Line:   1,000 mg, 2 tab(s), Oral, daily, 180 tab(s), 3 Refill(s) ; Ordering Provider:   Orestes Mendosa MD; Catalog Code:   metFORMIN  ; Order Dt/Tm:   1/26/2021 1:35:52 PM CST          methenamine  :   methenamine ; Status:   Prescribed ; Ordered As Mnemonic:   methenamine hippurate 1 g oral tablet ; Simple Display Line:   1 tab(s), Oral, bid, 180 tab(s), 1 Refill(s) ; Ordering Provider:   Orestes Mendosa MD; Catalog Code:   methenamine ; Order Dt/Tm:   2/19/2021 8:52:30 AM CST          Misc Prescription  :   Misc Prescription ; Status:   Prescribed ; Ordered As Mnemonic:   B-D PEN NDL MINI [id number](3/16)PRPL ; Simple Display Line:   See Instructions, INJECT WITH SAXENDA EVERY DAY, 100 EA ; Ordering Provider:   Orestes Mendosa MD; Catalog Code:   Miscellaneous Prescription ; Order Dt/Tm:   12/30/2019 3:05:21 PM CST          Misc Prescription  :   Misc Prescription ; Status:   Prescribed ; Ordered As Mnemonic:   B-D PEN NDL MINI [id number](3/16)PRPL ; Simple Display Line:   See Instructions, INJECT WITH SAXENDA EVERY DAY, 100 unknown unit ; Ordering Provider:   Orestes Mendosa MD; Catalog Code:   Miscellaneous Prescription ; Order Dt/Tm:   12/2/2019 11:16:59 AM CST          Miscellaneous Prescription  :   Miscellaneous Prescription ; Status:   Prescribed ; Ordered As Mnemonic:   BD pen needle mini [id number] ; Simple Display Line:   See Instructions, Use daily with Saxenda as directed, 100 EA, 0 Refill(s) ; Ordering Provider:   Orestes Mendosa MD; Catalog Code:   Miscellaneous Prescription ; Order Dt/Tm:   10/30/2019 8:12:33 AM CDT          Miscellaneous Rx Supply  :   Miscellaneous Rx Supply ; Status:   Prescribed ; Ordered As Mnemonic:   Accu-check guide meter ; Simple Display Line:   See Instructions, test 2x daily, 1 EA, 0 Refill(s) ; Ordering Provider:   Orestes Mendosa MD; Catalog Code:   Miscellaneous Rx Supply ; Order Dt/Tm:   1/31/2020 3:05:08 PM CST          Miscellaneous Rx Supply  :   Miscellaneous Rx Supply ; Status:   Prescribed ; Ordered As Mnemonic:   Accu-check Guide Test Strips ; Simple Display Line:   See Instructions, Testing 2x daily, 200 EA, 11  Refill(s) ; Ordering Provider:   Orestes Mendosa MD; Catalog Code:   Miscellaneous Rx Supply ; Order Dt/Tm:   1/31/2020 3:06:14 PM CST          Miscellaneous Rx Supply  :   Miscellaneous Rx Supply ; Status:   Prescribed ; Ordered As Mnemonic:   contour next microlet lancets ; Simple Display Line:   See Instructions, testing 2x/ day, 1 box(es), 2 Refill(s) ; Ordering Provider:   Orestes Mendosa MD; Catalog Code:   Miscellaneous Rx Supply ; Order Dt/Tm:   1/27/2020 4:36:08 PM CST          Miscellaneous Rx Supply  :   Miscellaneous Rx Supply ; Status:   Prescribed ; Ordered As Mnemonic:   Replacement CPAP +8 cm H2o for use daily ; Simple Display Line:   See Instructions, Heated humidifier x1; Humidifier chamber x1;  Heated tubing x1; Full face mask of choice with headgear  x1; Cushion x 1;  Filters: Disposable x1pk & Reusable x1pk.  Length of Need = 99 Months, 1 EA, 11 Refill(s) ; Ordering Provider:   Erwin Smiley MD; Catalog Code:   Miscellaneous Rx Supply ; Order Dt/Tm:   7/25/2019 1:49:35 PM CDT          ondansetron 4 mg oral tablet  :   ondansetron 4 mg oral tablet ; Status:   Prescribed ; Ordered As Mnemonic:   ondansetron 4 mg oral tablet ; Simple Display Line:   1 tab(s), Oral, q8 hrs, PRN: AS NEEDED FOR NAUSEA OR VOMITING, 30 tab(s) ; Ordering Provider:   Orestes Mendosa MD; Catalog Code:   ondansetron ; Order Dt/Tm:   12/30/2019 3:05:21 PM CST          rizatriptan 10 mg oral tablet  :   rizatriptan 10 mg oral tablet ; Status:   Prescribed ; Ordered As Mnemonic:   rizatriptan 10 mg oral tablet ; Simple Display Line:   See Instructions, TAKE 1 TABLET BY MOUTH 1 TIME AS NEEDED FOR MIGRAINE HEADACHE, 6 tab(s) ; Ordering Provider:   Orestes Mendosa MD; Catalog Code:   rizatriptan ; Order Dt/Tm:   12/30/2019 3:05:21 PM CST          semaglutide  :   semaglutide ; Status:   Prescribed ; Ordered As Mnemonic:   Ozempic (1 mg dose) 2 mg/1.5 mL subcutaneous solution ; Simple Display Line:   1 mg, Subcutaneous, qweek, 12 EA, 3  Refill(s) ; Ordering Provider:   Orestes Mendosa MD; Catalog Code:   semaglutide ; Order Dt/Tm:   4/14/2021 8:27:26 AM CDT          tiZANidine 4 mg oral tablet  :   tiZANidine 4 mg oral tablet ; Status:   Prescribed ; Ordered As Mnemonic:   tiZANidine 4 mg oral tablet ; Simple Display Line:   See Instructions, TAKE 2 TO 3 TABLETS BY MOUTH EVERY NIGHT AT BEDTIME, 90 tab(s) ; Ordering Provider:   Orestes Mendosa MD; Catalog Code:   tiZANidine ; Order Dt/Tm:   2/5/2020 10:24:40 AM CST          tiZANidine 4 mg oral tablet  :   tiZANidine 4 mg oral tablet ; Status:   Prescribed ; Ordered As Mnemonic:   tiZANidine 4 mg oral tablet ; Simple Display Line:   2 TO 3 TABLETS, Oral, qhs, 90 tab(s) ; Ordering Provider:   Orestes Mendosa MD; Catalog Code:   tiZANidine ; Order Dt/Tm:   1/7/2020 7:00:19 AM CST            Home Meds    acetaminophen-hydrocodone  :   acetaminophen-hydrocodone ; Status:   Documented ; Ordered As Mnemonic:   Norco 5 mg-325 mg oral tablet ; Simple Display Line:   1 tab(s), Oral, q4 hrs, PRN: for pain, 0 Refill(s) ; Catalog Code:   acetaminophen-hydrocodone ; Order Dt/Tm:   4/14/2021 8:12:23 AM CDT          acetaminophen  :   acetaminophen ; Status:   Documented ; Ordered As Mnemonic:   acetaminophen 325 mg oral tablet ; Simple Display Line:   1-2 tabs, Oral, q4 hrs, 0 Refill(s) ; Catalog Code:   acetaminophen ; Order Dt/Tm:   2/10/2021 11:14:05 AM CST          acetaminophen/aspirin/caffeine  :   acetaminophen/aspirin/caffeine ; Status:   Documented ; Ordered As Mnemonic:   Excedrin ; Simple Display Line:   See Instructions, 0 Refill(s) ; Catalog Code:   acetaminophen/aspirin/caffeine ; Order Dt/Tm:   9/6/2016 10:16:52 AM CDT          alemtuzumab  :   alemtuzumab ; Status:   Documented ; Ordered As Mnemonic:   Lemtrada ; Simple Display Line:   See Instructions, 1x/yr, 0 Refill(s) ; Catalog Code:   alemtuzumab ; Order Dt/Tm:   10/26/2018 3:52:14 PM CDT          aripiprazole  :   aripiprazole ; Status:   Documented ;  Ordered As Mnemonic:   Abilify 5 mg oral tablet ; Simple Display Line:   5 mg, 1 tab(s), Oral, daily, 0 Refill(s) ; Catalog Code:   aripiprazole ; Order Dt/Tm:   4/7/2021 9:10:37 AM CDT          busPIRone  :   busPIRone ; Status:   Documented ; Ordered As Mnemonic:   busPIRone 15 mg oral tablet ; Simple Display Line:   15 mg, 1 tab(s), Oral, bid, 0 Refill(s) ; Catalog Code:   busPIRone ; Order Dt/Tm:   4/14/2021 8:07:11 AM CDT ; Comment:   Responsible Provider: JD SANCHEZ          cholecalciferol  :   cholecalciferol ; Status:   Documented ; Ordered As Mnemonic:   Vitamin D3 5000 intl units oral tablet ; Simple Display Line:   10,000 International Unit, 2 tab(s), Oral, daily, 0 Refill(s) ; Catalog Code:   cholecalciferol ; Order Dt/Tm:   2/4/2020 1:35:21 PM CST          clonazePAM  :   clonazePAM ; Status:   Documented ; Ordered As Mnemonic:   clonazePAM 1 mg oral tablet ; Simple Display Line:   1 mg, 1 tab(s), Oral, qhs, 0 Refill(s) ; Catalog Code:   clonazePAM ; Order Dt/Tm:   4/14/2021 8:09:06 AM CDT          cyclobenzaprine  :   cyclobenzaprine ; Status:   Documented ; Ordered As Mnemonic:   cyclobenzaprine 10 mg oral tablet ; Simple Display Line:   10 mg, 1 tab(s), Oral, bid, PRN: for spasm, 30 tab(s), 0 Refill(s) ; Catalog Code:   cyclobenzaprine ; Order Dt/Tm:   7/23/2019 8:00:07 AM CDT          escitalopram  :   escitalopram ; Status:   Documented ; Ordered As Mnemonic:   escitalopram 20 mg oral tablet ; Simple Display Line:   0 Refill(s) ; Catalog Code:   escitalopram ; Order Dt/Tm:   1/26/2021 1:07:14 PM CST ; Comment:   Responsible Provider: SHARONA CARBAJAL          hydrOXYzine  :   hydrOXYzine ; Status:   Documented ; Ordered As Mnemonic:   hydrOXYzine hydrochloride 10 mg oral tablet ; Simple Display Line:   10 mg, 1 tab(s), Oral, PRN: as needed for anxiety, 0 Refill(s) ; Catalog Code:   hydrOXYzine ; Order Dt/Tm:   4/14/2021 8:07:07 AM CDT ; Comment:   Responsible Provider: RAVEN  SHARONA AYOUB          morphine  :   morphine ; Status:   Documented ; Ordered As Mnemonic:   morphine 15 mg/8 hr oral tablet, extended release ; Simple Display Line:   15 mg, 1 tab(s), Oral, q12 hrs, 0 Refill(s) ; Catalog Code:   morphine ; Order Dt/Tm:   4/14/2021 8:12:49 AM CDT          oxyCODONE  :   oxyCODONE ; Status:   Documented ; Ordered As Mnemonic:   oxyCODONE 5 mg oral tablet ; Simple Display Line:   1-2 tabs, Oral, q4 hrs, PRN: as needed for pain, 0 Refill(s) ; Catalog Code:   oxyCODONE ; Order Dt/Tm:   1/26/2021 1:07:07 PM CST ; Comment:   Responsible Provider: TE GALLAGHER          prazosin  :   prazosin ; Status:   Documented ; Ordered As Mnemonic:   prazosin 1 mg oral capsule ; Simple Display Line:   1 mg, 1 cap(s), Oral, hs, 0 Refill(s) ; Catalog Code:   prazosin ; Order Dt/Tm:   4/14/2021 8:07:15 AM CDT ; Comment:   Responsible Provider: JD SANCHEZ          valACYclovir  :   valACYclovir ; Status:   Documented ; Ordered As Mnemonic:   valACYclovir 500 mg oral tablet ; Simple Display Line:   500 mg, 1 tab(s), po, bid, 0 Refill(s) ; Catalog Code:   valACYclovir ; Order Dt/Tm:   2/23/2018 2:39:43 PM CST            ID Risk Screen   Recent Travel History :   No recent travel   Family Member Travel History :   No recent travel   Other Exposure to Infectious Disease :   Unknown   COVID-19 Testing Status :   No positive COVID-19 test   Millie Rivas LPN 5/18/2021 1:06 PM CDT

## 2022-02-15 NOTE — NURSING NOTE
Comprehensive Intake Entered On:  8/17/2019 2:30 PM CDT    Performed On:  8/17/2019 2:23 PM CDT by Sarah Banegas CMA               Summary   Chief Complaint :   c/o cough with mucus, increase in SOB since Wed, difficulty walking, fever since yesterday, exposure to pneumonia   Menstrual Status :   Menarcheal   Weight Measured :   260.2 lb(Converted to: 260 lb 3 oz, 118.02 kg)    Height Measured :   65 in(Converted to: 5 ft 5 in, 165.10 cm)    Body Mass Index :   43.29 kg/m2 (HI)    Body Surface Area :   2.32 m2   Systolic Blood Pressure :   130 mmHg   Diastolic Blood Pressure :   76 mmHg   Mean Arterial Pressure :   94 mmHg   Peripheral Pulse Rate :   93 bpm   BP Site :   Right arm   Pulse Site :   Radial artery   BP Method :   Manual   HR Method :   Electronic   Temperature Tympanic :   100 DegF(Converted to: 37.8 DegC)    Oxygen Saturation :   94 %   Sarah Banegas CMA - 8/17/2019 2:23 PM CDT   Health Status   Allergies Verified? :   Yes   Medication History Verified? :   Yes   Medical History Verified? :   No   Pre-Visit Planning Status :   Not completed   Tobacco Use? :   Never smoker   Sarah Banegas CMA - 8/17/2019 2:23 PM CDT   Meds / Allergies   (As Of: 8/17/2019 2:30:41 PM CDT)   Allergies (Active)   adhesive tape  Estimated Onset Date:   Unspecified ; Created By:   Katie Hatfield; Reaction Status:   Active ; Category:   Drug ; Substance:   adhesive tape ; Type:   Allergy ; Updated By:   Katie Hatfield; Reviewed Date:   8/17/2019 2:28 PM CDT      sulfa drugs  Estimated Onset Date:   Unspecified ; Reactions:   Rash, Vomiting....., Diarrhea ; Created By:   Michelle Pedersen CMA; Reaction Status:   Active ; Category:   Drug ; Substance:   sulfa drugs ; Type:   Allergy ; Updated By:   Michelle Pedersen CMA; Reviewed Date:   8/17/2019 2:28 PM CDT        Medication List   (As Of: 8/17/2019 2:30:41 PM CDT)   Prescription/Discharge Order    amLODIPine  :   amLODIPine ; Status:   Prescribed ; Ordered As Mnemonic:    amLODIPine 5 mg oral tablet ; Simple Display Line:   1 tab(s), Oral, daily, 90 tab(s), 0 Refill(s) ; Ordering Provider:   Orestes Mendosa MD; Catalog Code:   amLODIPine ; Order Dt/Tm:   12/21/2018 11:59:47 AM          ergocalciferol 50,000 intl units (1.25 mg) oral capsule  :   ergocalciferol 50,000 intl units (1.25 mg) oral capsule ; Status:   Prescribed ; Ordered As Mnemonic:   ergocalciferol 50,000 intl units (1.25 mg) oral capsule ; Simple Display Line:   See Instructions, TAKE 1 CAPSULE BY MOUTH EVERY WEEK, 5 cap(s) ; Ordering Provider:   Orestes Mendosa MD; Catalog Code:   ergocalciferol ; Order Dt/Tm:   12/13/2018 12:22:08 PM          ergocalciferol  :   ergocalciferol ; Status:   Prescribed ; Ordered As Mnemonic:   ergocalciferol 50,000 intl units (1.25 mg) oral capsule ; Simple Display Line:   50,000 International Unit, 1 cap(s), Oral, qweek, for 30 day(s), 5 cap(s), 0 Refill(s) ; Ordering Provider:   Orestes Mendosa MD; Catalog Code:   ergocalciferol ; Order Dt/Tm:   7/17/2018 10:46:19 AM          lidocaine 5% topical ointment  :   lidocaine 5% topical ointment ; Status:   Prescribed ; Ordered As Mnemonic:   lidocaine 5% topical ointment ; Simple Display Line:   See Instructions, APPLY EXTERNALLY TO THE AFFECTED AREA THREE TIMES DAILY AS NEEDED FOR PAIN, 30 gm ; Ordering Provider:   Orestes Mendosa MD; Catalog Code:   lidocaine topical ; Order Dt/Tm:   5/23/2019 12:52:19 PM          liraglutide  :   liraglutide ; Status:   Prescribed ; Ordered As Mnemonic:   Saxenda 18 mg/3 mL subcutaneous solution ; Simple Display Line:   3 mg, subcutaneous, daily, take 3mg injection q am (goal dose), 15 mL, 6 Refill(s) ; Ordering Provider:   Orestes Mendosa MD; Catalog Code:   liraglutide ; Order Dt/Tm:   10/26/2018 4:26:23 PM          meloxicam 15 mg oral tablet  :   meloxicam 15 mg oral tablet ; Status:   Prescribed ; Ordered As Mnemonic:   meloxicam 15 mg oral tablet ; Simple Display Line:   See Instructions, TAKE 1 TABLET BY MOUTH  DAILY, 90 tab(s) ; Ordering Provider:   Emily Lan MD; Catalog Code:   meloxicam ; Order Dt/Tm:   10/26/2018 4:53:45 PM          methenamine  :   methenamine ; Status:   Prescribed ; Ordered As Mnemonic:   methenamine hippurate 1 g oral tablet ; Simple Display Line:   1 gm, 1 tab(s), Oral, bid, for 90 day(s), 180 tab(s), 3 Refill(s) ; Ordering Provider:   Orestes Mendosa MD; Catalog Code:   methenamine ; Order Dt/Tm:   7/23/2019 8:18:37 AM          Miscellaneous Prescription  :   Miscellaneous Prescription ; Status:   Prescribed ; Ordered As Mnemonic:   B-D PEN NDL MINI [id number](3/16)PRPL ; Simple Display Line:   See Instructions, USE DAILY WITH SAXENDA AS DIRECTED, 100 unknown unit, 1 Refill(s) ; Ordering Provider:   Orestes Mendosa MD; Catalog Code:   Miscellaneous Prescription ; Order Dt/Tm:   7/26/2019 3:35:16 PM          Misc Prescription  :   Misc Prescription ; Status:   Prescribed ; Ordered As Mnemonic:   B-D PEN NDL MINI [id number](3/16)PRPL ; Simple Display Line:   See Instructions, USE DAILY WITH SAXENDA AS DIRECTED, 100 unknown unit ; Ordering Provider:   Orestes Mendosa MD; Catalog Code:   Miscellaneous Prescription ; Order Dt/Tm:   5/21/2019 12:55:23 PM          Misc Prescription  :   Misc Prescription ; Status:   Prescribed ; Ordered As Mnemonic:   B-D PEN NDL MINI [id number](3/16)PRPL ; Simple Display Line:   See Instructions, USE DAILY WITH SAXENDA AS DIRECTED, 100 unknown unit ; Ordering Provider:   Orestes Mendosa MD; Catalog Code:   Miscellaneous Prescription ; Order Dt/Tm:   6/5/2018 10:24:17 AM          Miscellaneous Rx Supply  :   Miscellaneous Rx Supply ; Status:   Prescribed ; Ordered As Mnemonic:   Replacement CPAP +8 cm H2o for use daily ; Simple Display Line:   See Instructions, Heated humidifier x1; Humidifier chamber x1;  Heated tubing x1; Full face mask of choice with headgear  x1; Cushion x 1;  Filters: Disposable x1pk & Reusable x1pk.  Length of Need = 99 Months, 1 EA, 11 Refill(s) ; Ordering  Provider:   Erwin Smiley MD; Catalog Code:   Miscellaneous Rx Supply ; Order Dt/Tm:   7/25/2019 1:49:35 PM          ondansetron  :   ondansetron ; Status:   Prescribed ; Ordered As Mnemonic:   ondansetron 4 mg oral tablet ; Simple Display Line:   4 mg, 1 tab(s), PO, q8 hrs, PRN: for nausea and vomiting, 30 tab(s), 3 Refill(s) ; Ordering Provider:   Orestes Mendosa MD; Catalog Code:   ondansetron ; Order Dt/Tm:   4/26/2019 1:11:44 PM          rizatriptan  :   rizatriptan ; Status:   Prescribed ; Ordered As Mnemonic:   rizatriptan 10 mg oral tablet ; Simple Display Line:   See Instructions, TAKE 1 TABLET BY MOUTH ONCE AS NEEDED FOR MIGRAINE HEADACHE, 6 tab(s), 0 Refill(s) ; Ordering Provider:   Orestes Mendosa MD; Catalog Code:   rizatriptan ; Order Dt/Tm:   7/24/2019 9:48:06 AM          tiZANidine  :   tiZANidine ; Status:   Prescribed ; Ordered As Mnemonic:   tiZANidine 4 mg oral tablet ; Simple Display Line:   2 TO 3 TABLETS, Oral, qhs, 90 tab(s), 0 Refill(s) ; Ordering Provider:   Orestes Mendosa MD; Catalog Code:   tiZANidine ; Order Dt/Tm:   7/24/2019 9:52:31 AM            Home Meds    acetaminophen/aspirin/caffeine  :   acetaminophen/aspirin/caffeine ; Status:   Documented ; Ordered As Mnemonic:   Excedrin ; Simple Display Line:   See Instructions, 0 Refill(s) ; Catalog Code:   acetaminophen/aspirin/caffeine ; Order Dt/Tm:   9/6/2016 10:16:52 AM          alemtuzumab  :   alemtuzumab ; Status:   Documented ; Ordered As Mnemonic:   Lemtrada ; Simple Display Line:   See Instructions, 1x/yr, 0 Refill(s) ; Catalog Code:   alemtuzumab ; Order Dt/Tm:   10/26/2018 3:52:14 PM          cholecalciferol  :   cholecalciferol ; Status:   Documented ; Ordered As Mnemonic:   cholecalciferol 2000 intl units oral tablet ; Simple Display Line:   2,000 International Unit, 1 tab(s), po, daily, 0 Refill(s) ; Catalog Code:   cholecalciferol ; Order Dt/Tm:   2/26/2018 11:07:57 AM          clonazePAM  :   clonazePAM ; Status:   Documented ;  Ordered As Mnemonic:   clonazePAM 0.5 mg oral tablet ; Simple Display Line:   1 mg, 2 tab(s), Oral, hs, 0 Refill(s) ; Catalog Code:   clonazePAM ; Order Dt/Tm:   10/26/2018 3:49:45 PM          cyclobenzaprine  :   cyclobenzaprine ; Status:   Documented ; Ordered As Mnemonic:   cyclobenzaprine 10 mg oral tablet ; Simple Display Line:   10 mg, 1 tab(s), Oral, tid, PRN: for spasm, 30 tab(s), 0 Refill(s) ; Catalog Code:   cyclobenzaprine ; Order Dt/Tm:   7/23/2019 8:00:07 AM          valACYclovir  :   valACYclovir ; Status:   Documented ; Ordered As Mnemonic:   valACYclovir 500 mg oral tablet ; Simple Display Line:   500 mg, 1 tab(s), po, bid, 0 Refill(s) ; Catalog Code:   valACYclovir ; Order Dt/Tm:   2/23/2018 2:39:43 PM

## 2022-02-15 NOTE — NURSING NOTE
PA for Modafinil done via CoverMyMeds.Pt has tried/failed Amantadine 100mg bid--hives per outside records.  Recommended to try either Nuvigil or Provigil for fatigue.

## 2022-02-15 NOTE — TELEPHONE ENCOUNTER
---------------------  From: Sara Connell CMA (Phone Messages Pool ([id number]_[facility]))   To: Phone Messages Pool ([id number]_[place] [facility] [place]);     Sent: 10/8/2020 4:44:44 PM CDT  Subject: General Message         Phone Message    PCP:   [facility]      Time of Call:  4:35pm       Person Calling:  Karli  Phone number:  [phone]     Returned call at: 4:43pm    Note:   Karli LM for Tati to call her back. States she has a hard lump sticking out of the right side of her abdomen, she can see it through her shirt. She states it is painful to the touch. I LMTCB I wanted to advise her to make appt.    Last office visit and reason:  _pt returned call at 1656  called back at 1718 and advised and she agrees to appt.  Scheduling with [facility] for next wk or [facility] for tomorrow    she understands she needs to be seen over wknd if sx worsening if waiting for [facility]

## 2022-02-15 NOTE — TELEPHONE ENCOUNTER
---------------------  From: Sue Mack CMA (eRx Pool ([id number]_[facility]))   To: [facility] Message Pool ([id number]_[place] [facility] [place]);     Sent: 2/5/2020 9:19:31 AM CST  Subject: FW: Medication Management   Due Date/Time: 2/5/2020 3:20:00 PM CST       Medication Refill needing approval    PCP:   SHANNON    Medication:   tizanidine  Last Filled:  1/7/20    Quantity:  90  Refills:  0  CSA on file?   n/a     Date of last office visit and reason:   2/4/20; f/u lab  Date of last labs pertaining to condition:  1/8/20    Note:  Please advise refill.    Return to Clinic order placed?  4 months      ------------------------------------------  From: [facility] #[id number]  To: Orestes Mendosa MD  Sent: February 4, 2020 3:20:53 PM CST  Subject: Medication Management  Due: February 5, 2020 3:20:53 PM CST    ** On Hold Pending Signature **  Drug: tiZANidine (tiZANidine 4 mg oral tablet)  TAKE 2 TO 3 TABLETS BY MOUTH EVERY NIGHT AT BEDTIME  Quantity: 90 tab(s)  Days Supply: 30  Refills: 0  Substitutions Allowed  Notes from Pharmacy:     Dispensed Drug: tiZANidine (tiZANidine 4 mg oral tablet)  TAKE 2 TO 3 TABLETS BY MOUTH EVERY NIGHT AT BEDTIME  Quantity: 90 tab(s)  Days Supply: 30  Refills: 0  Substitutions Allowed  Notes from Pharmacy:   ---------------------------------------------------------------  From: Freya Mark CMA ([facility] Message Pool ([id number]_[facility]))   To: Orestes Mendosa MD;     Sent: 2/5/2020 10:04:54 AM CST  Subject: FW: Medication Management   Due Date/Time: 2/5/2020 3:20:00 PM CST     please advise on refills, seen yestefday---------------------  From: Orestes Mendosa MD   To: SHANNON [employer] Pool ([id number]_[place]);     Sent: 2/5/2020 10:42:06 AM CST  Subject: RE: Medication Management     okay with refills; I think I already authorized
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
## 2022-02-15 NOTE — PROGRESS NOTES
Patient:   KARLI KAISER            MRN: [id number]            FIN: [id number]               Age:   45 years     Sex:  Female     :  1975   Associated Diagnoses:   Abnormal CT of the abdomen; Inguinal lymphadenopathy; RUQ abdominal pain   Author:   Meghan Ac      Visit Information      Date of Service: 10/09/2020 08:40 am  Performing Location: [facility]  Encounter#: [id number]      Primary Care Provider (PCP):  Orestes Mendosa MD    NPI# [id number]      Referring Provider:  Meghan Ac    NPI# [id number]      Chief Complaint   10/9/2020 8:46 AM CDT    c/o mass under Rt rib x3-4 days, not painful but tender to palpation, SOB. Also has back pain when she's walking but not sure if it is related. Felt warm last night.      History of Present Illness   Karli is a young woman who usually sees DR HERBERT Mendosa who is out of clinic  She has had right back pain for 2 weeks, not clear why, then over the last 3-4 days she developed suddenly pain under her right rib with a lump. It is hard and painful to touch.   chills but no known fever  Sob over the last 3 days with ambulation, she uses a walker due to her MS  she has gained at least #30 in the last 6 months  she feels her gait is off and generalized weakness, likely because she suspects an infection  has never had a hernia but has had multiple kidney stones  she used an infusion thru her port at the [facility] infusion center for MS treatment a couple years ago, states there were risks of cancer associated with that  some swelling in right foot by the end of the day  not using anything for pain      Health Status   Allergies:    Allergic Reactions (Selected)  Severity Not Documented  Adhesive tape (No reactions were documented)  Sulfa drugs (Rash, vomiting..... and diarrhea)   Medications:  (Selected)   Prescriptions  Prescribed  Accu-check Guide Test Strips: Accu-check Guide Test Strips, See Instructions, Instructions: Testing 2x daily, Supply, #  200 EA, 11 Refill(s), Type: Maintenance, Pharmacy: [facility] #[id number], Testing 2x daily  Accu-check guide meter: Accu-check guide meter, See Instructions, Instructions: test 2x daily, Supply, # 1 EA, 0 Refill(s), Type: Maintenance, Pharmacy: [facility] #[id number], test 2x daily  B-D PEN NDL MINI [id number](3/16)PRPL: See Instructions, Instructions: INJECT WITH SAXENDA EVERY DAY, # 100 EA, Type: Soft Stop, Pharmacy: [facility] #[id number], INJECT WITH SAXENDA EVERY DAY  B-D PEN NDL MINI [id number](3/16)PRPL: See Instructions, Instructions: INJECT WITH SAXENDA EVERY DAY, # 100 unknown unit, Type: Soft Stop, Pharmacy: [facility] #[id number], INJECT WITH SAXENDA EVERY DAY  BD pen needle mini [id number]: BD pen needle mini 22Pm4KM, See Instructions, Instructions: Use daily with Saxenda as directed, Supply, # 100 EA, 0 Refill(s), Type: Maintenance, Pharmacy: [facility] #[id number], Use daily with Saxenda as directed  Replacement CPAP +8 cm H2o for use daily: Replacement CPAP +8 cm H2o for use daily, See Instructions, Instructions: Heated humidifier x1; Humidifier chamber x1;  Heated tubing x1; Full face mask of choice with headgear  x1; Cushion x 1;  Filters: Disposable x1pk & Reusable x1pk.  Length of Ne...  Victoza 18 mg/3 mL subcutaneous solution: ( 1.8 mg ), Subcutaneous, daily, Instructions: take in am, # 27 mL, 3 Refill(s), Type: Maintenance, Pharmacy: [facility] #[id number], in place of Saxenda, 1.8 mg Subcutaneous daily,Instr:take in am  amLODIPine 5 mg oral tablet: = 1 tab(s), Oral, daily, # 90 tab(s), 0 Refill(s), Type: Maintenance, Pharmacy: [facility] #[id number], due for a visit, 63, in, 02/04/20 13:37:00 CST, Height Measured, 266.8, lb, 02/04/20 13:37:00 CST, Weight Measured  contour next microlet lancets: contour next microlet lancets, See Instructions, Instructions: testing 2x/ day, Supply, # 1 box(es), 2 Refill(s), Type: Maintenance, Pharmacy: [facility] #[id number],  testing 2x/ day  lidocaine 5% topical ointment: See Instructions, Instructions: APPLY EXTERNALLY TO THE AFFECTED AREA THREE TIMES DAILY AS NEEDED FOR PAIN, # 30 gm, Type: Soft Stop, Pharmacy: [facility] #[id number]  meloxicam 15 mg oral tablet: = 1 tab(s), Oral, daily, # 90 tab(s), 0 Refill(s), Type: Maintenance, Pharmacy: [facility] #[id number], TAKE 1 TABLET BY MOUTH DAILY, 65, in, 02/04/20 13:37:00 CST, Height Measured, 266.8, lb, 02/04/20 13:37:00 CST, Weight Measured  metFORMIN 500 mg oral tablet, extended release: = 2 tab(s) ( 1,000 mg ), Oral, daily, # 180 tab(s), 3 Refill(s), Type: Maintenance, Pharmacy: [facility] #[id number], 2 tab(s) Oral daily  methenamine hippurate 1 g oral tablet: = 1 tab(s), Oral, bid, # 180 tab(s), 0 Refill(s), Type: Acute, Pharmacy: [facility] #[id number], TAKE 1 TABLET BY MOUTH TWICE DAILY, 65, in, 02/04/20 13:37:00 CST, Height Measured, 266.8, lb, 02/04/20 13:37:00 CST, Weight Measured  ondansetron 4 mg oral tablet: 1 tab(s), Oral, q8 hrs, PRN: AS NEEDED FOR NAUSEA OR VOMITING, # 30 tab(s), Type: Soft Stop, Pharmacy: [facility] #[id number]  rizatriptan 10 mg oral tablet: See Instructions, Instructions: TAKE 1 TABLET BY MOUTH 1 TIME AS NEEDED FOR MIGRAINE HEADACHE, # 6 tab(s), Type: Soft Stop, Pharmacy: [facility] #[id number]  tiZANidine 4 mg oral tablet: 2 TO 3 TABLETS, Oral, qhs, # 90 tab(s), Type: Soft Stop, Pharmacy: [facility] #[id number]  tiZANidine 4 mg oral tablet: See Instructions, Instructions: TAKE 2 TO 3 TABLETS BY MOUTH EVERY NIGHT AT BEDTIME, # 90 tab(s), Type: Soft Stop, Pharmacy: [facility] #[id number], TAKE 2 TO 3 TABLETS BY MOUTH EVERY NIGHT AT BEDTIME  Documented Medications  Documented  Excedrin: See Instructions, 0 Refill(s), Type: Maintenance  Lemtrada: See Instructions, Instructions: 1x/yr, 0 Refill(s), Type: Maintenance  Saxenda 18 mg/3 mL subcutaneous solution: ( 1.2 mg ), Subcutaneous, daily, 0 Refill(s), Type:  Maintenance  Vitamin D3 5000 intl units oral tablet: = 4 tab(s) ( 20,000 International Unit ), Oral, daily, 0 Refill(s), Type: Maintenance  cyclobenzaprine 10 mg oral tablet: = 1 tab(s) ( 10 mg ), Oral, tid, PRN: for spasm, # 30 tab(s), 0 Refill(s), Type: Maintenance  escitalopram 10 mg oral tablet: = 1 tab(s) ( 10 mg ), Oral, daily, # 30 tab(s), 0 Refill(s), Type: Maintenance  valACYclovir 500 mg oral tablet: 1 tab(s) ( 500 mg ), po, bid, 0 Refill(s), Type: Maintenance,    Medications          *denotes recorded medication          BD pen needle mini [id number]: See Instructions, Use daily with Saxenda as directed, 100 EA, 0 Refill(s).          B-D PEN NDL MINI 81MD1HY(3/16)PRPL: See Instructions, INJECT WITH SAXENDA EVERY DAY, 100 unknown unit.          B-D PEN NDL MINI 59YL2TW(3/16)PRPL: See Instructions, INJECT WITH SAXENDA EVERY DAY, 100 EA.          Replacement CPAP +8 cm H2o for use daily: See Instructions, Heated humidifier x1; Humidifier chamber x1;  Heated tubing x1; Full face mask of choice with headgear  x1; Cushion x 1;  Filters: Disposable x1pk & Reusable x1pk.  Length of Need = 99 Months, 1 EA, 11 Refill(s).          contour next microlet lancets: See Instructions, testing 2x/ day, 1 box(es), 2 Refill(s).          Accu-check guide meter: See Instructions, test 2x daily, 1 EA, 0 Refill(s).          Accu-check Guide Test Strips: See Instructions, Testing 2x daily, 200 EA, 11 Refill(s).          *Excedrin: See Instructions, 0 Refill(s).          *Lemtrada: See Instructions, 1x/yr, 0 Refill(s).          amLODIPine 5 mg oral tablet: 1 tab(s), Oral, daily, 90 tab(s), 0 Refill(s).          *Vitamin D3 5000 intl units oral tablet: 20,000 International Unit, 4 tab(s), Oral, daily, 0 Refill(s).          *cyclobenzaprine 10 mg oral tablet: 10 mg, 1 tab(s), Oral, tid, PRN: for spasm, 30 tab(s), 0 Refill(s).          *escitalopram 10 mg oral tablet: 10 mg, 1 tab(s), Oral, daily, 30 tab(s), 0 Refill(s).           lidocaine 5% topical ointment: See Instructions, APPLY EXTERNALLY TO THE AFFECTED AREA THREE TIMES DAILY AS NEEDED FOR PAIN, 30 gm.          *Saxenda 18 mg/3 mL subcutaneous solution: 1.2 mg, Subcutaneous, daily, 0 Refill(s).          Victoza 18 mg/3 mL subcutaneous solution: 1.8 mg, Subcutaneous, daily, take in am, 27 mL, 3 Refill(s).          meloxicam 15 mg oral tablet: 1 tab(s), Oral, daily, 90 tab(s), 0 Refill(s).          metFORMIN 500 mg oral tablet, extended release: 1,000 mg, 2 tab(s), Oral, daily, 180 tab(s), 3 Refill(s).          methenamine hippurate 1 g oral tablet: 1 tab(s), Oral, bid, 180 tab(s), 0 Refill(s).          ondansetron 4 mg oral tablet: 1 tab(s), Oral, q8 hrs, PRN: AS NEEDED FOR NAUSEA OR VOMITING, 30 tab(s).          rizatriptan 10 mg oral tablet: See Instructions, TAKE 1 TABLET BY MOUTH 1 TIME AS NEEDED FOR MIGRAINE HEADACHE, 6 tab(s).          tiZANidine 4 mg oral tablet: 2 TO 3 TABLETS, Oral, qhs, 90 tab(s).          tiZANidine 4 mg oral tablet: See Instructions, TAKE 2 TO 3 TABLETS BY MOUTH EVERY NIGHT AT BEDTIME, 90 tab(s).          *valACYclovir 500 mg oral tablet: 500 mg, 1 tab(s), po, bid, 0 Refill(s).       Problem list:    All Problems  Anemia in chronic illness / SNOMED CT 734462915 / Confirmed  Ataxia / SNOMED CT 94445327 / Confirmed  Chronic low back pain / SNOMED CT 901729867 / Confirmed  Blues / SNOMED CT 158941148 / Confirmed  Fatigue / SNOMED CT 910735255 / Confirmed  History of renal stone / SNOMED CT 3992458208 / Confirmed  Headache, migraine / SNOMED CT 07862739 / Confirmed  With aura.  Morbid (severe) obesity due to excess calories / SNOMED CT 699822974 / Probable  Multiple sclerosis / SNOMED CT 17687033 / Confirmed  Muscle weakness / SNOMED CT 95430726 / Confirmed  Neuralgia / SNOMED CT 54961326 / Confirmed  Urinary bladder neurogenic dysfunction / SNOMED CT 9418730214 / Confirmed  Knee osteoarthritis / SNOMED CT 316724984 / Confirmed  Medication management / SNOMED  CT 466869468 / Confirmed  Skin sensation disturbance / SNOMED CT 477026327 / Confirmed  Seasonal allergies / SNOMED CT 109124197 / Confirmed  DM (diabetes mellitus), type 2 / SNOMED CT 545428995 / Confirmed  Vitamin D deficiency / SNOMED CT 73007773 / Confirmed  Inactive: Prediabetes / SNOMED CT 6354834277  Resolved: Inpatient stay / SNOMED CT 913692582  @[facility], [place] - Left renal stone  Resolved: Inpatient stay / SNOMED CT 139311671  @[facility], [place] - Complicated UTI  Resolved: Inpatient stay / SNOMED CT 168589058  @[facility], [place] - Acute pneumonitis; likely related to recent Lemtrada introduction  Resolved: Obstructive sleep apnea syndrome, moderate / SNOMED CT 585145357  On 2/14/18 AHI 20.2 with RDI 41.2, and oximetry avis 88%. Optimal CPAP titration to 8 cm water.  Resolved: Pregnancy  Resolved: Pregnancy / SNOMED CT 012800514  Resolved: Pregnancy / SNOMED CT 569099407  Canceled: Headache / SNOMED CT 24620429      Histories   Past Medical History:    Active  DM (diabetes mellitus), type 2 (579323134): Onset on 1/8/2019 at 43 years.  Multiple sclerosis (48261435): Onset in 2012 at 37 years.  Blues (692720325)  History of renal stone (4812533314)  Urinary bladder neurogenic dysfunction (5090708393)  Fatigue (582581733)  Muscle weakness (54357574)  Vitamin D deficiency (18990920)  Ataxia (59843062)  Headache, migraine (16864006)  Comments:  7/6/2016 CDT 11:19 AM CDT - Katie Hatfield  With aura.  Skin sensation disturbance (561225307)  Seasonal allergies (796070895)  Resolved  Inpatient stay (518845584): Onset on 2/23/2018 at 42 years.  Resolved on 2/25/2018 at 42 years.  Comments:  2/27/2018 CST 7:59 AM CST - La Garcia  @[facility], [place] - Acute pneumonitis; likely related to recent Lemtrada introduction  Obstructive sleep apnea syndrome, moderate (563746135): Onset on 2/21/2018 at 42 years.  Resolved.  Comments:  7/25/2019 CDT 12:23 PM CDT - Sherice HART,  [place]  On 2/14/18 AHI 20.2 with RDI 41.2, and oximetry avis 88%. Optimal CPAP titration to 8 cm water.  Inpatient stay (891453779): Onset on 6/14/2016 at 40 years.  Resolved on 6/18/2016 at 40 years.  Comments:  2/27/2018 CST 7:58 AM CST - La Garcia  @[facility] [place], [place] - Complicated UTI  Inpatient stay (921586814): Onset in the month of 5/2016 at 40 years  Resolved.  Comments:  2/27/2018 CST 7:57 AM La Serrano  @[facility], [place] - Left renal stone  Pregnancy:  Resolved.  Pregnancy (378125211):  Resolved in 2006 at 30 years.  Pregnancy (696017667):  Resolved in 1994 at 18 years.   Family History:    Melanoma  Father (Shakir)  Thyroid  Sister (Ana Cristina)  Kidney stone  Daughter (Zac)  Diabetes mellitus type II  Father (Shakir)  Hypothyroidism  Sister (Ana Cristina)  Diabetes mellitus type 2  Father (Shakir)  CA - Cancer  Father (Shakir)  Comments:  4/5/2017 11:45 AM CDT - Katie Hatfield  skin  CHF - Congestive heart failure  Father (Shakir)  Migraine  Sister (Ana Cristina)  Daughter (Leda)  Mother (Soledad)  Depression  Daughter (Zac)  Father (Shakir)  Gallbladder problem  Mother (Soledad)  Bipolar  Father (Shakir)  Miscellaneous  Brother (Adrian)  Comments:  4/5/2017 11:43 AM CHARLEET - Katie Hatfield  spinal stenosis  Mother (Soledad)  Comments:  4/5/2017 11:43 AM CHARLEET - Katie Hatfield  spinal stenosis  Anxiety  Daughter (Leda)  Daughter (Zac)     Procedure history:    Port-a-cath in place - Right (IMO [id number]) performed by Adriano Dougherty MD on 8/11/2016 at 41 Years.  Comments:  8/18/2016 8:01 AM CDT - Ceci Engle  Dx:  Multiple sclerosis.  cystoscopy left ureteroscopy with holmium laser left retrogrades stone extraction and left ureteral stent exchange on 6/3/2016 at 40 Years.  Ureteroscopy (SNOMED CT 4657097156) in the month of 5/2016 at 40 Years.  Comments:  6/14/2016 4:56 PM CDT - La Garcia  with laser/stone extraction (inability to remove all of stone)  Appendectomy (SNOMED CT 324127491) in   at 39 Years.  Breast reduction (SNOMED CT 135922041) in  at 26 Years.  Tonsillectomy and adenoidectomy (SNOMED CT 535030594) in  at 14 Years.  Cholecystectomy (SNOMED CT 61312123).   section (SNOMED CT 76230573).  Comments:  2016 11:26 AM CDT - Deedee LERMA, Freya  x1  Tubal ligation (SNOMED CT 104417236).   Social History:        Alcohol Assessment            Current, 1 TIME PER WEEK, 1 drinks/episode maximum.      Tobacco Assessment            Never smoker      Substance Abuse Assessment: Current            vaping THC and cannabis oil      Employment and Education Assessment            Disability      Home and Environment Assessment            2 children.      Nutrition and Health Assessment            Caffeine intake amount: 2 sodas, every day.      Exercise and Physical Activity Assessment: Does not exercise      Sexual Assessment            Sexually active: No.  Sexual orientation: Heterosexual.        Physical Examination   VS/Measurements   General:  Alert and oriented, Mild distress.    Respiratory:  Lungs are clear to auscultation, Respirations are non-labored, Breath sounds are equal.    Cardiovascular:  Normal rate, Regular rhythm, No murmur, No gallop, some swelling R lower leg, states this has developed over 6 months.    Gastrointestinal:  Soft, visible fullness right Upper abd quad, with standing exam  palpble tender firm mass approx baseball sized, seems to decrease in size when I exam her supine but still present and tender.    Integumentary:  Warm, Dry, Pink.    Neurologic:  Alert, Oriented.    Psychiatric:  Cooperative, Appropriate mood & affect.       Review / Management   Results review:  Lab results   10/9/2020 9:46 AM CDT Sodium Level 139 mmol/L    Potassium Level 4.3 mmol/L    Chloride Level 106 mmol/L    CO2 Level 25 mmol/L    AGAP 8    Glucose Level 163 mg/dL    BUN 9 mg/dL    Creatinine Level 0.72 mg/dL    BUN/Creat Ratio 13    eGFR  >60    eGFR  Non-African American >60    Calcium Level 9.1 mg/dL    Bilirubin Total 0.7 mg/dL    Bilirubin Direct 0.3 mg/dL    Bilirubin Indirect 0.4 mg/dL    Alkaline Phosphatase 104 IU/L    AST/SGOT 12 IU/L    ALT/SGPT 20 IU/L    Protein Total 6.8 g/dL    Albumin Level 3.9 g/dL    Globulin 2.9 g/dL    A/G Ratio 1.3    WBC 6.6    RBC 4.31    Hgb 12.5 g/dL    Hct 37.4 %    MCV 87 fL    MCH 29.0 pg    MCHC 33.4 g/dL    RDW 13.8 %    Platelet 274    MPV 9.9 fL    Lymphocytes 13.6 %    Abs Lymphocytes 0.9    Neutrophils 77.3 %    Abs Neutrophils 5.1    Monocytes 4.7 %    Abs Monocytes 0.3    Eosinophils 3.5 %    Abs Eosinophils 0.2    Basophils 0.9 %    Abs Basophils 0.1   8/17/2020 2:31 PM CDT UA Color YELLOW    UA Appear CLOUDY    UA pH 5.5    UA Specific Gravity 1.020    UA Glucose 1+    UA Bilirubin NEGATIVE    UA Ketones NEGATIVE    UA Blood NEGATIVE    UA Protein NEGATIVE    UA Nitrite NEGATIVE    UA Leukocyte Esterase NEGATIVE    UA Squamous Epithelial Cells 6-10 /HPF    UA Hyaline Cast NONE SEEN /LPF    UA WBC 6-10 /HPF    UA RBC NONE SEEN /HPF    UA Calcium Oxalate Crystals FEW /HPF    UA Bacteria MODERATE /HPF    Indicator Urine CULTURE INDICATED - RESULTS TO FOLLOW    Urine Culture See comment   .    Radiology results   Computed tomography, Reviewed radiologist's report      Impression and Plan   Diagnosis     Abnormal CT of the abdomen (LGO82-UK R93.5).     Inguinal lymphadenopathy (BOA57-VI R59.0).     RUQ abdominal pain (QML07-PZ R10.11).     Plan:  reviewed ct with Dr Primo Smiley  reviewed results with pt--new moderate right inguinal and mild iliac lymphadenopathy. This may be due to neoplam such as lymphoma or metastasis; no urinary calculi.    Patient Instructions:       Counseled: Patient, Regarding diagnosis, Regarding treatment, Regarding medications, Verbalized understanding, explained that Karli needs further work up, she is already connected with the [facility] and tells me she has seen the  oncology team there, will get her follow up with oncology next week. her right leg swelling is lightly related to this problem as well. She will elevate it when sitting , if things worsen over the weekend she needs to see ER care. she expresses understanding.    Orders     Orders (Selected)   Outpatient Orders  Ordered  Referral (Request): 10/09/20 11:27:00 CDT, Referred to: Oncology, Inguinal lymphadenopathy.

## 2022-02-15 NOTE — PROGRESS NOTES
Chief Complaint    Patient coughed up blood x2 x 1 day and c/o a hot burning feeling in the mouth. C/o blood with urinating and bowel movements starting today. Nausea started yesterday.  History of Present Illness      Patient is here today with her mom.  She is feeling very sick.  She has a history of multiple sclerosis which has been poorly controlled so has recently started chemotherapy in attempt to treat her multiple sclerosis.  This infusion unfortunately impairs her immune system significantly.  She was seen earlier this week and diagnosed with pneumonia and has started a cephalosporin and azithromycin.  She feels that her breathing is gotten a little bit better unfortunately it hurts when she swallows she is very lightheaded she has decreased urine output she is feeling very weak and sick and has had more hemoptysis and also reports that now she started to have some vaginal bleeding.  On further discussion it has been several months since she had her last menstrual cycle.  She reports that she usually only has a period when she has withdrawal bleeding from Clomid.  She does this every few months.  Dr. Girard had wanted to see her back and have another vaginal ultrasound before doing her next round of Clomid.  He had been concerned that she had thickened endometrial lining.  She reports that the bleeding has been mild but she was not sure if it was coming from her vagina versus anus versus urethra.  She thought she had some blood in her urine.  She has not had any dysuria.  It currently hurts too much to be able to eat or drink much of anything.       Review of systems is as per HPI and otherwise negative       Exam general patient is moderately ill-appearing but in no acute distress HEENT pupils are equally round reactive to light her extraocular motion is intact her tympanic membranes are pearly gray with normal light reflex her mucous membranes are tacky and dry and she has several inflamed taste buds but  there is no evidence of white plaques adherent to her buccal mucosa or tongue.  Her neck is supple there is no lymphadenopathy and no thyromegaly her lungs are surprisingly clear on auscultation there is no wheezes rhonchi or rales and no increased work of breathing.  Cardiovascular she has a rapid heart rate but there is no murmur gallops or rubs her skin is warm and dry do not see any rashes.   normal external female genitalia.  She has a small amount of bright red blood present in her vagina this is coming from the cervical spine.  When the cervical loss is wiped with a large cotton swab the blood does not immediately reaccumulate.  I do not see any lesions or wounds.       Assessment and plan 42-year-old female currently on chemotherapy that is known to impair the immune system now with hemoptysis and vaginal bleeding and dehydration and nasal inability to tolerate p.o.  I spoke with Dr. Orestes Mendosa who is patient's primary care physician and is also the hospitalist this week.  He has accepted care of the patient.  Patient was then admitted to the [facility].  Physical Exam   Vitals & Measurements    T: 98.2(Tympanic)  HR: 112(Peripheral)  BP: 134/84  SpO2: 96%     HT: 65 in  WT: 284 lb   Assessment/Plan       Abnormal vaginal bleeding       Dehydration       Dysphagia       Hemoptysis       Multiple sclerosis       Pneumonia  Patient Information     Name:MARGARITA KAISER      Address:      [address] [place] [id number]     Sex:Female     YOB: 1975     [phone]     Emergency Contact:ELISA HOPE     MRN:[id number]     FIN:[id number]     [facility]     Date of Service:02/23/2018      Primary Care Physician:       Orestes Mendosa MD, [phone]  Problem List/Past Medical History    Ongoing     Anemia in chronic illness     Ataxia     Blues     Fatigue     Headache, migraine      Comments: With aura.     History of  renal stone     Morbid (severe) obesity due to excess calories     Multiple sclerosis     Muscle weakness     Prediabetes     Seasonal allergies     Skin sensation disturbance     Urinary bladder neurogenic dysfunction     Vitamin D deficiency    Historical     *Hospitalized@[facility] - Complicated UTI     *Hospitalized@[facility] - Left renal stone     Pregnancy     Pregnancy     Pregnancy  Procedure/Surgical History     Port-a-cath in place - Right (2016)     cystoscopy left ureteroscopy with holmium laser left retrogrades stone extraction and left ureteral stent exchange (2016)     Ureteroscopy (2016)     Appendectomy ()     Breast reduction ()     Tonsillectomy and adenoidectomy ()      section     Cholecystectomy     Tubal ligation  Medications        DULoxetine 60 mg oral delayed release capsule: 180 mg, 3 cap(s), po, daily, 180 cap(s), 0 Refill(s).        LORazepam 0.5 mg oral tablet: po, tid, 0.5mg prn and 1-2 hs, PRN: for agitation, 0 Refill(s).        Walker with large wheels and large sitting seat: See Instructions, use as directed, 1 EA, 0 Refill(s).        Excedrin: See Instructions, 0 Refill(s).        Adderall: 45 mg, po, bid, 0 Refill(s).        Zithromax Z-Joe 250 mg oral tablet: Take 2 tablets on Day 1 and then 1 tablet, PO, Daily, for 5 day(s), until finished, 6 tab(s), 0 Refill(s).        cefdinir 300 mg oral capsule: 300 mg, 1 cap(s), PO, q12hr, for 7 day(s), 14 cap(s), 0 Refill(s).        cholecalciferol 5000 intl units oral tablet: See Instructions, 15,000 units daily, 100 tab(s), 5 Refill(s).        cyclobenzaprine: 10 mg, po, tid, PRN: as needed for muscle spasm, 0 Refill(s).        ergocalciferol 50,000 intl units (1.25 mg) oral capsule: See Instructions, 1 cap(s) po wkly, 12 cap(s), 0 Refill(s).        hydrOXYzine hydrochloride 50 mg oral tablet: 50 mg, 1 tab(s), PO, tid, PRN: for anxiety, 40 tab(s), 0 Refill(s).        indomethacin 25 mg oral capsule: 25 mg, 1  cap(s), po, q6 hrs, PRN: for gout pain, 0 Refill(s).        LaMICtal 100 mg oral tablet: 100 mg, 1 tab(s), po, daily, 0 Refill(s).        lidocaine 5% topical ointment: 1 gabby, TOP, TID, PRN: for pain, 50 gm, 2 Refill(s).        lidocaine-prilocaine 2.5%-2.5% topical cream: See Instructions, APPLY 5 GRAMS TOPICALLY TO THE AFFECTED AREA(S) ONCE FOR A ONE TIME DOSE, 30 gm.        Saxenda 18 mg/3 mL subcutaneous solution: 3 mg, subcutaneous, daily, take 3mg injection q am (goal dose), 15 mL, 6 Refill(s).        medroxyPROGESTERone 10 mg oral tablet: See Instructions, TAKE 1 TABLET BY MOUTH EVERY DAY FOR 10 DAYS ONCE PER MONTH PER PROVIDER, 30 tab(s).        meloxicam 15 mg oral tablet: 15 mg, 1 tab(s), PO, Daily, 90 tab(s), 0 Refill(s).        meperidine 50 mg oral tablet: 50 mg, 1 tab(s), po, q3 hr (int), PRN: for pain, 0 Refill(s).        methenamine hippurate 1 g oral tablet: See Instructions, TAKE ONE TABLET BY MOUTH TWICE DAILY WITH FOOD, 60 tab(s).        modafinil 200 mg oral tablet: See Instructions, TAKE 1 TABLET BY MOUTH TWICE DAILY, 60 tab(s).        ondansetron 4 mg oral tablet: 4 mg, 1 tab(s), PO, q8 hrs, PRN: for nausea and vomiting, 10 tab(s), 0 Refill(s).        Lyrica 300 mg oral capsule: 300 mg, 1 cap(s), po, tid, 90 cap(s), 5 Refill(s).        raNITIdine 150 mg oral capsule: 150 mg, 1 cap(s), po, daily, 0 Refill(s).        rizatriptan 10 mg oral tablet: See Instructions, TAKE 1 TABLET BY MOUTH 1 TIME AS NEEDED FOR MIGRAINE HEADACHE, 6 tab(s).        tiZANidine 4 mg oral tablet: See Instructions, TAKE 2 TO 3 TABLETS BY MOUTH EVERY NIGHT AT BEDTIME, 90 tab(s).        valACYclovir 500 mg oral tablet: 500 mg, 1 tab(s), po, bid, 0 Refill(s).                Allergies    adhesive tape    sulfa drugs (Rash, Vomiting....., Diarrhea)  Social History    Smoking Status - 02/23/2018     Never smoker     Alcohol - 01/26/2018      Current, 1-2 times per month, 1 drinks/episode maximum.     Employment and Education -  01/26/2018      Disability     Exercise and Physical Activity - Does not exercise, 01/26/2018     Home and Environment - 01/26/2018      2 children.     Nutrition and Health - 01/26/2018      Caffeine intake amount: 2 sodas, every day.     Sexual - 01/26/2018      Sexually active: No. Sexual orientation: Heterosexual.     Substance Abuse - Past, 01/26/2018      Marijuana     Tobacco - 01/26/2018      Never smoker  Family History    Anxiety: Daughter and Daughter.    Bipolar: Father.    CA - Cancer: Father.    CHF - Congestive heart failure: Father.    Depression: Father and Daughter.    Diabetes mellitus type 2: Father.    Diabetes mellitus type II: Father.    Gallbladder problem: Mother.    Hypothyroidism: Sister.    Kidney stone: Daughter.    Melanoma: Father.    Migraine: Mother, Sister and Daughter.    Miscellaneous: Mother and Brother.    Thyroid: Sister.  Immunizations      Vaccine Date Status      pneumococcal (PPSV23) 05/14/2012 Recorded      tetanus/diphth/pertuss (Tdap) adult/adol 07/16/2008 Recorded  Lab Results      Results (Last 90 days)                Laboratory                     Chemistry                          General Chemistry                               BUN:      12 mg/dL  (01/26/18 02:12 PM CST)                                                                                                                                          BUN/Creat Ratio:      NOT APPLICABLE   (01/26/18 02:12 PM CST)                                                                                                                                          Basic Metabolic Profile:         (01/26/18 02:12 PM CST)                                                                                                                                          C-Reactive Protein (CRP):      H 44.0 mg/L (Range  - <8.0)  (01/26/18 02:12 PM CST)                                                                                                                                           CO2 Level:      23 mmol/L  (01/26/18 02:12 PM CST)                                                                                                                                          Calcium Level:      9.5 mg/dL  (01/26/18 02:12 PM CST)                                                                                                                                          Chloride Level:      104 mmol/L  (01/26/18 02:12 PM CST)                                                                                                                                          Creatinine Level:      0.71 mg/dL  (01/26/18 02:12 PM CST)                                                                                                                                          Glucose Level:      H 139 mg/dL (Range 65 - 99)  (01/26/18 02:12 PM CST)                                                                                                                                          Potassium Level:      4.4 mmol/L  (01/26/18 02:12 PM CST)                                                                                                                                          Sodium Level:      138 mmol/L  (01/26/18 02:12 PM CST)                                                                                                                                          eGFR:      104 mL/min/1.73m2  (01/26/18 02:12 PM CST)                                                                                                                                          eGFR African American:      120 mL/min/1.73m2  (01/26/18 02:12 PM CST)                                                                                                                                Hematology                          CBC                               Hct                                     36.1 %  (01/26/18 02:12 PM CST)                                                                                                                                                 33.6 %  (02/21/18 01:22 PM CST)                                                                                                                                                33.6 %  (02/21/18 01:22 PM CST)                                                                                                                                          Hgb                                     12.0 gm/dL  (01/26/18 02:12 PM CST)                                                                                                                                                L 11.1 g/dL (Range 12.0 - 16.0)  (02/21/18 01:22 PM CST)                                                                                                                                                L 11.1 g/dL (Range 12.0 - 16.0)  (02/21/18 01:22 PM CST)                                                                                                                                          MCH                                     L 26.7 pg (Range 27.0 - 33.0)  (01/26/18 02:12 PM CST)                                                                                                                                                27.5 pg  (02/21/18 01:22 PM CST)                                                                                                                                                27.5 pg  (02/21/18 01:22 PM CST)                                                                                                                                          MCHC                                     33.2 gm/dL  (01/26/18 02:12 PM CST)                                                                                                                                                33.0 g/dL  (02/21/18 01:22 PM CST)                                                                                                                                                 33.0 g/dL  (02/21/18 01:22 PM CST)                                                                                                                                          MCV                                     80.2 fL  (01/26/18 02:12 PM CST)                                                                                                                                                83 fL  (02/21/18 01:22 PM CST)                                                                                                                                                83 fL  (02/21/18 01:22 PM CST)                                                                                                                                          MPV                                     9.7 fL  (01/26/18 02:12 PM CST)                                                                                                                                                9.9 fL  (02/21/18 01:22 PM CST)                                                                                                                                                9.9 fL  (02/21/18 01:22 PM CST)                                                                                                                                          Platelet                                     343   (01/26/18 02:12 PM CST)                                                                                                                                                218   (02/21/18 01:22 PM CST)                                                                                                                                                218   (02/21/18 01:22 PM CST)                                                                                                                                           RBC                                     4.50   (01/26/18 02:12 PM CST)                                                                                                                                                4.04   (02/21/18 01:22 PM CST)                                                                                                                                                4.04   (02/21/18 01:22 PM CST)                                                                                                                                          RDW                                     15.0 %  (01/26/18 02:12 PM CST)                                                                                                                                                H 15.8 % (Range 11.5 - 15.5)  (02/21/18 01:22 PM CST)                                                                                                                                                H 15.8 % (Range 11.5 - 15.5)  (02/21/18 01:22 PM CST)                                                                                                                                          WBC                                        (01/26/18 02:12 PM CST)                                                                                                                                                10.1   (01/26/18 02:12 PM CST)                                                                                                                                                H 13.1  (Range 4.5 - 11.0)  (02/21/18 01:22 PM CST)                                                                                                                                                H 13.1  (Range 4.5 - 11.0)  (02/21/18 01:22 PM CST)                                                                                                                                      Differential                               Abs Basophils                                     71   (01/26/18 02:12 PM CST)                                                                                                                                                0.0   (02/21/18 01:22 PM CST)                                                                                                                                          Abs Eosinophils                                     283   (01/26/18 02:12 PM CST)                                                                                                                                                0.0   (02/21/18 01:22 PM CST)                                                                                                                                          Abs Lymphocytes                                     2464   (01/26/18 02:12 PM CST)                                                                                                                                                L 0.2  (Range 0.9 - 2.9)  (02/21/18 01:22 PM CST)                                                                                                                                          Abs Monocytes                                     404   (01/26/18 02:12 PM CST)                                                                                                                                                0.3   (02/21/18 01:22 PM CST)                                                                                                                                          Abs Neutrophils                                     6878   (01/26/18 02:12 PM CST)                                                                                                                                                H 12.6  (Range 1.7 - 7.0)  (02/21/18 01:22 PM CST)                                                                                                                                           Basophils                                     0.7 %  (01/26/18 02:12 PM CST)                                                                                                                                                0.2 %  (02/21/18 01:22 PM CST)                                                                                                                                          Eosinophils                                     2.8 %  (01/26/18 02:12 PM CST)                                                                                                                                                0.0 %  (02/21/18 01:22 PM CST)                                                                                                                                          Lymphocytes                                     24.4 %  (01/26/18 02:12 PM CST)                                                                                                                                                L 1.1 % (Range 20.0 - 44.0)  (02/21/18 01:22 PM CST)                                                                                                                                          Monocytes                                     4.0 %  (01/26/18 02:12 PM CST)                                                                                                                                                2.4 %  (02/21/18 01:22 PM CST)                                                                                                                                          Neutrophils                                     68.1 %  (01/26/18 02:12 PM CST)                                                                                                                                                H 96.3 % (Range 42.0 - 72.0)  (02/21/18 01:22 PM CST)                                                                                                                                 Microbiology                          Bacteriology                               Culture Blood                                        (01/26/18 02:15 PM CST)                                                                                                                                                See comment   (01/26/18 02:15 PM CST)                                                                                                                                          Culture Urine                                        (01/26/18 02:14 PM CST)                                                                                                                                                See comment   (01/26/18 02:14 PM CST)                                                                                                                                Urinalysis                          UA Dipstick                               UA Bilirubin                                     Negative   (01/26/18 02:32 PM CST)                                                                                                                                                NEGATIVE   (02/06/18 12:38 PM CST)                                                                                                                                          UA Clarity:      Slightly Cloudy   (01/26/18 02:32 PM CST)                                                                                                                                          UA Color:      Yellow   (01/26/18 02:32 PM CST)                                                                                                                                          UA Glucose                                     Negative mg/dL  (01/26/18 02:32 PM CST)                                                                                                                                                 TRACE   (02/06/18 12:38 PM CST)                                                                                                                                          UA Ketones                                     Negative mg/dL  (01/26/18 02:32 PM CST)                                                                                                                                                NEGATIVE   (02/06/18 12:38 PM CST)                                                                                                                                          UA Leukocyte Esterase                                     Negative   (01/26/18 02:32 PM CST)                                                                                                                                                NEGATIVE   (02/06/18 12:38 PM CST)                                                                                                                                          UA Nitrite                                     Negative   (01/26/18 02:32 PM CST)                                                                                                                                                NEGATIVE   (02/06/18 12:38 PM CST)                                                                                                                                          UA Protein                                     Negative mg/dL  (01/26/18 02:32 PM CST)                                                                                                                                                1+   (02/06/18 12:38 PM CST)                                                                                                                                          UA Specific [place]                                      1.025   (01/26/18 02:32 PM CST)                                                                                                                                                > OR = 1.030   (02/06/18 12:38 PM CST)                                                                                                                                          UA Urobilinogen:      Normal   (01/26/18 02:32 PM CST)                                                                                                                                          UA pH                                     6.0   (01/26/18 02:32 PM CST)                                                                                                                                                   (02/06/18 12:38 PM CST)                                                                                                                                                5.5   (02/06/18 12:38 PM CST)                                                                                                                                          Urine Occult Blood                                     Negative   (01/26/18 02:32 PM CST)                                                                                                                                                NEGATIVE   (02/06/18 12:38 PM CST)                                                                                                                                     UA Microscopic                               UA Bacteria                                     Moderate   (01/26/18 02:32 PM CST)                                                                                                                                                Few   (02/06/18 01:40 PM CST)                                                                                                                                          UA Calcium  Oxalate Crystals:      Present   (01/26/18 02:32 PM CST)                                                                                                                                          UA Epithelial Cells                                     Moderate   (01/26/18 02:32 PM CST)                                                                                                                                                Few   (02/06/18 01:40 PM CST)                                                                                                                                          UA Mucous:      Present   (02/06/18 01:40 PM CST)                                                                                                                                          UA RBC                                     0-2   (01/26/18 02:32 PM CST)                                                                                                                                                3-5   (02/06/18 01:40 PM CST)                                                                                                                                          UA WBC                                     6-10   (01/26/18 02:32 PM CST)                                                                                                                                                3-5   (02/06/18 01:40 PM CST)

## 2022-02-15 NOTE — TELEPHONE ENCOUNTER
Entered by Jenny Ballesteros on August 21, 2020 8:57:52 AM CDT  PCP:   SHANNON    Medication:   Methenamine Hippurate   Last Filled:  7/23/19    Quantity:  180 Refills:  3    Date of last office visit and reason:   8/17/20 Video   Date of last labs pertaining to condition:  _    Note:  Please advise on refills     Return to Clinic order placed?  Yes. Placed 2/5/20; return in 3 months     Resource:   _  Phone:   _            ------------------------------------------  From: [facility] #[id number]  To: Orestes Mendosa MD  Sent: August 20, 2020 8:21:16 PM CDT  Subject: Medication Management  Due: August 12, 2020 4:14:54 PM CDT     ** On Hold Pending Signature **     Dispensed Drug: methenamine (methenamine hippurate 1 g oral tablet), TAKE 1 TABLET BY MOUTH TWICE DAILY  Quantity: 180 tab(s)  Days Supply: 90  Refills: 0  Substitutions Allowed  Notes from Pharmacy:  ---------------------------------------------------------------  From: Jenny Ballesteros ([facility] Pool ([id number]_[facility]))   To: [facility] Message Pool ([id number]_[place] [facility] [place]);     Sent: 8/21/2020 8:57:59 AM CDT  Subject: FW: Medication Management   Due Date/Time: 8/21/2020 8:21:00 PM CDT---------------------  From: Freya Mark CMA ([facility] Message Pool ([id number]_[facility]))   To: Orestes Mendosa MD;     Sent: 8/21/2020 8:59:14 AM CDT  Subject: FW: Medication Management   Due Date/Time: 8/21/2020 8:21:00 PM CDT---------------------  From: Orestes Mendosa MD   To: [facility] #[id number]    Sent: 8/21/2020 9:07:11 AM CDT  Subject: FW: Medication Management     ** Submitted: **  Complete:methenamine (methenamine hippurate 1 g oral tablet)   Signed by Orestes Mendosa MD  8/21/2020 2:07:00 PM [facility]    ** Approved with modifications: **  methenamine (METHENAMINE HIPPURATE 1GM TABLETS)  TAKE 1 TABLET BY MOUTH TWICE DAILY  Qty:  180 tab(s)        Days Supply:  90        Refills:  0          Substitutions Allowed     Route To Pharmacy - [facility] #[id number]

## 2022-02-15 NOTE — CARE COORDINATION
Patient:   MARGARITA KAISER            MRN: [id number]            FIN: [id number]               Age:   44 years     Sex:  Female     :  1975   Associated Diagnoses:   None   Author:   Tati Will CMA      Sources of Information:  [ ] Patient, family member, or caregiver (Please list):  [ ] Hospital discharge summary  [ ] Hospital fax  [x ] List of recent hospitalizations or ED visits  [ ] Other:     Discharged From:  ED   Discharge Date: 19    Diagnosis/Problem: Weak/cough    Medication Changes: [ ] Yes [ x] No   Medication List Updated: [ ] Yes [x ] No    Needs Referral or Lab: [ ] Yes [ x] No    Needs Follow-up Appointment: None indicated other than to f/u with her neuro about her arm weakness  [ ] Within 7 days of discharge (highly complex visit)  [ ] Within 14 days of discharge (moderately complex visit)    Appointment Made With: None  Date:    See msg to SHANNON

## 2022-02-15 NOTE — CARE COORDINATION
Pt called [facility] today stating she has been running a low grade fever all weekend (99.3). She said her Dad's celebration of life is on Saturday. She is almost certain she has a UTI (frequent hx of UTI's) but she feels she should probably get tested for COVID if she is going to be with family on Saturday. I advised a video vs phone visit w/ a provider to discuss and she agreed-transferred her to schedule.

## 2022-02-15 NOTE — PROGRESS NOTES
Patient:   MARGARITA KAISER            MRN: [id number]            FIN: [id number]               Age:   42 years     Sex:  Female     :  1975   Associated Diagnoses:   None   Author:   Emily Lan MD      Visit Information      Date of Service: 2018 09:00 am  Performing Location: [facility]  Encounter#: [id number]      Primary Care Provider (PCP):  Orestes Mendosa MD    NPI# [id number]      Referring Provider:  Emily Lan MD    NPI# [id number]      Chief Complaint   2018 9:10 AM CDT     Acupuncture treatment      History of Present Illness   Acupuncture treatment.    Consent signed:  Indication: Chronic pain, multiple sclerosis and neuralgia and myalgia  Treatment #    Symptoms following last treatment:   n/a.  Patient presents today for acupuncture treatment.  Informed consent including risks benefits and alternatives provided today.  Risks include risk of pain bleeding infection injury to surrounding tissue and need for further treatment.  Also about 1 and 1000 patients may experience a vasovagal reaction or lightheadedness.  There is a possibility that there might be some initial worsening of symptoms.  There may be no benefit with the treatment.  Indication is for treatment of patient's chronic condition.  Alternatives include doing nothing, continuing with traditional Western medicine, or seeing another acupuncturist.  Needle placement for acupuncture will be based on Traditional Chinese Medicine.  .            Review of Systems   Constitutional:  Negative except as documented in history of present illness.    Eye:  Negative except as documented in history of present illness.    Ear/Nose/Mouth/Throat:  Negative except as documented in history of present illness.    Respiratory:  Negative except as documented in history of present illness.    Cardiovascular:  Negative except as documented in history of present illness.    Gastrointestinal:  Negative except as documented  in history of present illness.    Immunologic:  Negative except as documented in history of present illness.    Integumentary:  Negative except as documented in history of present illness.              Health Status   Allergies:    Allergic Reactions (Selected)  Severity Not Documented  Adhesive tape (No reactions were documented)  Sulfa drugs (Rash, vomiting..... and diarrhea)   Medications:  (Selected)   Prescriptions  Prescribed  DULoxetine 60 mg oral delayed release capsule: 3 cap(s) ( 180 mg ), po, daily, # 180 cap(s), 0 Refill(s), Type: Soft Stop, Pharmacy: [facility] [id number], 3 cap(s) po daily  Saxenda 18 mg/3 mL subcutaneous solution: ( 3 mg ), subcutaneous, daily, Instructions: take 3mg injection q am (goal dose), # 15 mL, 6 Refill(s), Type: Maintenance, Pharmacy: [facility] 19447, 3 mg subcutaneous daily,Instr:take 3mg injection q am (goal dose)  amLODIPine 5 mg oral tablet: 1 tab(s) ( 5 mg ), PO, Daily, # 30 tab(s), 1 Refill(s), Type: Maintenance, Pharmacy: [facility] 59967, 1 tab(s) po daily  cefdinir 300 mg oral capsule: 1 cap(s) ( 300 mg ), PO, q12hr, # 14 cap(s), 0 Refill(s), Type: Maintenance, Pharmacy: [facility] 00036, 1 cap(s) po q12 hrs,x7 day(s)  ergocalciferol 50,000 intl units (1.25 mg) oral capsule: See Instructions, Instructions: TAKE 1 CAPSULE BY MOUTH WEEKLY, # 12 cap(s), Type: Soft Stop, Pharmacy: [facility] 50934, TAKE 1 CAPSULE BY MOUTH WEEKLY  lidocaine 5% topical ointment: 1 gabby, TOP, TID, PRN: for pain, # 50 gm, 2 Refill(s), Type: Maintenance, Pharmacy: [facility] 26886, 1 gabby top tid,PRN:for pain  lidocaine-prilocaine 2.5%-2.5% topical cream: See Instructions, Instructions: APPLY 5 GRAMS TOPICALLY TO THE AFFECTED AREA(S) ONCE FOR A ONE TIME DOSE, # 30 gm, Type: Soft Stop, Pharmacy: [facility]  medroxyPROGESTERone 10 mg oral tablet: See Instructions, Instructions: TAKE 1 TABLET BY MOUTH EVERY DAY FOR 10 DAYS  ONCE PER MONTH PER PROVIDER, # 30 tab(s), Type: Soft Stop, [facility], TAKE 1 TABLET BY MOUTH EVERY DAY FOR 10 DAYS ONCE PER MONTH PER PROVIDER  meloxicam 15 mg oral tablet: 1 tab(s) ( 15 mg ), PO, Daily, # 90 tab(s), 0 Refill(s), Type: Maintenance, [facility], 1 tab(s) po daily  methenamine hippurate 1 g oral tablet: See Instructions, Instructions: TAKE ONE TABLET BY MOUTH TWICE DAILY WITH FOOD, # 60 tab(s), Type: Soft Stop, [facility], TAKE ONE TABLET BY MOUTH TWICE DAILY WITH FOOD  modafinil 200 mg oral tablet: See Instructions, Instructions: TAKE 1 TABLET BY MOUTH TWICE DAILY, # 60 tab(s), Type: Soft Stop, [facility], TAKE 1 TABLET BY MOUTH TWICE DAILY  ondansetron 4 mg oral tablet: 1 tab(s) ( 4 mg ), PO, q8 hrs, PRN: for nausea and vomiting, # 10 tab(s), 0 Refill(s), Type: Maintenance, [facility], 1 tab(s) po q8 hrs,PRN:for nausea and vomiting  rizatriptan 10 mg oral tablet: See Instructions, Instructions: TAKE 1 TABLET BY MOUTH 1 TIME AS NEEDED FOR MIGRAINE HEADACHE, # 6 tab(s), Type: Soft Stop, [facility] [address]  tiZANidine 4 mg oral tablet: See Instructions, Instructions: TAKE 2 TO 3 TABLETS BY MOUTH EVERY NIGHT AT BEDTIME, # 90 tab(s), Type: Soft Stop, [facility] [address]  Documented Medications  Documented  Adderall: ( 45 mg ), po, bid, 0 Refill(s), Type: Maintenance  Excedrin: See Instructions, 0 Refill(s), Type: Maintenance  LORazepam 0.5 mg oral tablet: po, tid, Instructions: 0.5mg prn and 1-2 hs, PRN: for agitation, 0 Refill(s), Type: Maintenance  LaMICtal 100 mg oral tablet: 1 tab(s) ( 100 mg ), po, daily, 0 Refill(s), Type: Maintenance  Magic Mouthwash: Magic Mouthwash, Instructions: take 5-10ml po QID for stomatitis, Supply, 0 Refill(s), Type: Maintenance  cholecalciferol 2000 intl units oral tablet: 1 tab(s) ( 2,000 International Unit  ), po, daily, 0 Refill(s), Type: Maintenance  hydrOXYzine hydrochloride 50 mg oral tablet: 1 tab(s) ( 50 mg ), PO, tid, PRN: for anxiety, # 40 tab(s), 0 Refill(s), Type: Maintenance  meperidine 50 mg oral tablet: 1 tab(s) ( 50 mg ), po, q3 hr (int), PRN: for pain, 0 Refill(s), Type: Maintenance  raNITIdine 150 mg oral capsule: 1 cap(s) ( 150 mg ), po, daily, 0 Refill(s), Type: Maintenance  valACYclovir 500 mg oral tablet: 1 tab(s) ( 500 mg ), po, bid, 0 Refill(s), Type: Maintenance   Problem list:    All Problems  Anemia in chronic illness / SNOMED CT 465721356 / Confirmed  Ataxia / SNOMED CT 94752077 / Confirmed  Blues / SNOMED CT 315321609 / Confirmed  Chronic low back pain / SNOMED CT 184198656 / Confirmed  Fatigue / SNOMED CT 119701249 / Confirmed  Headache, migraine / SNOMED CT 50494592 / Confirmed  History of renal stone / SNOMED CT 9626254331 / Confirmed  Knee osteoarthritis / SNOMED CT 029756318 / Confirmed  Morbid (severe) obesity due to excess calories / SNOMED CT 302876165 / Probable  Multiple sclerosis / SNOMED CT 97073403 / Confirmed  Muscle weakness / SNOMED CT 95612806 / Confirmed  Neuralgia / SNOMED CT 11232539 / Confirmed  Prediabetes / SNOMED CT 4119272527 / Confirmed  Seasonal allergies / SNOMED CT 178586044 / Confirmed  Skin sensation disturbance / SNOMED CT 696479245 / Confirmed  Urinary bladder neurogenic dysfunction / SNOMED CT 1810333284 / Confirmed  Vitamin D deficiency / SNOMED CT 04726191 / Confirmed  Resolved: Pregnancy  Resolved: Inpatient stay / SNOMED CT 826713735  Resolved: Inpatient stay / SNOMED CT 563451645  Resolved: Inpatient stay / SNOMED CT 707550390  Resolved: Pregnancy / SNOMED CT 367213889  Resolved: Pregnancy / SNOMED CT 346573427  Canceled: Headache / SNOMED CT 29025164      Histories   Past Medical History:    Active  Multiple sclerosis (41778862): Onset in 2012 at 37 years.  Blues (783339509)  History of renal stone (6883658317)  Urinary bladder neurogenic dysfunction  (2158644088)  Fatigue (850354850)  Muscle weakness (86803817)  Vitamin D deficiency (72023422)  Ataxia (89976908)  Headache, migraine (68918536)  Comments:  7/6/2016 CDT 11:19 AM CDT - Katie Hatfield  With aura.  Skin sensation disturbance (192795480)  Seasonal allergies (003988906)  Resolved  Inpatient stay (251013084): Onset on 2/23/2018 at 42 years.  Resolved on 2/25/2018 at 42 years.  Comments:  2/27/2018 CST 7:59 AM La Serrano  @[facility] [place], [place] - Acute pneumonitis; likely related to recent Lemtrada introduction  Inpatient stay (189095604): Onset on 6/14/2016 at 40 years.  Resolved on 6/18/2016 at 40 years.  Comments:  2/27/2018 CST 7:58 AM La Serrano  @[facility] [place], [place] - Complicated UTI  Inpatient stay (762326412): Onset in the month of 5/2016 at 40 years  Resolved.  Comments:  2/27/2018 CST 7:57 AM La Serrano  @[facility], [place] - Left renal stone  Pregnancy:  Resolved.  Pregnancy (072249772):  Resolved in 2006 at 30 years.  Pregnancy (404287513):  Resolved in 1994 at 18 years.   Family History:    Melanoma  Father (Shakir)  Thyroid  Sister (Ana Cristina)  Kidney stone  Daughter (Zac)  Diabetes mellitus type II  Father (Shakir)  Hypothyroidism  Sister (Ana Cristina)  Diabetes mellitus type 2  Father (Shakir)  CA - Cancer  Father (Shakir)  Comments:  4/5/2017 11:45 AM - Katie Hatfield  skin  CHF - Congestive heart failure  Father (Shakir)  Migraine  Sister (Ana Cristina)  Daughter (Leda)  Mother (Soledad)  Depression  Daughter (Zac)  Father (Shakir)  Gallbladder problem  Mother (Soledad)  Bipolar  Father (Shakir)  Miscellaneous  Brother (Adrian)  Comments:  4/5/2017 11:43 AM - Katie Hatfield  spinal stenosis  Mother (Soledad)  Comments:  4/5/2017 11:43 AM - Katie Hatfield  spinal stenosis  Anxiety  Daughter (Leda)  Daughter (Zac)     Procedure history:    Port-a-cath in place - Right (17554104) on 8/11/2016 at 41 Years.  Comments:  8/18/2016 8:01 FANI - Ceci Engle  Dx:   Multiple sclerosis.  cystoscopy left ureteroscopy with holmium laser left retrogrades stone extraction and left ureteral stent exchange on 6/3/2016 at 40 Years.  Ureteroscopy (8861835318) in the month of 2016 at 40 Years.  Comments:  2016 4:56 PM - La Garcia  with laser/stone extraction (inability to remove all of stone)  Appendectomy (402644996) in  at 39 Years.  Breast reduction (025631133) in  at 26 Years.  Tonsillectomy and adenoidectomy (479332371) in  at 14 Years.  Cholecystectomy (62328092).   section (35530516).  Comments:  2016 11:26 AM - eDedee Freya LERMA  x1  Tubal ligation (043407298).   Social History:        Alcohol Assessment            Current, 1-2 times per month, 1 drinks/episode maximum.      Tobacco Assessment            Never smoker      Substance Abuse Assessment: Past            Marijuana      Employment and Education Assessment            Disability      Home and Environment Assessment            2 children.      Nutrition and Health Assessment            Caffeine intake amount: 2 sodas, every day.      Exercise and Physical Activity Assessment: Does not exercise      Sexual Assessment            Sexually active: No.  Sexual orientation: Heterosexual.        Physical Examination   Vital Signs   2018 9:10 AM CDT Peripheral Pulse Rate 90 bpm    Pulse Site Radial artery    HR Method Manual    Systolic Blood Pressure 138 mmHg  HI    Diastolic Blood Pressure 72 mmHg    Mean Arterial Pressure 94 mmHg    BP Site Right arm    BP Method Manual      Measurements from flowsheet : Measurements   2018 9:10 AM CDT Height Measured - Standard 65 in    Weight Measured - Standard 298.6 lb    BSA 2.49 m2    Body Mass Index 49.68 kg/m2  HI      General:  Alert and oriented, No acute distress.    Eye:  Pupils are equal, round and reactive to light, Extraocular movements are intact, Normal conjunctiva.    HENT:  Normocephalic, hearing grossly normal during our  conversation.    Respiratory:  Respirations are non-labored, Symmetrical chest wall expansion.    Cardiovascular:  Normal peripheral perfusion, brisk capillary refill.    Integumentary:  Warm, Dry, No rash.    Neurologic:  Alert, Oriented, No focal deficits.    Psychiatric:  Cooperative, Appropriate mood & affect, Normal judgment, Non-suicidal.       Health Maintenance      Recommendations     Pending (in the next year)        Due            Cervical Cancer Screen (if sexually active) due  05/01/18  and every 3  year(s)           Lipid Disorders Screen (Female) due  05/01/18  and every 1  year(s)           Type 2 Diabetes Mellitus Screen (Female) due  05/01/18  Variable frequency        Due In Future            Tetanus Vaccine not due until  07/16/18  and every 10  year(s)           Depression Screen (Female) not due until  01/26/19  and every 1  year(s)           Alcohol Misuse Screen (Female) not due until  01/26/19  and every 1  year(s)     Satisfied (in the past 1 year)        Satisfied            Alcohol Misuse Screen (Female) on  01/26/18.           Body Mass Index Check (Female) on  05/01/18.           Body Mass Index Check (Female) on  04/10/18.           Body Mass Index Check (Female) on  03/01/18.           Body Mass Index Check (Female) on  02/21/18.           Body Mass Index Check (Female) on  02/02/18.           Body Mass Index Check (Female) on  02/01/18.           Depression Screen (Female) on  01/26/18.           Depression Screen (Female) on  01/26/18.           Depression Screen (Female) on  01/26/18.           High Blood Pressure Screen (Female) on  05/01/18.           High Blood Pressure Screen (Female) on  04/17/18.           High Blood Pressure Screen (Female) on  04/10/18.           High Blood Pressure Screen (Female) on  03/01/18.           High Blood Pressure Screen (Female) on  02/23/18.           High Blood Pressure Screen (Female) on  02/21/18.           High Blood Pressure Screen  (Female) on  02/06/18.           High Blood Pressure Screen (Female) on  02/01/18.           High Blood Pressure Screen (Female) on  02/01/18.           High Blood Pressure Screen (Female) on  02/01/18.           High Blood Pressure Screen (Female) on  01/31/18.           High Blood Pressure Screen (Female) on  01/26/18.           High Blood Pressure Screen (Female) on  05/17/17.           High Blood Pressure Screen (Female) on  05/17/17.           Obesity Screen and Counseling (Female) on  05/01/18.           Obesity Screen and Counseling (Female) on  04/17/18.           Obesity Screen and Counseling (Female) on  04/10/18.           Obesity Screen and Counseling (Female) on  03/01/18.           Obesity Screen and Counseling (Female) on  02/23/18.           Obesity Screen and Counseling (Female) on  02/21/18.           Obesity Screen and Counseling (Female) on  02/06/18.           Obesity Screen and Counseling (Female) on  02/02/18.           Obesity Screen and Counseling (Female) on  02/01/18.           Obesity Screen and Counseling (Female) on  01/31/18.           Obesity Screen and Counseling (Female) on  01/26/18.           Obesity Screen and Counseling (Female) on  05/17/17.           Tobacco Use Screen (Female) on  05/01/18.           Tobacco Use Screen (Female) on  04/17/18.           Tobacco Use Screen (Female) on  04/10/18.           Tobacco Use Screen (Female) on  03/01/18.           Tobacco Use Screen (Female) on  02/23/18.           Tobacco Use Screen (Female) on  02/21/18.           Tobacco Use Screen (Female) on  02/06/18.           Tobacco Use Screen (Female) on  02/01/18.           Tobacco Use Screen (Female) on  02/01/18.           Tobacco Use Screen (Female) on  01/31/18.           Tobacco Use Screen (Female) on  01/26/18.           Tobacco Use Screen (Female) on  05/17/17.        Procedure   Procedure   Time.     Indication.     Confirmed: patient, procedure.     Physical Exam: vital signs Vital  Signs   5/1/2018 9:10 AM CDT Peripheral Pulse Rate 90 bpm    Pulse Site Radial artery    HR Method Manual    Systolic Blood Pressure 138 mmHg  HI    Diastolic Blood Pressure 72 mmHg    Mean Arterial Pressure 94 mmHg    BP Site Right arm    BP Method Manual      .     Location: size and depth (GV-20    Bilateral   LI-4  LV-3  GB-34  kearney men  point zero  shoulder well  appetite  thymus    Right ear  master oscillation, vitality, lesser occipital, medulla oblongota, brain stem    left ear   Omega 2, cingulate gyrus,  lumbar spine x 5,  lumbago, marvelous point, brain, muscle relaxation, pelvic girdle, , number of needles inserted:35    Number of needles removed:35).     Preparation and technique: position supine, sterile preparation of site with 70 % alcohol, estimated blood loss minimal.     Procedure tolerated: well.     No Complications.        Review / Management   Results review:  Lab results   2/21/2018 1:22 PM CST WBC 13.1    WBC 13.1    RBC 4.04    RBC 4.04    Hgb 11.1 g/dL    Hgb 11.1 g/dL    Hct 33.6 %    Hct 33.6 %    MCV 83 fL    MCV 83 fL    MCH 27.5 pg    MCH 27.5 pg    MCHC 33.0 g/dL    MCHC 33.0 g/dL    RDW 15.8 %    RDW 15.8 %    Platelet 218    Platelet 218    MPV 9.9 fL    MPV 9.9 fL    Lymphs 1.1 %    Abs Lymphs 0.2    Neutrophils 96.3 %    Abs Neutrophils 12.6    Monocytes 2.4 %    Abs Monocytes 0.3    Eosinophils 0.0 %    Abs Eosinophils 0.0    Basophils 0.2 %    Abs Basophils 0.0   2/6/2018 1:40 PM CST UA Epithelial Cells Few    UA Mucous Present    UA WBC 3-5    UA RBC 3-5    UA Bacteria Few   2/6/2018 12:38 PM CST UA pH 5.5    UA Specific Gravity > OR = 1.030    UA Glucose TRACE    UA Bilirubin NEGATIVE    UA Ketones NEGATIVE    U Occult Blood NEGATIVE    UA Protein 1+    UA Nitrite NEGATIVE    UA Leuk Est NEGATIVE   1/26/2018 2:32 PM CST UA Color Yellow    UA Clarity Slightly Cloudy    UA pH 6.0    UA Specific Gravity 1.025    UA Glucose Negative mg/dL    UA Bilirubin Negative    UA Ketones  Negative mg/dL    U Occult Blood Negative    UA Protein Negative mg/dL    UA Nitrite Negative    UA Leuk Est Negative    UA Urobilinogen Normal    UA Epithelial Cells Moderate    UA WBC 6-10    UA RBC 0-2    UA CA Ox Crystal Present    UA Bacteria Moderate   1/26/2018 2:15 PM CST Culture Blood See comment   1/26/2018 2:14 PM CST Culture Urine See comment   1/26/2018 2:12 PM CST Sodium Level 138 mmol/L    Potassium Level 4.4 mmol/L    Chloride Level 104 mmol/L    CO2 Level 23 mmol/L    Glucose Level 139 mg/dL  HI    BUN 12 mg/dL    Creatinine 0.71 mg/dL    BUN/Creat Ratio NOT APPLICABLE    eGFR 104 mL/min/1.73m2    eGFR African American 120 mL/min/1.73m2    Calcium Level 9.5 mg/dL    C-Reactive Protein (CRP) 44.0 mg/L  HI    WBC 10.1    RBC 4.50    Hgb 12.0 gm/dL    Hct 36.1 %    MCV 80.2 fL    MCH 26.7 pg  LOW    MCHC 33.2 gm/dL    RDW 15.0 %    Platelet 343    MPV 9.7 fL    Lymphs 24.4 %    Abs Lymphs 2,464    Neutrophils 68.1 %    Abs Neutrophils 6,878    Monocytes 4.0 %    Abs Monocytes 404    Eosinophils 2.8 %    Abs Eosinophils 283    Basophils 0.7 %    Abs Basophils 71   .       Impression and Plan   Patient Instructions:  patient will return for next treatment in about 2 weeks, and understands that we should plan on doing 6 treatments, 1 every 2 weeks, but that if there is no improvement after 3 treatments then we should not continue as it is unlikley to have benefit..

## 2022-02-15 NOTE — TELEPHONE ENCOUNTER
---------------------  From: Tati Will CMA (eRx Pool ([id number]_[facility]))   To: Orestes Mendosa MD;     Sent: 5/22/2019 9:28:26 AM CDT  Subject: Lidocaine topical   Due Date/Time: 5/22/2019 9:37:00 AM CDT     LR on 1/31/18 with 50gm and 2 refills.     OK to fill?      ------------------------------------------  From: [facility] [place] [id number]  To: Emily Lan MD  Sent: May 21, 2019 9:37:54 AM CDT  Subject: Medication Management  Due: May 22, 2019 9:37:54 AM CDT    ** On Hold Pending Signature **  Drug: lidocaine topical (lidocaine 5% topical ointment)  1 JAIRO TOP TID,PRN:FOR PAIN  Quantity: 50 gm       Days Supply: 0         Refills: 1  Substitutions Allowed  Notes from Pharmacy:     Dispensed Drug: lidocaine topical (lidocaine 5% topical ointment)  APPLY EXTERNALLY TO THE AFFECTED AREA THREE TIMES DAILY AS NEEDED FOR PAIN  Quantity: 30 gm       Days Supply: 15        Refills: 0  Substitutions Allowed  Notes from Pharmacy:   ---------------------------------------------------------------  From: Orestes Mendosa MD   To: [facility] [place] [id number]    Sent: 5/23/2019 12:52:21 PM CDT  Subject: Lidocaine topical     ** Submitted: **  Complete:lidocaine topical (lidocaine 5% topical ointment)   Signed by Orestes Mendosa MD  5/23/2019 12:52:00 PM    ** Approved **  lidocaine topical (LIDOCAINE 5% TOPICAL OINTMENT 30GM)  APPLY EXTERNALLY TO THE AFFECTED AREA THREE TIMES DAILY AS NEEDED FOR PAIN  Qty:  30 gm        Days Supply:  15        Refills:  0          Substitutions Allowed     Route To Pharmacy - [facility] [place] [address]   Signed by Orestes Mendosa MD

## 2022-02-15 NOTE — CARE COORDINATION
Karli called CC-    She said she had u/c done at the ED on 1/19 and it came back positive. Requesting [facility] review and rx abx.    Results hand carried to [facility] and he recommends Macrobid 100mg BID x 7 days. I sent this to [facility] pharmacy and Karli notified. She will  rx.

## 2022-02-15 NOTE — TELEPHONE ENCOUNTER
---------------------  From: Tati Will CMA   To: Orestes Mendosa MD;     Sent: 4/29/2019 12:55:24 PM CDT  Subject: Sinus infection     Karli called today c/o sinus infection x 2 days. Coughing up phlegm. Wants to know if she needs a visit/abx/what to watch for-she is concerned being she has no immune system.    Discussed making sure she is staying hydrated and watch for fever-if fever develops needs a visit right away. Advised I would send a msg to you for tomorrow-she was fine w/ this.    Further recommendations?---------------------  From: Orestes Mendosa MD   To: Tati Will CMA;     Sent: 4/29/2019 7:50:47 PM CDT  Subject: RE: Sinus infection     nothing further.  Low threshold for visit, though given 2 days of symptoms, reasonable to monitor as advised.Called and spoke to Karli and advised to cont. to monitor for now. Advised to make an appt if fever/worsening. She agreed.

## 2022-02-15 NOTE — LETTER
(Inserted Image. Unable to display)     April 15, 2019      MARGARITA KAISER  [address]  [place], [place] [id number]          Dear MARGARITA,      Thank you for selecting [facility] ([facility]viously [facility]) for your healthcare needs.      Our records indicate you are due for the following services:     Follow-up office visit.      To schedule an appointment or if you have further questions, please contact your primary clinic:   [facility]       [phone]   [facility]       [phone]              [facility]     [phone]      Powered by [employer]    Sincerely,    Orestes Mendosa MD

## 2022-02-15 NOTE — TELEPHONE ENCOUNTER
Entered by Jenny Ballesteros on April 27, 2020 3:00:39 PM CDT  PCP:   SHANNON    Medication:   Meloxicam   Last Filled:  1/28/20    Quantity:  90  Refills:  _    Date of last office visit and reason:   3/30/20 DM Type 2  Date of last labs pertaining to condition:  _    Note:  Please advise     Return to Clinic order placed?  yes.                ------------------------------------------  From: [facility] #[id number]  To: Emily Lan MD  Sent: April 27, 2020 12:13:07 PM CDT  Subject: Medication Management  Due: April 28, 2020 12:13:07 PM CDT    ** On Hold Pending Signature **  Drug: meloxicam (meloxicam 15 mg oral tablet)  TAKE 1 TABLET BY MOUTH DAILY  Quantity: 90 tab(s)  Days Supply: 30  Refills: 0  Substitutions Allowed  Notes from Pharmacy:     Dispensed Drug: meloxicam (meloxicam 15 mg oral tablet)  TAKE 1 TABLET BY MOUTH DAILY  Quantity: 90 tab(s)  Days Supply: 30  Refills: 0  Substitutions Allowed  Notes from Pharmacy:   ---------------------------------------------------------------  From: Jenny Ballesteros (eRx Pool ([id number]_[facility]))   To: MJP Message Pool ([id number]_[place] - [place]);     Sent: 4/27/2020 3:00:45 PM CDT  Subject: FW: Medication Management   Due Date/Time: 4/28/2020 12:13:00 PM CDT---------------------  From: Deepti Marroquin CMA ([facility] Pool ([id number]_[facility]))   To: Emily Lan MD;     Sent: 4/28/2020 11:50:26 AM CDT  Subject: FW: Medication Management   Due Date/Time: 4/28/2020 12:13:00 PM CDT---------------------  From: Emily Lan MD   To: [facility] #[id number]    Sent: 4/30/2020 1:56:43 PM CDT  Subject: FW: Medication Management     ** Submitted: **  Complete:meloxicam (meloxicam 15 mg oral tablet)   Signed by Emily Lan MD  4/30/2020 6:56:00 PM    ** Approved **  meloxicam (MELOXICAM 15MG TABLETS)  TAKE 1 TABLET BY MOUTH DAILY  Qty:  90 tab(s)        Days Supply:  30        Refills:  0          Substitutions Allowed     Route To Pharmacy -  [facility] #[id number]

## 2022-02-15 NOTE — PROGRESS NOTES
Patient:   KARLI KAISER            MRN: [id number]            FIN: [id number]               Age:   41 years     Sex:  Female     :  1975   Associated Diagnoses:   Multiple sclerosis; Prediabetes; Neurogenic bladder; Migraine; Morbid obesity   Author:   Orestes Mendosa MD      Visit Information      Date of Service: 2017 10:42 am  Performing Location: [facility]  Encounter#: [id number]      Primary Care Provider (PCP):  [id number] -UNKNOWN,      Referring Provider:  No referring provider recorded for selected visit.      Chief Complaint       3/1/2017 11:08 AM CST gastric bpass consult   3/1/2017 10:16 AM CST Patient is here per F for pelvic u/s.  Has been having worsening urinary incontinence last 6 months, some AUB.  Also mom has a mass in uterus diagnosed recently.   2017 2:47 PM CST Patient is here for annual exam.  Mammogram was done earlier today.  Hx of LEEP in , irregular period since then.  Mom having hysterectomy due to mass in Uterus.               Additional Information:No additional information recorded during visit.   Chief complaint and symptoms as noted above and confirmed with patient.  Recent lab and diagnostic studies reviewed with patient      History of Present Illness   3/8/2016: Karli presents to clinic for ER follow-up after presentations on both Thursday and Friday this past week for urinary incontinence and suspected pyelonephritis.  She requested for Srivastava catheter to be placed this past week because of persistent incontinence.  She is scheduled to meet with the urologist on the  of this month.  She was originally prescribed Cipro which was later changed to Macrobid after discovery of extended spectrum beta-lactamase resistant E. coli bacteria.  She was started on Macrobid on .  Overall continues to have severe left-sided flank pain.  She is very distressed about her underlying multiple sclerosis and her ability to care for her daughter.  She  does follow with a neurologist through [facility].  She previously was following with an internist through [place] and now wishes to localize her cares here.    6/23/2016: Karli returns to clinic for follow-up after recent hospitalization with pyelonephritis.  She has grown out ESBL E. coli strains her last 2 urine cultures.  She was empirically started on ertapenem and change to fosfomycin as an outpatient.  She feels well.  Previous ureteral stenting and stone extraction is completed.  Scheduled to follow-up with urology in the near future for urodynamic studies.    8/23/2016: Karli returns to clinic for follow-up after evaluation this past week with Dr. Radford.  She did present with febrile illness and CVA tenderness.  Urinalysis was normal.  Urine culture isolating 10-50,000 colonies of normal genitalia colonizing bacteria.  She was empirically started on both fosfomycin and ertapenem given her history of a previous ESBL organisms for presumptive treatment of pyelonephritis.  Her fever is resolved.  She continues to have some urinary frequency and incontinence.  She is not clear this is related to her residual MS versus infection related.  Does describe having antibiotic related diarrhea.  Does bring up questions related to underlying anemia.    9/6/2016: Presents to clinic with migraine complaints for the past 5 days.  No improvement with Excedrin originally.  Since then she has used both indomethacin and Flexeril with no improvement.  She did have a availability to Percocet which helped transiently.  Historically has not been responsive to sumatriptan and.  No other triptan use    12/15/2016: Karli presents to clinic with 2 week history of cold symptoms.  She presented to the emergency department earlier in this course for concerns of possible urinary tract infection.  She has had increased urinary frequency with incontinence.  Urinalysis was normal in the emergency room.  She was referred  on to a neurologist who ordered a CT of her neuro spine showing new MS lesions.  She was recommended to start on steroids though has had very significant psychiatric adverse reactions on corticosteroids.  She was prescribed corticotropin (Acthar) which she completed today.  Describing a 24-hour history of severe flulike symptoms including myalgias, extreme malaise, nonproductive cough.  She was advised to seek any medical attention with describes symptoms on Acthar.  Previously was treating with a variety of over-the-counter cold medicines including Robitussin, Tessalon Perles, Tylenol, Claritin    1/18/2017: Presents with migraine headaches now for the last 3 days.  She used up the last of her rizatriptan which generally works the best for her.  Currently with very limited funds for any other medication options.  Scheduled for infusion for her MS scheduled tomorrow.  Hoping for more immediate relief today, allowing her to get to infusion tomorrow.  After infusion, her deductible will be met.    3/1/2017: Karli presents to clinic after recent assessment by Dr. Girard earlier this week related to vaginal bleeding.  She underwent a vaginal ultrasound showing increased uterine lining thickness.  He recommended she be started on Provera to obtain more regular menses.  Shares having spontaneous menses about once/year. She voices concerns related to increased health risk from her obesity.  Specifically raises concerns to uterine cancer as well as metabolic complications.  She wonders if it is time to think more seriously about bariatric surgery options.  She shares having a good friend that went through the process has had some success.  She has not worked with Oanh Mane here in clinic.  She feels very physically limited related to her MS.  She states gaining over 80 pounds since beginning on MS therapy.         Review of Systems   Constitutional:  No fever, No chills, No weakness.    Eye:  Negative except as  documented in history of present illness.    Ear/Nose/Mouth/Throat:  Negative except as documented in history of present illness.    Respiratory:  No shortness of breath.    Cardiovascular:  No chest pain, No palpitations, No peripheral edema, No syncope.    Gastrointestinal:  No nausea, No vomiting.    Genitourinary:  Hematuria, Change in urine stream, No dysuria.    Hematology/Lymphatics:  Negative except as documented in history of present illness.    Endocrine:  No excessive thirst, No polyuria.    Immunologic:  No recurrent fevers.    Musculoskeletal:  No joint pain, No muscle pain.    Neurologic:  Alert and oriented X4, Abnormal balance, No numbness, No tingling, No headache.    Psychiatric:  Anxiety.       Health Status   Allergies:    Allergic Reactions (Selected)  Severity Not Documented  Adhesive tape (No reactions were documented)  Sulfa drugs (Rash, vomiting..... and diarrhea)   Medications:  (Selected)   Prescriptions  Prescribed  Lyrica 300 mg oral capsule: 1 cap(s) ( 300 mg ), po, tid, # 90 cap(s), 5 Refill(s), Type: Maintenance, Pharmacy: [id number], 1 cap(s) po tid  Walker with large wheels and large sitting seat: Walker with large wheels and large sitting seat, See Instructions, Instructions: use as directed, Supply, # 1 EA, 0 Refill(s), Type: Maintenance  cholecalciferol 5000 intl units oral tablet: 1 tab(s) ( 5,000 International Unit ), po, daily, # 30 tab(s), 5 Refill(s), Type: Maintenance, Pharmacy: [id number], 1 tab(s) po daily  ergocalciferol 50,000 intl units (1.25 mg) oral capsule: See Instructions, Instructions: 1 cap(s) PO 1x/Wk, # 12 cap(s), 0 Refill(s), Type: Maintenance, Pharmacy: [id number], 1 cap(s) PO 1x/Wk  methenamine hippurate 1 g oral tablet: See Instructions, Instructions: TAKE ONE TABLET BY MOUTH TWICE DAILY WITH FOOD, # 60 tab(s), Type: Soft Stop, Pharmacy: [id number], TAKE ONE TABLET BY MOUTH TWICE DAILY WITH  FOOD  modafinil 200 mg oral tablet: ( 200 mg ), po, bid, # 60 tab(s), 5 Refill(s), Type: Maintenance, called to pharmacy (Rx)  rizatriptan 10 mg oral tablet: See Instructions, Instructions: TAKE 1 TABLET BY MOUTH 1 TIME AS NEEDED FOR MIGRAINE HEADACHE, # 6 tab(s), Type: Soft Stop, Pharmacy: [id number]  tiZANidine 4 mg oral tablet: See Instructions, Instructions: 2-3 tab(s) PO qhs, # 90 tab(s), 3 Refill(s), Type: Maintenance, Pharmacy: [id number], 2-3 tab(s) PO qhs  Documented Medications  Documented  Adderall: ( 45 mg ), po, bid, 0 Refill(s), Type: Maintenance  Cymbalta 30 mg oral delayed release capsule: 1 cap(s) ( 30 mg ), po, daily, 0 Refill(s), Type: Maintenance  Cymbalta 60 mg oral delayed release capsule: 1 cap(s) ( 60 mg ), po, daily, 0 Refill(s), Type: Maintenance  Excedrin: See Instructions, 0 Refill(s), Type: Maintenance  LORazepam 0.5 mg oral tablet: po, tid, Instructions: 0.5mg prn and 1-2 hs, PRN: for agitation, 0 Refill(s), Type: Maintenance  LaMICtal 100 mg oral tablet: 1 tab(s) ( 100 mg ), po, daily, 0 Refill(s), Type: Maintenance  Tysabri: iv, q4 wks, 0 Refill(s), Type: Maintenance  Vitamin C: ( 500 mg ), bid, 0 Refill(s), Type: Maintenance  cyclobenzaprine: ( 10 mg ), po, tid, PRN: as needed for muscle spasm, 0 Refill(s), Type: Maintenance  indomethacin 25 mg oral capsule: 1 cap(s) ( 25 mg ), po, q6 hrs, PRN: for gout pain, 0 Refill(s), Type: Maintenance   Problem list:    All Problems  Anemia in chronic illness / SNOMED CT 325311112 / Confirmed  Ataxia / SNOMED CT 07455708 / Confirmed  Blues / SNOMED CT 782977738 / Confirmed  Fatigue / SNOMED CT 544181901 / Confirmed  History of renal stone / SNOMED CT 6880855929 / Confirmed  Headache, migraine / SNOMED CT 34984331 / Confirmed  Multiple sclerosis / SNOMED CT 51669887 / Confirmed  Muscle weakness / SNOMED CT 63084466 / Confirmed  Urinary bladder neurogenic dysfunction / SNOMED CT 7409110180 / Confirmed  Obesity /  SNOMED CT 6638930076 / Probable  Skin sensation disturbance / SNOMED CT 548361333 / Confirmed  Vitamin D deficiency / SNOMED CT 00411836 / Confirmed  Resolved: *Hospitalized@[facility] - Complicated UTI  Resolved: *Hospitalized@[facility] - Left renal stone  Resolved: Pregnancy  Resolved: Pregnancy / SNOMED CT 893393879  Canceled: Headache / SNOMED CT 07103423      Histories   Past Medical History:    Active  Multiple sclerosis (14308329): Onset in 2012 at 37 years.  Blues (049947476)  History of renal stone (1064653680)  Urinary bladder neurogenic dysfunction (8631634878)  Fatigue (155396701)  Muscle weakness (25090402)  Vitamin D deficiency (53451232)  Ataxia (23053116)  Headache, migraine (31382220)  Comments:  7/6/2016 CDT 11:19 AM CDT - Katie Hatfield  With aura.  Skin sensation disturbance (763401012)  Resolved  *Hospitalized@[facility] - Complicated UTI: Onset on 6/14/2016 at 40 years.  Resolved on 6/18/2016 at 40 years.  *Hospitalized@[facility] - Left renal stone: Onset in the month of 5/2016 at 40 years  Resolved.  Pregnancy:  Resolved.  Pregnancy (366862860):  Resolved in 2006 at 30 years.   Family History:    Melanoma  Father (Shakir)  Diabetes mellitus type II  Father (Shakir)  Heart disease  Father (Shakir)  Hypothyroidism  Sister (Ana Cristina)  Diabetes mellitus type 2  Father (Shakir)  Heart failure  Father (Shakir)  Migraine  Mother (Soledad)  Sister (Ana Cristina)  Daughter     Procedure history:    Port-a-cath in place - Right (89717725) on 8/11/2016 at 41 Years.  Comments:  8/18/2016 8:01 AM - Ceci Engle  Dx:  Multiple sclerosis.  cystoscopy left ureteroscopy with holmium laser left retrogrades stone extraction and left ureteral stent exchange on 6/3/2016 at 40 Years.  Ureteroscopy (4401566861) in the month of 5/2016 at 40 Years.  Comments:  6/14/2016 4:56 PM - La Garcia  with laser/stone extraction (inability to remove all of stone)  Appendectomy (137444337) in 2014 at 39 Years.  Breast reduction (901671048) in 2001 at 26  Years.  Tonsillectomy and adenoidectomy (372956642) in  at 14 Years.  Cholecystectomy (53755120).   section (77992102).  Comments:  2016 11:26 AM - Deedee Freya LERMA  x1  Tubal ligation (116171036).   Social History:        Alcohol Assessment            Current, 1-2 times per week, 1 drinks/episode maximum.      Tobacco Assessment            Never smoker      Substance Abuse Assessment            Never      Employment and Education Assessment            Disability      Home and Environment Assessment            2 children.      Nutrition and Health Assessment            Caffeine intake amount: 2 sodas, every day.      Exercise and Physical Activity Assessment: Does not exercise      Sexual Assessment            Sexually active: No.  Sexual orientation: Heterosexual.        Physical Examination   vital signs stable, as noted above   Vital Signs   3/1/2017 11:08 AM CST Temperature Tympanic 99 DegF    Peripheral Pulse Rate 80 bpm    Systolic Blood Pressure 150 mmHg  HI    Diastolic Blood Pressure 78 mmHg    Mean Arterial Pressure 102 mmHg   3/1/2017 10:16 AM CST Peripheral Pulse Rate 104 bpm  HI    HR Method Electronic    Systolic Blood Pressure 164 mmHg  HI    Diastolic Blood Pressure 94 mmHg  HI    Mean Arterial Pressure 117 mmHg    BP Site Right arm    BP Method Electronic   2017 2:47 PM CST Peripheral Pulse Rate 95 bpm    HR Method Electronic    Systolic Blood Pressure 143 mmHg  HI    Diastolic Blood Pressure 84 mmHg    Mean Arterial Pressure 104 mmHg    BP Site Right arm    BP Method Electronic      Measurements from flowsheet : Measurements   3/1/2017 11:08 AM CST Height Measured - Standard 65 in    Weight Measured - Standard 287 lb    BSA 2.44 m2    Body Mass Index 47.75 kg/m2   3/1/2017 10:16 AM CST Height Measured - Standard 65 in    Weight Measured - Standard 287 lb    BSA 2.44 m2    Body Mass Index 47.75 kg/m2   2017 2:47 PM CST Height Measured - Standard 65 in    Weight Measured -  Standard 287 lb    BSA 2.44 m2    Body Mass Index 47.75 kg/m2    Ht/Wt Measurement Refused by Patient? No      General:  Alert and oriented, No acute distress.    Eye:  Pupils are equal, round and reactive to light, Extraocular movements are intact.    HENT:  Normocephalic.    Neck:  Supple.    Respiratory:  Respirations are non-labored.    Cardiovascular:  Normal rate, No edema.    Musculoskeletal:  Infusaport in place; not engaged.    Neurologic:  Alert, Oriented.    Cognition and Speech:  Oriented, Speech clear and coherent.    Psychiatric:  Appropriate mood & affect.       Review / Management   Results review:  Lab results: 12/15/2016 7:40 PM CST   Influenza Ag              Negative for Influenza A antigen; Negative for Influenza B antigen  .       Impression and Plan   Diagnosis     Multiple sclerosis (NQR96-IP G35).     Prediabetes (VYG50-OU R73.09).     Neurogenic bladder (HXZ69-CX N31.9).     Migraine (ASW16-QI G43.019).     Morbid obesity (YJP49-ZS E66.01).         .) obesity, morbid; current BMI 48 - substantial weight gains since beginning therapies for multiple sclerosis   - referral to Oanh Mane   - discussed weight loss management options including lifestyle, medication options, and potential bariatric surgery   - I would like to adopt a dedicated plan for the next 1 yr; if not seeing sufficient momentum in terms of weight loss and generally healthier lifestyle, I think referral to bariatric center is warranted    .) vitamin D deficiency; total level 15 (1/2017 through [facility])   - start on ergocalciferol 50,000 units/week x 12 weeks   - start on cholecalciferol 5000 units/daily    plan as previously outlined:     .) lower urinary tract symptoms; fever   - urine Cx 8/18: 10-50K of colonizing toan (normal U/A)   - empirically treated with ertapenem and fosfomycin given h/o ESBL pyelo   - urology retrieval of kidney stones in 6/2016   - still with incontinence and lower urinary tract symptoms -  suspect more MS related    - can stop Abx at this point    - will need to follow up for urodnamic studies    .) multiple sclerosis   - currently on Tysabri   - following with Dr. Tucker through [facility] neurology group in [place]     .) chronic anemia   - last CBC for review with Hgb 11.2 (1/2017)    .) health maintenance   - prediabetes    - enrolled in [facility] program

## 2022-02-24 ENCOUNTER — COMMUNICATION - RIVER FALLS (OUTPATIENT)
Dept: FAMILY MEDICINE | Facility: CLINIC | Age: 47
End: 2022-02-24
Payer: MEDICARE

## 2022-02-25 ENCOUNTER — TRANSFERRED RECORDS (OUTPATIENT)
Dept: HEALTH INFORMATION MANAGEMENT | Facility: CLINIC | Age: 47
End: 2022-02-25
Payer: MEDICARE

## 2022-03-02 NOTE — TELEPHONE ENCOUNTER
---------------------  From: Freya Mark CMA ([facility]signal Message Pool ([id number]_[facility]))   To: Oerstes Mendosa MD;     Sent: 2/16/2022 8:53:26 AM CST  Subject: FW: Medication Management   Due Date/Time: 2/16/2022 6:17:00 PM CST     LRF 4/14/2021  last seen 11/24/21  RT May      ---------------------  From: Freya Mark CMA (eRx Pool (32224_[facility]))   To: [facility] Message Pool (32224_[place]);     Sent: 2/16/2022 8:11:53 AM CST  Subject: FW: Medication Management   Due Date/Time: 2/16/2022 6:17:00 PM CST           ------------------------------------------  From: [facility]  To: Orestes Mendosa MD  Sent: February 14, 2022 6:17:42 PM CST  Subject: Medication Management  Due: February 9, 2022 2:38:53 AM CST     ** On Hold Pending Signature **     Drug: amantadine (amantadine 100 mg oral capsule), TAKE 1 CAPSULE TWICE DAILY AS NEEDED  Quantity: 180 cap(s)  Days Supply: 90  Refills: 1  Substitutions Allowed  Notes from Pharmacy:     Dispensed Drug: amantadine (amantadine 100 mg oral capsule), TAKE 1 CAPSULE TWICE DAILY AS NEEDED  Quantity: 180 cap(s)  Days Supply: 90  Refills: 1  Substitutions Allowed  Notes from Pharmacy:  ---------------------------------------------------------------  From: Orestes Mendosa MD   To: [facility]    Sent: 2/16/2022 11:46:29 AM CST  Subject: FW: Medication Management     ** Submitted: **  Complete:amantadine (amantadine 100 mg oral capsule)   Signed by Orestes Mendosa MD  2/16/2022 5:46:00 PM [facility]    ** Approved with modifications: **  amantadine (AMANTADINE CAP 100MG)  TAKE 1 CAPSULE TWICE DAILY AS NEEDED  Qty:  180 cap(s)        Days Supply:  90        Refills:  1          Substitutions Allowed     Route To Pharmacy - [facility]

## 2022-03-02 NOTE — TELEPHONE ENCOUNTER
---------------------  From: Michelle Newton RN (Phone Messages Pool ([id number]_[facility]))   To: [facility] Message Pool ([id number]_[place]);     Sent: 2/24/2022 8:52:51 AM CST  Subject: UTI       PCP: SHANNON      Time of Call: 8:25am       Person Calling:  pt   Phone number:  [phone]    Note:   VM received from pt, stating she was in the ER on Sat for UTI. Was put on Keflex and was told to follow up with her primary if her sxs are not improving. Stated she continues to have urinary frequency and urgency. Pt is asking for BRM to give her advise on how to proceed.     Please advise.    Last office visit and reason:  12/16/21 video/covid MJP---------------------  From: Freya Mark CMA ([facility] Message Pool ([id number]_[facility]))   To: Orestes Mendosa MD;     Sent: 2/24/2022 10:26:57 AM CST  Subject: FW: UTI     Per ER visit 2/19 - please advise         Given her initial symptoms that were most consistent with infectious etiology and abnormal UA results we will send culture, initiate Keflex and plan for close follow-up.  Advised I suspect this is the etiology of her symptoms rather than an MS flare.       Culture   RESULT Abnormal   >100,000 CFU/mL Staphylococcus coagulase negative  10,000-50,000 CFU/mL Multiple organisms probable contaminants  Resulting Agency: [facility]  Susceptibility  Staphylococcus coagulase negative   Not Specified   CEFAZOLIN  S   LEVOFLOXACIN <=0.12 S   NITROFURANTOIN <=16 S   OXACILLIN <=0.25 S 1   TETRACYCLINE <=1 S   TRIMETHOPRIM/SULF <=0.5/9.5 S   VANCOMYCIN 2 S       Oxacillin susceptible should not be interpreted as penicillin or amoxicillin susceptible---------------------  From: Orestes Mendosa MD   To: [facility] Message Pool ([id number]_[place]);     Sent: 2/24/2022 11:13:04 AM CST  Subject: RE: UTI     Have her stop Keflex if still taking and change to macrobid 100mg BID x 7 days.  Should be seen in clinic if symptoms worseningpt contacted at 1314 and advised, expressed understanding   new rx  sent to pharm

## 2022-03-04 ENCOUNTER — LAB (OUTPATIENT)
Dept: LAB | Facility: CLINIC | Age: 47
End: 2022-03-04
Payer: MEDICARE

## 2022-03-04 DIAGNOSIS — R35.0 FREQUENCY OF URINATION: ICD-10-CM

## 2022-03-04 DIAGNOSIS — R35.0 FREQUENCY OF URINATION: Primary | ICD-10-CM

## 2022-03-04 LAB
ALBUMIN UR-MCNC: NEGATIVE MG/DL
APPEARANCE UR: CLEAR
BACTERIA #/AREA URNS HPF: ABNORMAL /HPF
BILIRUB UR QL STRIP: NEGATIVE
CAOX CRY #/AREA URNS HPF: ABNORMAL /HPF
COLOR UR AUTO: YELLOW
GLUCOSE UR STRIP-MCNC: NEGATIVE MG/DL
HGB UR QL STRIP: ABNORMAL
HYALINE CASTS #/AREA URNS LPF: ABNORMAL /LPF
KETONES UR STRIP-MCNC: NEGATIVE MG/DL
LEUKOCYTE ESTERASE UR QL STRIP: NEGATIVE
MUCOUS THREADS #/AREA URNS LPF: PRESENT /LPF
NITRATE UR QL: NEGATIVE
PH UR STRIP: 5.5 [PH] (ref 5–7)
RBC #/AREA URNS AUTO: ABNORMAL /HPF
SP GR UR STRIP: 1.02 (ref 1–1.03)
SQUAMOUS #/AREA URNS AUTO: ABNORMAL /LPF
UROBILINOGEN UR STRIP-ACNC: 0.2 E.U./DL
WBC #/AREA URNS AUTO: ABNORMAL /HPF

## 2022-03-04 PROCEDURE — 87086 URINE CULTURE/COLONY COUNT: CPT | Performed by: FAMILY MEDICINE

## 2022-03-04 PROCEDURE — 81001 URINALYSIS AUTO W/SCOPE: CPT | Performed by: FAMILY MEDICINE

## 2022-03-05 LAB — BACTERIA UR CULT: NORMAL

## 2022-03-08 ENCOUNTER — TELEPHONE (OUTPATIENT)
Dept: FAMILY MEDICINE | Facility: CLINIC | Age: 47
End: 2022-03-08
Payer: MEDICARE

## 2022-03-08 DIAGNOSIS — D64.9 LOW HEMOGLOBIN: Primary | ICD-10-CM

## 2022-03-08 DIAGNOSIS — E11.9 TYPE 2 DIABETES MELLITUS WITHOUT COMPLICATION, WITHOUT LONG-TERM CURRENT USE OF INSULIN (H): ICD-10-CM

## 2022-03-08 DIAGNOSIS — G35 MS (MULTIPLE SCLEROSIS) (H): ICD-10-CM

## 2022-03-08 NOTE — TELEPHONE ENCOUNTER
Pt calling because she saw that hemoglobin was 10.2 and wondering if ok to take Iron supplement or have one prescribed.    Please call patient to inform of decision @ [phone] anytime and ok to leave detailed message if no answer.

## 2022-03-09 NOTE — TELEPHONE ENCOUNTER
Left detailed message on pt's phone with recommendations on Ferrous sulfate 325mg BID and future labs orders placed.  Asked for a return call with any questions

## 2022-03-28 PROBLEM — Z00.00 HEALTH CARE MAINTENANCE: Status: ACTIVE | Noted: 2022-03-28

## 2022-03-28 PROBLEM — I10 HYPERTENSION: Status: ACTIVE | Noted: 2021-05-24

## 2022-03-28 PROBLEM — G89.29 CHRONIC LOW BACK PAIN: Status: ACTIVE | Noted: 2022-03-28

## 2022-03-28 PROBLEM — J30.2 SEASONAL ALLERGIC RHINITIS: Status: ACTIVE | Noted: 2022-03-28

## 2022-03-28 PROBLEM — G43.909 MIGRAINE HEADACHE: Status: ACTIVE | Noted: 2022-03-28

## 2022-03-28 PROBLEM — E66.01 MORBID OBESITY (H): Status: ACTIVE | Noted: 2022-03-28

## 2022-03-28 PROBLEM — E11.9 TYPE 2 DIABETES MELLITUS WITHOUT COMPLICATION, WITHOUT LONG-TERM CURRENT USE OF INSULIN (H): Status: ACTIVE | Noted: 2021-05-24

## 2022-03-28 PROBLEM — M54.50 CHRONIC LOW BACK PAIN: Status: ACTIVE | Noted: 2022-03-28

## 2022-03-28 RX ORDER — PRAZOSIN HYDROCHLORIDE 1 MG/1
1 CAPSULE ORAL AT BEDTIME
COMMUNITY
Start: 2021-04-14

## 2022-03-28 RX ORDER — ESCITALOPRAM OXALATE 20 MG/1
20 TABLET ORAL DAILY
COMMUNITY
Start: 2020-10-30

## 2022-03-28 RX ORDER — METHENAMINE HIPPURATE 1000 MG/1
1 TABLET ORAL 2 TIMES DAILY
COMMUNITY
Start: 2021-11-10 | End: 2022-04-22

## 2022-03-28 RX ORDER — IBUPROFEN 600 MG/1
600 TABLET, FILM COATED ORAL EVERY 6 HOURS PRN
COMMUNITY
End: 2022-06-16

## 2022-03-28 RX ORDER — FLUTICASONE PROPIONATE 50 MCG
2 SPRAY, SUSPENSION (ML) NASAL DAILY PRN
COMMUNITY
Start: 2021-12-14

## 2022-03-28 RX ORDER — ACETAMINOPHEN 500 MG
2 TABLET ORAL EVERY 6 HOURS PRN
Status: ON HOLD | COMMUNITY
Start: 2021-02-10 | End: 2022-10-24

## 2022-03-28 RX ORDER — RIZATRIPTAN BENZOATE 10 MG/1
10 TABLET ORAL
COMMUNITY

## 2022-03-28 RX ORDER — METFORMIN HCL 500 MG
1000 TABLET, EXTENDED RELEASE 24 HR ORAL DAILY
COMMUNITY
End: 2022-09-02

## 2022-03-28 RX ORDER — HYDROXYZINE HYDROCHLORIDE 10 MG/1
10 TABLET, FILM COATED ORAL
COMMUNITY
Start: 2021-04-14 | End: 2022-10-10

## 2022-03-28 RX ORDER — AMANTADINE HYDROCHLORIDE 100 MG/1
1 CAPSULE, GELATIN COATED ORAL 2 TIMES DAILY PRN
COMMUNITY
Start: 2021-04-14 | End: 2022-09-07

## 2022-03-28 RX ORDER — BUSPIRONE HYDROCHLORIDE 15 MG/1
15 TABLET ORAL 2 TIMES DAILY
COMMUNITY
Start: 2021-04-14

## 2022-03-28 RX ORDER — ARIPIPRAZOLE 5 MG/1
5 TABLET ORAL DAILY
COMMUNITY
Start: 2021-04-07

## 2022-03-28 RX ORDER — LIRAGLUTIDE 6 MG/ML
1.8 INJECTION SUBCUTANEOUS DAILY
COMMUNITY
Start: 2021-11-24 | End: 2022-10-04

## 2022-03-28 RX ORDER — MELOXICAM 15 MG/1
1 TABLET ORAL DAILY
COMMUNITY
Start: 2021-11-10 | End: 2022-04-20

## 2022-03-28 RX ORDER — TIZANIDINE 2 MG/1
2 TABLET ORAL 2 TIMES DAILY
Status: ON HOLD | COMMUNITY
Start: 2022-02-16 | End: 2022-10-24

## 2022-03-28 RX ORDER — VALACYCLOVIR HYDROCHLORIDE 500 MG/1
500 TABLET, FILM COATED ORAL 2 TIMES DAILY
COMMUNITY
Start: 2022-02-14 | End: 2022-10-20

## 2022-03-28 RX ORDER — CLONAZEPAM 1 MG/1
1 TABLET ORAL AT BEDTIME
Status: ON HOLD | COMMUNITY
Start: 2022-02-16 | End: 2022-10-26

## 2022-03-28 RX ORDER — AMLODIPINE BESYLATE 5 MG/1
1 TABLET ORAL DAILY
COMMUNITY
Start: 2021-11-10 | End: 2022-04-06

## 2022-04-04 DIAGNOSIS — I10 HTN (HYPERTENSION): Primary | ICD-10-CM

## 2022-04-06 RX ORDER — AMLODIPINE BESYLATE 5 MG/1
TABLET ORAL
Qty: 90 TABLET | Refills: 0 | Status: SHIPPED | OUTPATIENT
Start: 2022-04-06 | End: 2022-06-21

## 2022-04-07 ENCOUNTER — OFFICE VISIT (OUTPATIENT)
Dept: FAMILY MEDICINE | Facility: CLINIC | Age: 47
End: 2022-04-07
Payer: MEDICARE

## 2022-04-07 VITALS
DIASTOLIC BLOOD PRESSURE: 84 MMHG | OXYGEN SATURATION: 97 % | BODY MASS INDEX: 43.99 KG/M2 | TEMPERATURE: 98.5 F | WEIGHT: 264 LBS | SYSTOLIC BLOOD PRESSURE: 124 MMHG | HEART RATE: 85 BPM | HEIGHT: 65 IN

## 2022-04-07 DIAGNOSIS — G35 MULTIPLE SCLEROSIS (H): ICD-10-CM

## 2022-04-07 DIAGNOSIS — M54.41 CHRONIC BILATERAL LOW BACK PAIN WITH BILATERAL SCIATICA: ICD-10-CM

## 2022-04-07 DIAGNOSIS — G89.29 CHRONIC BILATERAL LOW BACK PAIN WITH BILATERAL SCIATICA: ICD-10-CM

## 2022-04-07 DIAGNOSIS — I10 PRIMARY HYPERTENSION: ICD-10-CM

## 2022-04-07 DIAGNOSIS — R59.1 LYMPHADENOPATHY: ICD-10-CM

## 2022-04-07 DIAGNOSIS — E11.9 TYPE 2 DIABETES MELLITUS WITHOUT COMPLICATION, WITHOUT LONG-TERM CURRENT USE OF INSULIN (H): ICD-10-CM

## 2022-04-07 DIAGNOSIS — Z00.00 HEALTH CARE MAINTENANCE: ICD-10-CM

## 2022-04-07 DIAGNOSIS — F32.A DEPRESSIVE DISORDER: ICD-10-CM

## 2022-04-07 DIAGNOSIS — M54.42 CHRONIC BILATERAL LOW BACK PAIN WITH BILATERAL SCIATICA: ICD-10-CM

## 2022-04-07 PROCEDURE — 99215 OFFICE O/P EST HI 40 MIN: CPT | Performed by: INTERNAL MEDICINE

## 2022-04-07 RX ORDER — FERROUS SULFATE 325(65) MG
650 TABLET ORAL
COMMUNITY

## 2022-04-07 RX ORDER — HYDROCODONE BITARTRATE AND ACETAMINOPHEN 5; 325 MG/1; MG/1
1 TABLET ORAL EVERY 6 HOURS PRN
COMMUNITY
End: 2022-10-04

## 2022-04-07 RX ORDER — FEXOFENADINE HCL 180 MG/1
180 TABLET ORAL DAILY
COMMUNITY

## 2022-04-07 ASSESSMENT — ASTHMA QUESTIONNAIRES: ACT_TOTALSCORE: 25

## 2022-04-07 NOTE — PROGRESS NOTES
[address] [address]  Phone: [phone]  Fax: [phone]  Primary Provider: Fredy Mendosa        PREOPERATIVE EVALUATION:  Today's date: 4/7/2022    Karli Miner is a 46 year old female who presents for a preoperative evaluation.      Surgical Information:  Surgery/Procedure: L4-L5 lumbar fusion  Surgery Location: [facility]  Surgeon: Dr Foreman  Surgery Date: 4/19/22  Time of Surgery: 0730  Where patient plans to recover: At home with family  Fax number for surgical facility: [phone]    Type of Anesthesia Anticipated: General    Assessment & Plan     The proposed surgical procedure is considered INTERMEDIATE risk.    Problem List Items Addressed This Visit     None          Possible Sleep Apnea: yes{   Implanted Device:        Risks and Recommendations:  The patient has the following additional risks and recommendations for perioperative complications:   - Morbid obesity (BMI >40)  Diabetes:  - Patient is not on insulin therapy: regular NPO guidelines can be followed.    - advised to hold Victoza on night prior to surgery and metformin, beginning two nights prior to surgery  - significant baseline immunosuppression related to multiple sclerosis disease modifying therapy (maintained on ocrelizumab infusions)  - Infusaport in place  - historically with issues involving post-anesthesia nausea/vomiting    Medication Instructions:   - ibuprofen (Advil, Motrin): HOLD 1 day before surgery.    - meloxicam (Mobic): HOLD 10 days before surgery.     RECOMMENDATION:  APPROVAL GIVEN to proceed with proposed procedure, without further diagnostic evaluation.    Review of external notes as documented above     Discussion of management or test interpretation with external physician/other qualified healthcare professional/appropriate source    50 minutes spent on the date of the encounter doing chart review, review of outside records, review of test  results, patient visit and documentation         Subjective     HPI related to upcoming procedure: She presents for preoperative assessment for planned lumbar L4-L5 transforaminal lumbar fusion scheduled for April 19 at [facility] with Dr. Foreman.  She has been bothered by chronic low back pain with radicular symptoms for quite some time.  She has received a series of epidural steroid injections with only transient relief in symptoms.  She has tried physical therapy as well as medication options without significant symptom relief.  Did have MRI of lumbar spine completed this past calendar year.  Now opting to pursue surgery for more definitive treatment.      Preop Questions 4/7/2022   1. Have you ever had a heart attack or stroke? No   2. Have you ever had surgery on your heart or blood vessels, such as a stent placement, a coronary artery bypass, or surgery on an artery in your head, neck, heart, or legs? No   3. Do you have chest pain with activity? No   4. Do you have a history of  heart failure? No   5. Do you currently have a cold, bronchitis or symptoms of other infection? No   6. Do you have a cough, shortness of breath, or wheezing? No   7. Do you or anyone in your family have previous history of blood clots? UNKNOWN -    8. Do you or does anyone in your family have a serious bleeding problem such as prolonged bleeding following surgeries or cuts? YES -    9. Have you ever had problems with anemia or been told to take iron pills? YES -    10. Have you had any abnormal blood loss such as black, tarry or bloody stools, or abnormal vaginal bleeding? YES -    11. Have you ever had a blood transfusion? YES -    11a. Have you ever had a transfusion reaction? YES -    12. Are you willing to have a blood transfusion if it is medically needed before, during, or after your surgery? Yes   13. Have you or any of your relatives ever had problems with anesthesia? YES -    14. Do you have sleep apnea, excessive  snoring or daytime drowsiness? YES -    14a. Do you have a CPAP machine? No   15. Do you have any artifical heart valves or other implanted medical devices like a pacemaker, defibrillator, or continuous glucose monitor? No -    15a. What type of device do you have? Power port   15b. Name of the clinic that manages your device:  [facility]   16. Do you have artificial joints? No   17. Are you allergic to latex? No   18. Is there any chance that you may be pregnant? No           Review of Systems  A 10 point complete review of systems was inquired and negative except for the pertinent following findings      Patient Active Problem List    Diagnosis Date Noted     Chronic low back pain 03/28/2022     Priority: Medium     Migraine headache 03/28/2022     Priority: Medium     Morbid obesity (H) 03/28/2022     Priority: Medium     Seasonal allergic rhinitis 03/28/2022     Priority: Medium     Health care maintenance 03/28/2022     Priority: Medium     Primary hypertension 05/24/2021     Priority: Medium     Type 2 diabetes mellitus without complication, without long-term current use of insulin (H) 05/24/2021     Priority: Medium     Urinary bladder neurogenic dysfunction 06/15/2016     Priority: Medium     Multiple sclerosis (H) 04/13/2016     Priority: Medium     Vitamin D deficiency 04/13/2016     Priority: Medium     Moderate persistent asthma without complication 02/05/2015     Priority: Medium     Depressive disorder 08/26/2013     Priority: Medium     Formatting of this note might be different from the original.  Depressive disorder, not elsewhere classified ([facility])       PTSD (post-traumatic stress disorder) 06/21/2011     Priority: Medium      Past Medical History:   Diagnosis Date     Calcium nephrolithiasis 6/15/2016     Chronic low back pain 3/28/2022     Depressive disorder 8/26/2013    Formatting of this note might be different from the original. Depressive disorder, not elsewhere classified ([facility])      Migraine headache 3/28/2022     Moderate persistent asthma without complication 2015     Morbid obesity (H) 3/28/2022     Multiple sclerosis (H) 2016     Primary hypertension 2021     PTSD (post-traumatic stress disorder) 2011     Seasonal allergic rhinitis 3/28/2022     Type 2 diabetes mellitus without complication, without long-term current use of insulin (H) 2021     Urinary bladder neurogenic dysfunction 6/15/2016     Vitamin D deficiency 2016     Past Surgical History:   Procedure Laterality Date     APPENDECTOMY       BONE MARROW BIOPSY        SECTION       CHOLECYSTECTOMY       LAPAROSCOPY, SURGICAL; REPAIR INCISIONAL OR VENTRAL HERNIA       LYMPH NODE BIOPSY      2021 - reactive lymphadenopathy work-up     MAMMOPLASTY REDUCTION       TONSILLECTOMY & ADENOIDECTOMY       TUBAL LIGATION       URETEROSCOPY  2016     Current Outpatient Medications   Medication Sig Dispense Refill     acetaminophen (TYLENOL) 325 MG tablet Take 2 tablets by mouth every 6 hours as needed       amantadine (SYMMETREL) 100 MG capsule Take 1 capsule by mouth 2 times daily as needed       amLODIPine (NORVASC) 5 MG tablet TAKE 1 TABLET DAILY 90 tablet 0     ARIPiprazole (ABILIFY) 5 MG tablet Take 5 mg by mouth daily       busPIRone (BUSPAR) 15 MG tablet Take 15 mg by mouth 2 times daily       cholecalciferol 125 MCG (5000 UT) CAPS Take 5,000 Units by mouth daily        clonazePAM (KLONOPIN) 1 MG tablet Take 1 mg by mouth At Bedtime       escitalopram (LEXAPRO) 20 MG tablet Take 20 mg by mouth daily       ferrous sulfate (FEROSUL) 325 (65 Fe) MG tablet Take 325 mg by mouth daily (with breakfast) 2 tabs bravo;u       fexofenadine (ALLEGRA) 180 MG tablet Take 180 mg by mouth daily       fluticasone (FLONASE ALLERGY RELIEF) 50 MCG/ACT nasal spray Spray 2 sprays in nostril daily       hydrOXYzine (ATARAX) 10 MG tablet Take 10 mg by mouth nightly as needed        ibuprofen  "(ADVIL/MOTRIN) 600 MG tablet Take 600 mg by mouth every 6 hours as needed       liraglutide (VICTOZA PEN) 18 MG/3ML solution Inject 1.8 mg Subcutaneous daily       meloxicam (MOBIC) 15 MG tablet Take 1 tablet by mouth daily       metFORMIN (GLUCOPHAGE-XR) 500 MG 24 hr tablet Take 1,000 mg by mouth daily       methenamine hippurate (HIPREX) 1 g tablet Take 1 g by mouth 2 times daily       prazosin (MINIPRESS) 1 MG capsule Take 1 mg by mouth At Bedtime       rizatriptan (MAXALT) 10 MG tablet Take 10 mg by mouth as needed       tiZANidine (ZANAFLEX) 4 MG tablet Take 2 tablets by mouth At Bedtime       valACYclovir (VALTREX) 500 MG tablet Take 500 mg by mouth 2 times daily       HYDROcodone-acetaminophen (NORCO) 5-325 MG tablet Take 1 tablet by mouth every 6 hours as needed for severe pain (Patient not taking: Reported on 4/7/2022)         Allergies   Allergen Reactions     Sulfa Drugs Diarrhea, Rash and Nausea and Vomiting     Adhesive Tape Rash        Social History     Tobacco Use     Smoking status: Not on file     Smokeless tobacco: Not on file   Substance Use Topics     Alcohol use: Not on file       History   Drug Use Not on file         Objective     /84 (BP Location: Right arm, Patient Position: Sitting, Cuff Size: Adult Large)   Pulse 85   Temp 98.5  F (36.9  C) (Tympanic)   Ht 1.651 m (5' 5\")   Wt 119.7 kg (264 lb)   SpO2 97%   BMI 43.93 kg/m      Physical Exam  GENERAL APPEARANCE: healthy, alert and no distress  HENT: ear canals and TM's normal and nose and mouth without ulcers or lesions  RESP: lungs clear to auscultation - no rales, rhonchi or wheezes  CV: regular rate and rhythm, normal S1 S2, no S3 or S4 and no murmur, click or rub.  Infusaport in place  ABDOMEN: soft, nontender, no HSM or masses and bowel sounds normal  NEURO: Normal strength and tone, sensory exam grossly normal, mentation intact and speech normal    No results for input(s): HGB, PLT, INR, NA, POTASSIUM, CR, A1C in the last " [id number] hours.     Diagnostics:  Labs from [facility], oncology visit from 3/24/2022 review  CBC notable for WBC 11.2K.  Normal BMP, LFTs  CT C/A/P: interval improvement in size of thoracic and pelvic adenopathy.  No coronary calcification      Revised Cardiac Risk Index (RCRI):  The patient has the following serious cardiovascular risks for perioperative complications:   - No serious cardiac risks = 0 points     RCRI Interpretation: 0 points: Class I (very low risk - 0.4% complication rate)           Signed Electronically by: Fredy Mendosa MD  Copy of this evaluation report is provided to requesting physician.

## 2022-04-07 NOTE — PATIENT INSTRUCTIONS
Hold meloxicam 1 week prior to surgery  Hold metformin 2 nights prior to surgery  Hold Victoza the night prior

## 2022-04-08 NOTE — ASSESSMENT & PLAN NOTE
clear situational stressors as well as significant depression/anxiety history  - maintained on escitalopram 20mg daily, buspirone 15mg BID, aripiprazole 5mg daily, clonazepam prn  - working with therapist recommended through [facility] clinic

## 2022-04-08 NOTE — ASSESSMENT & PLAN NOTE
multiple sclerosis  - currently in remission;  follows with Dr. Webb, her neurologist  - previously on Lemtrada (alemtuzumab) in spring, 2018; good clinical response and tolerance - associated with sustained T-cell immunosuppression  - since begin on ocrelizumab infusions  - using amantadine - finding helpful with her fatigue

## 2022-04-08 NOTE — ASSESSMENT & PLAN NOTE
chronic low back pains, lumbar radiculopathy  - MRI of C-T-L spine - no active demyelinating disease  - MRI of L-spine (4/8/2021): L4-5 severe foraminal stenosis, moderate central stenosis   - epidural steroid injection ([facility]) on 4/2021 and 7/2021 with transient improvement in symptoms

## 2022-04-08 NOTE — ASSESSMENT & PLAN NOTE
lymphadenopathy, primarily intra-abdominal/pelvic; LN biopsy on 11/2020 and 2/2021   - both biopsies demonstrating  florid lymphoid hyperplasia with granulomatous features - felt to be reactive/infectious process.   - FISH staining for EBV: negative   - fungal, mycobacterial staining/culture - negative   - COVID swab testing consistently negative  - previous bone marrow biopsy in fall, 2020: mildly hypocellular marrow - otherwise unremarkable  - CT C/A/P, 2/1/21: stable, though persistent inguinal and retroperitoneal adenopathy.  RUQ fatty containing ventral wall hernia   - etiology of adenopathy unclear - no further sequelae

## 2022-04-08 NOTE — ASSESSMENT & PLAN NOTE
controlled  hypoglycemic medications: Victoza 1.8mg daily , metformin ER 1000mg daily  insulin therapy: none  last HbA1c: 6.7%  microvascular complications: none  macrovascular complications: none known  ASA/VIRGILIO/statin: will need to discuss future use; concerns with current polypharmacy
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
## 2022-04-08 NOTE — ASSESSMENT & PLAN NOTE
current antihypertensive regimen: amlodipine 5mg daily  regimen changes:  intolerance:  future titration/work-up plan:   - consider transitioning her amlodipine to ACEi/ARB given diabetic status

## 2022-04-12 ENCOUNTER — OFFICE VISIT (OUTPATIENT)
Dept: FAMILY MEDICINE | Facility: CLINIC | Age: 47
End: 2022-04-12
Payer: MEDICARE

## 2022-04-12 VITALS
BODY MASS INDEX: 44.26 KG/M2 | WEIGHT: 266 LBS | TEMPERATURE: 97.6 F | SYSTOLIC BLOOD PRESSURE: 118 MMHG | DIASTOLIC BLOOD PRESSURE: 74 MMHG | HEART RATE: 85 BPM | OXYGEN SATURATION: 97 %

## 2022-04-12 DIAGNOSIS — R35.0 FREQUENCY OF MICTURITION: ICD-10-CM

## 2022-04-12 DIAGNOSIS — N30.01 ACUTE CYSTITIS WITH HEMATURIA: ICD-10-CM

## 2022-04-12 DIAGNOSIS — E66.01 MORBID OBESITY (H): ICD-10-CM

## 2022-04-12 DIAGNOSIS — G35 MS (MULTIPLE SCLEROSIS) (H): ICD-10-CM

## 2022-04-12 DIAGNOSIS — N31.9 URINARY BLADDER NEUROGENIC DYSFUNCTION: ICD-10-CM

## 2022-04-12 DIAGNOSIS — R39.15 URGENCY OF URINATION: Primary | ICD-10-CM

## 2022-04-12 LAB
ALBUMIN UR-MCNC: NEGATIVE MG/DL
APPEARANCE UR: CLEAR
BACTERIA #/AREA URNS HPF: ABNORMAL /HPF
BILIRUB UR QL STRIP: NEGATIVE
COLOR UR AUTO: YELLOW
GLUCOSE UR STRIP-MCNC: NEGATIVE MG/DL
HGB UR QL STRIP: ABNORMAL
KETONES UR STRIP-MCNC: NEGATIVE MG/DL
LEUKOCYTE ESTERASE UR QL STRIP: ABNORMAL
MUCOUS THREADS #/AREA URNS LPF: PRESENT /LPF
NITRATE UR QL: NEGATIVE
PH UR STRIP: 5.5 [PH] (ref 5–7)
RBC #/AREA URNS AUTO: ABNORMAL /HPF
SP GR UR STRIP: 1.02 (ref 1–1.03)
SQUAMOUS #/AREA URNS AUTO: ABNORMAL /LPF
UROBILINOGEN UR STRIP-ACNC: 0.2 E.U./DL
WBC #/AREA URNS AUTO: ABNORMAL /HPF

## 2022-04-12 PROCEDURE — 81001 URINALYSIS AUTO W/SCOPE: CPT | Performed by: FAMILY MEDICINE

## 2022-04-12 PROCEDURE — 99213 OFFICE O/P EST LOW 20 MIN: CPT | Performed by: FAMILY MEDICINE

## 2022-04-12 NOTE — PROGRESS NOTES
"  Assessment & Plan   Problem List Items Addressed This Visit        Digestive    Morbid obesity (H)       Urinary    Urinary bladder neurogenic dysfunction      Other Visit Diagnoses     Urgency of urination    -  Primary    Relevant Orders    UA reflex to Microscopic and Culture (Completed)    Urine Microscopic (Completed)    Frequency of micturition        Relevant Orders    UA reflex to Microscopic and Culture (Completed)    Urine Microscopic (Completed)    Acute cystitis with hematuria        Relevant Medications    cephALEXin (KEFLEX) 500 MG capsule    MS (multiple sclerosis) (H)          Patient with history of multiple sclerosis on immunologic modifying medications was noted to have UA consistent with UTI.  Repeated today also consistent with urinary tract infection.  She reports that she usually has to take Keflex and fail that before her insurance will allow her to take Macrobid.  Culture is pending.  We will start her Keflex and await results of urine culture.  In regards to her MS she will continue to follow-up with her neurologist team.             BMI:   Estimated body mass index is 44.26 kg/m  as calculated from the following:    Height as of 4/7/22: 1.651 m (5' 5\").    Weight as of this encounter: 120.7 kg (266 lb).   Weight management plan: Discussed healthy diet and exercise guidelines        No follow-ups on file. return to clinic if symptoms worsen or do not improve      Emily Lan MD  [facility]    Bud Calvillo is a 46 year old who presents for the following health issues: contacted by neurologist and told she has UTI per lab work, labs were done April 4th, needs to be treated      UTI    History of Present Illness       Reason for visit:  UTI  Symptom onset:  3-7 days ago  Symptoms include:  Frequency, urgency  Symptom intensity:  Mild  Symptom progression:  Worsening  Had these symptoms before:  Yes  Has tried/received treatment for these " symptoms:  Yes  Previous treatment was successful:  Yes  Prior treatment description:  Macrobid    She eats 0-1 servings of fruits and vegetables daily.She consumes 2 sweetened beverage(s) daily.She exercises with enough effort to increase her heart rate 9 or less minutes per day.  She exercises with enough effort to increase her heart rate 3 or less days per week.   She is taking medications regularly.     Multiple sclerosis on immune modifying medications.  Noted to have a UA consistent with UTI at her neurology appointment.  She was told to follow-up with her PCP because they do not prescribe medications for urinary tract infections.    She also shares with me today that her daughter is doing well she is a senior in high school and has been accepted into foster care.  She gets to see her daughter once every 1 to 2 weeks and her daughter recently asked that if she started school at [employer] she lives with her mom while going to college.        Review of Systems   Neg except as per hpi      Objective    /74 (BP Location: Right arm, Patient Position: Sitting)   Pulse 85   Temp 97.6  F (36.4  C)   Wt 120.7 kg (266 lb)   SpO2 97%   BMI 44.26 kg/m    Body mass index is 44.26 kg/m .  Physical Exam       Exam:  General: alert and oriented ×3 no acute distress.    HEENT: pupils are equal round and reactive to light extraocular motion is intact. Normocephalic and atraumatic.     Hearing is grossly normal and there is no otorrhea.     Chest: has bilateral rise with no increased work of breathing.    Cardiovascular: normal perfusion and brisk capillary refill.    Musculoskeletal: no gross focal abnormalities     Psychiatric: speech is clear and coherent and fluent. Patient dressed appropriately for the weather. Mood is appropriate and affect is full.        Discussed with patient to return to clinic if symptoms worsen or do not improve

## 2022-04-13 ENCOUNTER — TELEPHONE (OUTPATIENT)
Dept: FAMILY MEDICINE | Facility: CLINIC | Age: 47
End: 2022-04-13
Payer: MEDICARE

## 2022-04-13 RX ORDER — CEPHALEXIN 500 MG/1
500 CAPSULE ORAL 4 TIMES DAILY
Qty: 28 CAPSULE | Refills: 0 | Status: SHIPPED | OUTPATIENT
Start: 2022-04-13 | End: 2022-05-06

## 2022-04-13 NOTE — TELEPHONE ENCOUNTER
Reason for Call:  Request for results:    Name of test or procedure: Urine lab    Date of test of procedure: 4.12.2022    Location of the test or procedure: [facility]    OK to leave the result message on voice mail or with a family member? YES    Phone number Patient can be reached at:  Cell number on file:    Telephone Information:   Mobile [phone]       Additional comments: anytime    Call taken on 4/13/2022 at 12:49 PM by Brianna Parrish

## 2022-04-15 ENCOUNTER — LAB (OUTPATIENT)
Dept: LAB | Facility: CLINIC | Age: 47
End: 2022-04-15
Payer: MEDICARE

## 2022-04-15 DIAGNOSIS — Z20.822 ENCOUNTER FOR LABORATORY TESTING FOR COVID-19 VIRUS: ICD-10-CM

## 2022-04-15 LAB — SARS-COV-2 RNA RESP QL NAA+PROBE: NEGATIVE

## 2022-04-15 PROCEDURE — U0005 INFEC AGEN DETEC AMPLI PROBE: HCPCS

## 2022-04-15 PROCEDURE — U0003 INFECTIOUS AGENT DETECTION BY NUCLEIC ACID (DNA OR RNA); SEVERE ACUTE RESPIRATORY SYNDROME CORONAVIRUS 2 (SARS-COV-2) (CORONAVIRUS DISEASE [COVID-19]), AMPLIFIED PROBE TECHNIQUE, MAKING USE OF HIGH THROUGHPUT TECHNOLOGIES AS DESCRIBED BY CMS-2020-01-R: HCPCS

## 2022-04-20 ENCOUNTER — TELEPHONE (OUTPATIENT)
Dept: FAMILY MEDICINE | Facility: CLINIC | Age: 47
End: 2022-04-20
Payer: MEDICARE

## 2022-04-20 DIAGNOSIS — M54.42 CHRONIC BILATERAL LOW BACK PAIN WITH BILATERAL SCIATICA: Primary | ICD-10-CM

## 2022-04-20 DIAGNOSIS — M54.41 CHRONIC BILATERAL LOW BACK PAIN WITH BILATERAL SCIATICA: Primary | ICD-10-CM

## 2022-04-20 DIAGNOSIS — G89.29 CHRONIC BILATERAL LOW BACK PAIN WITH BILATERAL SCIATICA: Primary | ICD-10-CM

## 2022-04-20 RX ORDER — MELOXICAM 15 MG/1
15 TABLET ORAL DAILY
Qty: 90 TABLET | Refills: 1 | Status: SHIPPED | OUTPATIENT
Start: 2022-04-20 | End: 2022-05-06

## 2022-04-20 NOTE — TELEPHONE ENCOUNTER
Reason for Call:  Medication or medication refill:    Do you use a [facility]?  Name of the pharmacy and phone number for the current request:  [facility] pharmacy    Name of the medication requested: meloxicam    Other request: talked to Freya regarding  Medication she would like to start taking    Can we leave a detailed message on this number? YES    Phone number patient can be reached at: Cell number on file:    Telephone Information:   Mobile [phone]       Best Time: any    Call taken on 4/20/2022 at 10:46 AM by NATHANIEL GUTIÉRREZ

## 2022-04-20 NOTE — TELEPHONE ENCOUNTER
Last filled 11/10/2021 for 90 tabs, 1 refill  Visit note from 4/7/22 days to hold Mobic for 10 days before surgery.  Med is active on Medications list.    Please advise.    Zeina Choi RN

## 2022-04-20 NOTE — TELEPHONE ENCOUNTER
Rx set up for refill, however, need appropriate diagnosis attached to order.  Please input dx and complete order    Zeina Choi RN

## 2022-04-22 DIAGNOSIS — N31.9 URINARY BLADDER NEUROGENIC DYSFUNCTION: Primary | ICD-10-CM

## 2022-04-22 RX ORDER — METHENAMINE HIPPURATE 1000 MG/1
1 TABLET ORAL 2 TIMES DAILY
Qty: 90 TABLET | Refills: 3 | Status: SHIPPED | OUTPATIENT
Start: 2022-04-22 | End: 2022-06-16

## 2022-04-22 NOTE — TELEPHONE ENCOUNTER
Routing refill request to provider for review/approval because:  Drug not on the [facility] refill protocol     Last Written Prescription Date:  11/1/2021  Last Fill Quantity: 180,  # refills: 1   Last office visit: 4/12/2022 with prescribing provider:     Future Office Visit:      Gretta Pace RN  [facility]

## 2022-04-22 NOTE — TELEPHONE ENCOUNTER
Reason for Call:  Medication or medication refill:    Do you use a [facility]?  Name of the pharmacy and phone number for the current request: [facility]    Name of the medication requested: methenamine hippurate 1 g    Other request: patient completely out    Can we leave a detailed message on this number? Not Applicable    Phone number patient can be reached at: Other phone number: [phone]    Best Time: anytime    Call taken on 4/22/2022 at 11:24 AM by Brianna Parrish

## 2022-05-06 ENCOUNTER — OFFICE VISIT (OUTPATIENT)
Dept: FAMILY MEDICINE | Facility: CLINIC | Age: 47
End: 2022-05-06
Payer: MEDICARE

## 2022-05-06 VITALS
OXYGEN SATURATION: 93 % | BODY MASS INDEX: 45.43 KG/M2 | WEIGHT: 273 LBS | HEART RATE: 82 BPM | DIASTOLIC BLOOD PRESSURE: 85 MMHG | SYSTOLIC BLOOD PRESSURE: 141 MMHG | TEMPERATURE: 99 F

## 2022-05-06 DIAGNOSIS — R10.9 FLANK PAIN: ICD-10-CM

## 2022-05-06 DIAGNOSIS — M54.41 CHRONIC BILATERAL LOW BACK PAIN WITH BILATERAL SCIATICA: ICD-10-CM

## 2022-05-06 DIAGNOSIS — M54.42 CHRONIC BILATERAL LOW BACK PAIN WITH BILATERAL SCIATICA: ICD-10-CM

## 2022-05-06 DIAGNOSIS — G89.29 CHRONIC BILATERAL LOW BACK PAIN WITH BILATERAL SCIATICA: ICD-10-CM

## 2022-05-06 DIAGNOSIS — R39.15 URINARY URGENCY: Primary | ICD-10-CM

## 2022-05-06 DIAGNOSIS — R30.0 DYSURIA: ICD-10-CM

## 2022-05-06 LAB
ALBUMIN UR-MCNC: NEGATIVE MG/DL
APPEARANCE UR: CLEAR
BACTERIA #/AREA URNS HPF: ABNORMAL /HPF
BILIRUB UR QL STRIP: NEGATIVE
COLOR UR AUTO: YELLOW
GLUCOSE UR STRIP-MCNC: NEGATIVE MG/DL
HGB UR QL STRIP: NEGATIVE
KETONES UR STRIP-MCNC: NEGATIVE MG/DL
LEUKOCYTE ESTERASE UR QL STRIP: NEGATIVE
NITRATE UR QL: NEGATIVE
PH UR STRIP: 7 [PH] (ref 5–7)
RBC #/AREA URNS AUTO: ABNORMAL /HPF
SP GR UR STRIP: 1.02 (ref 1–1.03)
SQUAMOUS #/AREA URNS AUTO: ABNORMAL /LPF
UROBILINOGEN UR STRIP-ACNC: 0.2 E.U./DL
WBC #/AREA URNS AUTO: ABNORMAL /HPF

## 2022-05-06 PROCEDURE — 87086 URINE CULTURE/COLONY COUNT: CPT | Performed by: INTERNAL MEDICINE

## 2022-05-06 PROCEDURE — 81001 URINALYSIS AUTO W/SCOPE: CPT | Performed by: INTERNAL MEDICINE

## 2022-05-06 PROCEDURE — 99214 OFFICE O/P EST MOD 30 MIN: CPT | Performed by: INTERNAL MEDICINE

## 2022-05-06 RX ORDER — MELOXICAM 15 MG/1
15 TABLET ORAL DAILY
Qty: 90 TABLET | Refills: 1 | Status: ON HOLD | OUTPATIENT
Start: 2022-05-06 | End: 2022-10-25

## 2022-05-06 NOTE — PROGRESS NOTES
[address] [address]  Phone: [phone]  Fax: [phone]  Primary Provider: Fredy Mendosa        PREOPERATIVE EVALUATION:  Today's date: 5/6/2022    Karli Miner is a 46 year old female who presents for a preoperative evaluation.  She was seen by me on 4/7/2022 in recent past for the same planned procedure, originally scheduled for 4/19/2022, which had to be rescheduled due to time conflicts related to her provider.  No significant overall health changes since her last visit.  She is eager to move forward with planned procedure.  Does bring up concerns related to 2-day history of increased urinary frequency urgency and dysuria.  Last treated for urinary tract infection 1 month ago, successfully treated with Keflex for 1 week.  Also described a fall at the entrance of a restaurant earlier this week when tripping on a rug.  She states she fell over to a nearby bench hitting her left side.  Does describe associated left-sided flank pain some pain with deep inspiration.  No current shortness of breath    Surgical Information:  Surgery/Procedure: L4-L5 Lumbar fusion  Surgery Location: [facility]  Surgeon: Dr Foreman  Surgery Date: 5/19/22  Time of Surgery: tbd  Where patient plans to recover: At home with family  Fax number for surgical facility:     Type of Anesthesia Anticipated: General    Assessment & Plan     The proposed surgical procedure is considered INTERMEDIATE risk.    Problem List Items Addressed This Visit        Nervous and Auditory    Chronic low back pain    Relevant Medications    meloxicam (MOBIC) 15 MG tablet      Other Visit Diagnoses     Urinary urgency    -  Primary    Relevant Orders    UA with Microscopic (Completed)    Urine Microscopic Exam (Completed)    Urine Culture Aerobic Bacterial - lab collect    Flank pain        Relevant Orders    XR Ribs & Chest Left G/E 3 Views (Completed)    Dysuria        Relevant Orders     Urine Culture Aerobic Bacterial - lab collect          Possible Sleep Apnea: yes     Risks and Recommendations:  The patient has the following additional risks and recommendations for perioperative complications:    - Morbid obesity (BMI >40)  Diabetes:  - Patient is not on insulin therapy: regular NPO guidelines can be followed.    - advised to hold Victoza on night prior to surgery and metformin, beginning two nights prior to surgery  - significant baseline immunosuppression related to multiple sclerosis disease modifying therapy (maintained on ocrelizumab infusions)  - Infusaport in place  - historically with issues involving post-anesthesia nausea/vomiting    Medication Instructions:   - ibuprofen (Advil, Motrin): HOLD 1 day before surgery.    - meloxicam (Mobic): HOLD 10 days before surgery.     RECOMMENDATION:  APPROVAL GIVEN to proceed with proposed procedure, without further diagnostic evaluation.    Subjective     HPI related to upcoming procedure:    Preop Questions 5/6/2022   1. Have you ever had a heart attack or stroke? No   2. Have you ever had surgery on your heart or blood vessels, such as a stent placement, a coronary artery bypass, or surgery on an artery in your head, neck, heart, or legs? No   3. Do you have chest pain with activity? No   4. Do you have a history of  heart failure? No   5. Do you currently have a cold, bronchitis or symptoms of other infection? No   6. Do you have a cough, shortness of breath, or wheezing? No   7. Do you or anyone in your family have previous history of blood clots? YES -    8. Do you or does anyone in your family have a serious bleeding problem such as prolonged bleeding following surgeries or cuts? YES -    9. Have you ever had problems with anemia or been told to take iron pills? YES -    10. Have you had any abnormal blood loss such as black, tarry or bloody stools, or abnormal vaginal bleeding? No   11. Have you ever had a blood transfusion? YES -    11a.  Have you ever had a transfusion reaction? YES -    12. Are you willing to have a blood transfusion if it is medically needed before, during, or after your surgery? Yes   13. Have you or any of your relatives ever had problems with anesthesia? YES -    14. Do you have sleep apnea, excessive snoring or daytime drowsiness? YES -    14a. Do you have a CPAP machine? No   15. Do you have any artifical heart valves or other implanted medical devices like a pacemaker, defibrillator, or continuous glucose monitor? YES -    15a. What type of device do you have? Power port   15b. Name of the clinic that manages your device:  [facility]   16. Do you have artificial joints? No   17. Are you allergic to latex? No   18. Is there any chance that you may be pregnant? No           Preoperative Review of :   reviewed - controlled substances reflected in medication list.  6}      Review of Systems  CONSTITUTIONAL: NEGATIVE for fever, chills, change in weight  ENT/MOUTH: NEGATIVE for ear, mouth and throat problems  RESP: NEGATIVE for significant cough or SOB  CV: NEGATIVE for chest pain, palpitations or peripheral edema    Patient Active Problem List    Diagnosis Date Noted     Lymphadenopathy 04/07/2022     Priority: Medium     Chronic low back pain 03/28/2022     Priority: Medium     Migraine headache 03/28/2022     Priority: Medium     Morbid obesity (H) 03/28/2022     Priority: Medium     Seasonal allergic rhinitis 03/28/2022     Priority: Medium     Health care maintenance 03/28/2022     Priority: Medium     Primary hypertension 05/24/2021     Priority: Medium     Type 2 diabetes mellitus without complication, without long-term current use of insulin (H) 05/24/2021     Priority: Medium     Urinary bladder neurogenic dysfunction 06/15/2016     Priority: Medium     Multiple sclerosis (H) 04/13/2016     Priority: Medium     Vitamin D deficiency 04/13/2016     Priority: Medium     Moderate persistent asthma without  complication 2015     Priority: Medium     Depressive disorder 2013     Priority: Medium     Formatting of this note might be different from the original.  Depressive disorder, not elsewhere classified ([facility])       PTSD (post-traumatic stress disorder) 2011     Priority: Medium      Past Medical History:   Diagnosis Date     Calcium nephrolithiasis 6/15/2016     Chronic low back pain 3/28/2022     Depressive disorder 2013    Formatting of this note might be different from the original. Depressive disorder, not elsewhere classified ([facility])     Migraine headache 3/28/2022     Moderate persistent asthma without complication 2015     Morbid obesity (H) 3/28/2022     Multiple sclerosis (H) 2016     Primary hypertension 2021     PTSD (post-traumatic stress disorder) 2011     Seasonal allergic rhinitis 3/28/2022     Type 2 diabetes mellitus without complication, without long-term current use of insulin (H) 2021     Urinary bladder neurogenic dysfunction 6/15/2016     Vitamin D deficiency 2016     Past Surgical History:   Procedure Laterality Date     APPENDECTOMY       BONE MARROW BIOPSY        SECTION       CHOLECYSTECTOMY       LAPAROSCOPY, SURGICAL; REPAIR INCISIONAL OR VENTRAL HERNIA       LYMPH NODE BIOPSY      2021 - reactive lymphadenopathy work-up     MAMMOPLASTY REDUCTION       TONSILLECTOMY & ADENOIDECTOMY       TUBAL LIGATION       URETEROSCOPY  2016     Current Outpatient Medications   Medication Sig Dispense Refill     acetaminophen (TYLENOL) 325 MG tablet Take 2 tablets by mouth every 6 hours as needed       amantadine (SYMMETREL) 100 MG capsule Take 1 capsule by mouth 2 times daily as needed       amLODIPine (NORVASC) 5 MG tablet TAKE 1 TABLET DAILY 90 tablet 0     ARIPiprazole (ABILIFY) 5 MG tablet Take 5 mg by mouth daily       busPIRone (BUSPAR) 15 MG tablet Take 15 mg by mouth 2 times daily       cholecalciferol 125 MCG  (5000 UT) CAPS Take 5,000 Units by mouth daily        clonazePAM (KLONOPIN) 1 MG tablet Take 1 mg by mouth At Bedtime       escitalopram (LEXAPRO) 20 MG tablet Take 20 mg by mouth daily       ferrous sulfate (FEROSUL) 325 (65 Fe) MG tablet Take 325 mg by mouth daily (with breakfast) 2 tabs bravo;u       fexofenadine (ALLEGRA) 180 MG tablet Take 180 mg by mouth daily       fluticasone (FLONASE) 50 MCG/ACT nasal spray Spray 2 sprays in nostril daily as needed       HYDROcodone-acetaminophen (NORCO) 5-325 MG tablet Take 1 tablet by mouth every 6 hours as needed for severe pain       hydrOXYzine (ATARAX) 10 MG tablet Take 10 mg by mouth nightly as needed        ibuprofen (ADVIL/MOTRIN) 600 MG tablet Take 600 mg by mouth every 6 hours as needed       liraglutide (VICTOZA PEN) 18 MG/3ML solution Inject 1.8 mg Subcutaneous daily       meloxicam (MOBIC) 15 MG tablet Take 1 tablet (15 mg) by mouth daily 90 tablet 1     metFORMIN (GLUCOPHAGE-XR) 500 MG 24 hr tablet Take 1,000 mg by mouth daily       methenamine hippurate (HIPREX) 1 g tablet Take 1 tablet (1 g) by mouth 2 times daily 90 tablet 3     prazosin (MINIPRESS) 1 MG capsule Take 1 mg by mouth At Bedtime       rizatriptan (MAXALT) 10 MG tablet Take 10 mg by mouth as needed       tiZANidine (ZANAFLEX) 4 MG tablet Take 2 tablets by mouth At Bedtime       valACYclovir (VALTREX) 500 MG tablet Take 500 mg by mouth 2 times daily         Allergies   Allergen Reactions     Sulfa Drugs Diarrhea, Rash and Nausea and Vomiting     Adhesive Tape Rash        Social History     Tobacco Use     Smoking status: Never Smoker     Smokeless tobacco: Never Used   Substance Use Topics     Alcohol use: Not on file       History   Drug Use Not on file         Objective     BP (!) 141/85   Pulse 82   Temp 99  F (37.2  C)   Wt 123.8 kg (273 lb)   SpO2 93%   BMI 45.43 kg/m      Physical Exam     EYES: EOMI,  PERRL     HENT: ear canals and TM's normal and nose and mouth without ulcers or  lesions     NECK: no adenopathy, no asymmetry, masses, or scars and thyroid normal to palpation     RESP: lungs clear to auscultation - no rales, rhonchi or wheezes     CV: regular rates and rhythm, normal S1 S2, no S3 or S4 and no murmur, click or rub.  Port-a-cath in place     ABDOMEN:  soft, focal left-sided flank tenderness to palpation over ribs and costal margin     MS: extremities normal- no gross deformities noted, no evidence of inflammation in joints, FROM in all extremities.     SKIN: no suspicious lesions or rashes     NEURO: Normal strength and tone, sensory exam grossly normal, mentation intact and speech normal     PSYCH: mentation appears normal. and affect normal/bright     LYMPHATICS: No cervical adenopathy    No results for input(s): HGB, PLT, INR, NA, POTASSIUM, CR, A1C in the last 94333 hours.     Diagnostics:  Urinalysis with microscopy obtained today which was normal.  Still processing urine culture due to complaints of urinary frequency and urgency  Chest x-ray obtained today due to recent fall which did not demonstrate any pulmonary contusion or rib fractures  Labs from [facility], oncology visit from 3/24/2022 review  CBC notable for WBC 11.2K.  Normal BMP, LFTs   - no significant clinical change and electing not to repeat any further labs  CT C/A/P: interval improvement in size of thoracic and pelvic adenopathy.  No coronary calcification    Revised Cardiac Risk Index (RCRI):  The patient has the following serious cardiovascular risks for perioperative complications:   - No serious cardiac risks = 0 points     RCRI Interpretation: 0 points: Class I (very low risk - 0.4% complication rate)           Signed Electronically by: Fredy Mendosa MD  Copy of this evaluation report is provided to requesting physician.

## 2022-05-08 LAB — BACTERIA UR CULT: NO GROWTH

## 2022-05-10 ENCOUNTER — TELEPHONE (OUTPATIENT)
Dept: FAMILY MEDICINE | Facility: CLINIC | Age: 47
End: 2022-05-10
Payer: MEDICARE

## 2022-05-10 DIAGNOSIS — M54.41 CHRONIC BILATERAL LOW BACK PAIN WITH BILATERAL SCIATICA: ICD-10-CM

## 2022-05-10 DIAGNOSIS — M54.42 CHRONIC BILATERAL LOW BACK PAIN WITH BILATERAL SCIATICA: ICD-10-CM

## 2022-05-10 DIAGNOSIS — G89.29 CHRONIC BILATERAL LOW BACK PAIN WITH BILATERAL SCIATICA: ICD-10-CM

## 2022-05-10 NOTE — TELEPHONE ENCOUNTER
"Fax request questioning if a quantity of 60 tablets for a 90 day supply would better accommodate the patient's therapy if it is an intermittent one and if \"As Needed\" could be in the directions for the med Mobic 15mg tabs.    Med was filled on 5/6/22 for a 90 day supply with 1 refill.     Please update instructions and quantity for future refill.     "

## 2022-05-12 ENCOUNTER — TELEPHONE (OUTPATIENT)
Dept: FAMILY MEDICINE | Facility: CLINIC | Age: 47
End: 2022-05-12
Payer: MEDICARE

## 2022-05-12 DIAGNOSIS — B35.6 TINEA CRURIS: Primary | ICD-10-CM

## 2022-05-12 DIAGNOSIS — G89.29 CHRONIC BILATERAL LOW BACK PAIN WITH BILATERAL SCIATICA: Primary | ICD-10-CM

## 2022-05-12 DIAGNOSIS — M54.42 CHRONIC BILATERAL LOW BACK PAIN WITH BILATERAL SCIATICA: Primary | ICD-10-CM

## 2022-05-12 DIAGNOSIS — M54.41 CHRONIC BILATERAL LOW BACK PAIN WITH BILATERAL SCIATICA: Primary | ICD-10-CM

## 2022-05-12 DIAGNOSIS — Z20.822 ENCOUNTER FOR LABORATORY TESTING FOR COVID-19 VIRUS: ICD-10-CM

## 2022-05-12 NOTE — TELEPHONE ENCOUNTER
Please place order for Pre-op Covid test. Pt has an appt on Mon 5.16.2022 with Lab.  Fwd to clinic resp as [facility] is not in clinic.

## 2022-05-16 ENCOUNTER — LAB (OUTPATIENT)
Dept: LAB | Facility: CLINIC | Age: 47
End: 2022-05-16
Payer: MEDICARE

## 2022-05-16 DIAGNOSIS — G89.29 CHRONIC BILATERAL LOW BACK PAIN WITH BILATERAL SCIATICA: ICD-10-CM

## 2022-05-16 DIAGNOSIS — M54.41 CHRONIC BILATERAL LOW BACK PAIN WITH BILATERAL SCIATICA: ICD-10-CM

## 2022-05-16 DIAGNOSIS — M54.42 CHRONIC BILATERAL LOW BACK PAIN WITH BILATERAL SCIATICA: ICD-10-CM

## 2022-05-16 PROCEDURE — U0003 INFECTIOUS AGENT DETECTION BY NUCLEIC ACID (DNA OR RNA); SEVERE ACUTE RESPIRATORY SYNDROME CORONAVIRUS 2 (SARS-COV-2) (CORONAVIRUS DISEASE [COVID-19]), AMPLIFIED PROBE TECHNIQUE, MAKING USE OF HIGH THROUGHPUT TECHNOLOGIES AS DESCRIBED BY CMS-2020-01-R: HCPCS | Performed by: FAMILY MEDICINE

## 2022-05-16 PROCEDURE — U0005 INFEC AGEN DETEC AMPLI PROBE: HCPCS | Performed by: FAMILY MEDICINE

## 2022-05-17 LAB — SARS-COV-2 RNA RESP QL NAA+PROBE: NEGATIVE

## 2022-06-15 RX ORDER — GABAPENTIN 100 MG/1
100 CAPSULE ORAL 3 TIMES DAILY
Status: ON HOLD | COMMUNITY
Start: 2022-06-13 | End: 2022-10-24

## 2022-06-15 RX ORDER — OXYCODONE HYDROCHLORIDE 5 MG/1
5 TABLET ORAL EVERY 6 HOURS PRN
Status: ON HOLD | COMMUNITY
Start: 2022-06-13 | End: 2022-10-24

## 2022-06-16 ENCOUNTER — OFFICE VISIT (OUTPATIENT)
Dept: FAMILY MEDICINE | Facility: CLINIC | Age: 47
End: 2022-06-16
Payer: MEDICARE

## 2022-06-16 ENCOUNTER — TELEPHONE (OUTPATIENT)
Dept: FAMILY MEDICINE | Facility: CLINIC | Age: 47
End: 2022-06-16

## 2022-06-16 VITALS
HEART RATE: 73 BPM | TEMPERATURE: 97.3 F | WEIGHT: 279.1 LBS | SYSTOLIC BLOOD PRESSURE: 116 MMHG | DIASTOLIC BLOOD PRESSURE: 72 MMHG | BODY MASS INDEX: 46.44 KG/M2 | OXYGEN SATURATION: 97 %

## 2022-06-16 DIAGNOSIS — G35 MULTIPLE SCLEROSIS (H): ICD-10-CM

## 2022-06-16 DIAGNOSIS — Z98.1 S/P LUMBAR FUSION: ICD-10-CM

## 2022-06-16 DIAGNOSIS — N31.9 URINARY BLADDER NEUROGENIC DYSFUNCTION: Primary | ICD-10-CM

## 2022-06-16 PROCEDURE — 99495 TRANSJ CARE MGMT MOD F2F 14D: CPT | Performed by: INTERNAL MEDICINE

## 2022-06-16 RX ORDER — CYCLOBENZAPRINE HCL 10 MG
10 TABLET ORAL 3 TIMES DAILY PRN
COMMUNITY
End: 2022-06-16

## 2022-06-16 ASSESSMENT — PATIENT HEALTH QUESTIONNAIRE - PHQ9
SUM OF ALL RESPONSES TO PHQ QUESTIONS 1-9: 12
SUM OF ALL RESPONSES TO PHQ QUESTIONS 1-9: 12
10. IF YOU CHECKED OFF ANY PROBLEMS, HOW DIFFICULT HAVE THESE PROBLEMS MADE IT FOR YOU TO DO YOUR WORK, TAKE CARE OF THINGS AT HOME, OR GET ALONG WITH OTHER PEOPLE: SOMEWHAT DIFFICULT

## 2022-06-16 NOTE — PATIENT INSTRUCTIONS
I would advise you remain on your vitamin D and Victoza.  I would have you clarify your vitamin D dose with your MS clinic in the future    There was talk on starting on tamsulosin (Flomax), though ultimately this was deferred for outpatient decision.  I would prefer you wait till meeting with a urologist later this summer to see if this would be beneficial option.    I have removed Flexeril and ibuprofen from your home medication list.

## 2022-06-16 NOTE — ASSESSMENT & PLAN NOTE
multiple sclerosis  - currently in remission;  follows with Dr. Webb, her neurologist  - previously on Lemtrada (alemtuzumab) in spring, 2018; good clinical response and tolerance - associated with sustained T-cell immunosuppression  - since begin on ocrelizumab infusions  - using amantadine - finding helpful with her fatigue  June, 2022: AFO brace fitting for right foot drop + botox to right calf contracture

## 2022-06-16 NOTE — ASSESSMENT & PLAN NOTE
Significant postoperative urinary retention after lumbar back fusion requiring intermittent bladder catheterization  -Generally improved symptoms since discharge  -Plans for urology follow-up in August for further assessment (there was potential discussion of tamsulosin use)

## 2022-06-16 NOTE — PROGRESS NOTES
Assessment & Plan   Problem List Items Addressed This Visit        Nervous and Auditory    Multiple sclerosis (H)     multiple sclerosis  - currently in remission;  follows with Dr. Webb, her neurologist  - previously on Lemtrada (alemtuzumab) in spring, 2018; good clinical response and tolerance - associated with sustained T-cell immunosuppression  - since begin on ocrelizumab infusions  - using amantadine - finding helpful with her fatigue  June, 2022: AFO brace fitting for right foot drop + botox to right calf contracture                Urinary    Urinary bladder neurogenic dysfunction - Primary     Significant postoperative urinary retention after lumbar back fusion requiring intermittent bladder catheterization  -Generally improved symptoms since discharge  -Plans for urology follow-up in August for further assessment (there was potential discussion of tamsulosin use)              Other    S/P lumbar fusion     May 25, 2022: Lumbar back decompression fusion  -Perioperative course complicated by dural tear requiring surgical repair  -Prolong weakness and deconditioning postoperatively as well as neurogenic bowel and bladder  -Admitted to [facility] for approximately 4-week history of inpatient rehab  -Discharged home on June 14 and participating in [facility] care                    40 minutes spent on the date of the encounter doing chart review, review of outside records, patient visit and documentation       Return in about 3 months (around 9/16/2022) for Follow up, with me.    Fredy Mendosa MD  [facility]    Bud Calvillo is a 46 year old who presents for hospital follow-up visit.  Underwent elective lumbar back fusion on May 25, perioperative timeframe complicated by dural tear which was surgically repaired at the time.  She required over 3days of strict bedrest which compounded her postoperative rehabilitation.  She was admitted to [facility]  [facility] and had approximately a 4-week rehabilitative stay there.  Significant postoperative issues related to neurogenic bowel and bladder.  Discharged earlier this week to home therapy with [facility].  She has a fitted AFO brace on right side.  Did have Botox injection done in her right calf to help with foot drop.  Using a walker at home.  Feels stronger now than she has in a long time.  Specific questions about her home medications now that home from hospital    HPI       Hospital Follow-up Visit:    Hospital/Nursing Home/IP Rehab Facility: discharged from [facility] 6/14/22  Date of Admission: 5/25/22   Date of Discharge: 6/14/22  Reason(s) for Admission: Spinal fusion 5/19/22    Was your hospitalization related to COVID-19? No   Problems taking medications regularly:  None  Medication changes since discharge: yes - records reconciled  Problems adhering to non-medication therapy:  None    Summary of hospitalization:  CareEverywhere information obtained and reviewed  Diagnostic Tests/Treatments reviewed.  Follow up needed: urology planned in August.  Post-op ortho/spine  Other Healthcare Providers Involved in Patient s Care:  Update since discharge: improved.       Post Discharge Medication Reconciliation: discharge medications reconciled and changed, per note/orders.  Plan of care communicated with patient             Review of Systems   Constitutional, HEENT, cardiovascular, pulmonary, gi and gu systems are negative, except as otherwise noted.      Objective    /72 (BP Location: Right arm, Patient Position: Sitting)   Pulse 73   Temp 97.3  F (36.3  C) (Tympanic)   Wt 126.6 kg (279 lb 1.6 oz)   SpO2 97%   BMI 46.44 kg/m    Body mass index is 46.44 kg/m .  Physical Exam   GENERAL: healthy, alert and no distress  NECK: no adenopathy, no asymmetry, masses, or scars and thyroid normal to palpation  RESP: lungs clear to auscultation - no rales, rhonchi or wheezes  CV: regular rate  and rhythm, normal S1 S2, no S3 or S4, no murmur, click or rub, no peripheral edema and peripheral pulses strong  ABDOMEN: soft, nontender, no hepatosplenomegaly, no masses and bowel sounds normal  MS: no gross musculoskeletal defects noted, no edema  Wearing AFO brace on right side                  .  ..  Answers for HPI/ROS submitted by the patient on 6/16/2022  If you checked off any problems, how difficult have these problems made it for you to do your work, take care of things at home, or get along with other people?: Somewhat difficult  PHQ9 TOTAL SCORE: 12

## 2022-06-16 NOTE — TELEPHONE ENCOUNTER
Reason for Call:  Other call back    Detailed comments: Disha from [facility] calling would like a call back wanting to know if PCP is [facility] and if he willing to sign home care orders     Phone Number Patient can be reached at: Other phone number:  [phone]    Best Time: any    Can we leave a detailed message on this number? YES    Call taken on 6/16/2022 at 2:46 PM by Edel Young

## 2022-06-16 NOTE — ASSESSMENT & PLAN NOTE
May 25, 2022: Lumbar back decompression fusion  -Perioperative course complicated by dural tear requiring surgical repair  -Prolong weakness and deconditioning postoperatively as well as neurogenic bowel and bladder  -[facility] approximately 4-week history of inpatient rehab  -Discharged home on June 14 and participating in [facility]

## 2022-06-17 DIAGNOSIS — I10 HTN (HYPERTENSION): ICD-10-CM

## 2022-06-17 DIAGNOSIS — G35 MULTIPLE SCLEROSIS (H): Primary | ICD-10-CM

## 2022-06-20 NOTE — TELEPHONE ENCOUNTER
Reason for Call:  Medication or medication refill:    Do you use a [facility]?  Name of the pharmacy and phone number for the current request:  [facility]    Name of the medication requested: Gabapentin & Tizanadine    Other request: 90 day supply sent in was prescribed these at the hospital    Can we leave a detailed message on this number? YES    Phone number patient can be reached at: Cell number on file:    Telephone Information:   Mobile [phone]       Best Time: anytime    Call taken on 6/20/2022 at 1:21 PM by Brianna Parrish

## 2022-06-21 RX ORDER — TIZANIDINE 2 MG/1
2 TABLET ORAL 2 TIMES DAILY
Qty: 180 TABLET | Refills: 0 | Status: SHIPPED | OUTPATIENT
Start: 2022-06-21 | End: 2022-11-29

## 2022-06-21 RX ORDER — AMLODIPINE BESYLATE 5 MG/1
TABLET ORAL
Qty: 90 TABLET | Refills: 0 | Status: SHIPPED | OUTPATIENT
Start: 2022-06-21 | End: 2022-09-01

## 2022-06-21 RX ORDER — GABAPENTIN 100 MG/1
100 CAPSULE ORAL 3 TIMES DAILY
Qty: 270 CAPSULE | Refills: 0 | Status: SHIPPED | OUTPATIENT
Start: 2022-06-21 | End: 2022-09-12

## 2022-06-21 NOTE — TELEPHONE ENCOUNTER
Prescription approved per [facility] Refill Protocol.  Amlodipine  Last Written Prescription Date: 4/6/22  Last Fill Quantity: 90,  # refills:  0  Last office visit: 6/16/2022 with prescribing provider  6/1/22 [facility] completed

## 2022-06-21 NOTE — TELEPHONE ENCOUNTER
Please advise on prescription requests for Tizanidine and Gabapentin. Per discharge summary:      Gabapentin:

## 2022-06-25 ENCOUNTER — NURSE TRIAGE (OUTPATIENT)
Dept: NURSING | Facility: CLINIC | Age: 47
End: 2022-06-25

## 2022-06-25 RX ORDER — NYSTATIN 100000 [USP'U]/G
POWDER TOPICAL 3 TIMES DAILY PRN
Qty: 60 G | Refills: 0 | Status: SHIPPED | OUTPATIENT
Start: 2022-06-25

## 2022-06-25 NOTE — TELEPHONE ENCOUNTER
This writer called home health nurse back to verify that the RX for nystatin powder was sent to the [facility]'s in [place].    Kasandra Souza RN  [place] Nurse Advisor  06/25/22  3:10 PM

## 2022-06-25 NOTE — TELEPHONE ENCOUNTER
Home care nurseDisha, calling to request:   Nystatin powder for yeast infection in pt's groin and folds.     Page sent to On-call provider.     Kasandra Souza RN  [place] Nurse Advisor  06/25/22  3:03 PM    Reason for Disposition    [1] Caller requesting a NON-URGENT new prescription or refill AND [2] triager unable to refill per unit policy    Protocols used: MEDICATION QUESTION CALL-A-AH

## 2022-06-27 ENCOUNTER — TELEPHONE (OUTPATIENT)
Dept: FAMILY MEDICINE | Facility: CLINIC | Age: 47
End: 2022-06-27

## 2022-06-27 ENCOUNTER — TRANSFERRED RECORDS (OUTPATIENT)
Dept: HEALTH INFORMATION MANAGEMENT | Facility: CLINIC | Age: 47
End: 2022-06-27

## 2022-06-27 DIAGNOSIS — R29.898 RIGHT LEG WEAKNESS: Primary | ICD-10-CM

## 2022-06-27 DIAGNOSIS — R20.0 NUMBNESS OF RIGHT HAND: ICD-10-CM

## 2022-06-27 DIAGNOSIS — R29.898 RIGHT HAND WEAKNESS: ICD-10-CM

## 2022-06-27 NOTE — TELEPHONE ENCOUNTER
Reason for Call: Request for an order or referral:    Order or referral being requested: Needs PT and OT orders    Date needed: as soon as possible    Has the patient been seen by the PCP for this problem? YES    Additional comments: pt needs PT order for R leg and OT order for R arm/hand    Phone number Patient can be reached at:  Cell number on file:    Telephone Information:   Mobile [phone]       Best Time:  anytime    Can we leave a detailed message on this number?  YES    Call taken on 6/27/2022 at 3:59 PM by Heidy Sebastian

## 2022-06-28 NOTE — TELEPHONE ENCOUNTER
Patient called back saying she can answer question of where to send orders.  Pt would like to go to [facility].  If any other questions/follow up; is available rest of today.

## 2022-07-06 ENCOUNTER — MEDICAL CORRESPONDENCE (OUTPATIENT)
Dept: HEALTH INFORMATION MANAGEMENT | Facility: CLINIC | Age: 47
End: 2022-07-06

## 2022-07-28 ENCOUNTER — TRANSFERRED RECORDS (OUTPATIENT)
Dept: HEALTH INFORMATION MANAGEMENT | Facility: CLINIC | Age: 47
End: 2022-07-28

## 2022-08-19 DIAGNOSIS — E11.9 TYPE 2 DIABETES MELLITUS WITHOUT COMPLICATION, WITHOUT LONG-TERM CURRENT USE OF INSULIN (H): Primary | ICD-10-CM

## 2022-08-23 ENCOUNTER — TELEPHONE (OUTPATIENT)
Dept: FAMILY MEDICINE | Facility: CLINIC | Age: 47
End: 2022-08-23

## 2022-08-23 RX ORDER — LIRAGLUTIDE 6 MG/ML
1.8 INJECTION SUBCUTANEOUS DAILY
Qty: 9 ML | Refills: 0 | Status: SHIPPED | OUTPATIENT
Start: 2022-08-23 | End: 2022-09-13

## 2022-08-23 NOTE — TELEPHONE ENCOUNTER
Ade kelly/[employer] calling to inform PCP that she is closing pt case with the telephone support program.      If any questions call: [phone]

## 2022-08-23 NOTE — TELEPHONE ENCOUNTER
Prescription approved per [facility] Refill Protocol. Pt has appt scheduled.  Last Written Prescription Date: 11/24/21   Last office visit: 6/16/2022 with prescribing provider   Future Office Visit:   Next 5 appointments (look out 90 days)    Oct 04, 2022  1:30 PM  (Arrive by 1:10 PM)  Pre-Operative Physical with Fredy Mendosa MD  [facility] [address] [address]  [phone]   Oct 21, 2022  9:00 AM  Pre-procedure Covid with [facility] COVID LAB  [facility] [address] [address]  [phone]

## 2022-08-31 DIAGNOSIS — I10 HTN (HYPERTENSION): ICD-10-CM

## 2022-09-01 RX ORDER — AMLODIPINE BESYLATE 5 MG/1
TABLET ORAL
Qty: 30 TABLET | Refills: 0 | Status: SHIPPED | OUTPATIENT
Start: 2022-09-01 | End: 2022-09-27

## 2022-09-01 NOTE — TELEPHONE ENCOUNTER
Last Written Prescription Date:  6/21/22  Last Fill Quantity: 90,  # refills: 0   Last office visit: 6/16/2022   Future Office Visit:   Next 5 appointments (look out 90 days)    Oct 04, 2022  1:30 PM  (Arrive by 1:10 PM)  Pre-Operative Physical with Fredy Mendosa MD  [facility] ([facility] [address] [address]  [phone]   Oct 21, 2022  9:00 AM  Pre-procedure Covid with [place] COVID LAB  [facility] ([facility] ) [address] [address]  [phone]

## 2022-09-02 DIAGNOSIS — G35 MULTIPLE SCLEROSIS (H): Primary | ICD-10-CM

## 2022-09-02 DIAGNOSIS — E11.9 TYPE 2 DIABETES MELLITUS WITHOUT COMPLICATION, WITHOUT LONG-TERM CURRENT USE OF INSULIN (H): Primary | ICD-10-CM

## 2022-09-02 NOTE — TELEPHONE ENCOUNTER
[facility] OUT OF CLINIC-[facility] IS COVERING TODAY      Reason for Call:  Medication or medication refill:    Do you use a [facility]?  Name of the pharmacy and phone number for the current request:  [facility]    Name of the medication requested: Metformin 500mg 24hour tab    Other request: Option 2 Ref#-[id number]    Can we leave a detailed message on this number? Not Applicable    Phone number patient can be reached at: Other phone number:  [phone]-Option 2. Reference No-[id number]    Best Time: business hours    Call taken on 9/2/2022 at 7:49 AM by NATHANIEL GUTIÉRREZ

## 2022-09-06 RX ORDER — METFORMIN HCL 500 MG
1000 TABLET, EXTENDED RELEASE 24 HR ORAL DAILY
Qty: 180 TABLET | Refills: 1 | Status: SHIPPED | OUTPATIENT
Start: 2022-09-06 | End: 2023-02-02

## 2022-09-06 NOTE — TELEPHONE ENCOUNTER
Prescription approved per [facility] Refill Protocol.    Last Written Prescription Date: 12/14/21  Last Fill Quantity: 90days,  # refills: 2   Last office visit: 6/16/2022 with prescribing provider   Future Office Visit:   Next 5 appointments (look out 90 days)    Oct 04, 2022  1:30 PM  (Arrive by 1:10 PM)  Pre-Operative Physical with Fredy Mendosa MD  [facility] ([facility] ) [address] [address] [phone]   Oct 21, 2022  9:00 AM  Pre-procedure Covid with [place] COVID LAB  [facility] ([facility] ) [address] [id number] [phone]

## 2022-09-06 NOTE — TELEPHONE ENCOUNTER
Routing refill request to provider for review/approval because:  Drug not on the [facility] refill protocol   Patient reported    Last office visit: 6/16/2022 with prescribing provider:     Future Office Visit:   Next 5 appointments (look out 90 days)    Oct 04, 2022  1:30 PM  (Arrive by 1:10 PM)  Pre-Operative Physical with Fredy Mendosa MD  [facility] ([facility] ) [address] [address]  [phone]   Oct 21, 2022  9:00 AM  Pre-procedure [place] with [place] COVID [facility]  [facility] ([facility] ) [address] [address]  [phone]                    Requested Prescriptions   Pending Prescriptions Disp Refills     amantadine (SYMMETREL) 100 MG capsule [Pharmacy Med Name: AMANTADINE CAP 100MG] 180 capsule 1     Sig: TAKE 1 CAPSULE TWICE DAILY AS NEEDED       There is no refill protocol information for this order          Jude Pace RN 09/06/22 9:42 AM

## 2022-09-07 RX ORDER — AMANTADINE HYDROCHLORIDE 100 MG/1
CAPSULE, GELATIN COATED ORAL
Qty: 180 CAPSULE | Refills: 1 | Status: SHIPPED | OUTPATIENT
Start: 2022-09-07

## 2022-09-11 DIAGNOSIS — G35 MULTIPLE SCLEROSIS (H): ICD-10-CM

## 2022-09-12 DIAGNOSIS — E11.9 TYPE 2 DIABETES MELLITUS WITHOUT COMPLICATION, WITHOUT LONG-TERM CURRENT USE OF INSULIN (H): ICD-10-CM

## 2022-09-12 RX ORDER — GABAPENTIN 100 MG/1
CAPSULE ORAL
Qty: 270 CAPSULE | Refills: 0 | Status: SHIPPED | OUTPATIENT
Start: 2022-09-12 | End: 2022-11-27

## 2022-09-12 NOTE — TELEPHONE ENCOUNTER
Routing refill request to provider for review/approval because:  Drug not on the [facility] refill protocol   Routing to [facility] out of clinic    Last Written Prescription Date:  6/21/2022  Last Fill Quantity: 270,  # refills: 0   Last office visit: 6/16/2022 with prescribing provider:     Future Office Visit:   Next 5 appointments (look out 90 days)    Oct 04, 2022  1:30 PM  (Arrive by 1:10 PM)  Pre-Operative Physical with Fredy Mendosa MD  [facility] ([facility] ) [address] [place] [address]  [phone]   Oct 21, 2022  9:00 AM  Pre-procedure Covid with [facility] COVID LAB  [facility] ([facility] ) [address] [place] [address]  [phone]         Requested Prescriptions   Pending Prescriptions Disp Refills     gabapentin (NEURONTIN) 100 MG capsule [Pharmacy Med Name: GABAPENTIN CAP 100MG] 270 capsule 0     Sig: TAKE 1 CAPSULE 3 TIMES A   DAY       There is no refill protocol information for this order          Jude Pace RN 09/12/22 11:43 AM

## 2022-09-13 RX ORDER — LIRAGLUTIDE 6 MG/ML
INJECTION SUBCUTANEOUS
Qty: 9 ML | Refills: 0 | Status: ON HOLD | OUTPATIENT
Start: 2022-09-13 | End: 2022-10-24

## 2022-09-13 NOTE — TELEPHONE ENCOUNTER
Last office visit: 6/16/2022   Future Office Visit:   Next 5 appointments (look out 90 days)    Oct 04, 2022  1:30 PM  (Arrive by 1:10 PM)  Pre-Operative Physical with Fredy Mendosa MD  [facility] ([facility] ) [address] [address]  [phone]   Oct 21, 2022  9:00 AM  Pre-procedure Covid with [facility] COVID LAB  [facility] ([facility] ) [address] [address]  [phone]

## 2022-09-26 DIAGNOSIS — I10 HTN (HYPERTENSION): ICD-10-CM

## 2022-09-27 ENCOUNTER — MEDICAL CORRESPONDENCE (OUTPATIENT)
Dept: HEALTH INFORMATION MANAGEMENT | Facility: CLINIC | Age: 47
End: 2022-09-27

## 2022-09-27 RX ORDER — AMLODIPINE BESYLATE 5 MG/1
TABLET ORAL
Qty: 30 TABLET | Refills: 0 | Status: ON HOLD | OUTPATIENT
Start: 2022-09-27 | End: 2022-10-24

## 2022-10-04 ENCOUNTER — OFFICE VISIT (OUTPATIENT)
Dept: FAMILY MEDICINE | Facility: CLINIC | Age: 47
End: 2022-10-04
Payer: MEDICARE

## 2022-10-04 VITALS
SYSTOLIC BLOOD PRESSURE: 134 MMHG | BODY MASS INDEX: 45.26 KG/M2 | DIASTOLIC BLOOD PRESSURE: 84 MMHG | WEIGHT: 272 LBS | HEART RATE: 77 BPM | TEMPERATURE: 98.4 F

## 2022-10-04 DIAGNOSIS — Z98.1 S/P LUMBAR FUSION: ICD-10-CM

## 2022-10-04 DIAGNOSIS — E66.01 MORBID OBESITY (H): ICD-10-CM

## 2022-10-04 DIAGNOSIS — Z00.00 HEALTH CARE MAINTENANCE: ICD-10-CM

## 2022-10-04 DIAGNOSIS — G35 MULTIPLE SCLEROSIS (H): ICD-10-CM

## 2022-10-04 DIAGNOSIS — E11.9 TYPE 2 DIABETES MELLITUS WITHOUT COMPLICATION, WITHOUT LONG-TERM CURRENT USE OF INSULIN (H): Primary | ICD-10-CM

## 2022-10-04 DIAGNOSIS — Z01.818 PREOPERATIVE EXAMINATION: ICD-10-CM

## 2022-10-04 DIAGNOSIS — N31.9 URINARY BLADDER NEUROGENIC DYSFUNCTION: ICD-10-CM

## 2022-10-04 LAB
ERYTHROCYTE [DISTWIDTH] IN BLOOD BY AUTOMATED COUNT: 13.7 % (ref 10–15)
HBA1C MFR BLD: 6.6 % (ref 0–5.6)
HCT VFR BLD AUTO: 38.5 % (ref 35–47)
HGB BLD-MCNC: 12.6 G/DL (ref 11.7–15.7)
MCH RBC QN AUTO: 28.8 PG (ref 26.5–33)
MCHC RBC AUTO-ENTMCNC: 32.7 G/DL (ref 31.5–36.5)
MCV RBC AUTO: 88 FL (ref 78–100)
PLATELET # BLD AUTO: 283 10E3/UL (ref 150–450)
RBC # BLD AUTO: 4.37 10E6/UL (ref 3.8–5.2)
WBC # BLD AUTO: 9.3 10E3/UL (ref 4–11)

## 2022-10-04 PROCEDURE — 85027 COMPLETE CBC AUTOMATED: CPT | Performed by: INTERNAL MEDICINE

## 2022-10-04 PROCEDURE — 80048 BASIC METABOLIC PNL TOTAL CA: CPT | Performed by: INTERNAL MEDICINE

## 2022-10-04 PROCEDURE — 36415 COLL VENOUS BLD VENIPUNCTURE: CPT | Performed by: INTERNAL MEDICINE

## 2022-10-04 PROCEDURE — 83036 HEMOGLOBIN GLYCOSYLATED A1C: CPT | Performed by: INTERNAL MEDICINE

## 2022-10-04 PROCEDURE — 99214 OFFICE O/P EST MOD 30 MIN: CPT | Performed by: INTERNAL MEDICINE

## 2022-10-04 PROCEDURE — 83721 ASSAY OF BLOOD LIPOPROTEIN: CPT | Performed by: INTERNAL MEDICINE

## 2022-10-04 RX ORDER — SOLIFENACIN SUCCINATE 5 MG/1
5 TABLET, FILM COATED ORAL DAILY
COMMUNITY
Start: 2022-08-29

## 2022-10-04 RX ORDER — LIRAGLUTIDE 6 MG/ML
1.8 INJECTION SUBCUTANEOUS DAILY
Qty: 9 ML | Refills: 11 | Status: SHIPPED | OUTPATIENT
Start: 2022-10-04 | End: 2024-05-24

## 2022-10-04 ASSESSMENT — PATIENT HEALTH QUESTIONNAIRE - PHQ9
SUM OF ALL RESPONSES TO PHQ QUESTIONS 1-9: 4
10. IF YOU CHECKED OFF ANY PROBLEMS, HOW DIFFICULT HAVE THESE PROBLEMS MADE IT FOR YOU TO DO YOUR WORK, TAKE CARE OF THINGS AT HOME, OR GET ALONG WITH OTHER PEOPLE: SOMEWHAT DIFFICULT
SUM OF ALL RESPONSES TO PHQ QUESTIONS 1-9: 4

## 2022-10-04 ASSESSMENT — ASTHMA QUESTIONNAIRES
QUESTION_5 LAST FOUR WEEKS HOW WOULD YOU RATE YOUR ASTHMA CONTROL: COMPLETELY CONTROLLED
QUESTION_2 LAST FOUR WEEKS HOW OFTEN HAVE YOU HAD SHORTNESS OF BREATH: NOT AT ALL
QUESTION_1 LAST FOUR WEEKS HOW MUCH OF THE TIME DID YOUR ASTHMA KEEP YOU FROM GETTING AS MUCH DONE AT WORK, SCHOOL OR AT HOME: NONE OF THE TIME
ACT_TOTALSCORE: 24
ACT_TOTALSCORE: 24
QUESTION_3 LAST FOUR WEEKS HOW OFTEN DID YOUR ASTHMA SYMPTOMS (WHEEZING, COUGHING, SHORTNESS OF BREATH, CHEST TIGHTNESS OR PAIN) WAKE YOU UP AT NIGHT OR EARLIER THAN USUAL IN THE MORNING: ONCE OR TWICE
QUESTION_4 LAST FOUR WEEKS HOW OFTEN HAVE YOU USED YOUR RESCUE INHALER OR NEBULIZER MEDICATION (SUCH AS ALBUTEROL): NOT AT ALL

## 2022-10-04 NOTE — PATIENT INSTRUCTIONS
Night prior to surgery - okay to continue usual home meds    Day of surgery -  no essential meds needs prior to surgery.  All can be addressed in hospital after surgery    For metformin, no use on day of surgery ONLY  For meloxicam: eliminate 5 days prior
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
## 2022-10-04 NOTE — ASSESSMENT & PLAN NOTE
May 25, 2022: Lumbar back decompression fusion  -Perioperative course complicated by dural tear requiring surgical repair  -Prolong weakness and deconditioning postoperatively as well as neurogenic bowel and bladder  -[facility]-week history of inpatient rehab

## 2022-10-04 NOTE — ASSESSMENT & PLAN NOTE
controlled  hypoglycemic medications: Victoza 1.8mg daily (using intermittently) , metformin ER 1000mg daily  insulin therapy: none  last HbA1c: 6.7%  microvascular complications: none  macrovascular complications: none known  ASA/VIRGILIO/statin: will need to discuss future use; concerns with current polypharmacy

## 2022-10-04 NOTE — LETTER
October 6, 2022      Karli Miner  [address]  [facility] [place] [address]        Dear ,    We are writing to inform you of your test results.    Preop labs look okay.  No specific concerns       Resulted Orders   CBC with platelets   Result Value Ref Range    WBC Count 9.3 4.0 - 11.0 10e3/uL    RBC Count 4.37 3.80 - 5.20 10e6/uL    Hemoglobin 12.6 11.7 - 15.7 g/dL    Hematocrit 38.5 35.0 - 47.0 %    MCV 88 78 - 100 fL    MCH 28.8 26.5 - 33.0 pg    MCHC 32.7 31.5 - 36.5 g/dL    RDW 13.7 10.0 - 15.0 %    Platelet Count 283 150 - 450 10e3/uL   Basic metabolic panel   Result Value Ref Range    Sodium 141 136 - 145 mmol/L    Potassium 4.3 3.4 - 5.3 mmol/L    Chloride 105 98 - 107 mmol/L    Carbon Dioxide (CO2) 22 22 - 29 mmol/L    Anion Gap 14 7 - 15 mmol/L    Urea Nitrogen 10.3 6.0 - 20.0 mg/dL    Creatinine 0.55 0.51 - 0.95 mg/dL    Calcium 9.2 8.6 - 10.0 mg/dL    Glucose 104 (H) 70 - 99 mg/dL    GFR Estimate >90 >60 mL/min/1.73m2      Comment:      Effective December 21, 2021 eGFRcr in adults is calculated using the 2021 CKD-EPI creatinine equation which includes age and gender (Grecia jones al., NE, DOI: 10.1056/EIVYrw5364541)   Hemoglobin A1c   Result Value Ref Range    Hemoglobin A1C 6.6 (H) 0.0 - 5.6 %      Comment:      Normal <5.7%   Prediabetes 5.7-6.4%    Diabetes 6.5% or higher     Note: Adopted from ADA consensus guidelines.   LDL cholesterol direct   Result Value Ref Range    LDL Cholesterol Direct 107 (H) <100 mg/dL      Comment:      Age 2-19 years:  Desirable: 0-110 mg/dL   Borderline high: 110-129 mg/dL   High: >= 130 mg/dL    Age 20 years and older:  Desirable: <100mg/dL  Above desirable: 100-129 mg/dL   Borderline high: 130-159 mg/dL   High: 160-189 mg/dL   Very high: >= 190 mg/dL       If you have any questions or concerns, please call the clinic at the number listed above.       Sincerely,      Fredy Mendosa MD

## 2022-10-05 LAB
ANION GAP SERPL CALCULATED.3IONS-SCNC: 14 MMOL/L (ref 7–15)
BUN SERPL-MCNC: 10.3 MG/DL (ref 6–20)
CALCIUM SERPL-MCNC: 9.2 MG/DL (ref 8.6–10)
CHLORIDE SERPL-SCNC: 105 MMOL/L (ref 98–107)
CREAT SERPL-MCNC: 0.55 MG/DL (ref 0.51–0.95)
DEPRECATED HCO3 PLAS-SCNC: 22 MMOL/L (ref 22–29)
GFR SERPL CREATININE-BSD FRML MDRD: >90 ML/MIN/1.73M2
GLUCOSE SERPL-MCNC: 104 MG/DL (ref 70–99)
LDLC SERPL DIRECT ASSAY-MCNC: 107 MG/DL
POTASSIUM SERPL-SCNC: 4.3 MMOL/L (ref 3.4–5.3)
SODIUM SERPL-SCNC: 141 MMOL/L (ref 136–145)

## 2022-10-20 RX ORDER — METHENAMINE HIPPURATE 1000 MG/1
1000 TABLET ORAL 2 TIMES DAILY
COMMUNITY
Start: 2022-04-23 | End: 2023-04-18

## 2022-10-20 NOTE — PROVIDER NOTIFICATION
Discharge plan according to [facility]:       10/10/22 0791   Discharge Planning   Patient/Family Anticipates Transition to home   Living Arrangements   People in Home parent(s)   Type of Residence Private Residence   Number of Stairs, Within Home, Primary none   Bathroom Shower/Tub Tub/Shower unit   Equipment Currently Used at Home none   Support System   Support Systems Parent   Medical Clearance   Clinic Name [facility]

## 2022-10-20 NOTE — PROVIDER NOTIFICATION
I am evaluating this patient for upcoming Left Total Knee Arthroplasty with Dr. Avila at [facility] on 10/24/22:    - Patient cleared for surgery at preop H&P but had high BMI of 45.26, Wt: 272 lbs at preop physical on 10/4/22. Goal BMI for elective total joint arthroplasty surgery is < 45. Dr. Avila's office responded and he is ok proceeding with surgery despite high BMI. Patient also told preop phone RN that she has some concerns about going home after this surgery. She has MS as well as right foot drop and wears an AFO brace for this. Patient asked about possibility of going to a TCU or nursing home after this surgery. Patient was educated that most patients having joint replacement surgery do not need or qualify for TCU stay. The expectation is that patients discharge home POD 1 with help of friends/family if they are medically stable for discharge. Patient says that her mother will be around helping her out after surgery but she is 77 and can't provide a lot of physical help. Discharge plan is home with help of her mother and possibly would benefit from home care. Backup plan would be TCU only if patient struggles post-op and meets the medical/skilled need requirements to go to TCU. Dr. Avila notified and is ok with this plan. He wants patient to be prepared to discharge home if she can safely do so after surgery. Full Treatment Plan note to follow.       SNOW Garcia, CNP   Advanced Practice Nurse Navigator- Orthopedics  [facility]   Office Phone: [phone]  Direct Fax: [phone]

## 2022-10-21 ENCOUNTER — LAB (OUTPATIENT)
Dept: LAB | Facility: CLINIC | Age: 47
End: 2022-10-21
Payer: MEDICARE

## 2022-10-21 DIAGNOSIS — I10 HTN (HYPERTENSION): ICD-10-CM

## 2022-10-21 DIAGNOSIS — Z20.822 ENCOUNTER FOR LABORATORY TESTING FOR COVID-19 VIRUS: ICD-10-CM

## 2022-10-21 LAB — SARS-COV-2 RNA RESP QL NAA+PROBE: NEGATIVE

## 2022-10-21 PROCEDURE — U0005 INFEC AGEN DETEC AMPLI PROBE: HCPCS

## 2022-10-21 PROCEDURE — U0003 INFECTIOUS AGENT DETECTION BY NUCLEIC ACID (DNA OR RNA); SEVERE ACUTE RESPIRATORY SYNDROME CORONAVIRUS 2 (SARS-COV-2) (CORONAVIRUS DISEASE [COVID-19]), AMPLIFIED PROBE TECHNIQUE, MAKING USE OF HIGH THROUGHPUT TECHNOLOGIES AS DESCRIBED BY CMS-2020-01-R: HCPCS

## 2022-10-24 ENCOUNTER — TRANSFERRED RECORDS (OUTPATIENT)
Dept: MEDSURG UNIT | Facility: CLINIC | Age: 47
End: 2022-10-24

## 2022-10-24 ENCOUNTER — APPOINTMENT (OUTPATIENT)
Dept: RADIOLOGY | Facility: CLINIC | Age: 47
End: 2022-10-24
Payer: MEDICARE

## 2022-10-24 ENCOUNTER — ANESTHESIA EVENT (OUTPATIENT)
Dept: SURGERY | Facility: CLINIC | Age: 47
End: 2022-10-24
Payer: MEDICARE

## 2022-10-24 ENCOUNTER — ANESTHESIA (OUTPATIENT)
Dept: SURGERY | Facility: CLINIC | Age: 47
End: 2022-10-24
Payer: MEDICARE

## 2022-10-24 ENCOUNTER — HOSPITAL ENCOUNTER (OUTPATIENT)
Facility: CLINIC | Age: 47
Discharge: HOME | End: 2022-10-26
Attending: ORTHOPAEDIC SURGERY | Admitting: ORTHOPAEDIC SURGERY
Payer: MEDICARE

## 2022-10-24 DIAGNOSIS — Z96.652 S/P TOTAL KNEE ARTHROPLASTY, LEFT: Primary | ICD-10-CM

## 2022-10-24 DIAGNOSIS — F41.9 ANXIETY: ICD-10-CM

## 2022-10-24 LAB
GLUCOSE BLDC GLUCOMTR-MCNC: 132 MG/DL (ref 70–99)
GLUCOSE BLDC GLUCOMTR-MCNC: 212 MG/DL (ref 70–99)
GLUCOSE BLDC GLUCOMTR-MCNC: 264 MG/DL (ref 70–99)
GLUCOSE BLDC GLUCOMTR-MCNC: 328 MG/DL (ref 70–99)

## 2022-10-24 PROCEDURE — 250N000012 HC RX MED GY IP 250 OP 636 PS 637: Performed by: INTERNAL MEDICINE

## 2022-10-24 PROCEDURE — 250N000011 HC RX IP 250 OP 636: Performed by: ANESTHESIOLOGY

## 2022-10-24 PROCEDURE — 250N000011 HC RX IP 250 OP 636: Performed by: NURSE ANESTHETIST, CERTIFIED REGISTERED

## 2022-10-24 PROCEDURE — 370N000017 HC ANESTHESIA TECHNICAL FEE, PER MIN: Performed by: ORTHOPAEDIC SURGERY

## 2022-10-24 PROCEDURE — 258N000001 HC RX 258: Performed by: ORTHOPAEDIC SURGERY

## 2022-10-24 PROCEDURE — 250N000009 HC RX 250: Performed by: NURSE ANESTHETIST, CERTIFIED REGISTERED

## 2022-10-24 PROCEDURE — 272N000001 HC OR GENERAL SUPPLY STERILE: Performed by: ORTHOPAEDIC SURGERY

## 2022-10-24 PROCEDURE — C1776 JOINT DEVICE (IMPLANTABLE): HCPCS | Performed by: ORTHOPAEDIC SURGERY

## 2022-10-24 PROCEDURE — 258N000003 HC RX IP 258 OP 636: Performed by: ANESTHESIOLOGY

## 2022-10-24 PROCEDURE — 999N000127 HC STATISTIC PERIPHERAL IV START W US GUIDANCE

## 2022-10-24 PROCEDURE — 360N000077 HC SURGERY LEVEL 4, PER MIN: Performed by: ORTHOPAEDIC SURGERY

## 2022-10-24 PROCEDURE — 250N000009 HC RX 250

## 2022-10-24 PROCEDURE — 82962 GLUCOSE BLOOD TEST: CPT

## 2022-10-24 PROCEDURE — 250N000013 HC RX MED GY IP 250 OP 250 PS 637: Performed by: INTERNAL MEDICINE

## 2022-10-24 PROCEDURE — 710N000010 HC RECOVERY PHASE 1, LEVEL 2, PER MIN: Performed by: ORTHOPAEDIC SURGERY

## 2022-10-24 PROCEDURE — 999N000285 HC STATISTIC VASC ACCESS LAB DRAW WITH PIV START

## 2022-10-24 PROCEDURE — 250N000025 HC SEVOFLURANE, PER MIN: Performed by: ORTHOPAEDIC SURGERY

## 2022-10-24 PROCEDURE — 99204 OFFICE O/P NEW MOD 45 MIN: CPT | Performed by: INTERNAL MEDICINE

## 2022-10-24 PROCEDURE — 250N000013 HC RX MED GY IP 250 OP 250 PS 637

## 2022-10-24 PROCEDURE — 250N000009 HC RX 250: Performed by: ORTHOPAEDIC SURGERY

## 2022-10-24 PROCEDURE — 250N000011 HC RX IP 250 OP 636

## 2022-10-24 PROCEDURE — 999N000141 HC STATISTIC PRE-PROCEDURE NURSING ASSESSMENT: Performed by: ORTHOPAEDIC SURGERY

## 2022-10-24 PROCEDURE — 258N000003 HC RX IP 258 OP 636

## 2022-10-24 PROCEDURE — 999N000065 XR KNEE PORT LEFT 1/2 VIEWS: Mod: LT

## 2022-10-24 PROCEDURE — 250N000013 HC RX MED GY IP 250 OP 250 PS 637: Performed by: ANESTHESIOLOGY

## 2022-10-24 PROCEDURE — 96372 THER/PROPH/DIAG INJ SC/IM: CPT | Performed by: INTERNAL MEDICINE

## 2022-10-24 PROCEDURE — 250N000009 HC RX 250: Performed by: ANESTHESIOLOGY

## 2022-10-24 DEVICE — TIBIAL BEARING INSERT - CS
Type: IMPLANTABLE DEVICE | Site: KNEE | Status: FUNCTIONAL
Brand: TRIATHLON

## 2022-10-24 DEVICE — CRUCIATE RETAINING FEMORAL
Type: IMPLANTABLE DEVICE | Site: KNEE | Status: FUNCTIONAL
Brand: TRIATHLON

## 2022-10-24 DEVICE — TIBIAL COMPONENT
Type: IMPLANTABLE DEVICE | Site: KNEE | Status: FUNCTIONAL
Brand: TRIATHLON

## 2022-10-24 RX ORDER — ONDANSETRON 4 MG/1
4 TABLET, ORALLY DISINTEGRATING ORAL EVERY 30 MIN PRN
Status: DISCONTINUED | OUTPATIENT
Start: 2022-10-24 | End: 2022-10-24 | Stop reason: HOSPADM

## 2022-10-24 RX ORDER — ASPIRIN 81 MG/1
81 TABLET ORAL 2 TIMES DAILY
Qty: 60 TABLET | Refills: 0 | Status: SHIPPED | OUTPATIENT
Start: 2022-10-24 | End: 2022-10-26

## 2022-10-24 RX ORDER — TOLTERODINE 2 MG/1
2 CAPSULE, EXTENDED RELEASE ORAL DAILY
Status: DISCONTINUED | OUTPATIENT
Start: 2022-10-24 | End: 2022-10-26 | Stop reason: HOSPADM

## 2022-10-24 RX ORDER — BISACODYL 10 MG
10 SUPPOSITORY, RECTAL RECTAL DAILY PRN
Status: DISCONTINUED | OUTPATIENT
Start: 2022-10-24 | End: 2022-10-26 | Stop reason: HOSPADM

## 2022-10-24 RX ORDER — AMLODIPINE BESYLATE 5 MG/1
TABLET ORAL
Qty: 90 TABLET | Refills: 3 | Status: SHIPPED | OUTPATIENT
Start: 2022-10-24

## 2022-10-24 RX ORDER — ACETAMINOPHEN 325 MG/1
650 TABLET ORAL EVERY 4 HOURS PRN
Status: DISCONTINUED | OUTPATIENT
Start: 2022-10-27 | End: 2022-10-26 | Stop reason: HOSPADM

## 2022-10-24 RX ORDER — PROPOFOL 10 MG/ML
INJECTION, EMULSION INTRAVENOUS PRN
Status: DISCONTINUED | OUTPATIENT
Start: 2022-10-24 | End: 2022-10-24

## 2022-10-24 RX ORDER — ONDANSETRON 2 MG/ML
4 INJECTION INTRAMUSCULAR; INTRAVENOUS EVERY 30 MIN PRN
Status: DISCONTINUED | OUTPATIENT
Start: 2022-10-24 | End: 2022-10-24 | Stop reason: HOSPADM

## 2022-10-24 RX ORDER — SODIUM CHLORIDE, SODIUM LACTATE, POTASSIUM CHLORIDE, CALCIUM CHLORIDE 600; 310; 30; 20 MG/100ML; MG/100ML; MG/100ML; MG/100ML
INJECTION, SOLUTION INTRAVENOUS CONTINUOUS
Status: DISCONTINUED | OUTPATIENT
Start: 2022-10-24 | End: 2022-10-24 | Stop reason: HOSPADM

## 2022-10-24 RX ORDER — ACETAMINOPHEN 325 MG/1
975 TABLET ORAL EVERY 8 HOURS
Status: DISCONTINUED | OUTPATIENT
Start: 2022-10-24 | End: 2022-10-26 | Stop reason: HOSPADM

## 2022-10-24 RX ORDER — POLYETHYLENE GLYCOL 3350 17 G/17G
17 POWDER, FOR SOLUTION ORAL DAILY
Status: DISCONTINUED | OUTPATIENT
Start: 2022-10-25 | End: 2022-10-26 | Stop reason: HOSPADM

## 2022-10-24 RX ORDER — ONDANSETRON 2 MG/ML
4 INJECTION INTRAMUSCULAR; INTRAVENOUS EVERY 6 HOURS PRN
Status: DISCONTINUED | OUTPATIENT
Start: 2022-10-24 | End: 2022-10-26 | Stop reason: HOSPADM

## 2022-10-24 RX ORDER — OXYCODONE HYDROCHLORIDE 5 MG/1
10 TABLET ORAL EVERY 4 HOURS PRN
Status: DISCONTINUED | OUTPATIENT
Start: 2022-10-24 | End: 2022-10-26 | Stop reason: HOSPADM

## 2022-10-24 RX ORDER — METFORMIN HCL 500 MG
1000 TABLET, EXTENDED RELEASE 24 HR ORAL DAILY
Status: DISCONTINUED | OUTPATIENT
Start: 2022-10-24 | End: 2022-10-26 | Stop reason: HOSPADM

## 2022-10-24 RX ORDER — HYDROXYZINE HYDROCHLORIDE 10 MG/1
10 TABLET, FILM COATED ORAL
Status: CANCELLED | OUTPATIENT
Start: 2022-10-24

## 2022-10-24 RX ORDER — FLUTICASONE PROPIONATE 50 MCG
2 SPRAY, SUSPENSION (ML) NASAL DAILY PRN
Status: DISCONTINUED | OUTPATIENT
Start: 2022-10-24 | End: 2022-10-26 | Stop reason: HOSPADM

## 2022-10-24 RX ORDER — HYDROXYZINE HYDROCHLORIDE 25 MG/1
25 TABLET, FILM COATED ORAL EVERY 6 HOURS PRN
Status: DISCONTINUED | OUTPATIENT
Start: 2022-10-24 | End: 2022-10-26 | Stop reason: HOSPADM

## 2022-10-24 RX ORDER — PROPOFOL 10 MG/ML
INJECTION, EMULSION INTRAVENOUS CONTINUOUS PRN
Status: DISCONTINUED | OUTPATIENT
Start: 2022-10-24 | End: 2022-10-24

## 2022-10-24 RX ORDER — NICOTINE POLACRILEX 4 MG
15-30 LOZENGE BUCCAL
Status: DISCONTINUED | OUTPATIENT
Start: 2022-10-24 | End: 2022-10-26 | Stop reason: HOSPADM

## 2022-10-24 RX ORDER — DIPHENHYDRAMINE HYDROCHLORIDE 50 MG/ML
25 INJECTION INTRAMUSCULAR; INTRAVENOUS EVERY 6 HOURS PRN
Status: DISCONTINUED | OUTPATIENT
Start: 2022-10-24 | End: 2022-10-24 | Stop reason: HOSPADM

## 2022-10-24 RX ORDER — HYDROMORPHONE HCL IN WATER/PF 6 MG/30 ML
0.4 PATIENT CONTROLLED ANALGESIA SYRINGE INTRAVENOUS EVERY 5 MIN PRN
Status: DISCONTINUED | OUTPATIENT
Start: 2022-10-24 | End: 2022-10-24 | Stop reason: HOSPADM

## 2022-10-24 RX ORDER — FERROUS SULFATE 325(65) MG
650 TABLET ORAL
Status: DISCONTINUED | OUTPATIENT
Start: 2022-10-25 | End: 2022-10-26 | Stop reason: HOSPADM

## 2022-10-24 RX ORDER — FEXOFENADINE HCL 180 MG/1
180 TABLET ORAL DAILY
Status: DISCONTINUED | OUTPATIENT
Start: 2022-10-24 | End: 2022-10-26 | Stop reason: HOSPADM

## 2022-10-24 RX ORDER — OXYCODONE HYDROCHLORIDE 5 MG/1
5 TABLET ORAL EVERY 6 HOURS PRN
Status: ON HOLD | COMMUNITY
End: 2022-10-24

## 2022-10-24 RX ORDER — ACETAMINOPHEN 325 MG/1
975 TABLET ORAL ONCE
Status: DISCONTINUED | OUTPATIENT
Start: 2022-10-24 | End: 2022-10-24 | Stop reason: HOSPADM

## 2022-10-24 RX ORDER — ESCITALOPRAM OXALATE 20 MG/1
20 TABLET ORAL DAILY
Status: DISCONTINUED | OUTPATIENT
Start: 2022-10-24 | End: 2022-10-26 | Stop reason: HOSPADM

## 2022-10-24 RX ORDER — DEXAMETHASONE SODIUM PHOSPHATE 4 MG/ML
INJECTION, SOLUTION INTRA-ARTICULAR; INTRALESIONAL; INTRAMUSCULAR; INTRAVENOUS; SOFT TISSUE PRN
Status: DISCONTINUED | OUTPATIENT
Start: 2022-10-24 | End: 2022-10-24

## 2022-10-24 RX ORDER — TIZANIDINE 2 MG/1
2 TABLET ORAL 2 TIMES DAILY
Status: DISCONTINUED | OUTPATIENT
Start: 2022-10-25 | End: 2022-10-26 | Stop reason: HOSPADM

## 2022-10-24 RX ORDER — GABAPENTIN 100 MG/1
100 CAPSULE ORAL 3 TIMES DAILY
Status: DISCONTINUED | OUTPATIENT
Start: 2022-10-24 | End: 2022-10-26 | Stop reason: HOSPADM

## 2022-10-24 RX ORDER — OXYCODONE HYDROCHLORIDE 5 MG/1
5-10 TABLET ORAL EVERY 4 HOURS PRN
Qty: 30 TABLET | Refills: 0 | Status: SHIPPED | OUTPATIENT
Start: 2022-10-24 | End: 2022-10-26

## 2022-10-24 RX ORDER — CEFAZOLIN SODIUM/WATER 3 G/30 ML
3 SYRINGE (ML) INTRAVENOUS SEE ADMIN INSTRUCTIONS
Status: DISCONTINUED | OUTPATIENT
Start: 2022-10-24 | End: 2022-10-24 | Stop reason: HOSPADM

## 2022-10-24 RX ORDER — ASPIRIN 81 MG/1
81 TABLET ORAL 2 TIMES DAILY
Status: DISCONTINUED | OUTPATIENT
Start: 2022-10-24 | End: 2022-10-26 | Stop reason: HOSPADM

## 2022-10-24 RX ORDER — ACETAMINOPHEN 325 MG/1
650 TABLET ORAL EVERY 4 HOURS PRN
Qty: 100 TABLET | Refills: 0 | Status: SHIPPED | OUTPATIENT
Start: 2022-10-24 | End: 2022-10-26

## 2022-10-24 RX ORDER — HYDROMORPHONE HCL IN WATER/PF 6 MG/30 ML
0.2 PATIENT CONTROLLED ANALGESIA SYRINGE INTRAVENOUS
Status: DISCONTINUED | OUTPATIENT
Start: 2022-10-24 | End: 2022-10-26 | Stop reason: HOSPADM

## 2022-10-24 RX ORDER — FENTANYL CITRATE 50 UG/ML
50 INJECTION, SOLUTION INTRAMUSCULAR; INTRAVENOUS
Status: DISCONTINUED | OUTPATIENT
Start: 2022-10-24 | End: 2022-10-24 | Stop reason: HOSPADM

## 2022-10-24 RX ORDER — TRANEXAMIC ACID 650 MG/1
1950 TABLET ORAL ONCE
Status: COMPLETED | OUTPATIENT
Start: 2022-10-24 | End: 2022-10-24

## 2022-10-24 RX ORDER — FENTANYL CITRATE 50 UG/ML
INJECTION, SOLUTION INTRAMUSCULAR; INTRAVENOUS PRN
Status: DISCONTINUED | OUTPATIENT
Start: 2022-10-24 | End: 2022-10-24

## 2022-10-24 RX ORDER — PROCHLORPERAZINE MALEATE 10 MG
10 TABLET ORAL EVERY 6 HOURS PRN
Status: DISCONTINUED | OUTPATIENT
Start: 2022-10-24 | End: 2022-10-26 | Stop reason: HOSPADM

## 2022-10-24 RX ORDER — CEFAZOLIN SODIUM 2 G/100ML
2 INJECTION, SOLUTION INTRAVENOUS EVERY 8 HOURS
Status: COMPLETED | OUTPATIENT
Start: 2022-10-24 | End: 2022-10-25

## 2022-10-24 RX ORDER — NALOXONE HYDROCHLORIDE 0.4 MG/ML
0.2 INJECTION, SOLUTION INTRAMUSCULAR; INTRAVENOUS; SUBCUTANEOUS
Status: DISCONTINUED | OUTPATIENT
Start: 2022-10-24 | End: 2022-10-26 | Stop reason: HOSPADM

## 2022-10-24 RX ORDER — LIDOCAINE 40 MG/G
CREAM TOPICAL
Status: DISCONTINUED | OUTPATIENT
Start: 2022-10-24 | End: 2022-10-24 | Stop reason: HOSPADM

## 2022-10-24 RX ORDER — BUPIVACAINE HYDROCHLORIDE 5 MG/ML
INJECTION, SOLUTION EPIDURAL; INTRACAUDAL
Status: COMPLETED | OUTPATIENT
Start: 2022-10-24 | End: 2022-10-24

## 2022-10-24 RX ORDER — LIDOCAINE HYDROCHLORIDE 10 MG/ML
INJECTION, SOLUTION INFILTRATION; PERINEURAL PRN
Status: DISCONTINUED | OUTPATIENT
Start: 2022-10-24 | End: 2022-10-24

## 2022-10-24 RX ORDER — HYDROMORPHONE HCL IN WATER/PF 6 MG/30 ML
0.4 PATIENT CONTROLLED ANALGESIA SYRINGE INTRAVENOUS
Status: DISCONTINUED | OUTPATIENT
Start: 2022-10-24 | End: 2022-10-26 | Stop reason: HOSPADM

## 2022-10-24 RX ORDER — FENTANYL CITRATE 50 UG/ML
50 INJECTION, SOLUTION INTRAMUSCULAR; INTRAVENOUS EVERY 5 MIN PRN
Status: DISCONTINUED | OUTPATIENT
Start: 2022-10-24 | End: 2022-10-24 | Stop reason: HOSPADM

## 2022-10-24 RX ORDER — ESMOLOL HYDROCHLORIDE 10 MG/ML
INJECTION INTRAVENOUS PRN
Status: DISCONTINUED | OUTPATIENT
Start: 2022-10-24 | End: 2022-10-24

## 2022-10-24 RX ORDER — NALOXONE HYDROCHLORIDE 0.4 MG/ML
0.4 INJECTION, SOLUTION INTRAMUSCULAR; INTRAVENOUS; SUBCUTANEOUS
Status: DISCONTINUED | OUTPATIENT
Start: 2022-10-24 | End: 2022-10-26 | Stop reason: HOSPADM

## 2022-10-24 RX ORDER — DIPHENHYDRAMINE HCL 25 MG
25 CAPSULE ORAL EVERY 6 HOURS PRN
Status: DISCONTINUED | OUTPATIENT
Start: 2022-10-24 | End: 2022-10-24 | Stop reason: HOSPADM

## 2022-10-24 RX ORDER — LIDOCAINE 40 MG/G
CREAM TOPICAL
Status: DISCONTINUED | OUTPATIENT
Start: 2022-10-24 | End: 2022-10-26 | Stop reason: HOSPADM

## 2022-10-24 RX ORDER — PRAZOSIN HYDROCHLORIDE 1 MG/1
1 CAPSULE ORAL AT BEDTIME
Status: DISCONTINUED | OUTPATIENT
Start: 2022-10-24 | End: 2022-10-26 | Stop reason: HOSPADM

## 2022-10-24 RX ORDER — ONDANSETRON 4 MG/1
4 TABLET, ORALLY DISINTEGRATING ORAL EVERY 6 HOURS PRN
Status: DISCONTINUED | OUTPATIENT
Start: 2022-10-24 | End: 2022-10-26 | Stop reason: HOSPADM

## 2022-10-24 RX ORDER — OXYCODONE HYDROCHLORIDE 5 MG/1
5 TABLET ORAL EVERY 4 HOURS PRN
Status: DISCONTINUED | OUTPATIENT
Start: 2022-10-24 | End: 2022-10-24 | Stop reason: HOSPADM

## 2022-10-24 RX ORDER — AMOXICILLIN 250 MG
1-2 CAPSULE ORAL 2 TIMES DAILY
Qty: 30 TABLET | Refills: 0 | Status: SHIPPED | OUTPATIENT
Start: 2022-10-24 | End: 2022-10-26

## 2022-10-24 RX ORDER — ONDANSETRON 2 MG/ML
INJECTION INTRAMUSCULAR; INTRAVENOUS PRN
Status: DISCONTINUED | OUTPATIENT
Start: 2022-10-24 | End: 2022-10-24

## 2022-10-24 RX ORDER — RIZATRIPTAN BENZOATE 10 MG/1
10 TABLET ORAL
Status: DISCONTINUED | OUTPATIENT
Start: 2022-10-24 | End: 2022-10-26 | Stop reason: HOSPADM

## 2022-10-24 RX ORDER — SODIUM CHLORIDE, SODIUM LACTATE, POTASSIUM CHLORIDE, CALCIUM CHLORIDE 600; 310; 30; 20 MG/100ML; MG/100ML; MG/100ML; MG/100ML
INJECTION, SOLUTION INTRAVENOUS CONTINUOUS
Status: DISCONTINUED | OUTPATIENT
Start: 2022-10-24 | End: 2022-10-26 | Stop reason: HOSPADM

## 2022-10-24 RX ORDER — ACETAMINOPHEN 325 MG/1
975 TABLET ORAL ONCE
Status: COMPLETED | OUTPATIENT
Start: 2022-10-24 | End: 2022-10-24

## 2022-10-24 RX ORDER — HYDROXYZINE HYDROCHLORIDE 10 MG/1
10 TABLET, FILM COATED ORAL
COMMUNITY
Start: 2022-05-10

## 2022-10-24 RX ORDER — BUSPIRONE HYDROCHLORIDE 5 MG/1
15 TABLET ORAL 2 TIMES DAILY
Status: DISCONTINUED | OUTPATIENT
Start: 2022-10-24 | End: 2022-10-26 | Stop reason: HOSPADM

## 2022-10-24 RX ORDER — CLONAZEPAM 1 MG/1
1 TABLET ORAL AT BEDTIME
Status: DISCONTINUED | OUTPATIENT
Start: 2022-10-24 | End: 2022-10-26 | Stop reason: HOSPADM

## 2022-10-24 RX ORDER — CEFAZOLIN SODIUM/WATER 3 G/30 ML
3 SYRINGE (ML) INTRAVENOUS
Status: COMPLETED | OUTPATIENT
Start: 2022-10-24 | End: 2022-10-24

## 2022-10-24 RX ORDER — DEXTROSE MONOHYDRATE 25 G/50ML
25-50 INJECTION, SOLUTION INTRAVENOUS
Status: DISCONTINUED | OUTPATIENT
Start: 2022-10-24 | End: 2022-10-26 | Stop reason: HOSPADM

## 2022-10-24 RX ORDER — POLYETHYLENE GLYCOL 3350 17 G/17G
1 POWDER, FOR SOLUTION ORAL DAILY
Qty: 7 PACKET | Refills: 0 | Status: SHIPPED | OUTPATIENT
Start: 2022-10-24 | End: 2022-10-26

## 2022-10-24 RX ORDER — ARIPIPRAZOLE 5 MG/1
5 TABLET ORAL DAILY
Status: DISCONTINUED | OUTPATIENT
Start: 2022-10-24 | End: 2022-10-26 | Stop reason: HOSPADM

## 2022-10-24 RX ORDER — AMOXICILLIN 250 MG
1 CAPSULE ORAL 2 TIMES DAILY
Status: DISCONTINUED | OUTPATIENT
Start: 2022-10-24 | End: 2022-10-26 | Stop reason: HOSPADM

## 2022-10-24 RX ORDER — AMANTADINE HYDROCHLORIDE 100 MG/1
200 CAPSULE, GELATIN COATED ORAL DAILY
Status: DISCONTINUED | OUTPATIENT
Start: 2022-10-25 | End: 2022-10-26 | Stop reason: HOSPADM

## 2022-10-24 RX ORDER — AMLODIPINE BESYLATE 5 MG/1
5 TABLET ORAL DAILY
Status: DISCONTINUED | OUTPATIENT
Start: 2022-10-24 | End: 2022-10-26 | Stop reason: HOSPADM

## 2022-10-24 RX ORDER — HALOPERIDOL 5 MG/ML
1 INJECTION INTRAMUSCULAR
Status: DISCONTINUED | OUTPATIENT
Start: 2022-10-24 | End: 2022-10-24 | Stop reason: HOSPADM

## 2022-10-24 RX ORDER — OXYCODONE HYDROCHLORIDE 5 MG/1
5 TABLET ORAL EVERY 4 HOURS PRN
Status: DISCONTINUED | OUTPATIENT
Start: 2022-10-24 | End: 2022-10-26 | Stop reason: HOSPADM

## 2022-10-24 RX ORDER — CEFADROXIL 500 MG/1
500 CAPSULE ORAL 2 TIMES DAILY
Qty: 14 CAPSULE | Refills: 0 | Status: SHIPPED | OUTPATIENT
Start: 2022-10-24 | End: 2022-10-26

## 2022-10-24 RX ORDER — AMLODIPINE BESYLATE 5 MG/1
5 TABLET ORAL DAILY
Status: CANCELLED | OUTPATIENT
Start: 2022-10-24

## 2022-10-24 RX ADMIN — HYDROMORPHONE HYDROCHLORIDE 0.4 MG: 0.2 INJECTION, SOLUTION INTRAMUSCULAR; INTRAVENOUS; SUBCUTANEOUS at 21:31

## 2022-10-24 RX ADMIN — GABAPENTIN 100 MG: 100 CAPSULE ORAL at 21:28

## 2022-10-24 RX ADMIN — FENTANYL CITRATE 50 MCG: 50 INJECTION, SOLUTION INTRAMUSCULAR; INTRAVENOUS at 11:30

## 2022-10-24 RX ADMIN — SENNOSIDES AND DOCUSATE SODIUM 1 TABLET: 50; 8.6 TABLET ORAL at 21:27

## 2022-10-24 RX ADMIN — LIDOCAINE HYDROCHLORIDE 20 MG: 10 INJECTION, SOLUTION INFILTRATION; PERINEURAL at 12:09

## 2022-10-24 RX ADMIN — BUPIVACAINE HYDROCHLORIDE 15 ML: 5 INJECTION, SOLUTION EPIDURAL; INTRACAUDAL; PERINEURAL at 11:33

## 2022-10-24 RX ADMIN — TIZANIDINE 8 MG: 4 TABLET ORAL at 21:28

## 2022-10-24 RX ADMIN — HYDROMORPHONE HYDROCHLORIDE 0.5 MG: 1 INJECTION, SOLUTION INTRAMUSCULAR; INTRAVENOUS; SUBCUTANEOUS at 13:10

## 2022-10-24 RX ADMIN — ESMOLOL HYDROCHLORIDE 30 MG: 10 INJECTION, SOLUTION INTRAVENOUS at 13:37

## 2022-10-24 RX ADMIN — ASPIRIN 81 MG: 81 TABLET, COATED ORAL at 21:27

## 2022-10-24 RX ADMIN — PROPOFOL 50 MCG/KG/MIN: 10 INJECTION, EMULSION INTRAVENOUS at 12:15

## 2022-10-24 RX ADMIN — ONDANSETRON 4 MG: 2 INJECTION INTRAMUSCULAR; INTRAVENOUS at 13:00

## 2022-10-24 RX ADMIN — TRANEXAMIC ACID 1950 MG: 650 TABLET ORAL at 10:19

## 2022-10-24 RX ADMIN — MIDAZOLAM HYDROCHLORIDE 1 MG: 1 INJECTION, SOLUTION INTRAMUSCULAR; INTRAVENOUS at 11:29

## 2022-10-24 RX ADMIN — ACETAMINOPHEN 975 MG: 325 TABLET, FILM COATED ORAL at 18:45

## 2022-10-24 RX ADMIN — BUSPIRONE HYDROCHLORIDE 15 MG: 5 TABLET ORAL at 21:28

## 2022-10-24 RX ADMIN — Medication 3 G: at 12:00

## 2022-10-24 RX ADMIN — FENTANYL CITRATE 100 MCG: 50 INJECTION, SOLUTION INTRAMUSCULAR; INTRAVENOUS at 12:09

## 2022-10-24 RX ADMIN — ACETAMINOPHEN 975 MG: 325 TABLET, FILM COATED ORAL at 10:19

## 2022-10-24 RX ADMIN — CEFAZOLIN SODIUM 2 G: 2 INJECTION, SOLUTION INTRAVENOUS at 21:30

## 2022-10-24 RX ADMIN — SODIUM CHLORIDE, POTASSIUM CHLORIDE, SODIUM LACTATE AND CALCIUM CHLORIDE: 600; 310; 30; 20 INJECTION, SOLUTION INTRAVENOUS at 11:41

## 2022-10-24 RX ADMIN — INSULIN ASPART 3 UNITS: 100 INJECTION, SOLUTION INTRAVENOUS; SUBCUTANEOUS at 18:38

## 2022-10-24 RX ADMIN — HYDROMORPHONE HYDROCHLORIDE 0.5 MG: 1 INJECTION, SOLUTION INTRAMUSCULAR; INTRAVENOUS; SUBCUTANEOUS at 12:40

## 2022-10-24 RX ADMIN — DEXAMETHASONE SODIUM PHOSPHATE 4 MG: 4 INJECTION, SOLUTION INTRA-ARTICULAR; INTRALESIONAL; INTRAMUSCULAR; INTRAVENOUS; SOFT TISSUE at 12:20

## 2022-10-24 RX ADMIN — ROCURONIUM BROMIDE 50 MG: 10 INJECTION, SOLUTION INTRAVENOUS at 12:09

## 2022-10-24 RX ADMIN — CLONAZEPAM 1 MG: 1 TABLET ORAL at 21:27

## 2022-10-24 RX ADMIN — SODIUM CHLORIDE, POTASSIUM CHLORIDE, SODIUM LACTATE AND CALCIUM CHLORIDE: 600; 310; 30; 20 INJECTION, SOLUTION INTRAVENOUS at 18:39

## 2022-10-24 RX ADMIN — OXYCODONE HYDROCHLORIDE 5 MG: 5 TABLET ORAL at 18:35

## 2022-10-24 RX ADMIN — ESMOLOL HYDROCHLORIDE 20 MG: 10 INJECTION, SOLUTION INTRAVENOUS at 13:30

## 2022-10-24 RX ADMIN — PROPOFOL 200 MG: 10 INJECTION, EMULSION INTRAVENOUS at 12:09

## 2022-10-24 RX ADMIN — SODIUM CHLORIDE, POTASSIUM CHLORIDE, SODIUM LACTATE AND CALCIUM CHLORIDE: 600; 310; 30; 20 INJECTION, SOLUTION INTRAVENOUS at 12:25

## 2022-10-24 RX ADMIN — PRAZOSIN HYDROCHLORIDE 1 MG: 1 CAPSULE ORAL at 21:29

## 2022-10-24 RX ADMIN — SUGAMMADEX 200 MG: 100 INJECTION, SOLUTION INTRAVENOUS at 13:33

## 2022-10-24 RX ADMIN — OXYCODONE HYDROCHLORIDE 5 MG: 5 TABLET ORAL at 14:20

## 2022-10-24 ASSESSMENT — ACTIVITIES OF DAILY LIVING (ADL)
ADLS_ACUITY_SCORE: 39
ADLS_ACUITY_SCORE: 37
ADLS_ACUITY_SCORE: 39
ADLS_ACUITY_SCORE: 37
ADLS_ACUITY_SCORE: 39

## 2022-10-24 NOTE — ANESTHESIA PROCEDURE NOTES
Airway       Patient location during procedure: OR       Procedure Start/Stop Times: 10/24/2022 12:11 PM and 10/24/2022 12:13 PM  Staff -        CRNA: Jude Saini APRN CRNA       Performed By: CRNA  Consent for Airway        Urgency: elective  Indications and Patient Condition       Indications for airway management: donato-procedural       Induction type:intravenous       Mask difficulty assessment: 1 - vent by mask    Final Airway Details       Final airway type: endotracheal airway       Successful airway: ETT - single  Endotracheal Airway Details        ETT size (mm): 7.0       Cuffed: yes       Successful intubation technique: direct laryngoscopy       DL Blade Type: Rad[place] 2       Grade View of Cords: 1       Adjucts: stylet       Position: Right       Measured from: lips       Secured at (cm): 21    Post intubation assessment        Placement verified by: capnometry, equal breath sounds and chest rise        Number of attempts at approach: 1       Secured with: silk tape       Ease of procedure: easy       Dentition: Intact and Unchanged       Dental guard used and removed. Dental Guard Type: Proguard Red.    Medication(s) Administered   Medication Administration Time: 10/24/2022 12:11 PM

## 2022-10-24 NOTE — TREATMENT PLAN
Orthopedic Surgery Pre-Op Plan: Karli Mnier  pre-op review. This is NOT an H&P   Surgeon: Dr. Avila   [facility]: [facility]  Name of Surgery: Left Total Knee Arthroplasty   Date of Surgery: 10/24/22  H&P: Completed on 10/4/22 by Dr. Fredy Mendosa at [place], [place].   History of ASA, NSAIDS, vitamin and/or herbal supplements within 10 days: Yes- Meloxicam- instructed to hold this for 7 days before surgery.   History of blood thinners: No    Plan:   1) Discharge Plan: Home POD 1 with assist of Mother and possibly would benefit from Home Care. Backup plan is TCU if struggles post-op and meets medical/skilled need requirements for TCU stay. Please see Discharge Planning section near bottom of this note for further details.     2) Multiple Sclerosis: currently in remission. Neurologist is Dr. Webb. On ocrelizumab infusions. Reports chronic fatigue but amantadine helps.     3) S/P Lumbar Decompression and Fusion with complications: May 2022: complicated by dural tear requiring surgical repair. Had neurogenic bowel/bladder, prolonged weakness/de-conditioning and required AFO brace for right foot drop. Required extended inpatient rehab stay for 4 weeks at [facility].     4) Hypertension: Appears well controlled on amlodipine.  Patient instructed to continue taking amlodipine including on the day of surgery.    5) Type 2 Diabetes Mellitus: Good control.  Most recent hemoglobin A1c 6.6 on 10/4/2022.  On metformin and Victoza.  Not on insulin. I recommend blood glucose checks at least three times a day and at bedtime during hospital stay. Goal BG < 180 to decrease risk for infection and wound healing complications. Nursing to please notify Hospitalist if BG > 180.  May benefit from short-term coverage with sliding scale insulin during hospital stay.  Will defer to hospitalist for management of this.    6) Sleep Apnea: Does not have CPAP.  I recommend close monitoring of post-op  respiratory status.  If frequent O2 desats or apneic episodes, nursing to please notify hospitalist.    7) Morbid Obesity: BMI 45.2, Wt: 272 lbs at preop exam on 10/4/22. Dr. Avila notified of high BMI and is ok proceeding with surgery.  I recommend continued efforts at safe weight loss following recovery from surgery. If patient is interested in further assistance with weight loss, please consider referral to [facility]. They offer both non-surgical and surgical evidence-based weight loss strategies. Call [phone] to schedule a consultation to learn more.      8) History of Anemia: Most recent hemoglobin 12.6 on 10/4/2022.  On ferrous sulfate.    9) PTSD and Depression: On Abilify, BuSpar, clonazepam, escitalopram.     Patient appears medically optimized for upcoming surgery. I would recommend Hospitalist Consult to assist with medical management. Please call me below with any questions on this patient.       Review of Systems Notable for: Multiple sclerosis, s/p lumbar decompression and fusion in May 2022 with complications including dural tear, neurogenic bowel/bladder, right foot drop, hypertension, type 2 diabetes mellitus-good recent control, sleep apnea-does not have CPAP, morbid obesity, history of anemia, PTSD, depression.    Past Medical History:   Past Medical History:   Diagnosis Date     Anemia      Arthritis      Calcium nephrolithiasis 06/15/2016     Chronic low back pain 03/28/2022     Depressive disorder 08/26/2013    Formatting of this note might be different from the original. Depressive disorder, not elsewhere classified ([facility])     Heart murmur      Migraine headache 03/28/2022     Moderate persistent asthma without complication 02/05/2015     Morbid obesity (H) 03/28/2022     MRSA infection     hx no labs to rule out since     Multiple sclerosis (H) 04/13/2016     PONV (postoperative nausea and vomiting)      Primary hypertension 05/24/2021      PTSD (post-traumatic stress disorder) 2011     Seasonal allergic rhinitis 2022     Sleep apnea     no CPAP     Type 2 diabetes mellitus without complication, without long-term current use of insulin (H) 2021     Urinary bladder neurogenic dysfunction 06/15/2016     Urinary incontinence      Vitamin D deficiency 2016     Past Surgical History:   Procedure Laterality Date     APPENDECTOMY  2014     BONE MARROW BIOPSY        SECTION      no date     CHOLECYSTECTOMY      no date     CYSTOSCOPY  2016     HRW PORT A CATH  2016     LAP VENTRAL HERNIA REPAIR  2021     LAPAROSCOPY, SURGICAL; REPAIR INCISIONAL OR VENTRAL HERNIA       LUMBAR FUSION  2022    L4-L5     LYMPH NODE BIOPSY      2021 - reactive lymphadenopathy work-up     MAMMOPLASTY REDUCTION       SPINAL FUSION       TONSILLECTOMY & ADENOIDECTOMY       TUBAL LIGATION      no date     URETEROSCOPY         Current Medications:  Patient's Medications   New Prescriptions    No medications on file   Previous Medications    ACETAMINOPHEN (TYLENOL) 500 MG TABLET    Take 2 tablets by mouth every 6 hours as needed for mild pain    AMANTADINE (SYMMETREL) 100 MG CAPSULE    TAKE 1 CAPSULE TWICE DAILY AS NEEDED    AMLODIPINE (NORVASC) 5 MG TABLET    TAKE 1 TABLET DAILY    ARIPIPRAZOLE (ABILIFY) 5 MG TABLET    Take 5 mg by mouth daily    ASCORBIC ACID 1000 MG TABS TABLET    Take 1,000 mg by mouth 2 times daily    BUSPIRONE (BUSPAR) 15 MG TABLET    Take 15 mg by mouth 2 times daily    CLONAZEPAM (KLONOPIN) 1 MG TABLET    Take 1 mg by mouth At Bedtime Occasionally takes during the day prn    ESCITALOPRAM (LEXAPRO) 20 MG TABLET    Take 20 mg by mouth daily    FERROUS SULFATE (FEROSUL) 325 (65 FE) MG TABLET    Take 325 mg by mouth daily (with breakfast) 2 tabs bravo;u    FEXOFENADINE (ALLEGRA) 180 MG TABLET    Take 180 mg by mouth daily    FLUTICASONE (FLONASE) 50 MCG/ACT NASAL SPRAY    [place] 2 sprays  in nostril daily as needed    GABAPENTIN (NEURONTIN) 100 MG CAPSULE    Take 100 mg by mouth 3 times daily    GABAPENTIN (NEURONTIN) 100 MG CAPSULE    TAKE 1 CAPSULE 3 TIMES A   DAY    LIRAGLUTIDE (VICTOZA PEN) 18 MG/3ML SOLUTION    Inject 1.8 mg Subcutaneous daily    MELOXICAM (MOBIC) 15 MG TABLET    Take 1 tablet (15 mg) by mouth daily    METFORMIN (GLUCOPHAGE XR) 500 MG 24 HR TABLET    Take 2 tablets (1,000 mg) by mouth daily    METHENAMINE HIPPURATE (HIPREX) 1 G TABLET    Take 1,000 mg by mouth 2 times daily    NYSTATIN (MYCOSTATIN) 504468 UNIT/GM EXTERNAL POWDER    Apply topically 3 times daily as needed (rash)    OCRELIZUMAB (OCREVUS IV)    Inject into the vein every 6 months    OXYCODONE (ROXICODONE) 5 MG TABLET    Take 5 mg by mouth every 6 hours as needed    PRAZOSIN (MINIPRESS) 1 MG CAPSULE    Take 1 mg by mouth At Bedtime    RIZATRIPTAN (MAXALT) 10 MG TABLET    Take 10 mg by mouth as needed    SOLIFENACIN (VESICARE) 5 MG TABLET        TIZANIDINE (ZANAFLEX) 2 MG TABLET    Take 2 mg by mouth 2 times daily    TIZANIDINE (ZANAFLEX) 2 MG TABLET    Take 1 tablet (2 mg) by mouth 2 times daily    TIZANIDINE (ZANAFLEX) 4 MG TABLET    Take 2 tablets (8 mg) by mouth At Bedtime    VICTOZA PEN 18 MG/3ML SOLN    ADMINISTER 1.8 MG UNDER THE SKIN DAILY   Modified Medications    No medications on file   Discontinued Medications    CHOLECALCIFEROL 125 MCG (5000 UT) CAPS    Take 5,000 Units by mouth daily     HYDROXYZINE (ATARAX) 10 MG TABLET    Take 10 mg by mouth nightly as needed     VALACYCLOVIR (VALTREX) 500 MG TABLET    Take 500 mg by mouth 2 times daily       ALLERGIES:  Allergies   Allergen Reactions     Sulfa Drugs Diarrhea, Rash and Nausea and Vomiting     Adhesive Tape Rash       Social History  Social History     Tobacco Use     Smoking status: Never     Smokeless tobacco: Never   Vaping Use     Vaping Use: Some days   Substance Use Topics     Alcohol use: Yes     Comment: 3 damien./week     Drug use: Yes     Types:  Opiates, Marijuana       Any Abnormal Recent Diagnostics? Yes  Hemoglobin A1c 6.6 on 10/4/2022: Shows good recent control of type 2 diabetes on metformin and Victoza.  Blood glucose 104 on 10/4/2022: We will monitor BG's closely during hospital stay.  Goal BG <180 to decrease risk for infection and postop wound healing complications.    Discharge Planning:   Discharge plan according to [facility]:     Home POD 1 with assist of Mother and possibly Home Care. Backup plan TCU if struggles  Post-op and meets medical/skilled need requirements to go to TCU.        10/10/22 [id number]   Discharge Planning   Patient/Family Anticipates Transition to home   Living Arrangements   People in Home parent(s)   Type of Residence Private Residence   Number of Stairs, Within Home, Primary none   Bathroom Shower/Tub Tub/Shower unit   Equipment Currently Used at Home none   Support System   Support Systems Parent   Medical Clearance   Clinic Name [facility]       SNOW Garcia, CNP   Advanced Practice Nurse Navigator- Orthopedics  [facility]   Phone: [phone]

## 2022-10-24 NOTE — ANESTHESIA POSTPROCEDURE EVALUATION
Patient: Karli Miner    Procedure: Procedure(s):  LEFT TOTAL KNEE ARTHROPLASTY       Anesthesia Type:  General    Note:  Disposition: Inpatient   Postop Pain Control: Uneventful            Sign Out: Well controlled pain   PONV: No   Neuro/Psych: Uneventful            Sign Out: Acceptable/Baseline neuro status   Airway/Respiratory: Uneventful            Sign Out: Acceptable/Baseline resp. status   CV/Hemodynamics: Uneventful            Sign Out: Acceptable CV status; No obvious hypovolemia; No obvious fluid overload   Other NRE: NONE   DID A NON-ROUTINE EVENT OCCUR? No           Last vitals:  Vitals Value Taken Time   /64 10/24/22 1500   Temp 36.3  C (97.4  F) 10/24/22 1430   Pulse 99 10/24/22 1526   Resp 10 10/24/22 1430   SpO2 94 % 10/24/22 1526   Vitals shown include unvalidated device data.    Electronically Signed By: Miles Salmon MD  October 24, 2022  3:28 PM

## 2022-10-24 NOTE — ANESTHESIA CARE TRANSFER NOTE
Patient: Karli Miner    Procedure: Procedure(s):  LEFT TOTAL KNEE ARTHROPLASTY       Diagnosis: Osteoarthritis of left knee [M17.12]  Diagnosis Additional Information: No value filed.    Anesthesia Type:   General     Note:    Oropharynx: oropharynx clear of all foreign objects  Level of Consciousness: awake  Oxygen Supplementation: face mask  Level of Supplemental Oxygen (L/min / FiO2): 6  Independent Airway: airway patency satisfactory and stable  Dentition: dentition unchanged  Vital Signs Stable: post-procedure vital signs reviewed and stable  Report to RN Given: handoff report given  Patient transferred to: PACU    Handoff Report: Identifed the Patient, Identified the Reponsible Provider, Reviewed the pertinent medical history, Discussed the surgical course, Reviewed Intra-OP anesthesia mangement and issues during anesthesia, Set expectations for post-procedure period and Allowed opportunity for questions and acknowledgement of understanding      Vitals:  Vitals Value Taken Time   /75 10/24/22 1349   Temp     Pulse 89 10/24/22 1349   Resp 11 10/24/22 1349   SpO2 93 % 10/24/22 1349   Vitals shown include unvalidated device data.    Electronically Signed By: SNOW Powers CRNA  October 24, 2022  1:51 PM  
None known

## 2022-10-24 NOTE — ANESTHESIA PROCEDURE NOTES
Adductor canal Procedure Note    Pre-Procedure   Staff -        Anesthesiologist:  Miles Salmon MD       Performed By: anesthesiologist       Location: pre-op       Procedure Start/Stop Times: 10/24/2022 11:30 AM and 10/24/2022 11:33 AM       Pre-Anesthestic Checklist: patient identified, IV checked, site marked, risks and benefits discussed, informed consent, monitors and equipment checked, pre-op evaluation, at physician/surgeon's request and post-op pain management  Timeout:       Correct Patient: Yes        Correct Procedure: Yes        Correct Site: Yes        Correct Position: Yes        Correct Laterality: Yes        Site Marked: Yes  Procedure Documentation  Procedure: Adductor canal       Laterality: left       Patient Position: supine       Patient Prep/Sterile Barriers: sterile gloves, mask       Skin prep: Chloraprep       Needle Type: other       Needle Gauge: 20.        Needle Length (Inches): 4        Ultrasound guided       1. Ultrasound was used to identify targeted nerve, plexus, vascular marker, or fascial plane and place a needle adjacent to it in real-time.       2. Ultrasound was used to visualize the spread of anesthetic in close proximity to the above referenced structure.       3. A permanent image is entered into the patient's record.       4. The visualized anatomic structures appeared normal.       5. There were no apparent abnormal pathologic findings.    Assessment/Narrative         The placement was negative for: blood aspirated, painful injection and site bleeding       Paresthesias: No.       Bolus given via needle..        Secured via.        Insertion/Infusion Method: Single Shot       Complications: none    Medication(s) Administered   Bupivacaine 0.5% PF (Infiltration) - Infiltration   15 mL - 10/24/2022 11:33:00 AM  Medication Administration Time: 10/24/2022 11:30 AM      FOR [facility] ([facility]) ONLY:   Pain Team Contact information: please page the Pain Team Via [employer].  "Search \"Pain\". During daytime hours, please page the attending first. At night please page the resident first.    "

## 2022-10-24 NOTE — ANESTHESIA PREPROCEDURE EVALUATION
Anesthesia Pre-Procedure Evaluation    Patient: Karli Miner   MRN: [id number] : 1975        Procedure : Procedure(s):  LEFT TOTAL KNEE ARTHROPLASTY          Past Medical History:   Diagnosis Date     Anemia      Arthritis      Calcium nephrolithiasis 06/15/2016     Chronic low back pain 2022     Depressive disorder 2013    Formatting of this note might be different from the original. Depressive disorder, not elsewhere classified ([facility])     Heart murmur      Migraine headache 2022     Moderate persistent asthma without complication 2015     Morbid obesity (H) 2022     MRSA infection     hx no labs to rule out since     Multiple sclerosis (H) 2016     PONV (postoperative nausea and vomiting)      Primary hypertension 2021     PTSD (post-traumatic stress disorder) 2011     Seasonal allergic rhinitis 2022     Sleep apnea     no CPAP     Type 2 diabetes mellitus without complication, without long-term current use of insulin (H) 2021     Urinary bladder neurogenic dysfunction 06/15/2016     Urinary incontinence      Vitamin D deficiency 2016      Past Surgical History:   Procedure Laterality Date     APPENDECTOMY       BONE MARROW BIOPSY        SECTION      no date     CHOLECYSTECTOMY      no date     CYSTOSCOPY  2016     HRW PORT A CATH  2016     LAP VENTRAL HERNIA REPAIR  2021     LAPAROSCOPY, SURGICAL; REPAIR INCISIONAL OR VENTRAL HERNIA       LUMBAR FUSION  2022    L4-L5     LYMPH NODE BIOPSY      2021 - reactive lymphadenopathy work-up     MAMMOPLASTY REDUCTION       SPINAL FUSION       TONSILLECTOMY & ADENOIDECTOMY       TUBAL LIGATION      no date     URETEROSCOPY        Allergies   Allergen Reactions     Sulfa Drugs Diarrhea, Rash and Nausea and Vomiting     Adhesive Tape Rash      Social History     Tobacco Use     Smoking status: Never     Smokeless tobacco: Never    Substance Use Topics     Alcohol use: Yes     Comment: 3 damien./week      Wt Readings from Last 1 Encounters:   10/24/22 126.1 kg (278 lb)        Anesthesia Evaluation   Pt has had prior anesthetic.     History of anesthetic complications  - PONV.      ROS/MED HX  ENT/Pulmonary:  - neg pulmonary ROS   (+) sleep apnea, asthma     Neurologic:  - neg neurologic ROS   (+) Multiple Sclerosis,     Cardiovascular:     (+) hypertension-----valvular problems/murmurs     METS/Exercise Tolerance:     Hematologic:  - neg hematologic  ROS     Musculoskeletal:  - neg musculoskeletal ROS     GI/Hepatic:  - neg GI/hepatic ROS     Renal/Genitourinary:  - neg Renal ROS     Endo:     (+) type II DM, Obesity (MO),     Psychiatric/Substance Use:  - neg psychiatric ROS     Infectious Disease:  - neg infectious disease ROS     Malignancy:  - neg malignancy ROS     Other:  - neg other ROS          Physical Exam    Airway  airway exam normal      Mallampati: II   TM distance: > 3 FB   Neck ROM: full   Mouth opening: > 3 cm    Respiratory Devices and Support         Dental  no notable dental history         Cardiovascular   cardiovascular exam normal       Rhythm and rate: regular and normal     Pulmonary   pulmonary exam normal        breath sounds clear to auscultation           OUTSIDE LABS:  CBC:   Lab Results   Component Value Date    WBC 9.3 10/04/2022    WBC 7.1 08/24/2021    HGB 12.6 10/04/2022    HGB 12.5 08/24/2021    HCT 38.5 10/04/2022    HCT 38.7 08/24/2021     10/04/2022     08/24/2021     BMP:   Lab Results   Component Value Date     10/04/2022     11/24/2021    POTASSIUM 4.3 10/04/2022    POTASSIUM 4.2 11/24/2021    CHLORIDE 105 10/04/2022    CHLORIDE 108 11/24/2021    CO2 22 10/04/2022    CO2 22 11/24/2021    BUN 10.3 10/04/2022    BUN 12 11/24/2021    CR 0.55 10/04/2022    CR 0.63 11/24/2021     (H) 10/24/2022     (H) 10/04/2022     COAGS: No results found for: PTT, INR, FIBR  POC: No  results found for: BGM, HCG, HCGS  HEPATIC:   Lab Results   Component Value Date    ALBUMIN 3.9 02/01/2021    PROTTOTAL 7.1 02/01/2021    ALT 18 08/24/2021    AST 11 08/24/2021    ALKPHOS 119 02/01/2021    BILITOTAL 0.6 02/01/2021     OTHER:   Lab Results   Component Value Date    A1C 6.6 (H) 10/04/2022    AMINTA 9.2 10/04/2022    TSH 2.08 06/09/2021       Anesthesia Plan    ASA Status:  3   NPO Status:  NPO Appropriate    Anesthesia Type: General.     - Airway: ETT   Induction: Intravenous.   Maintenance: Balanced.        Consents    Anesthesia Plan(s) and associated risks, benefits, and realistic alternatives discussed. Questions answered and patient/representative(s) expressed understanding.    - Discussed:     - Discussed with:  Patient      - Extended Intubation/Ventilatory Support Discussed: Yes.      - Patient is DNR/DNI Status: No    Use of blood products discussed: Yes.     - Discussed with: Patient.     Postoperative Care    Pain management: Peripheral nerve block (Single Shot).   PONV prophylaxis: Ondansetron (or other 5HT-3), Dexamethasone or Solumedrol, Background Propofol Infusion     Comments:    Other Comments: ADC block for POA per surgeon request.            Miles Salmon MD

## 2022-10-24 NOTE — TELEPHONE ENCOUNTER
Prescription approved per [facility] Refill Protocol.    Last Written Prescription Date: 9/27/22  Last Fill Quantity: 30,  # refills: 0   Last office visit: 10/4/2022 with prescribing provider

## 2022-10-24 NOTE — PHARMACY-ADMISSION MEDICATION HISTORY
Pharmacy Note - Admission Medication History    Pertinent Provider Information: N/A   ______________________________________________________________________    Prior To Admission (PTA) med list completed and updated in EMR.       PTA Med List   Medication Sig Note Last Dose     acetaminophen (TYLENOL) 500 MG tablet Take 2 tablets by mouth every 6 hours as needed for mild pain  Unknown     amantadine (SYMMETREL) 100 MG capsule TAKE 1 CAPSULE TWICE DAILY AS NEEDED (Patient taking differently: Take 200 mg by mouth daily Take 2 in AM)  10/23/2022     amLODIPine (NORVASC) 5 MG tablet TAKE 1 TABLET DAILY  10/23/2022 at PM     ARIPiprazole (ABILIFY) 5 MG tablet Take 5 mg by mouth daily  10/23/2022 at AM     ascorbic acid 1000 MG TABS tablet Take 1,000 mg by mouth 2 times daily  10/23/2022     busPIRone (BUSPAR) 15 MG tablet Take 15 mg by mouth 2 times daily  10/23/2022     clonazePAM (KLONOPIN) 1 MG tablet Take 1 mg by mouth At Bedtime  10/23/2022     escitalopram (LEXAPRO) 20 MG tablet Take 20 mg by mouth daily  10/23/2022     ferrous sulfate (FEROSUL) 325 (65 Fe) MG tablet Take 650 mg by mouth daily (with breakfast)  10/23/2022     fexofenadine (ALLEGRA) 180 MG tablet Take 180 mg by mouth daily  10/23/2022     fluticasone (FLONASE) 50 MCG/ACT nasal spray Spray 2 sprays in nostril daily as needed  Unknown     gabapentin (NEURONTIN) 100 MG capsule TAKE 1 CAPSULE 3 TIMES A   DAY  10/23/2022 at PM     hydrOXYzine (ATARAX) 10 MG tablet Take 10 mg by mouth nightly as needed  Unknown     liraglutide (VICTOZA PEN) 18 MG/3ML solution Inject 1.8 mg Subcutaneous daily  10/22/2022     meloxicam (MOBIC) 15 MG tablet Take 1 tablet (15 mg) by mouth daily  10/16/2022     metFORMIN (GLUCOPHAGE XR) 500 MG 24 hr tablet Take 2 tablets (1,000 mg) by mouth daily  10/23/2022     methenamine hippurate (HIPREX) 1 g tablet Take 1,000 mg by mouth 2 times daily  10/23/2022     nystatin (MYCOSTATIN) 344503 UNIT/GM external powder Apply topically 3  times daily as needed (rash)  Unknown     Ocrelizumab (OCREVUS IV) Inject into the vein every 6 months       oxyCODONE (ROXICODONE) 5 MG tablet Take 5 mg by mouth every 6 hours as needed for severe pain  Unknown     prazosin (MINIPRESS) 1 MG capsule Take 1 mg by mouth At Bedtime  10/23/2022 at PM     rizatriptan (MAXALT) 10 MG tablet Take 10 mg by mouth at onset of headache for migraine  Unknown     solifenacin (VESICARE) 5 MG tablet Take 5 mg by mouth daily  10/23/2022     tiZANidine (ZANAFLEX) 2 MG tablet Take 1 tablet (2 mg) by mouth 2 times daily 10/24/2022: Morning and Afternoon 10/23/2022     tiZANidine (ZANAFLEX) 4 MG tablet Take 2 tablets (8 mg) by mouth At Bedtime  10/23/2022     Vitamin D3 (CHOLECALCIFEROL) 125 MCG (5000 UT) tablet Take 1 tablet by mouth At Bedtime  10/23/2022       Information source(s): Patient and Clinic records    Method of interview communication: in-person    Patient was asked about OTC/herbal products specifically.  PTA med list reflects this.    Based on the pharmacist's assessment, the PTA med list information appears reliable    Allergies were reviewed, assessed, and updated with the patient.      Patient does not use any multi-dose medications prior to admission.     Thank you for the opportunity to participate in the care of this patient.      Wayne Crandall [facility]     10/24/2022     10:43 AM

## 2022-10-24 NOTE — OP NOTE
Operative Note    Name:  Karli Miner  Location: [facility] Main OR  Procedure Date:  10/24/2022  PCP:  Fredy Mendosa      Procedure(s):  LEFT TOTAL KNEE ARTHROPLASTY     Implant Name Type Inv. Item Serial No.  Lot No. LRB No. Used Action   IMP INSERT TIBIAL STRK TRI 5X09MM  [id number] - [id number] Total Joint Component/Insert IMP INSERT TIBIAL STRK TRI 5X09MM  [id number]  [employer] [id number] Left 1 Implanted   IMP BASEPLATE TIBIAL STRK TRIATHLN KNEE SZ 5 5536-B-500 - [id number] Total Joint Component/Insert IMP BASEPLATE TIBIAL STRK TRIATHLN KNEE SZ 5 [id number]-[id number]  [employer] [id number] Left 1 Implanted   IMP COMP FEM STRK TRIATHLN CR BD W/PA LT 4 5517-F-401 - [id number] Total Joint Component/Insert IMP COMP FEM STRK TRIATHLN CR BD W/PA LT 4 [id number]  [employer] [id number] Left 1 Implanted       Pre-Procedure Diagnosis:  Osteoarthritis of left knee [M17.12]     Post-Procedure Diagnosis:    same    Surgeon(s):  Joe Brady, Lester Torre MD      Assistants:  Required for patient positioning, intraoperative retraction, patient safety, and wound closure.    Anesthesia Type:  Spinal with Block     Findings:  Arthritis    Operative Report:      After satisfactory infiltration of regional block anesthetic in the preoperative holding area, the patient was taken to the operating room.  Spinal anesthesia was administered.  The patient's operative extremity was then prepped and draped sterile.  A timeout was then taken to ensure proper surgical site.  A medial parapatellar arthrotomy was then performed.  The menisci and fat pad were sacrificed.  The distal femoral cut was made taking 8mm off, 5 degrees valgus.  Osteophytes were then removed.  Proximal tibia cut was then made.  The proper rotation of the distal femur was then set, sized, chamfer cuts were made.  At this point an intraoperative standardized cocktail was infiltrated around the periarticular soft tissues.   Trials were implanted.  Patella was everted and denervated with cautery.  Trials showed excellent flexion and extension gaps, excellent range of motion with excellent patellar tracking.  Trials were removed and the tibial keel punch was then made.  The wound then copiously irrigated.  Components then impacted into place.  The wound irrigated once more.  The wound then closed in layers: #1 Vicryl for the extensor mechanism, 2-0 Vicryl for the subcutaneous tissues, 3-0 Monocryl for the skin followed by Dermabond.  Sterile dressings were applied.  The patient was then awakened and taken to the [facility] stable.    Plan:  Initiate DVT prophylaxis protocol including early ambulation, mechanical and chemical prophylaxis.    Estimated Blood Loss:   50cc       Complications:    None    Lester Avila MD     Date: 10/24/2022  Time: 1:05 PM

## 2022-10-24 NOTE — INTERVAL H&P NOTE
"I have reviewed the surgical (or preoperative) H&P that is linked to this encounter, and examined the patient. There are no significant changes    Clinical Conditions Present on Arrival:  Clinically Significant Risk Factors Present on Admission                    # DMII: A1C = 6.6 % (Ref range: 0.0 - 5.6 %) within past 3 months  # Severe Obesity: Estimated body mass index is 46.26 kg/m  as calculated from the following:    Height as of this encounter: 1.651 m (5' 5\").    Weight as of this encounter: 126.1 kg (278 lb).       "

## 2022-10-25 ENCOUNTER — APPOINTMENT (OUTPATIENT)
Dept: OCCUPATIONAL THERAPY | Facility: CLINIC | Age: 47
End: 2022-10-25
Attending: ORTHOPAEDIC SURGERY
Payer: MEDICARE

## 2022-10-25 ENCOUNTER — APPOINTMENT (OUTPATIENT)
Dept: PHYSICAL THERAPY | Facility: CLINIC | Age: 47
End: 2022-10-25
Attending: ORTHOPAEDIC SURGERY
Payer: MEDICARE

## 2022-10-25 LAB
CREAT SERPL-MCNC: 0.63 MG/DL (ref 0.6–1.1)
GFR SERPL CREATININE-BSD FRML MDRD: >90 ML/MIN/1.73M2
GLUCOSE BLDC GLUCOMTR-MCNC: 128 MG/DL (ref 70–99)
GLUCOSE BLDC GLUCOMTR-MCNC: 155 MG/DL (ref 70–99)
GLUCOSE BLDC GLUCOMTR-MCNC: 155 MG/DL (ref 70–99)
GLUCOSE BLDC GLUCOMTR-MCNC: 188 MG/DL (ref 70–99)
GLUCOSE BLDC GLUCOMTR-MCNC: 216 MG/DL (ref 70–99)
HGB BLD-MCNC: 10.4 G/DL (ref 11.7–15.7)
POTASSIUM BLD-SCNC: 4.4 MMOL/L (ref 3.5–5)

## 2022-10-25 PROCEDURE — 97116 GAIT TRAINING THERAPY: CPT | Mod: GP

## 2022-10-25 PROCEDURE — 97166 OT EVAL MOD COMPLEX 45 MIN: CPT | Mod: GO

## 2022-10-25 PROCEDURE — 36415 COLL VENOUS BLD VENIPUNCTURE: CPT | Performed by: INTERNAL MEDICINE

## 2022-10-25 PROCEDURE — 84132 ASSAY OF SERUM POTASSIUM: CPT | Performed by: INTERNAL MEDICINE

## 2022-10-25 PROCEDURE — 82565 ASSAY OF CREATININE: CPT | Performed by: INTERNAL MEDICINE

## 2022-10-25 PROCEDURE — 250N000013 HC RX MED GY IP 250 OP 250 PS 637: Performed by: INTERNAL MEDICINE

## 2022-10-25 PROCEDURE — 85018 HEMOGLOBIN: CPT

## 2022-10-25 PROCEDURE — 250N000013 HC RX MED GY IP 250 OP 250 PS 637

## 2022-10-25 PROCEDURE — 97110 THERAPEUTIC EXERCISES: CPT | Mod: GP

## 2022-10-25 PROCEDURE — 82962 GLUCOSE BLOOD TEST: CPT

## 2022-10-25 PROCEDURE — 97535 SELF CARE MNGMENT TRAINING: CPT | Mod: GO

## 2022-10-25 PROCEDURE — 97162 PT EVAL MOD COMPLEX 30 MIN: CPT | Mod: GP

## 2022-10-25 PROCEDURE — 99214 OFFICE O/P EST MOD 30 MIN: CPT | Performed by: INTERNAL MEDICINE

## 2022-10-25 PROCEDURE — 250N000011 HC RX IP 250 OP 636

## 2022-10-25 RX ADMIN — ACETAMINOPHEN 975 MG: 325 TABLET, FILM COATED ORAL at 03:01

## 2022-10-25 RX ADMIN — ASPIRIN 81 MG: 81 TABLET, COATED ORAL at 20:31

## 2022-10-25 RX ADMIN — BUSPIRONE HYDROCHLORIDE 15 MG: 5 TABLET ORAL at 08:24

## 2022-10-25 RX ADMIN — CLONAZEPAM 1 MG: 1 TABLET ORAL at 20:31

## 2022-10-25 RX ADMIN — GABAPENTIN 100 MG: 100 CAPSULE ORAL at 08:24

## 2022-10-25 RX ADMIN — BUSPIRONE HYDROCHLORIDE 15 MG: 5 TABLET ORAL at 20:32

## 2022-10-25 RX ADMIN — TIZANIDINE 8 MG: 4 TABLET ORAL at 20:31

## 2022-10-25 RX ADMIN — TIZANIDINE 2 MG: 2 TABLET ORAL at 13:00

## 2022-10-25 RX ADMIN — ASPIRIN 81 MG: 81 TABLET, COATED ORAL at 08:24

## 2022-10-25 RX ADMIN — OXYCODONE HYDROCHLORIDE 10 MG: 5 TABLET ORAL at 00:18

## 2022-10-25 RX ADMIN — ESCITALOPRAM OXALATE 20 MG: 20 TABLET ORAL at 08:24

## 2022-10-25 RX ADMIN — OXYCODONE HYDROCHLORIDE 10 MG: 5 TABLET ORAL at 13:00

## 2022-10-25 RX ADMIN — GABAPENTIN 100 MG: 100 CAPSULE ORAL at 20:31

## 2022-10-25 RX ADMIN — PRAZOSIN HYDROCHLORIDE 1 MG: 1 CAPSULE ORAL at 20:31

## 2022-10-25 RX ADMIN — GABAPENTIN 100 MG: 100 CAPSULE ORAL at 13:23

## 2022-10-25 RX ADMIN — INSULIN ASPART 1 UNITS: 100 INJECTION, SOLUTION INTRAVENOUS; SUBCUTANEOUS at 08:19

## 2022-10-25 RX ADMIN — CEFAZOLIN SODIUM 2 G: 2 INJECTION, SOLUTION INTRAVENOUS at 03:01

## 2022-10-25 RX ADMIN — POLYETHYLENE GLYCOL 3350 17 G: 17 POWDER, FOR SOLUTION ORAL at 08:24

## 2022-10-25 RX ADMIN — INSULIN ASPART 1 UNITS: 100 INJECTION, SOLUTION INTRAVENOUS; SUBCUTANEOUS at 17:52

## 2022-10-25 RX ADMIN — ARIPIPRAZOLE 5 MG: 5 TABLET ORAL at 08:23

## 2022-10-25 RX ADMIN — SENNOSIDES AND DOCUSATE SODIUM 1 TABLET: 50; 8.6 TABLET ORAL at 08:24

## 2022-10-25 RX ADMIN — OXYCODONE HYDROCHLORIDE 10 MG: 5 TABLET ORAL at 09:00

## 2022-10-25 RX ADMIN — SENNOSIDES AND DOCUSATE SODIUM 1 TABLET: 50; 8.6 TABLET ORAL at 20:31

## 2022-10-25 RX ADMIN — TIZANIDINE 2 MG: 2 TABLET ORAL at 08:23

## 2022-10-25 RX ADMIN — AMANTADINE HYDROCHLORIDE 200 MG: 100 CAPSULE ORAL at 08:23

## 2022-10-25 RX ADMIN — ACETAMINOPHEN 975 MG: 325 TABLET, FILM COATED ORAL at 13:00

## 2022-10-25 RX ADMIN — METFORMIN ER 500 MG 1000 MG: 500 TABLET ORAL at 08:24

## 2022-10-25 RX ADMIN — FEXOFENADINE HYDROCHLORIDE 180 MG: 180 TABLET ORAL at 08:59

## 2022-10-25 RX ADMIN — ACETAMINOPHEN 975 MG: 325 TABLET, FILM COATED ORAL at 20:31

## 2022-10-25 ASSESSMENT — ACTIVITIES OF DAILY LIVING (ADL)
ADLS_ACUITY_SCORE: 37
ADLS_ACUITY_SCORE: 35
ADLS_ACUITY_SCORE: 39
ADLS_ACUITY_SCORE: 35
ADLS_ACUITY_SCORE: 39
ADLS_ACUITY_SCORE: 35

## 2022-10-25 NOTE — PROGRESS NOTES
10/25/22 0906   Appointment Info   Signing Clinician's Name / Credentials (PT) Roxanne Camp, PT, DPT   Living Environment   People in Home alone   Current Living Arrangements other (see comments)  (Trailer)   Home Accessibility stairs to enter home   Number of Stairs, Main Entrance 1   Stair Railings, Main Entrance none   Transportation Anticipated family or friend will provide   Living Environment Comments Has a ramp and 1 step into home   Self-Care   Usual Activity Tolerance good   Current Activity Tolerance fair   Equipment Currently Used at Home walker, rolling;cane, straight   Fall history within last six months yes   Number of times patient has fallen within last six months 3   Activity/Exercise/Self-Care Comment At baseline requires assist of 1 to navigate stairs.   General Information   Onset of Illness/Injury or Date of Surgery 10/24/22   Referring Physician Lester Avila J, MD   Patient/Family Therapy Goals Statement (PT) To go to TCU   Pertinent History of Current Problem (include personal factors and/or comorbidities that impact the POC) Status post L TKA 10/24/22   Existing Precautions/Restrictions weight bearing;fall;other (see comments)  (AFO for RLE)   Weight-Bearing Status - LLE weight-bearing as tolerated   Weight-Bearing Status - RLE full weight-bearing   General Observations Has history of R foot drop, has AFO.   Cognition   Affect/Mental Status (Cognition) WNL   Orientation Status (Cognition) oriented x 4   Follows Commands (Cognition) WNL   Range of Motion (ROM)   Range of Motion ROM deficits secondary to surgical procedure;ROM deficits secondary to pain;ROM deficits secondary to weakness   ROM Comment RLE limited due to weakness, LLE limited due to surgical procedure, pain, and weakness   Strength (Manual Muscle Testing)   Strength (Manual Muscle Testing) Deficits observed during functional mobility   Bed Mobility   Bed Mobility sit-supine   Sit-Supine [place] (Bed Mobility)  moderate assist (50% patient effort);1 person to manage equipment   Bed Mobility Limitations decreased ability to use legs for bridging/pushing   Impairments Contributing to Impaired Bed Mobility pain;decreased strength   Assistive Device (Bed Mobility) bed rails   Transfers   Transfers sit-stand transfer   Maintains Weight-bearing Status (Transfers) able to maintain   Transfer Safety Concerns Noted losing balance backward;decreased step length;decreased weight-shifting ability   Impairments Contributing to Impaired Transfers pain;decreased strength   Sit-Stand Transfer   Sit-Stand [place] (Transfers) moderate assist (50% patient effort);1 person to manage equipment   Assistive Device (Sit-Stand Transfers) walker, front-wheeled   Gait/Stairs (Locomotion)   [place] Level (Gait) contact guard;1 person to manage equipment   Assistive Device (Gait) walker, front-wheeled   Distance in Feet (Required for LE Total Joints) 10   Distance in Feet (Gait) 50   Pattern (Gait) step-to   Deviations/Abnormal Patterns (Gait) antalgic;base of support, wide;diana decreased;gait speed decreased;stride length decreased;weight shifting decreased   Maintains Weight-bearing Status (Gait) able to maintain   Clinical Impression   Criteria for Skilled Therapeutic Intervention Yes, treatment indicated   PT Diagnosis (PT) Impaired functional mobility   Influenced by the following impairments Pain, weakness   Functional limitations due to impairments Bed mobility, transfers, gait, stairs   Clinical Presentation (PT Evaluation Complexity) Evolving/Changing   Clinical Presentation Rationale Patient presents as medically diagnosed   Clinical Decision Making (Complexity) moderate complexity   Planned Therapy Interventions (PT) bed mobility training;gait training;home exercise program;patient/family education;stair training;strengthening;transfer training   Anticipated Equipment Needs at Discharge (PT) walker, rolling   Risk & Benefits of  therapy have been explained evaluation/treatment results reviewed;care plan/treatment goals reviewed;participants voiced agreement with care plan;participants included;patient   PT Total Evaluation Time   PT Eval, Moderate Complexity Minutes (28013) 16   Physical Therapy Goals   PT Frequency Daily   PT Predicted Duration/Target Date for Goal Attainment 11/01/22   PT Goals Transfers;Gait;Stairs;PT Goal 1   PT: Transfers Supervision/stand-by assist;Sit to/from stand;Bed to/from chair;Assistive device;Within precautions   PT: Gait Supervision/stand-by assist;Assistive device;Within precautions;150 feet   PT: Stairs Supervision/stand-by assist;1 stair;Within precautions   PT: Goal 1 Patient will be independent with HEP in order to improve lower extremity strength and overall independence with functional mobility.   Interventions   Interventions Quick Adds Gait Training;Therapeutic Procedure   Therapeutic Procedure/Exercise   Ther. Procedure: strength, endurance, ROM, flexibillity Minutes (15193) 10   Symptoms Noted During/After Treatment fatigue;increased pain   Treatment Detail/Skilled Intervention Instructed patient in TKA exercises. Patient performed exercises x 10 with LLE. Verbal and tactile cueing required for proper technique. Educated patient on parameters for performing exercises.   Gait Training   Gait Training Minutes (05152) 15   Symptoms Noted During/After Treatment (Gait Training) fatigue;increased pain   Treatment Detail/Skilled Intervention Patient ambulated in hallway with CGAx 1 and FWW. Demonstrated antalgic gait and decreased R foot clearance, putting her at an increased risk of falls. Verbal cueing for walker and foot sequencing. Verbal cueing to use BUE to offweight sore LLE during gait. Patient supine in bed with all light within reach and bed alarm armed at end of session.   [place] Level (Gait Training) contact guard   Physical Assistance Level (Gait Training) verbal cues;1 person assist    Weight Bearing (Gait Training) weight-bearing as tolerated   Assistive Device (Gait Training) rolling walker   Gait Analysis Deviations decreased diana;increased time in double stance;decreased step length;decreased toe-to-floor clearance;decreased weight-shifting ability   Impairments (Gait Analysis/Training) pain;strength decreased   PT Discharge Planning   PT Plan Bed mobility, transfers, gait, stairs as able   PT Discharge Recommendation (DC Rec) (S)  Transitional Care Facility;home with assist;home with home care physical therapy   PT Rationale for DC Rec Patient currently requires assist of 1 for bed mobility, transfers, and gait. She lives in a trailer alone and does not have access to assistance. She has 1 step to enter her trailer, at this time she has not attempted stairs. She has access to a walker and a single end cane at home and uses both at baseline. She demonstrates slow, antalgic gait with decreased R foot clearance, putting her at an increased risk of falls. If patient were to discharge at this time she would require assist of 1 for all mobility, use of a walker, and benefit from home physical therapy in order to improve bilateral lower extremity strength and overall safety and independence with mobility. If this level of assistance cannot be provided at home, recommend patient discharges to TCU in order to improve strength, safety, and independence with functional mobility.   PT Brief overview of current status Requries assist of 1 for all mobility at this time.   Total Session Time   Timed Code Treatment Minutes 25   Total Session Time (sum of timed and untimed services) 41     [facility]  OUTPATIENT PHYSICAL THERAPY EVALUATION  PLAN OF TREATMENT FOR OUTPATIENT REHABILITATION  (COMPLETE FOR INITIAL CLAIMS ONLY)  Patient's Last Name, First Name, M.I.  YOB: 1975  Karli Miner                        Provider's Name  [facility] Medical Record No.  [id number]                             Onset Date:  10/24/22   Start of Care Date:      Type:     _X_PT   ___OT   ___SLP Medical Diagnosis:                 PT Diagnosis:  Impaired functional mobility Visits from SOC:  1     See note for plan of treatment, functional goals and certification details    I CERTIFY THE NEED FOR THESE SERVICES FURNISHED UNDER        THIS PLAN OF TREATMENT AND WHILE UNDER MY CARE     (Physician co-signature of this document indicates review and certification of the therapy plan).               Roxanne Camp, PT, DPT

## 2022-10-25 NOTE — PLAN OF CARE
Patient vital signs are at baseline: Yes  Patient able to ambulate as they were prior to admission or with assist devices provided by therapies during their stay: no difficulty moving due to baseline status and surgery.   Patient MUST void prior to discharge:  No priest in place due to retention   Patient able to tolerate oral intake:  Yes  Pain has adequate pain control using Oral analgesics:  Yes  Does patient have an identified :  Yes  Has goal D/C date and time been discussed with patient:  Yes , looking into tcu placement.

## 2022-10-25 NOTE — PROGRESS NOTES
10/25/22 1000   Appointment Info   Signing Clinician's Name / Credentials (OT) Deirdre Sanabria OTR/L   Quick Adds   Quick Adds Certification   Living Environment   People in Home alone   Current Living Arrangements other (see comments)  (trailer)   Transportation Anticipated family or friend will provide   Living Environment Comments Tub/shower with transfer bench, RTS with Bilat GB   Self-Care   Usual Activity Tolerance good   Current Activity Tolerance fair   Equipment Currently Used at Home cane, straight;walker, rolling   General Information   Onset of Illness/Injury or Date of Surgery 10/24/22   Referring Physician Dr. Lester Avila   Patient/Family Therapy Goal Statement (OT) To go to TCU.   Additional Occupational Profile Info/Pertinent History of Current Problem Adm for L TKA.  PMH:  MS, Lumbar decompression/fusion, HTN, DM2   Existing Precautions/Restrictions other (see comments)  (TKA precautions.)   Left Lower Extremity (Weight-bearing Status) weight-bearing as tolerated (WBAT)   Cognitive Status Examination   Orientation Status orientation to person, place and time   Follows Commands WNL   Visual Perception   Visual Impairment/Limitations corrective lenses full-time   Bed Mobility   Bed Mobility supine-sit;sit-supine   Supine-Sit [place] (Bed Mobility) minimum assist (75% patient effort)   Sit-Supine [place] (Bed Mobility) minimum assist (75% patient effort)   Assistive Device (Bed Mobility) leg ;bed rails   Transfers   Transfers bed-chair transfer;sit-stand transfer;toilet transfer;shower transfer   Transfer Skill: Bed to Chair/Chair to Bed   Bed-Chair [place] (Transfers) minimum assist (75% patient effort)   Assistive Device (Bed-Chair Transfers) rolling walker   Sit-Stand Transfer   Sit-Stand [place] (Transfers) minimum assist (75% patient effort)   Assistive Device (Sit-Stand Transfers) walker, front-wheeled   Shower Transfer   Type (Shower Transfer)   (therapist demo using  trsf bench.)   Toilet Transfer   Type (Toilet Transfer) sit-stand;stand-sit   [place] Level (Toilet Transfer) minimum assist (75% patient effort)   Assistive Device (Toilet Transfer) raised toilet seat;grab bars/safety frame;walker, front-wheeled   Balance   Balance Assessment standing balance: dynamic   Balance Comments fair   Activities of Daily Living   BADL Assessment/Intervention upper body dressing;lower body dressing;grooming   Upper Body Dressing Assessment/Training   Position (Upper Body Dressing) supported sitting   [place] Level (Upper Body Dressing) independent   Lower Body Dressing Assessment/Training   Position (Lower Body Dressing) supported sitting;supported standing   Assistive Devices (Lower Body Dressing) sock-aid   [place] Level (Lower Body Dressing) minimum assist (75% patient effort)   Grooming Assessment/Training   Position (Grooming) sink side   [place] Level (Grooming) supervision;verbal cues   Clinical Impression   Criteria for Skilled Therapeutic Interventions Met (OT) Yes, treatment indicated   OT Diagnosis decreased indep with ADL due to TKA   OT Problem List-Impairments impacting ADL problems related to;balance;mobility   Assessment of Occupational Performance 3-5 Performance Deficits   Identified Performance Deficits dressing, toileting, bathing, trsfs, G/H   Planned Therapy Interventions (OT) ADL retraining   Clinical Decision Making Complexity (OT) moderate complexity   Risk & Benefits of therapy have been explained evaluation/treatment results reviewed;participants included;patient   OT Total Evaluation Time   OT Eval, Moderate Complexity Minutes (37954) 15   Therapy Certification   Medical Diagnosis TKA   Start of Care Date 10/25/22   Certification date from 10/25/22   Certification date to 11/25/22   OT Goals   Therapy Frequency (OT) Daily   OT Predicted Duration/Target Date for Goal Attainment 10/29/22   OT Goals Lower Body Dressing;Transfers;Bed  Mobility;Toilet Transfer/Toileting   OT: Lower Body Dressing Modified independent   OT: Bed Mobility Supervision/stand-by assist   OT: Transfer Supervision/stand-by assist   OT: Toilet Transfer/Toileting Supervision/stand-by assist   Interventions   Interventions Quick Adds Self-Care/Home Management   Self-Care/Home Management   Self-Care/Home Mgmt/ADL, Compensatory, Meal Prep Minutes (02513) 30   Symptoms Noted During/After Treatment (Meal Preparation/Planning Training) fatigue;increased pain   Treatment Detail/Skilled Intervention Pt in bed when therapist arrived.  Trsfs completed in the room using a FWW and min assist of one.  Completed bed, chair, toilet, BR sink.  Therapist demo the tub/shower trsf using a trsf bench.  Pt understood technique.  Pt fatigues easily and balance is a concern.  Needs assist to lift her legs up onto the bed using a leg .  At end of session. Pt was back in bed with bed alarm on and call light within reach.   [place] Level (Grooming Training) minimum assist (75% patient effort)   Assistance (Grooming Training) 1 person assist   [place] Level (Upper Body Dressing Training) independent   Lower Body Dressing Training Assistance minimum assist (75% patient effort)   Lower Body Dressing Training Assistance 1 person assist   Lower Body Dressing Training Assistance - Assistive Device sock-aid   [place] Level (Toilet Training)   (declined need)   OT Discharge Planning   OT Plan Pt moving slowly due to TKA and MS.  Balance is an issue.  Review all trsfs with FWW.  Work on bed mobility using a leg , dressing L/B with sock aid.  Pt also has an AFO for the R L/E.   OT Discharge Recommendation (DC Rec) (S)  Transitional Care Facility;home with assist   OT Rationale for DC Rec Pt would benefit from further therapy at a TCU to increase indep with all ADLs and trsfs.  Pt has MS and fatigues easily.  Balance is also an issue - falls risk.  Pt's Mother is not able to assist Pt  with ADLs - jacobo donning her AFO.  If Pt went home, would need assist with dressing, toileting, bathing.   OT Brief overview of current status Min assist with trsfs using FWW.  Min assist with dressing and toileting.   Total Session Time   Timed Code Treatment Minutes 30   Total Session Time (sum of timed and untimed services) 45    [facility]  OUTPATIENT OCCUPATIONAL THERAPY  EVALUATION  PLAN OF TREATMENT FOR OUTPATIENT REHABILITATION  (COMPLETE FOR INITIAL CLAIMS ONLY)  Patient's Last Name, First Name, M.I.  YOB: 1975  Karli Miner                          Provider's Name  [facility] Medical Record No.  [id number]                             Onset Date:  10/24/22   Start of Care Date:  10/25/22   Type:     ___PT   _X_OT   ___SLP Medical Diagnosis:  TKA                    OT Diagnosis:  decreased indep with ADL due to TKA Visits from SOC:  1     See note for plan of treatment, functional goals and certification details    I CERTIFY THE NEED FOR THESE SERVICES FURNISHED UNDER        THIS PLAN OF TREATMENT AND WHILE UNDER MY CARE     (Physician co-signature of this document indicates review and certification of the therapy plan).

## 2022-10-25 NOTE — PROGRESS NOTES
Patient vital signs are at baseline: No,  Reason:  Pt requires oxygen supplementation.  Patient able to ambulate as they were prior to admission or with assist devices provided by therapies during their stay:  Yes  Patient MUST void prior to discharge:  No,  Reason:  Pt is retaining urine. Pt requires straight catheterization, see flowsheet for details. Nurse provided education about indications for straight catheterization, complications of urinary retention, and straight catheterization procedure. Pt stated understanding and was agreeable.   oral intake:  Yes  Pain has adequate pain control using Oral analgesics:  Yes  Does patient have an identified :  No, pt states that they do not have social support at home. Pt wants to go to a TCU.   Has goal D/C date and time been discussed with patient:  Yes

## 2022-10-25 NOTE — PLAN OF CARE
Patient vital signs are at baseline: Yes  Patient able to ambulate as they were prior to admission or with assist devices provided by therapies during their stay:  Yes  Patient MUST void prior to discharge:  Pt voided a little bit  Of urine bladder scan has a residue of 419  Patient able to tolerate oral intake:  Yes  Pain has adequate pain control using Oral analgesics:  Yes  Does patient have an identified :  Yes  Has goal D/C date and time been discussed with patient:  Yes Goal Outcome Evaluation:      Plan of Care Reviewed With: patient    Overall Patient Progress: improvingOverall Patient Progress: improving

## 2022-10-25 NOTE — CONSULTS
Care Management Initial Consult    General Information  Assessment completed with: Patient,    Type of CM/SW Visit: Initial Assessment    Primary Care Provider verified and updated as needed:     Readmission within the last 30 days:        Reason for Consult: discharge planning  Advance Care Planning:            Communication Assessment  Patient's communication style: spoken language (English or Bilingual)    Hearing Difficulty or Deaf: no        Cognitive  Cognitive/Neuro/Behavioral: WDL                      Living Environment:   People in home: alone     Current living Arrangements: mobile home      Able to return to prior arrangements:         Family/Social Support:  Care provided by:    Provides care for:                  Description of Support System:           Current Resources:   Patient receiving home care services:       Community Resources:    Equipment currently used at home: cane, straight, walker, rolling  Supplies currently used at home:      Employment/Financial:  Employment Status:          Financial Concerns:             Lifestyle & Psychosocial Needs:  Social Determinants of Health     Tobacco Use: Low Risk      Smoking Tobacco Use: Never     Smokeless Tobacco Use: Never     Passive Exposure: Not on file   Alcohol Use: Not on file   Financial Resource Strain: Not on file   Food Insecurity: Not on file   Transportation Needs: Not on file   Physical Activity: Not on file   Stress: Not on file   Social Connections: Not on file   Intimate Partner Violence: Not on file   Depression: Not at risk     PHQ-2 Score: 0   Housing Stability: Not on file       Functional Status:  Prior to admission patient needed assistance:              Mental Health Status:          Chemical Dependency Status:                Values/Beliefs:  Spiritual, Cultural Beliefs, Jain Practices, Values that affect care:                 Additional Information:  Initial assessment completed with pt.  Pt lives alone in mobile home.   Discussed recommendation for TCU and pt is in agreement.  Pt prefers [facility] in [facility].  Referral made and spoke to admissions, they are reviewing referral.  2:35 PM    LAMONT Prieto

## 2022-10-25 NOTE — PROGRESS NOTES
[facility] Daily Progress Note    Assessment/Plan:  Assessment/Plan: 47-year-old female status post left total knee arthroplasty on 10/24/2022.     Hypertension, essential  Continue home medications with holding parameters.     Diabetes mellitus type 2  Hemoglobin A1c 6.6 from 10/4/2022.  Goal glucose less than 180 mg/dL  NovoLog correction insulin medium scale 4 times daily AC at bedtime.  Resume metformin 1000 mg daily.  Hold Victoza until after discharge.     Sleep apnea  Does not utilize CPAP.  Postoperative oximetry per protocol.     Class III obesity  Body mass index is 46.26 kg/m .     PTSD, depression  Resume PTA medication: Abilify, BuSpar, clonazepam, escitalopram.     Multiple sclerosis  Follows with neurology.  Previously on Lemtrada discontinued due to T-cell immunosuppression.  Last ocrelizumab infusion September 20, 2022.  Continue amantidine 200 mg daily.  Currently in remission.     History of previous lumbar decompression and fusion complicated by dural tear requiring surgical repair.  History of urinary bladder neurogenic dysfunction  Postop bladder protocol.  Hold methenamine, may resume at discharge.      Migraine headaches.   Rizatriptan 10 mg as needed.   Continue tizanidine home dose.      Status post left total knee arthroplasty  Postoperative cares per orthopedic service.   Pain control plan: oxycodone 5 mg q4h prn, tylenol 975mg q8h.   Continue gabapentin home dose.   DVT prophylaxis per orthopedics.   She would like to discharge to TCU    Acute urinary retention  Likely secondary to narcotics, anesthesia  Denies any urinary symptoms  Srivastava placed today 10/25  Urology consulted.     LOS: 0 days     Subjective:  Has urinary retention greater than 500 normal Srivastava placed today.  denies any urinary burning pain, frequency, urgency.  She has chronic right foot drop and wishes to discharge to TCU.  No other cough chest pain, shortness of breath,  bowel complaints.      acetaminophen  975 mg Oral Q8H      amantadine  200 mg Oral Daily     amLODIPine  5 mg Oral Daily     ARIPiprazole  5 mg Oral Daily     aspirin  81 mg Oral BID     busPIRone  15 mg Oral BID     clonazePAM  1 mg Oral At Bedtime     escitalopram  20 mg Oral Daily     [Held by provider] ferrous sulfate  650 mg Oral Daily with breakfast     fexofenadine  180 mg Oral Daily     gabapentin  100 mg Oral TID     insulin aspart  1-7 Units Subcutaneous TID AC     insulin aspart  1-5 Units Subcutaneous At Bedtime     metFORMIN  1,000 mg Oral Daily     polyethylene glycol  17 g Oral Daily     povidone-iodine 0.28% (BETADINE) in sterile water 500 mL irrigation   Irrigation Once     prazosin  1 mg Oral At Bedtime     senna-docusate  1 tablet Oral BID     sodium chloride (PF)  3 mL Intracatheter Q8H     tiZANidine  2 mg Oral BID     tiZANidine  8 mg Oral At Bedtime     [Held by provider] tolterodine ER  2 mg Oral Daily       Objective:  Vital signs in last 24 hours:  Temp:  [97.4  F (36.3  C)-98.2  F (36.8  C)] 97.9  F (36.6  C)  Pulse:  [] 78  Resp:  [10-20] 18  BP: (108-187)/(53-86) 118/57  SpO2:  [92 %-98 %] 97 %  Weight:   [unfilled]    Intake/Output last 3 shifts:  I/O last 3 completed shifts:  In: 1740 [P.O.:340; I.V.:1400]  Out: 650 [Urine:650]  Intake/Output this shift:  No intake/output data recorded.    Review of Systems:   As per subjective, all others negative.    Physical Exam:    General Appearance:  Alert, cooperative, no distress, appears stated age   Head:  Normocephalic, without obvious abnormality, atraumatic   Lungs:   Clear to auscultation bilaterally, respirations unlabored   Chest Wall:  No tenderness or deformity   Heart:  Regular rate and rhythm, S1, S2 normal,no murmur, rub or gallop   Abdomen:   Soft, non tender, non distended, bowel sounds present, no guarding or rigidity   Extremities:  Status post left TKA   Skin: Skin color, texture, turgor normal, no rashes or lesions   Neurologic: Alert and oriented X 3, Moves all 4  extremities       Lab Results:  Personally Reviewed.   Recent Results (from the past 24 hour(s))   Glucose by meter    Collection Time: 10/24/22 10:54 AM   Result Value Ref Range    GLUCOSE BY METER POCT 132 (H) 70 - 99 mg/dL   Glucose by meter    Collection Time: 10/24/22  1:57 PM   Result Value Ref Range    GLUCOSE BY METER POCT 212 (H) 70 - 99 mg/dL   Glucose by meter    Collection Time: 10/24/22  6:38 PM   Result Value Ref Range    GLUCOSE BY METER POCT 264 (H) 70 - 99 mg/dL   Glucose by meter    Collection Time: 10/24/22 10:03 PM   Result Value Ref Range    GLUCOSE BY METER POCT 328 (H) 70 - 99 mg/dL   Glucose by meter    Collection Time: 10/25/22  1:44 AM   Result Value Ref Range    GLUCOSE BY METER POCT 216 (H) 70 - 99 mg/dL   Hemoglobin    Collection Time: 10/25/22  6:04 AM   Result Value Ref Range    Hemoglobin 10.4 (L) 11.7 - 15.7 g/dL   Creatinine    Collection Time: 10/25/22  6:04 AM   Result Value Ref Range    Creatinine 0.63 0.60 - 1.10 mg/dL    GFR Estimate >90 >60 mL/min/1.73m2   Potassium    Collection Time: 10/25/22  6:04 AM   Result Value Ref Range    Potassium 4.4 3.5 - 5.0 mmol/L   Glucose by meter    Collection Time: 10/25/22  7:52 AM   Result Value Ref Range    GLUCOSE BY METER POCT 188 (H) 70 - 99 mg/dL         Jovita Mcintyre M.D  [facility]  Internal Medicine

## 2022-10-25 NOTE — PROGRESS NOTES
"[facility] Progress Note    POD #: 1  S/P: Procedure(s):  LEFT TOTAL KNEE ARTHROPLASTY    Subjective:  Patient reports 3-4/10 pain today. Has been utilizing ice packs. Undergoing PT, states walking and stairs going well. Feels overall well today.   (-) N/V  (+) flatus    Objective:  /57 (BP Location: Left arm)   Pulse 78   Temp 97.9  F (36.6  C) (Oral)   Resp 18   Ht 1.651 m (5' 5\")   Wt 126.1 kg (278 lb)   SpO2 97%   BMI 46.26 kg/m      Intake/Output Summary (Last 24 hours) at 10/25/2022 0947  Last data filed at 10/25/2022 0257  Gross per 24 hour   Intake 1740 ml   Output 650 ml   Net 1090 ml       Physical Examination:  Incision: Mepilex dressing in place at left anterior knee. Notable for mild swelling. No pus or drainage noted from incision.     The patient is A&Ox3. Appears comfortable, sitting up at bedside.  Sensation is intact to light touch in bilateral L2-S1 dermatomes.  Foot/Ankle Movement intact.   5/5 strength with ankle DF, PF and EHL bilaterally.  Brisk capillary refill in toes.   Palpable dorsalis pedis pulses bilaterally.  Calves aresoft and non-tender.    Assessment:  S/P Procedure(s):  LEFT TOTAL KNEE ARTHROPLASTY    Plan:  1. Internal Med following. Appreciate their recs.  2. Continue with pain management   3. Diet per protocol  4. PT/OT  5. Will place urology consult. Pt states difficulty voiding s/p procedure, she does see urology group outpatient. This has happened to her before. She sees Adina ATKINSON at [facility] (phone # [phone])  6. From ortho standpoint pt is cleared to discharge. Will place discharge orders. Can plan for discharge later today or tomorrow morning pending urology clearance.    ADDENDUM  -PT recommending TCU vs home with HC (PT). Keep me posted, I can change discharge orders and add face-to-face for HC. Thank you    Keke Coyle PA-C  [facility]   [phone]   October 25, 2022 at 9:47 AM      "

## 2022-10-25 NOTE — CONSULTS
[facility] CONSULT - URINARY RETENTION     Type of Consult: inpatient   Place of Service:  [facility]   Reason for Consultation: Urinary retention   Consult Requested by: ORA Coyle PA-C    History of present illness:  Karli Miner is a 47 year old female with hx of PTSD, anxiety, depression, borderline personality disorder, DM-II, obesity, MRSA infection, MS, ROBERT, asthma, HTN, recurrent UTIs, nephrolithiasis s/p removal 2015, urinary incontinence, neurogenic bladder; Admitted to the hospital 10/24/22 for left total knee arthroplasty. Urology was consulted for urinary retention. History is obtained from patient and chart review.     Pt follows with DA Herron PA-C with [facility] for hx of neurogenic bladder, urinary urgency/frequency, UUI, nocturia, recurrent UTIs. Pt states she started Vesicare approx a month ago with symptomatic improvement. Also reports she is scheduled for UDS in near future. States she has experienced post-op urinary retention in the past. Denies UR otherwise.     Pt reports she was voiding without difficulty prior to surgery. Post-operatively, only able to pass a few drops. Required at least 2 CICs and priest was placed 10/25/22 for PVR >400 ml. Pt denies recent burning with urination, abdominal and bilateral flank pain, fever, chills. No problems with priest overnight. Pt is taking medications which may contribute to urinary retention, including narcotic pain medication, antihistamine, muscle relaxants, antidepressants. Currently holding Detrol LA. No documented BM this admission. No UA/UC.     Past medical history :  Past Medical History:   Diagnosis Date     Anemia      Arthritis      Calcium nephrolithiasis 06/15/2016     Chronic low back pain 03/28/2022     Depressive disorder 08/26/2013    Formatting of this note might be different from the original. Depressive disorder, not elsewhere classified ([facility])     Heart murmur      Migraine headache 03/28/2022      Moderate persistent asthma without complication 2015     Morbid obesity (H) 2022     MRSA infection     hx no labs to rule out since     Multiple sclerosis (H) 2016     PONV (postoperative nausea and vomiting)      Primary hypertension 2021     PTSD (post-traumatic stress disorder) 2011     Seasonal allergic rhinitis 2022     Sleep apnea     no CPAP     Type 2 diabetes mellitus without complication, without long-term current use of insulin (H) 2021     Urinary bladder neurogenic dysfunction 06/15/2016     Urinary incontinence      Vitamin D deficiency 2016       Past surgical history:   Past Surgical History:   Procedure Laterality Date     APPENDECTOMY  2014     ARTHROPLASTY KNEE Left 10/24/2022    Procedure: LEFT TOTAL KNEE ARTHROPLASTY;  Surgeon: Lester Avila MD;  Location: [facility] Main OR     BONE MARROW BIOPSY        SECTION      no date     CHOLECYSTECTOMY      no date     CYSTOSCOPY  2016     HRW PORT A CATH  2016     LAP VENTRAL HERNIA REPAIR  2021     LAPAROSCOPY, SURGICAL; REPAIR INCISIONAL OR VENTRAL HERNIA       LUMBAR FUSION  2022    L4-L5     LYMPH NODE BIOPSY      2021 - reactive lymphadenopathy work-up     MAMMOPLASTY REDUCTION       SPINAL FUSION       TONSILLECTOMY & ADENOIDECTOMY       TUBAL LIGATION      no date     URETEROSCOPY         Social history:  Social History     Socioeconomic History     Marital status:      Spouse name: Not on file     Number of children: Not on file     Years of education: Not on file     Highest education level: Not on file   Occupational History     Not on file   Tobacco Use     Smoking status: Never     Smokeless tobacco: Never   Vaping Use     Vaping Use: Some days   Substance and Sexual Activity     Alcohol use: Yes     Comment: 3 damien./week     Drug use: Yes     Types: Opiates, Marijuana     Sexual activity: Not on file   Other Topics  Concern     Not on file   Social History Narrative     Not on file     Social Determinants of Health     Financial Resource Strain: Not on file   Food Insecurity: Not on file   Transportation Needs: Not on file   Physical Activity: Not on file   Stress: Not on file   Social Connections: Not on file   Intimate Partner Violence: Not on file   Housing Stability: Not on file       Medications  Current Facility-Administered Medications   Medication     [START ON 10/27/2022] acetaminophen (TYLENOL) tablet 650 mg     acetaminophen (TYLENOL) tablet 975 mg     amantadine (SYMMETREL) capsule 200 mg     amLODIPine (NORVASC) tablet 5 mg     ARIPiprazole (ABILIFY) tablet 5 mg     aspirin EC tablet 81 mg     benzocaine-menthol (CEPACOL) 15-3.6 MG lozenge 1 lozenge     bisacodyl (DULCOLAX) suppository 10 mg     busPIRone (BUSPAR) tablet 15 mg     clonazePAM (klonoPIN) tablet 1 mg     glucose gel 15-30 g    Or     dextrose 50 % injection 25-50 mL    Or     glucagon injection 1 mg     escitalopram (LEXAPRO) tablet 20 mg     [Held by provider] ferrous sulfate (FEROSUL) tablet 650 mg     fexofenadine (ALLEGRA) tablet 180 mg     fluticasone (FLONASE) 50 MCG/ACT spray 2 spray     gabapentin (NEURONTIN) capsule 100 mg     HYDROmorphone (DILAUDID) injection 0.2 mg    Or     HYDROmorphone (DILAUDID) injection 0.4 mg     hydrOXYzine (ATARAX) tablet 25 mg     insulin aspart (NovoLOG) injection (RAPID ACTING)     insulin aspart (NovoLOG) injection (RAPID ACTING)     lactated ringers infusion     lidocaine (LMX4) cream     lidocaine 1 % 0.1-1 mL     magnesium hydroxide (MILK OF MAGNESIA) suspension 30 mL     metFORMIN (GLUCOPHAGE XR) 24 hr tablet 1,000 mg     miconazole (MICATIN) 2 % powder 1 g     naloxone (NARCAN) injection 0.2 mg    Or     naloxone (NARCAN) injection 0.4 mg    Or     naloxone (NARCAN) injection 0.2 mg    Or     naloxone (NARCAN) injection 0.4 mg     ondansetron (ZOFRAN ODT) ODT tab 4 mg    Or     ondansetron (ZOFRAN)  "injection 4 mg     oxyCODONE (ROXICODONE) tablet 5 mg    Or     oxyCODONE (ROXICODONE) tablet 10 mg     polyethylene glycol (MIRALAX) Packet 17 g     povidone-iodine 0.28% (BETADINE) in sterile water 500 mL irrigation     prazosin (MINIPRESS) capsule 1 mg     prochlorperazine (COMPAZINE) injection 10 mg    Or     prochlorperazine (COMPAZINE) tablet 10 mg     rizatriptan (MAXALT) tablet 10 mg     senna-docusate (SENOKOT-S/PERICOLACE) 8.6-50 MG per tablet 1 tablet     sodium chloride (PF) 0.9% PF flush 3 mL     sodium chloride (PF) 0.9% PF flush 3 mL     tiZANidine (ZANAFLEX) tablet 2 mg     tiZANidine (ZANAFLEX) tablet 8 mg     [Held by provider] tolterodine ER (DETROL LA) 24 hr capsule 2 mg       Allergies  Allergies   Allergen Reactions     Sulfa Drugs Diarrhea, Rash and Nausea and Vomiting     Adhesive Tape Rash       Review of systems   12 point review of systems is otherwise negative except what is stated in the HPI.     Physical examinations:  /57 (BP Location: Left arm)   Pulse 78   Temp 97.9  F (36.6  C) (Oral)   Resp 18   Ht 1.651 m (5' 5\")   Wt 126.1 kg (278 lb)   SpO2 97%   BMI 46.26 kg/m     GENERAL: NAD, alert, cooperative  HEAD: normocephalic/atraumatic   ABDOMEN: soft, non- tender, non- distended, no suprapubic fulness/tenderness noted, no bilateral CVA tenderness noted.   : 16 Fr priest draining clear dania urine.    MUSCULOSKELETAL: moves all four extremities equally  PSYCHOLOGICAL: alert and oriented, answers questions appropriately      LABS:   Lab Results   Component Value Date    WBC 9.3 10/04/2022    HGB 10.4 (L) 10/25/2022    HCT 38.5 10/04/2022     10/04/2022    CHOL 168 10/09/2020    TRIG 143 10/09/2020    HDL 52 10/09/2020    ALT 18 08/24/2021    AST 11 08/24/2021     10/04/2022    BUN 10.3 10/04/2022    CO2 22 10/04/2022    TSH 2.08 06/09/2021    MICROALBUR 1.5 11/24/2021     Creatinine   Date Value Ref Range Status   10/25/2022 0.63 0.60 - 1.10 mg/dL Final     I " have personally reviewed these labs.     IMAGING:  EXAM: CT CHEST ABDOMEN PELVIS W   LOCATION: [facility] MEDICAL IMAGING   DATE/TIME: 9/22/2022 12:11 PM     INDICATION: Lymphadenopathy, followup.   COMPARISON: CT chest, abdomen pelvis 03/24/2022, 9/20/2021.   TECHNIQUE: CT scan of the chest, abdomen, and pelvis was performed following injection of IV contrast. Multiplanar reformats were obtained. Dose reduction techniques were used.   CONTRAST: Omnipaque 350, 96 mL.     FINDINGS:   LUNGS AND PLEURA: Stable diminutive benign pulmonary nodules measuring 3 mm and smaller going back to 9/20/2021. Mild fibrotic appearing scarring left lower lung and right middle lobe. Lungs otherwise clear. No effusion.     MEDIASTINUM/AXILLAE: Right Port-A-Cath tip near the atrial caval junction. No mediastinal or hilar lymphadenopathy. Left subpectoral lymph node decreased in size measuring 0.8 cm, previously 1.0 cm. No new axillary adenopathy.     CORONARY ARTERY CALCIFICATION: None.     HEPATOBILIARY: Hepatic steatosis. Cholecystectomy. No bile duct dilatation.     PANCREAS: Normal.     SPLEEN: Normal.     ADRENAL GLANDS: Normal.     KIDNEYS/BLADDER: No significant mass, stones, or hydronephrosis. There are simple or benign cysts. No follow up is needed.     BOWEL: Appendectomy. No obstruction or inflammatory change.     LYMPH NODES: Bilateral inguinal and iliac lymphadenopathy decreased. Right external iliac lymph node on series 5 image 189 measures 1.8 x 1.0 cm previously 1.9 x 1.2 cm. Left inguinal lymph node on image 197 measures 0.9 cm, previously 1.6 cm. Right external iliac lymph node on image 191 measures 1.1 cm, previously 1.8 cm. No new or enlarging abdominal pelvic lymphadenopathy.     VASCULATURE: Normal caliber abdominal aorta.     PELVIC ORGANS: Normal.     MUSCULOSKELETAL: Lumbosacral spine fusion hardware. No suspicious osseous lesions.     IMPRESSION:   1.  Previously noted mildly prominent left subpectoral and bilateral  external iliac and inguinal lymphadenopathy decreased in size compared to 3/24/2022. The lymph nodes are now within upper limits of normal in size.   2.  No new adenopathy in the chest, abdomen or pelvis.      I have personally reviewed the imaging reports above.     ASSESSMENT/PLAN:  Karli Miner is being seen by [facility] for post-op urinary retention.     *Hx of MS with neurogenic bladder, urinary urgency/frequency, UUI, nocturia, recurrent UTIs, nephrolithiasis s/p removal 2015.    - Priest placed 10/25 for persistent small voids (</= 50 ml), requiring > 2 CIC and PVR > 400 ml.   - Will check UA/UC. Pt asymptomatic.   - Recommend trying to decrease or stop medication that may be contributing to retention  - Ensure the patient is on a bowel regimen  - Recommend either maintaining catheter and completing trial void as an outpatient in 1-2 weeks, when more mobile, less narcotics. This can be done at TCU (if bladder scanner is available) or with patient's primary urology team. When priest removed, recommend: Check PVRs. If < 200 ml x 2, discontinue PVR checks. If > 400 ml, straight cath. If requires > 2 straight catheterizations, place priest catheter and notify patient's primary Urology team ([facility]) for repeat TOV in 1-2 weeks.   - Old records reviewed - [facility], [facility]  - Will watch for UA result and then sign off. Please page with new or worsening urologic concerns.     Thank you for consulting [facility] regarding this patient's care. Please contact us with questions or concerns.     Zac Peng, APRN, CNP  [facility]  [phone]

## 2022-10-25 NOTE — PROGRESS NOTES
[facility]  Chart Note  10/25/22    [facility] consulted for urinary retention for this 47 year old female with MS and s/p left total knee arthroplasty 10/24/22. Voiding only small amounts. Pt required CIC x 2 per RN report. Order placed for PVR checks, if > 400 ml PVR then place priest. Follow up with RN at 1615, RN reports priest was placed. Will see patient in AM 10/26 to complete consult.     Zac Peng, APRN, CNP

## 2022-10-26 ENCOUNTER — APPOINTMENT (OUTPATIENT)
Dept: OCCUPATIONAL THERAPY | Facility: CLINIC | Age: 47
End: 2022-10-26
Attending: ORTHOPAEDIC SURGERY
Payer: MEDICARE

## 2022-10-26 ENCOUNTER — APPOINTMENT (OUTPATIENT)
Dept: PHYSICAL THERAPY | Facility: CLINIC | Age: 47
End: 2022-10-26
Attending: ORTHOPAEDIC SURGERY
Payer: MEDICARE

## 2022-10-26 VITALS
HEIGHT: 65 IN | RESPIRATION RATE: 18 BRPM | WEIGHT: 278 LBS | DIASTOLIC BLOOD PRESSURE: 56 MMHG | SYSTOLIC BLOOD PRESSURE: 110 MMHG | HEART RATE: 86 BPM | TEMPERATURE: 97.9 F | OXYGEN SATURATION: 90 % | BODY MASS INDEX: 46.32 KG/M2

## 2022-10-26 LAB
FASTING STATUS PATIENT QL REPORTED: NO
GLUCOSE BLD-MCNC: 154 MG/DL (ref 70–125)
GLUCOSE BLDC GLUCOMTR-MCNC: 152 MG/DL (ref 70–99)
GLUCOSE BLDC GLUCOMTR-MCNC: 93 MG/DL (ref 70–99)
HGB BLD-MCNC: 11 G/DL (ref 11.7–15.7)

## 2022-10-26 PROCEDURE — 97110 THERAPEUTIC EXERCISES: CPT | Mod: GP

## 2022-10-26 PROCEDURE — 85018 HEMOGLOBIN: CPT

## 2022-10-26 PROCEDURE — 82962 GLUCOSE BLOOD TEST: CPT

## 2022-10-26 PROCEDURE — 97530 THERAPEUTIC ACTIVITIES: CPT | Mod: GP

## 2022-10-26 PROCEDURE — 250N000013 HC RX MED GY IP 250 OP 250 PS 637: Performed by: INTERNAL MEDICINE

## 2022-10-26 PROCEDURE — 99214 OFFICE O/P EST MOD 30 MIN: CPT | Performed by: INTERNAL MEDICINE

## 2022-10-26 PROCEDURE — 250N000013 HC RX MED GY IP 250 OP 250 PS 637

## 2022-10-26 PROCEDURE — 250N000011 HC RX IP 250 OP 636

## 2022-10-26 PROCEDURE — 82947 ASSAY GLUCOSE BLOOD QUANT: CPT | Performed by: ORTHOPAEDIC SURGERY

## 2022-10-26 PROCEDURE — 97535 SELF CARE MNGMENT TRAINING: CPT | Mod: GO

## 2022-10-26 PROCEDURE — 36415 COLL VENOUS BLD VENIPUNCTURE: CPT | Performed by: ORTHOPAEDIC SURGERY

## 2022-10-26 RX ORDER — OXYCODONE HYDROCHLORIDE 5 MG/1
5-10 TABLET ORAL EVERY 4 HOURS PRN
Qty: 25 TABLET | Refills: 0 | Status: SHIPPED | OUTPATIENT
Start: 2022-10-26 | End: 2022-11-08

## 2022-10-26 RX ORDER — POLYETHYLENE GLYCOL 3350 17 G/17G
17 POWDER, FOR SOLUTION ORAL DAILY
Qty: 510 G | Refills: 0 | Status: SHIPPED | OUTPATIENT
Start: 2022-10-26

## 2022-10-26 RX ORDER — CLONAZEPAM 1 MG/1
1 TABLET ORAL AT BEDTIME
Qty: 5 TABLET | Refills: 0 | Status: SHIPPED | OUTPATIENT
Start: 2022-10-26 | End: 2022-10-27

## 2022-10-26 RX ORDER — ASPIRIN 81 MG/1
81 TABLET ORAL 2 TIMES DAILY
Qty: 60 TABLET | Refills: 0 | Status: SHIPPED | OUTPATIENT
Start: 2022-10-26 | End: 2024-04-16

## 2022-10-26 RX ORDER — CEFADROXIL 500 MG/1
500 CAPSULE ORAL 2 TIMES DAILY
Qty: 14 CAPSULE | Refills: 0 | Status: SHIPPED | OUTPATIENT
Start: 2022-10-26 | End: 2023-08-15

## 2022-10-26 RX ORDER — AMOXICILLIN 250 MG
1-2 CAPSULE ORAL DAILY PRN
Qty: 30 TABLET | Refills: 0 | Status: SHIPPED | OUTPATIENT
Start: 2022-10-26

## 2022-10-26 RX ORDER — ACETAMINOPHEN 325 MG/1
325 TABLET ORAL EVERY 6 HOURS PRN
Qty: 20 TABLET | Refills: 0 | Status: SHIPPED | OUTPATIENT
Start: 2022-10-26

## 2022-10-26 RX ADMIN — ESCITALOPRAM OXALATE 20 MG: 20 TABLET ORAL at 09:22

## 2022-10-26 RX ADMIN — ARIPIPRAZOLE 5 MG: 5 TABLET ORAL at 09:20

## 2022-10-26 RX ADMIN — INSULIN ASPART 1 UNITS: 100 INJECTION, SOLUTION INTRAVENOUS; SUBCUTANEOUS at 12:07

## 2022-10-26 RX ADMIN — HYDROXYZINE HYDROCHLORIDE 25 MG: 25 TABLET, FILM COATED ORAL at 01:18

## 2022-10-26 RX ADMIN — SENNOSIDES AND DOCUSATE SODIUM 1 TABLET: 50; 8.6 TABLET ORAL at 09:20

## 2022-10-26 RX ADMIN — OXYCODONE HYDROCHLORIDE 10 MG: 5 TABLET ORAL at 13:25

## 2022-10-26 RX ADMIN — TIZANIDINE 2 MG: 2 TABLET ORAL at 09:21

## 2022-10-26 RX ADMIN — HYDROMORPHONE HYDROCHLORIDE 0.4 MG: 0.2 INJECTION, SOLUTION INTRAMUSCULAR; INTRAVENOUS; SUBCUTANEOUS at 14:24

## 2022-10-26 RX ADMIN — GABAPENTIN 100 MG: 100 CAPSULE ORAL at 09:19

## 2022-10-26 RX ADMIN — POLYETHYLENE GLYCOL 3350 17 G: 17 POWDER, FOR SOLUTION ORAL at 09:18

## 2022-10-26 RX ADMIN — FEXOFENADINE HYDROCHLORIDE 180 MG: 180 TABLET ORAL at 09:19

## 2022-10-26 RX ADMIN — ACETAMINOPHEN 975 MG: 325 TABLET, FILM COATED ORAL at 12:06

## 2022-10-26 RX ADMIN — GABAPENTIN 100 MG: 100 CAPSULE ORAL at 13:26

## 2022-10-26 RX ADMIN — TIZANIDINE 2 MG: 2 TABLET ORAL at 13:35

## 2022-10-26 RX ADMIN — METFORMIN ER 500 MG 1000 MG: 500 TABLET ORAL at 09:20

## 2022-10-26 RX ADMIN — ACETAMINOPHEN 975 MG: 325 TABLET, FILM COATED ORAL at 03:45

## 2022-10-26 RX ADMIN — ASPIRIN 81 MG: 81 TABLET, COATED ORAL at 09:19

## 2022-10-26 RX ADMIN — OXYCODONE HYDROCHLORIDE 10 MG: 5 TABLET ORAL at 09:20

## 2022-10-26 RX ADMIN — HYDROXYZINE HYDROCHLORIDE 25 MG: 25 TABLET, FILM COATED ORAL at 13:25

## 2022-10-26 RX ADMIN — BUSPIRONE HYDROCHLORIDE 15 MG: 5 TABLET ORAL at 09:20

## 2022-10-26 RX ADMIN — AMANTADINE HYDROCHLORIDE 200 MG: 100 CAPSULE ORAL at 09:19

## 2022-10-26 RX ADMIN — OXYCODONE HYDROCHLORIDE 5 MG: 5 TABLET ORAL at 01:17

## 2022-10-26 ASSESSMENT — ACTIVITIES OF DAILY LIVING (ADL)
ADLS_ACUITY_SCORE: 35

## 2022-10-26 NOTE — DISCHARGE SUMMARY
[facility] Discharge Summary    Karli Miner MRN# [id number]   Age: 47 year old YOB: 1975     Date of Admission:  10/24/2022  Date of Discharge::  10/26/2022  Admitting Physician:  Lester Avila MD  Discharge Physician:  Keke Coyle PA-C             Admission Diagnoses:   Osteoarthritis of left knee [M17.12]  S/P total knee arthroplasty, left [Z96.652]          Discharge Diagnosis:   Patient Active Problem List    Diagnosis     S/P lumbar fusion     Lymphadenopathy     Chronic low back pain     Migraine headache     Morbid obesity (H)     Seasonal allergic rhinitis     Health care maintenance     Primary hypertension     Type 2 diabetes mellitus without complication, without long-term current use of insulin (H)     Urinary bladder neurogenic dysfunction     Multiple sclerosis (H)     Vitamin D deficiency     Moderate persistent asthma without complication     Depressive disorder     PTSD (post-traumatic stress disorder)             Procedures:   Procedure(s): Total knee arthoplasty (Left)       No other procedures performed during this admission           Medications Prior to Admission:     Medications Prior to Admission   Medication Sig Dispense Refill Last Dose     amantadine (SYMMETREL) 100 MG capsule TAKE 1 CAPSULE TWICE DAILY AS NEEDED (Patient taking differently: Take 200 mg by mouth daily Take 2 in AM) 180 capsule 1 10/23/2022     ARIPiprazole (ABILIFY) 5 MG tablet Take 5 mg by mouth daily   10/23/2022 at AM     ascorbic acid 1000 MG TABS tablet Take 1,000 mg by mouth 2 times daily   10/23/2022     busPIRone (BUSPAR) 15 MG tablet Take 15 mg by mouth 2 times daily   10/23/2022     clonazePAM (KLONOPIN) 1 MG tablet Take 1 mg by mouth At Bedtime   10/23/2022     escitalopram (LEXAPRO) 20 MG tablet Take 20 mg by mouth daily   10/23/2022     ferrous sulfate (FEROSUL) 325 (65 Fe) MG tablet Take 650 mg by mouth daily (with breakfast)   10/23/2022     fexofenadine (ALLEGRA) 180  MG tablet Take 180 mg by mouth daily   10/23/2022     fluticasone (FLONASE) 50 MCG/ACT nasal spray Spray 2 sprays in nostril daily as needed   Unknown     gabapentin (NEURONTIN) 100 MG capsule TAKE 1 CAPSULE 3 TIMES A    capsule 0 10/23/2022 at PM     hydrOXYzine (ATARAX) 10 MG tablet Take 10 mg by mouth nightly as needed   Unknown     liraglutide (VICTOZA PEN) 18 MG/3ML solution Inject 1.8 mg Subcutaneous daily 9 mL 11 10/22/2022     metFORMIN (GLUCOPHAGE XR) 500 MG 24 hr tablet Take 2 tablets (1,000 mg) by mouth daily 180 tablet 1 10/23/2022     methenamine hippurate (HIPREX) 1 g tablet Take 1,000 mg by mouth 2 times daily   10/23/2022     nystatin (MYCOSTATIN) 949019 UNIT/GM external powder Apply topically 3 times daily as needed (rash) 60 g 0 Unknown     Ocrelizumab (OCREVUS IV) Inject into the vein every 6 months        prazosin (MINIPRESS) 1 MG capsule Take 1 mg by mouth At Bedtime   10/23/2022 at PM     rizatriptan (MAXALT) 10 MG tablet Take 10 mg by mouth at onset of headache for migraine   Unknown     solifenacin (VESICARE) 5 MG tablet Take 5 mg by mouth daily   10/23/2022     tiZANidine (ZANAFLEX) 2 MG tablet Take 1 tablet (2 mg) by mouth 2 times daily 180 tablet 0 10/23/2022     tiZANidine (ZANAFLEX) 4 MG tablet Take 2 tablets (8 mg) by mouth At Bedtime 180 tablet 0 10/23/2022     Vitamin D3 (CHOLECALCIFEROL) 125 MCG (5000 UT) tablet Take 1 tablet by mouth At Bedtime   10/23/2022     [DISCONTINUED] acetaminophen (TYLENOL) 500 MG tablet Take 2 tablets by mouth every 6 hours as needed for mild pain   Unknown     [DISCONTINUED] meloxicam (MOBIC) 15 MG tablet Take 1 tablet (15 mg) by mouth daily 90 tablet 1 10/16/2022     [DISCONTINUED] oxyCODONE (ROXICODONE) 5 MG tablet Take 5 mg by mouth every 6 hours as needed for severe pain   Unknown             Discharge Medications:     Current Discharge Medication List      START taking these medications    Details   aspirin 81 MG EC tablet Take 1 tablet (81  mg) by mouth 2 times daily  Qty: 60 tablet, Refills: 0    Comments: 81 mg aspirin BID for 30 days after surgery  Associated Diagnoses: S/P total knee arthroplasty, left      cefadroxil (DURICEF) 500 MG capsule Take 1 capsule (500 mg) by mouth 2 times daily  Qty: 14 capsule, Refills: 0    Associated Diagnoses: S/P total knee arthroplasty, left      polyethylene glycol (MIRALAX) 17 GM/Dose powder Take 17 g by mouth daily  Qty: 510 g, Refills: 0    Associated Diagnoses: S/P total knee arthroplasty, left      senna-docusate (SENOKOT-S/PERICOLACE) 8.6-50 MG tablet Take 1-2 tablets by mouth daily as needed for constipation Take while on oral narcotics to prevent or treat constipation.  Qty: 30 tablet, Refills: 0    Associated Diagnoses: S/P total knee arthroplasty, left         CONTINUE these medications which have CHANGED    Details   acetaminophen (TYLENOL) 325 MG tablet Take 1 tablet (325 mg) by mouth every 6 hours as needed for other (mild pain)  Qty: 20 tablet, Refills: 0    Associated Diagnoses: S/P total knee arthroplasty, left      oxyCODONE (ROXICODONE) 5 MG tablet Take 1-2 tablets (5-10 mg) by mouth every 4 hours as needed for moderate to severe pain  Qty: 25 tablet, Refills: 0    Associated Diagnoses: S/P total knee arthroplasty, left         CONTINUE these medications which have NOT CHANGED    Details   amantadine (SYMMETREL) 100 MG capsule TAKE 1 CAPSULE TWICE DAILY AS NEEDED  Qty: 180 capsule, Refills: 1    Associated Diagnoses: Multiple sclerosis (H)      ARIPiprazole (ABILIFY) 5 MG tablet Take 5 mg by mouth daily      ascorbic acid 1000 MG TABS tablet Take 1,000 mg by mouth 2 times daily      busPIRone (BUSPAR) 15 MG tablet Take 15 mg by mouth 2 times daily      clonazePAM (KLONOPIN) 1 MG tablet Take 1 mg by mouth At Bedtime      escitalopram (LEXAPRO) 20 MG tablet Take 20 mg by mouth daily      ferrous sulfate (FEROSUL) 325 (65 Fe) MG tablet Take 650 mg by mouth daily (with breakfast)      fexofenadine  (ALLEGRA) 180 MG tablet Take 180 mg by mouth daily      fluticasone (FLONASE) 50 MCG/ACT nasal spray Spray 2 sprays in nostril daily as needed      gabapentin (NEURONTIN) 100 MG capsule TAKE 1 CAPSULE 3 TIMES A   DAY  Qty: 270 capsule, Refills: 0    Associated Diagnoses: Multiple sclerosis (H)      hydrOXYzine (ATARAX) 10 MG tablet Take 10 mg by mouth nightly as needed      liraglutide (VICTOZA PEN) 18 MG/3ML solution Inject 1.8 mg Subcutaneous daily  Qty: 9 mL, Refills: 11    Associated Diagnoses: Type 2 diabetes mellitus without complication, without long-term current use of insulin (H)      metFORMIN (GLUCOPHAGE XR) 500 MG 24 hr tablet Take 2 tablets (1,000 mg) by mouth daily  Qty: 180 tablet, Refills: 1    Associated Diagnoses: Type 2 diabetes mellitus without complication, without long-term current use of insulin (H)      methenamine hippurate (HIPREX) 1 g tablet Take 1,000 mg by mouth 2 times daily      nystatin (MYCOSTATIN) 721634 UNIT/GM external powder Apply topically 3 times daily as needed (rash)  Qty: 60 g, Refills: 0    Associated Diagnoses: Tinea cruris      Ocrelizumab (OCREVUS IV) Inject into the vein every 6 months      prazosin (MINIPRESS) 1 MG capsule Take 1 mg by mouth At Bedtime      rizatriptan (MAXALT) 10 MG tablet Take 10 mg by mouth at onset of headache for migraine      solifenacin (VESICARE) 5 MG tablet Take 5 mg by mouth daily      !! tiZANidine (ZANAFLEX) 2 MG tablet Take 1 tablet (2 mg) by mouth 2 times daily  Qty: 180 tablet, Refills: 0    Associated Diagnoses: Multiple sclerosis (H)      !! tiZANidine (ZANAFLEX) 4 MG tablet Take 2 tablets (8 mg) by mouth At Bedtime  Qty: 180 tablet, Refills: 0    Associated Diagnoses: Multiple sclerosis (H)      Vitamin D3 (CHOLECALCIFEROL) 125 MCG (5000 UT) tablet Take 1 tablet by mouth At Bedtime      amLODIPine (NORVASC) 5 MG tablet TAKE 1 TABLET DAILY  Qty: 90 tablet, Refills: 3    Associated Diagnoses: HTN (hypertension)       !! - Potential  duplicate medications found. Please discuss with provider.      STOP taking these medications       meloxicam (MOBIC) 15 MG tablet Comments:   Reason for Stopping:                     Consultations:   Consultation during this admission received from internal medicine and urology          Brief History of Illness:   Reason for admission requiring a surgical or invasive procedure:   Patient Active Problem List    Diagnosis     S/P lumbar fusion     Lymphadenopathy     Chronic low back pain     Migraine headache     Morbid obesity (H)     Seasonal allergic rhinitis     Health care maintenance     Primary hypertension     Type 2 diabetes mellitus without complication, without long-term current use of insulin (H)     Urinary bladder neurogenic dysfunction     Multiple sclerosis (H)     Vitamin D deficiency     Moderate persistent asthma without complication     Depressive disorder     PTSD (post-traumatic stress disorder)      The patient underwent the following procedure(s):   Total knee arthoplasty (Left)   There were no immediate complications during this procedure.    Please refer to the full operative summary for details.           Hospital Course:   The patient's hospital course was unremarkable.  She recovered as anticipated and experienced no post-operative complications.           Discharge Instructions and Follow-Up:   Discharge diet: Regular   Discharge activity: Activity as tolerated  Keep leg elevated as much as possible  Up as tolerated  Driving restricitIons: no driving while taking narcotic analgesics  Weight bearing status: Weight bearing as tolerated   Discharge follow-up: Follow up with Dr. Avila in 2 weeks   Wound care: Dressing changes: Dressing to remain intact until 2 week post-op  Ice to area for comfort  Keep wound clean and dry. Cover incision when showering.  Do not submerge incision in hot tub, bath tub, pool           Discharge Disposition:   Discharged to TCU      Attestation:  Amount of  time performed on this discharge summary: 30 minutes.    Keke Coyle PA-C

## 2022-10-26 NOTE — PROGRESS NOTES
[facility] Daily Progress Note    Assessment/Plan:  Assessment/Plan: 47-year-old female status post left total knee arthroplasty on 10/24/2022.     Acute on chronic urinary retention  Likely secondary to narcotics, anesthesia  Denies any urinary symptoms  Srivastava placed 10/25  Urology input appreciated.  Follow-up with urology nursing home physician for void trial in 1 to 2 weeks.    Hypertension, essential  Continue home medications with holding parameters.     Diabetes mellitus type 2  Hemoglobin A1c 6.6 from 10/4/2022.  Goal glucose less than 180 mg/dL  NovoLog correction insulin medium scale 4 times daily AC at bedtime.  Resume metformin 1000 mg daily.  Hold Victoza until after discharge.     Sleep apnea  Does not utilize CPAP.     Class III obesity  Body mass index is 46.26 kg/m .     PTSD, depression  Resume PTA medication: Abilify, BuSpar, clonazepam, escitalopram.     Multiple sclerosis  Follows with neurology.  Previously on Lemtrada discontinued due to T-cell immunosuppression.  Last ocrelizumab infusion September 20, 2022.  Continue amantidine 200 mg daily.  Currently in remission.     History of previous lumbar decompression and fusion complicated by dural tear requiring surgical repair.  History of urinary bladder neurogenic dysfunction  Postop bladder protocol.  Hold methenamine, may resume at discharge.      Migraine headaches.   Rizatriptan 10 mg as needed.   Continue tizanidine home dose.      Status post left total knee arthroplasty  Postoperative cares per orthopedic service.   Pain control plan: oxycodone 5 mg q4h prn, tylenol 975mg q8h.   Continue gabapentin home dose.   DVT prophylaxis per orthopedics.   She would like to discharge to TCU       LOS: 0 days     Subjective:  She complains of pain in her left knee.  Denies any other shortness of breath, chest pain, urinary or bowel complaints.      acetaminophen  975 mg Oral Q8H     amantadine  200 mg Oral Daily     amLODIPine  5 mg Oral Daily      ARIPiprazole  5 mg Oral Daily     aspirin  81 mg Oral BID     busPIRone  15 mg Oral BID     clonazePAM  1 mg Oral At Bedtime     escitalopram  20 mg Oral Daily     [Held by provider] ferrous sulfate  650 mg Oral Daily with breakfast     fexofenadine  180 mg Oral Daily     gabapentin  100 mg Oral TID     insulin aspart  1-7 Units Subcutaneous TID AC     insulin aspart  1-5 Units Subcutaneous At Bedtime     metFORMIN  1,000 mg Oral Daily     polyethylene glycol  17 g Oral Daily     povidone-iodine 0.28% (BETADINE) in sterile water 500 mL irrigation   Irrigation Once     prazosin  1 mg Oral At Bedtime     senna-docusate  1 tablet Oral BID     sodium chloride (PF)  3 mL Intracatheter Q8H     tiZANidine  2 mg Oral BID     tiZANidine  8 mg Oral At Bedtime     [Held by provider] tolterodine ER  2 mg Oral Daily       Objective:  Vital signs in last 24 hours:  Temp:  [97.5  F (36.4  C)-98  F (36.7  C)] 97.9  F (36.6  C)  Pulse:  [73-86] 80  Resp:  [18-20] 18  BP: (100-126)/(50-60) 126/60  SpO2:  [86 %-97 %] 96 %    Intake/Output last 3 shifts:  I/O last 3 completed shifts:  In: 570 [P.O.:570]  Out: 900 [Urine:900]  Intake/Output this shift:  No intake/output data recorded.    Review of Systems:   As per subjective, all others negative.    Physical Exam:    General Appearance:  Alert, cooperative, no distress, appears stated age   Head:  Normocephalic, without obvious abnormality, atraumatic   Lungs:   Clear to auscultation bilaterally, respirations unlabored   Chest Wall:  No tenderness or deformity   Heart:  Regular rate and rhythm, S1, S2 normal,no murmur   Abdomen:   Soft, non tender, non distended, bowel sounds present, no guarding or rigidity   Extremities:  Status post left TKA   Skin: Skin color, texture, turgor normal, no rashes or lesions   Neurologic: Alert and oriented X 3, Moves all 4 extremities       Lab Results:  Personally Reviewed.   Recent Results (from the past 24 hour(s))   Glucose by meter    Collection  Time: 10/25/22 12:12 PM   Result Value Ref Range    GLUCOSE BY METER POCT 128 (H) 70 - 99 mg/dL   Glucose by meter    Collection Time: 10/25/22  5:04 PM   Result Value Ref Range    GLUCOSE BY METER POCT 155 (H) 70 - 99 mg/dL   Glucose by meter    Collection Time: 10/25/22  8:43 PM   Result Value Ref Range    GLUCOSE BY METER POCT 155 (H) 70 - 99 mg/dL   Glucose by meter    Collection Time: 10/26/22  8:18 AM   Result Value Ref Range    GLUCOSE BY METER POCT 93 70 - 99 mg/dL   Glucose by meter    Collection Time: 10/26/22 11:36 AM   Result Value Ref Range    GLUCOSE BY METER POCT 152 (H) 70 - 99 mg/dL         Jovita Mcintyre M.D  [facility]  Internal Medicine

## 2022-10-26 NOTE — PROGRESS NOTES
"[facility] Progress Note    POD #: 2  S/P: Procedure(s):  LEFT TOTAL KNEE ARTHROPLASTY    Subjective:  Patient reports 3-4/10 pain today. Has been utilizing ice packs. Undergoing PT, states walking going well. Does increase pain, but managed well with medicatio. Feels overall well today.   (-) N/V  (+) flatus    Objective:  /55 (BP Location: Left arm)   Pulse 78   Temp 97.5  F (36.4  C) (Oral)   Resp 20   Ht 1.651 m (5' 5\")   Wt 126.1 kg (278 lb)   SpO2 92%   BMI 46.26 kg/m    Intake/Output Summary (Last 24 hours) at 10/26/2022 0914  Last data filed at 10/26/2022 0600  Gross per 24 hour   Intake 570 ml   Output 900 ml   Net -330 ml     Physical Examination:  Incision: Mepilex dressing in place at left anterior knee. Notable for mild swelling. C/D/I.  The patient is A&Ox3. Appears comfortable, sitting up at bedside.  Sensation is intact to light touch in bilateral L2-S1 dermatomes.  Foot/Ankle Movement intact.   5/5 strength with ankle DF, PF and EHL bilaterally.  Brisk capillary refill in toes.   Palpable dorsalis pedis pulses bilaterally.  Calves aresoft and non-tender.    Assessment:  S/P Procedure(s):  LEFT TOTAL KNEE ARTHROPLASTY    Plan:  1. Internal Med following. Appreciate their recs.  2. Continue with pain management   3. Diet per protocol  4. PT/OT  5. SW- pt planning to discharge to TCU. I think this is reasonable due to priest catheter insertion and ongoing pain mgt, PT/OT.  6. Urology to see patient this AM for further plan regarding post-op urinary retention.   -she does see urology group outpatient. This has happened to her before. She sees Adina ATKINSON at [facility] (phone # [phone])    Keke Coyle PA-C  [facility]   [phone]   October 26, 2022 at 9:15 AM      "

## 2022-10-26 NOTE — PROGRESS NOTES
Karli was doing well.  She transferred to a TCU today by medical transport.  Packet was given to transporters.

## 2022-10-26 NOTE — PROGRESS NOTES
Care Management Discharge Note    Discharge Date: 10/26/2022       Discharge Disposition: Transitional Care    Discharge Services: Other (see comment)    Discharge DME: None    Discharge Transportation: agency    Private pay costs discussed: Not applicable    PAS Confirmation Code:  [place] [facility] does not require.  Patient/family educated on Medicare website which has current facility and service quality ratings:      Education Provided on the Discharge Plan: per team  Persons Notified of Discharge Plans: pt, nurse, facility, family  Patient/Family in Agreement with the Plan: yes    Handoff Referral Completed: Yes    Additional Information:  Patient requiring TCU at discharge due to  Mobility issues. Patient lives alone. Cannot return home, is below her normal baseline. Agreeable to TCU in [facility]. They are able to take her today. Plan for transport at 4pm, stretcher ride. Facilty and everyone updated.        Agatha Sexton RN

## 2022-10-26 NOTE — PLAN OF CARE
Problem: Plan of Care - These are the overarching goals to be used throughout the patient stay.    Goal: Absence of Hospital-Acquired Illness or Injury  Intervention: Identify and Manage Fall Risk  Recent Flowsheet Documentation  Taken 10/25/2022 1530 by Edel Barajas RN  Safety Promotion/Fall Prevention:   activity supervised   assistive device/personal items within reach   bed alarm on   check orthostatic blood pressure   clutter free environment maintained   fall prevention program maintained   lighting adjusted   mobility aid in reach   nonskid shoes/slippers when out of bed   patient and family education    Pt has bee OOB and she is a 1 assst with a walker.  She wears a leg brace on her R foot for foot drop.       Problem: Plan of Care - These are the overarching goals to be used throughout the patient stay.    Goal: Optimal Comfort and Wellbeing  Intervention: Monitor Pain and Promote Comfort  Recent Flowsheet Documentation  Taken 10/25/2022 1530 by Edel Barajas RN  Pain Management Interventions:   care clustered   pain management plan reviewed with patient/caregiver   quiet environment facilitated   relaxation techniques promoted   rest    Pt appears comfortable and has not requested prn pain meds this shift.     Goal Outcome Evaluation:      Outcome Evaluation: Karli will discharge to a TCU tomorrow 10/26.  She is doing well. priest is in place due to urinary retention.  VSS.

## 2022-10-26 NOTE — PLAN OF CARE
Patient vital signs are at baseline: Yes  Patient able to ambulate as they were prior to admission or with assist devices provided by therapies during their stay:  Yes  Patient MUST void prior to discharge:  No,  Reason:  leaving with Srivastava  Patient able to tolerate oral intake:  Yes  Pain has adequate pain control using Oral analgesics:  No,  Reason:  bump of iv dilaudid given to get pain back under control  Does patient have an identified :  Yes  Has goal D/C date and time been discussed with patient:  Yes

## 2022-10-26 NOTE — PLAN OF CARE
Problem: Knee Arthroplasty  Goal: Optimal Coping  Outcome: Progressing   Goal Outcome Evaluation:  Patient vital signs are at baseline: Yes. 02 on 2L to maintain SATS above 92%  Patient able to ambulate as they were prior to admission or with assist devices provided by therapies during their stay:  Yes  Patient MUST void prior to discharge:  No,  Reason:  Srivastava catheter in place  Patient able to tolerate oral intake:  Yes  Pain has adequate pain control using Oral analgesics:  Yes.   Does patient have an identified :  Yes  Has goal D/C date and time been discussed with patient:  Yes. Awaiting TCU placement

## 2022-10-27 ENCOUNTER — NURSING HOME VISIT (OUTPATIENT)
Dept: GERIATRICS | Facility: CLINIC | Age: 47
End: 2022-10-27
Payer: MEDICARE

## 2022-10-27 DIAGNOSIS — G35 MULTIPLE SCLEROSIS (H): ICD-10-CM

## 2022-10-27 DIAGNOSIS — F41.9 ANXIETY: ICD-10-CM

## 2022-10-27 DIAGNOSIS — Z96.652 STATUS POST TOTAL LEFT KNEE REPLACEMENT: Primary | ICD-10-CM

## 2022-10-27 PROCEDURE — 99304 1ST NF CARE SF/LOW MDM 25: CPT | Performed by: FAMILY MEDICINE

## 2022-10-27 RX ORDER — CLONAZEPAM 1 MG/1
1 TABLET ORAL AT BEDTIME
Qty: 30 TABLET | Refills: 0 | Status: SHIPPED | OUTPATIENT
Start: 2022-10-27 | End: 2023-04-26

## 2022-10-27 NOTE — PROGRESS NOTES
[facility]    PRIMARY CARE PROVIDER AND CLINIC:  Fredy Mendosa MD, [address] [facility] [address]  No chief complaint on file.     [place] Medical Record Number:  [id number]  Place of Service where encounter took place:  [facility] ([facility]) [[id number]]    Karli Miner  is a 47 year old  (1975), this is nursing home admission and hospital follow-up.  Patient was recently hospitalized for left knee osteoarthritis status post total knee arthroplasty.  She is here for PT OT.  She has a history of MS and morbid obesity  HPI:        CODE STATUS/ADVANCE DIRECTIVES DISCUSSION:  Full Code  CPR/Full code   ALLERGIES:   Allergies   Allergen Reactions     Sulfa Drugs Diarrhea, Rash and Nausea and Vomiting     Adhesive Tape Rash      PAST MEDICAL HISTORY:   Past Medical History:   Diagnosis Date     Anemia      Arthritis      Calcium nephrolithiasis 06/15/2016     Chronic low back pain 03/28/2022     Depressive disorder 08/26/2013    Formatting of this note might be different from the original. Depressive disorder, not elsewhere classified ([facility])     Heart murmur      Migraine headache 03/28/2022     Moderate persistent asthma without complication 02/05/2015     Morbid obesity (H) 03/28/2022     MRSA infection     hx no labs to rule out since     Multiple sclerosis (H) 04/13/2016     PONV (postoperative nausea and vomiting)      Primary hypertension 05/24/2021     PTSD (post-traumatic stress disorder) 06/21/2011     Seasonal allergic rhinitis 03/28/2022     Sleep apnea     no CPAP     Type 2 diabetes mellitus without complication, without long-term current use of insulin (H) 05/24/2021     Urinary bladder neurogenic dysfunction 06/15/2016     Urinary incontinence      Vitamin D deficiency 04/13/2016      PAST SURGICAL HISTORY:   has a past surgical history that includes Ureteroscopy (2016); tonsillectomy & adenoidectomy (1989); Mammoplasty reduction (2001); appendectomy (2014);  Cholecystectomy;  section; tubal ligation; bone marrow biopsy (); lymph node biopsy; laparoscopy, surgical; repair incisional or ventral hernia (); Spinal Fusion; Lumbar Fusion (2022); cystoscopy (2016); hrw port a cath (2016); lap ventral hernia repair (2021); and Arthroplasty knee (Left, 10/24/2022).  FAMILY HISTORY: family history includes Anxiety Disorder in her daughter and daughter; Bipolar Disorder in her father; Cancer in her father; Colon Cancer in her father; Depression in her daughter and father; Diabetes in her father; Diabetes Type 2  in her father; Gallbladder Disease in her mother; Heart Failure in her father; Hypothyroidism in her sister; Melanoma in her father; Migraines in her daughter, mother, and sister; Thyroid Disease in her sister.  SOCIAL HISTORY:   reports that she has never smoked. She has never used smokeless tobacco. She reports current alcohol use. She reports current drug use. Drugs: Opiates and Marijuana.  Patient's living condition:     Post Discharge Medication Reconciliation Status:   MED REC REQUIRED med rec and hospital discharge summary reviewed  Post Medication Reconciliation Status:        Current Outpatient Medications   Medication Sig     acetaminophen (TYLENOL) 325 MG tablet Take 1 tablet (325 mg) by mouth every 6 hours as needed for other (mild pain)     amantadine (SYMMETREL) 100 MG capsule TAKE 1 CAPSULE TWICE DAILY AS NEEDED (Patient taking differently: Take 200 mg by mouth daily Take 2 in AM)     amLODIPine (NORVASC) 5 MG tablet TAKE 1 TABLET DAILY     ARIPiprazole (ABILIFY) 5 MG tablet Take 5 mg by mouth daily     ascorbic acid 1000 MG TABS tablet Take 1,000 mg by mouth 2 times daily     aspirin 81 MG EC tablet Take 1 tablet (81 mg) by mouth 2 times daily     busPIRone (BUSPAR) 15 MG tablet Take 15 mg by mouth 2 times daily     cefadroxil (DURICEF) 500 MG capsule Take 1 capsule (500 mg) by mouth 2 times daily     clonazePAM  (KLONOPIN) 1 MG tablet Take 1 tablet (1 mg) by mouth At Bedtime     escitalopram (LEXAPRO) 20 MG tablet Take 20 mg by mouth daily     ferrous sulfate (FEROSUL) 325 (65 Fe) MG tablet Take 650 mg by mouth daily (with breakfast)     fexofenadine (ALLEGRA) 180 MG tablet Take 180 mg by mouth daily     fluticasone (FLONASE) 50 MCG/ACT nasal spray Spray 2 sprays in nostril daily as needed     gabapentin (NEURONTIN) 100 MG capsule TAKE 1 CAPSULE 3 TIMES A   DAY     hydrOXYzine (ATARAX) 10 MG tablet Take 10 mg by mouth nightly as needed     liraglutide (VICTOZA PEN) 18 MG/3ML solution Inject 1.8 mg Subcutaneous daily     metFORMIN (GLUCOPHAGE XR) 500 MG 24 hr tablet Take 2 tablets (1,000 mg) by mouth daily     methenamine hippurate (HIPREX) 1 g tablet Take 1,000 mg by mouth 2 times daily     nystatin (MYCOSTATIN) 471610 UNIT/GM external powder Apply topically 3 times daily as needed (rash)     Ocrelizumab (OCREVUS IV) Inject into the vein every 6 months     oxyCODONE (ROXICODONE) 5 MG tablet Take 1-2 tablets (5-10 mg) by mouth every 4 hours as needed for moderate to severe pain     polyethylene glycol (MIRALAX) 17 GM/Dose powder Take 17 g by mouth daily     prazosin (MINIPRESS) 1 MG capsule Take 1 mg by mouth At Bedtime     rizatriptan (MAXALT) 10 MG tablet Take 10 mg by mouth at onset of headache for migraine     senna-docusate (SENOKOT-S/PERICOLACE) 8.6-50 MG tablet Take 1-2 tablets by mouth daily as needed for constipation Take while on oral narcotics to prevent or treat constipation.     solifenacin (VESICARE) 5 MG tablet Take 5 mg by mouth daily     tiZANidine (ZANAFLEX) 2 MG tablet Take 1 tablet (2 mg) by mouth 2 times daily     tiZANidine (ZANAFLEX) 4 MG tablet Take 2 tablets (8 mg) by mouth At Bedtime     Vitamin D3 (CHOLECALCIFEROL) 125 MCG (5000 UT) tablet Take 1 tablet by mouth At Bedtime     No current facility-administered medications for this visit.       ROS:  4 point ROS including Respiratory, CV, GI and ,  other than that noted in the HPI,  is negative    Vitals:  There were no vitals taken for this visit.  Exam:  GENERAL APPEARANCE:  Alert, in no distress  EYES:  EOM, conjunctivae, lids, pupils and irises normal  NECK:  No adenopathy,masses or thyromegaly  RESP:  respiratory effort and palpation of chest normal  CV:  Palpation and auscultation of heart done , regular rate and rhythm, no murmur, rub, or gallop  ABDOMEN:  normal bowel sounds, soft, nontender, no hepatosplenomegaly or other masses  M/S:   Left knee dressing  SKIN:  Inspection of skin and subcutaneous tissue baseline  NEURO:   Cranial nerves 2-12 are normal tested and grossly at patient's baseline    Lab/Diagnostic data:      ASSESSMENT/PLAN:  (Z96.652) Status post total left knee replacement  (primary encounter diagnosis)  Comment: Doing well here for PT OT  Plan:     (G35) Multiple sclerosis (H)  Comment: Stable  Plan:             Total time spent with patient visit at the skilled nursing facility was 35 min including patient visit and review of past records.    Joshua Coles MD on 10/27/2022 at 8:02 AM

## 2022-10-27 NOTE — PLAN OF CARE
Occupational Therapy Discharge Summary    Reason for therapy discharge:    Discharged to transitional care facility.    Progress towards therapy goal(s). See goals on Care Plan in [facility] electronic health record for goal details.  Goals not met.  Barriers to achieving goals:   discharge from facility.    Therapy recommendation(s):    Continued therapy is recommended.  Rationale/Recommendations:  not at baseline for ADLs.

## 2022-11-08 DIAGNOSIS — Z96.652 S/P TOTAL KNEE ARTHROPLASTY, LEFT: ICD-10-CM

## 2022-11-08 RX ORDER — OXYCODONE HYDROCHLORIDE 5 MG/1
5-10 TABLET ORAL EVERY 4 HOURS PRN
Qty: 30 TABLET | Refills: 0 | Status: SHIPPED | OUTPATIENT
Start: 2022-11-08 | End: 2024-05-24

## 2022-11-10 ENCOUNTER — TRANSFERRED RECORDS (OUTPATIENT)
Dept: HEALTH INFORMATION MANAGEMENT | Facility: CLINIC | Age: 47
End: 2022-11-10

## 2022-11-23 DIAGNOSIS — G35 MULTIPLE SCLEROSIS (H): ICD-10-CM

## 2022-11-25 NOTE — TELEPHONE ENCOUNTER
Routing refill request to provider for review/approval because:  LOV 10/04/2022     HEYDI Marshall  [facility]

## 2022-11-26 DIAGNOSIS — G35 MULTIPLE SCLEROSIS (H): ICD-10-CM

## 2022-11-27 RX ORDER — GABAPENTIN 100 MG/1
CAPSULE ORAL
Qty: 270 CAPSULE | Refills: 0 | Status: SHIPPED | OUTPATIENT
Start: 2022-11-27 | End: 2023-02-09

## 2022-11-28 NOTE — TELEPHONE ENCOUNTER
Routing refill request to provider for review/approval because:  Drug not on the [facility] refill protocol   LOV 10/04/2022     HEYDI Marshall  [facility]

## 2022-11-29 RX ORDER — TIZANIDINE 2 MG/1
TABLET ORAL
Qty: 180 TABLET | Refills: 0 | Status: SHIPPED | OUTPATIENT
Start: 2022-11-29 | End: 2023-05-16

## 2023-02-02 DIAGNOSIS — E11.9 TYPE 2 DIABETES MELLITUS WITHOUT COMPLICATION, WITHOUT LONG-TERM CURRENT USE OF INSULIN (H): ICD-10-CM

## 2023-02-02 RX ORDER — METFORMIN HCL 500 MG
TABLET, EXTENDED RELEASE 24 HR ORAL
Qty: 180 TABLET | Refills: 1 | Status: SHIPPED | OUTPATIENT
Start: 2023-02-02 | End: 2023-06-28

## 2023-02-02 NOTE — TELEPHONE ENCOUNTER
Prescription approved per [facility] Refill Protocol.    Last Written Prescription Date: 9/6/22  Last Fill Quantity: 180,  # refills: 1   Last office visit: 10/4/2022 with prescribing provider

## 2023-03-07 ENCOUNTER — TELEPHONE (OUTPATIENT)
Dept: FAMILY MEDICINE | Facility: CLINIC | Age: 48
End: 2023-03-07
Payer: MEDICARE

## 2023-03-07 NOTE — TELEPHONE ENCOUNTER
CSS contacted pt and relayed Dr. Mendosa message to her:      I don't have any specific directions for more local resources   If she is wanting to explore [place] neurology avenues, I'm okay with placing referral order to see what can get set up in [facility] direction   Alternative would be contacting her [place] neurology clinic on available resources.    Pt will check with the neurology clinic in [place].

## 2023-03-07 NOTE — TELEPHONE ENCOUNTER
General Call      Reason for Call:  Patient wondering if anyone at the [facility] clinic does Botox injections, previously [facility], wondering if this is something that she can continue to do with [facility]. If not, looking for referral to someone closer to her that does.    Date of last appointment with provider: 10/04/2022, was just at appointment yesterday for her MS and they recommended she get this before starting PT    Okay to leave a detailed message?: Yes at Cell number on file:    Telephone Information:   Mobile [phone]

## 2023-04-18 DIAGNOSIS — G35 MULTIPLE SCLEROSIS (H): Primary | ICD-10-CM

## 2023-04-18 DIAGNOSIS — F41.9 ANXIETY: ICD-10-CM

## 2023-04-19 RX ORDER — METHENAMINE HIPPURATE 1000 MG/1
1 TABLET ORAL 2 TIMES DAILY
Qty: 180 TABLET | Refills: 3 | Status: SHIPPED | OUTPATIENT
Start: 2023-04-19

## 2023-04-19 NOTE — TELEPHONE ENCOUNTER
Routing to provider as refill request for review/approval because:  Drug not on the [facility] refill protocol     Malathi Puckett RN  [facility]

## 2023-04-20 DIAGNOSIS — G35 MULTIPLE SCLEROSIS (H): ICD-10-CM

## 2023-04-21 NOTE — TELEPHONE ENCOUNTER
Routing to provider as refill request for review/approval because:  Drug not on the [facility] refill protocol   LOV 10/4/22  Last filled on 11/29/22      Malathi Puckett RN  [facility]

## 2023-04-25 ENCOUNTER — TRANSFERRED RECORDS (OUTPATIENT)
Dept: HEALTH INFORMATION MANAGEMENT | Facility: CLINIC | Age: 48
End: 2023-04-25
Payer: MEDICARE

## 2023-04-26 RX ORDER — CLONAZEPAM 1 MG/1
1 TABLET ORAL AT BEDTIME
Qty: 90 TABLET | Refills: 1 | Status: SHIPPED | OUTPATIENT
Start: 2023-04-26

## 2023-04-26 NOTE — TELEPHONE ENCOUNTER
Medication Question or Refill    Contacts       Type Contact Phone/Fax    04/18/2023 01:41 PM CDT Phone (Incoming) Karli Miner (Self) [phone] (H)    04/26/2023 10:48 AM CDT Phone (Incoming) Karli Miner (Self) [phone] (H)        What medication are you calling about (include dose and sig)?:      Preferred Pharmacy:  [id number]  [address] [address]  Phone: [phone] Fax: [phone]      Controlled Substance Agreement on file:   CSA -- Patient Level:    CSA: None found at the patient level.       Who prescribed the medication?: TFS, but she said her neurologist used to prescribe this medication however it has since been delegated/transferred to [facility]    Do you need a refill? Yes    Patient offered an appointment? Yes: I asked the PT if she'd like to make an appointment but she said she typically isn't seen regularly for this medication.    Do you have any questions or concerns?  No    Okay to leave a detailed message?: Yes at Home number on file [phone] (home)

## 2023-05-16 DIAGNOSIS — G35 MULTIPLE SCLEROSIS (H): ICD-10-CM

## 2023-05-16 RX ORDER — TIZANIDINE 2 MG/1
2 TABLET ORAL 2 TIMES DAILY
Qty: 180 TABLET | Refills: 1 | Status: SHIPPED | OUTPATIENT
Start: 2023-05-16 | End: 2023-10-24

## 2023-05-16 NOTE — TELEPHONE ENCOUNTER
Routing refill request to provider for review/approval because:  Drug not on the [facility] refill protocol     Last Written Prescription Date: 11/29/22  Last Fill Quantity: 180,  # refills: 0   Last office visit: 10/4/2022

## 2023-05-16 NOTE — TELEPHONE ENCOUNTER
Medication Question or Refill    What medication are you calling about (include dose and sig)?:      Preferred Pharmacy:  [facility]  [place] [address]  Phone: [phone] Fax: [phone]    Who prescribed the medication?: BRM    Do you need a refill? Yes    Okay to leave a detailed message?: Yes at Home number on file [phone] (home)

## 2023-06-26 DIAGNOSIS — E11.9 TYPE 2 DIABETES MELLITUS WITHOUT COMPLICATION, WITHOUT LONG-TERM CURRENT USE OF INSULIN (H): ICD-10-CM

## 2023-06-28 RX ORDER — METFORMIN HCL 500 MG
TABLET, EXTENDED RELEASE 24 HR ORAL
Qty: 180 TABLET | Refills: 1 | Status: SHIPPED | OUTPATIENT
Start: 2023-06-28

## 2023-06-28 NOTE — TELEPHONE ENCOUNTER
Routing refill request to provider for review/approval because:  LOV 10/04/2022    Care team please reach out to patient to schedule appointment      Biguanide Agents Failed     Patient has documented A1c within the specified period of time.    Recent (6 mo) or future (30 days) visit within the authorizing provider's specialty           HEYDI Marshall  [facility]

## 2023-08-05 DIAGNOSIS — G35 MULTIPLE SCLEROSIS (H): ICD-10-CM

## 2023-08-11 ENCOUNTER — TELEPHONE (OUTPATIENT)
Dept: FAMILY MEDICINE | Facility: CLINIC | Age: 48
End: 2023-08-11

## 2023-08-11 NOTE — TELEPHONE ENCOUNTER
S-(situation):   Patient is out of town and has UTI. Would like antibiotic prescribed.    B-(background):       A-(assessment):   Pain, burning, urgency, incontinence.    R-(recommendations):   Patient advised to schedule E-visit.  Instructions sent via [employer].    Patient instructed to call back if symptoms change or if new symptoms present. Patient verbalizes agreement with plan.    HEYDI Trejo  [place], [place]  [phone]

## 2023-08-13 ENCOUNTER — HEALTH MAINTENANCE LETTER (OUTPATIENT)
Age: 48
End: 2023-08-13

## 2023-08-15 ENCOUNTER — LAB (OUTPATIENT)
Dept: LAB | Facility: CLINIC | Age: 48
End: 2023-08-15
Payer: MEDICARE

## 2023-08-15 ENCOUNTER — E-VISIT (OUTPATIENT)
Dept: FAMILY MEDICINE | Facility: CLINIC | Age: 48
End: 2023-08-15

## 2023-08-15 DIAGNOSIS — N39.0 ACUTE UTI (URINARY TRACT INFECTION): Primary | ICD-10-CM

## 2023-08-15 DIAGNOSIS — N39.0 ACUTE UTI (URINARY TRACT INFECTION): ICD-10-CM

## 2023-08-15 LAB
ALBUMIN UR-MCNC: 100 MG/DL
APPEARANCE UR: ABNORMAL
BACTERIA #/AREA URNS HPF: ABNORMAL /HPF
BILIRUB UR QL STRIP: NEGATIVE
COLOR UR AUTO: YELLOW
CREAT UR-MCNC: 118 MG/DL
GLUCOSE UR STRIP-MCNC: NEGATIVE MG/DL
HGB UR QL STRIP: ABNORMAL
KETONES UR STRIP-MCNC: NEGATIVE MG/DL
LEUKOCYTE ESTERASE UR QL STRIP: ABNORMAL
MICROALBUMIN UR-MCNC: 398 MG/L
MICROALBUMIN/CREAT UR: 337.29 MG/G CR (ref 0–25)
NITRATE UR QL: NEGATIVE
PH UR STRIP: 6.5 [PH] (ref 5–7)
RBC #/AREA URNS AUTO: ABNORMAL /HPF
SP GR UR STRIP: 1.02 (ref 1–1.03)
SQUAMOUS #/AREA URNS AUTO: ABNORMAL /LPF
UROBILINOGEN UR STRIP-ACNC: 0.2 E.U./DL
WBC #/AREA URNS AUTO: >100 /HPF
WBC CLUMPS #/AREA URNS HPF: PRESENT /HPF

## 2023-08-15 PROCEDURE — 81001 URINALYSIS AUTO W/SCOPE: CPT

## 2023-08-15 PROCEDURE — 82570 ASSAY OF URINE CREATININE: CPT | Performed by: INTERNAL MEDICINE

## 2023-08-15 PROCEDURE — 82043 UR ALBUMIN QUANTITATIVE: CPT | Performed by: INTERNAL MEDICINE

## 2023-08-15 PROCEDURE — 87086 URINE CULTURE/COLONY COUNT: CPT

## 2023-08-15 PROCEDURE — 99422 OL DIG E/M SVC 11-20 MIN: CPT | Performed by: INTERNAL MEDICINE

## 2023-08-15 RX ORDER — NITROFURANTOIN 25; 75 MG/1; MG/1
100 CAPSULE ORAL 2 TIMES DAILY
Qty: 10 CAPSULE | Refills: 0 | Status: SHIPPED | OUTPATIENT
Start: 2023-08-15 | End: 2023-08-22

## 2023-08-15 NOTE — PATIENT INSTRUCTIONS
Dear Karli Miner    After reviewing your responses, I've been able to diagnose you with a urinary tract infection, which is a common infection of the bladder with bacteria.  This is not a sexually transmitted infection, though urinating immediately after intercourse can help prevent infections.  Drinking lots of fluids is also helpful to clear your current infection and prevent the next one.      I have sent a prescription for antibiotics to your pharmacy to treat this infection.    It is important that you take all of your prescribed medication even if your symptoms are improving after a few doses.  Taking all of your medicine helps prevent the symptoms from returning.     If your symptoms worsen, you develop pain in your back or stomach, develop fevers, or are not improving in 5 days, please contact your primary care provider for an appointment or visit any of our convenient Walk-in or Urgent Care Centers to be seen, which can be found on our website here.    Thanks again for choosing us as your health care partner,    Fredy Mendosa MD

## 2023-08-15 NOTE — TELEPHONE ENCOUNTER
Pt called back to ask where her e-visit appt went that she submitted last Fri 8/11/2023; as it has disappeared from her MyChart.  Advised pt to submit another one and will route message to provider to keep look out for and have addressed.    Pt states she will do that rt now; and states to still have continence issues.    (MyChart E-visit was cancelled by care team for unknown reason)

## 2023-08-17 LAB — BACTERIA UR CULT: NORMAL

## 2023-08-24 ENCOUNTER — E-VISIT (OUTPATIENT)
Dept: FAMILY MEDICINE | Facility: CLINIC | Age: 48
End: 2023-08-24
Payer: MEDICARE

## 2023-08-24 DIAGNOSIS — N39.0 ACUTE UTI (URINARY TRACT INFECTION): Primary | ICD-10-CM

## 2023-08-24 PROCEDURE — 99421 OL DIG E/M SVC 5-10 MIN: CPT | Performed by: INTERNAL MEDICINE

## 2023-08-24 NOTE — PATIENT INSTRUCTIONS
Dear Karli Miner,     After reviewing your responses, I would like you to come in for a urine test to make sure we treat you correctly. This urine test is to evaluate you for a possible urinary tract infection, and should be scheduled for today or tomorrow. Schedule a Lab Only appointment here.     Lab appointments are not available at most locations on the weekends. If no Lab Only appointment is available, you should be seen in any of our convenient Walk-in or Urgent Care Centers, which can be found on our website here.     You will receive instructions with your results in [employer] once they are available.     If your symptoms worsen, you develop pain in your back or stomach, develop fevers, or are not improving in 5 days, please contact your primary care provider for an appointment or visit a Walk-in or Urgent Care Center to be seen.     Thanks again for choosing us as your health care partner,     Fredy Mendosa MD

## 2023-08-28 RX ORDER — CEPHALEXIN 500 MG/1
500 CAPSULE ORAL 3 TIMES DAILY
Qty: 30 CAPSULE | Refills: 0 | Status: SHIPPED | OUTPATIENT
Start: 2023-08-28 | End: 2023-09-07

## 2023-08-31 ENCOUNTER — E-VISIT (OUTPATIENT)
Dept: FAMILY MEDICINE | Facility: CLINIC | Age: 48
End: 2023-08-31
Payer: MEDICARE

## 2023-08-31 DIAGNOSIS — R05.1 ACUTE COUGH: Primary | ICD-10-CM

## 2023-08-31 PROCEDURE — 99421 OL DIG E/M SVC 5-10 MIN: CPT | Performed by: INTERNAL MEDICINE

## 2023-08-31 NOTE — PATIENT INSTRUCTIONS
Dear Karli Miner    Nothing more specific with symptoms to better discern from viral versus bacterial sinusitis/pneumonia.  Recently prescribed Keflex would be a reasonable fit for potential bacterial sinusitis    Given your immunosuppression, I would like you to come in for chest x-ray  Also requesting arrangements for COVID PCR nasal swab.  (There has been a significant uptake in COVID in the community in recent weeks).    Thanks for choosing us as your health care partner,    Fredy Mendosa MD

## 2023-10-17 ENCOUNTER — E-VISIT (OUTPATIENT)
Dept: FAMILY MEDICINE | Facility: CLINIC | Age: 48
End: 2023-10-17
Payer: MEDICARE

## 2023-10-17 ENCOUNTER — TELEPHONE (OUTPATIENT)
Dept: FAMILY MEDICINE | Facility: CLINIC | Age: 48
End: 2023-10-17

## 2023-10-17 DIAGNOSIS — N39.0 ACUTE UTI (URINARY TRACT INFECTION): Primary | ICD-10-CM

## 2023-10-17 DIAGNOSIS — N30.00 ACUTE CYSTITIS WITHOUT HEMATURIA: Primary | ICD-10-CM

## 2023-10-17 PROCEDURE — 99421 OL DIG E/M SVC 5-10 MIN: CPT | Performed by: INTERNAL MEDICINE

## 2023-10-17 RX ORDER — NITROFURANTOIN 25; 75 MG/1; MG/1
100 CAPSULE ORAL 2 TIMES DAILY
Qty: 14 CAPSULE | Refills: 0 | Status: SHIPPED | OUTPATIENT
Start: 2023-10-17 | End: 2023-10-24

## 2023-10-17 NOTE — TELEPHONE ENCOUNTER
Patient Returning Call    Reason for call: Patient called back to let Dr. Mendosa know that - Yes the Macrobid did work for her and if you could send to [facility].    Information relayed to patient:  Yes    Patient has additional questions:  No      Could we send this information to you in [employer] or would you prefer to receive a phone call?:   Patient would prefer a phone call   Okay to leave a detailed message?: Yes at Cell number on file:    Telephone Information:   Mobile [phone]

## 2023-10-17 NOTE — PATIENT INSTRUCTIONS
Dear Karli Miner,     After reviewing your responses, I would like you to come in for a urine test to make sure we treat you correctly. This urine test is to evaluate you for a possible urinary tract infection, and should be scheduled for today or tomorrow. Schedule a Lab Only appointment here.     Lab appointments are not available at most locations on the weekends. If no Lab Only appointment is available, you should be seen in any of our convenient Walk-in or Urgent Care Centers, which can be found on our website here.     You will receive instructions with your results in [employer] once they are available.     If your symptoms worsen, you develop pain in your back or stomach, develop fevers, or are not improving in 5 days, please contact your primary care provider for an appointment or visit a Walk-in or Urgent Care Center to be seen.     Thanks again for choosing us as your health care partner,     Fredy Mendosa MD  Dear Karli Miner    Did Aug episode get better with Macrobid antibiotic use? Or just resolve with time?    Thanks for choosing us as your health care partner,    Fredy Mendosa MD

## 2023-10-19 NOTE — TELEPHONE ENCOUNTER
Please let pt know I have hu tin her Keflex, let us know if she is not improving and we also have the culture pending   Z Plasty Text: The lesion was extirpated to the level of the fat with a #15 scalpel blade.  Given the location of the defect, shape of the defect and the proximity to free margins a Z-plasty was deemed most appropriate for repair.  Using a sterile surgical marker, the appropriate transposition arms of the Z-plasty were drawn incorporating the defect and placing the expected incisions within the relaxed skin tension lines where possible.    The area thus outlined was incised deep to adipose tissue with a #15 scalpel blade.  The skin margins were undermined to an appropriate distance in all directions utilizing iris scissors.  The opposing transposition arms were then transposed into place in opposite direction and anchored with interrupted buried subcutaneous sutures.

## 2023-10-21 DIAGNOSIS — G35 MULTIPLE SCLEROSIS (H): ICD-10-CM

## 2023-10-24 RX ORDER — TIZANIDINE 2 MG/1
2 TABLET ORAL 2 TIMES DAILY
Qty: 180 TABLET | Refills: 1 | Status: SHIPPED | OUTPATIENT
Start: 2023-10-24 | End: 2024-05-21

## 2023-11-27 DIAGNOSIS — G35 MULTIPLE SCLEROSIS (H): ICD-10-CM

## 2023-12-04 ENCOUNTER — TELEPHONE (OUTPATIENT)
Dept: FAMILY MEDICINE | Facility: CLINIC | Age: 48
End: 2023-12-04
Payer: MEDICARE

## 2023-12-04 NOTE — TELEPHONE ENCOUNTER
Medication Question or Refill    What medication are you calling about (include dose and sig)?: Tizanidine 4 MG tablet and 2 MG tablet    Preferred Pharmacy:  [facility] [address] [facility] AT Portal to Registered [facility] Sites  [facility]  [place] [address]  Phone: [phone] Fax: [phone]      Controlled Substance Agreement on file:   CSA -- Patient Level:    CSA: None found at the patient level.       Who prescribed the medication?: Dr. Mendosa    Do you need a refill? Yes    Do you have any questions or concerns?  Yes Pharmacy has some questions about how he RX is written and until it's clear they have it on hold.    Please call back [facility]- [phone] Reference #[id number]

## 2023-12-05 NOTE — TELEPHONE ENCOUNTER
RN called to speak with pharmacy for more information.    October tizanidine 2mg (BID) was filled.  11/27/23 new RX for 4mg tabs for bedtime dosing.    Pharmacy would like to clarify if (BOTH RX are to continue or if 4mg RX is replacing 2mg RX).        REF #  [id number]

## 2023-12-06 NOTE — TELEPHONE ENCOUNTER
Spoke with Pharmacist at [facility] and gave instructions per Dr. Mendosa. Pharmacist Rafat expressed understanding.

## 2023-12-31 ENCOUNTER — HEALTH MAINTENANCE LETTER (OUTPATIENT)
Age: 48
End: 2023-12-31

## 2024-03-07 NOTE — CARE COORDINATION
Karli called stating she needs disability paperwork filled out/signed by [employer]-she is going to drop off so I can reviewpaperwork received-see msg to SHANNON   [facility] ambulatory encounter      CHIEF COMPLAINT:    Chief Complaint   Patient presents with    Menstrual Problem     Dysmenorrhea- onset for a long time.     Patient states that in January of this year, she was on cycle for 18 days. Heavy bleeding, not passing clots.          SUBJECTIVE:  Preeti Turcios is a 38 year old female who presented requesting evaluation for the following medical concern:    Dysmenorrhea: The patient reports continuing to have awful periods. Notes her January period was super heavy and painful. She notes she always has pain on the right pelvic region during her periods, but this pain was 10/10. The patient had an unremarkable US Pelvis obtained at the ED in July of 2023, patient notes her January painful period was way more painful than the one she had at the time of that ED visit. She reports that she did not go to the ED at that time because she feels she never gets answers there. The patient reports that her February period was back to her baseline, 5-7 days long, several days of cramps that were moderately painful and moderate heaviness to her flow.  The patient denies vaginal itching or irritation. She denies fever or chills or sweats. She denies pain or burning with urination. She denies frequency or urgency with urination. She denies cloudy or malodorous urine. The patient reports she is with her same partner, no new partner. Patient's ED visit results were reviewed again, patient did have Trichomonas found on her vaginitis panel. She did have repeat testing here with me one month later and the Trichomonas test was negative. The patient is noted to have had ED visits in November 2023 (STD screenings, as partner was suspected to have cheated on her) and January of 2024 (retained tampon). No positive STD findings at those visits. The patient was advised at her August WWE that IUD may be a great option to improve her periods. The patient did not follow through with having the IUD  placed. I did make note that this would still be my recommendation, however first I would check a Pelvic Ultrasound to rule out any other etiology for the worsening period.     Review of systems:   All other systems are reviewed and are negative except as documented in the history of present illness.     OBJECTIVE:  PROBLEM LIST:   Patient Active Problem List   Diagnosis    Family history of diabetes mellitus (DM)    GREG (obstructive sleep apnea)    General medical exam    Obesity, morbid, BMI 50 or higher (CMD)    Vitamin D deficiency    Hx of trichomonal vaginitis    Primary hypertension    Dysmenorrhea       PAST HISTORIES:   I have reviewed the past medical history, family history, social history, medications and allergies listed in the medical record as obtained by my nursing staff and support staff and agree with their documentation.    PHYSICAL EXAM:   Visit Vitals  /84   Pulse 85   Ht 5' 7\" (1.702 m)   Wt 135.9 kg (299 lb 11.2 oz)   LMP 02/21/2024 (Exact Date)   SpO2 98%   BMI 46.94 kg/m²       General Appearance:  Alert, cooperative, no distress, appears stated age.  Head:  Normocephalic, without obvious abnormality, atraumatic.  Neurologic:  Cranial nerves II-XII intact, normal strength, sensation and reflexes throughout.  Genitalia:  External genitalia, bulbourethral and [place]'s glands without lesions.  Vaginal vault with scant secretions, no lesions, mild odor appreciated.  Cervix without friability or motion tenderness.  Uterus not palpable due to body habitus.  Adnexa without masses or tenderness but difficult to exam due to habitus.        LAB RESULTS:   All pertinent laboratory results were reviewed.    ASSESSMENT:   1. Dysmenorrhea    2. Pelvic pain in female        PLAN:   Orders Placed This Encounter    US PELVIS NON-OB EXTERNAL TRANSABDOMINAL AND INTERNAL TRANSVAGINAL    [facility] Vaginitis Panel    Chlamydia/Gonorrhea by Nucleic Acid Amplification    [facility] Bacterial Vaginosis by EWELINA     [facility] Candida/Trichomonas Panel by EWELINA       Dysmenorrhea/Menorrhagia/Pelvic Pain: Will check US Pelvis. Chlamydia/Gonorrhea swab collected and sent for testing. [facility] vaginitis panel collected and sent for testing. Further recommendations to be made once all results are reviewed. Likely will recommend Mirena IUD placement as discussed at previous visit.     No follow-ups on file.    Instructions provided as documented in the after visit summary.    The patient indicated understanding of the diagnosis and agreed with the plan of care.

## 2024-04-16 ENCOUNTER — OFFICE VISIT (OUTPATIENT)
Dept: FAMILY MEDICINE | Facility: CLINIC | Age: 49
End: 2024-04-16
Payer: MEDICARE

## 2024-04-16 VITALS
HEART RATE: 82 BPM | RESPIRATION RATE: 22 BRPM | OXYGEN SATURATION: 96 % | WEIGHT: 278 LBS | HEIGHT: 65 IN | TEMPERATURE: 98.2 F | DIASTOLIC BLOOD PRESSURE: 81 MMHG | BODY MASS INDEX: 46.32 KG/M2 | SYSTOLIC BLOOD PRESSURE: 133 MMHG

## 2024-04-16 DIAGNOSIS — Z01.818 PREOP GENERAL PHYSICAL EXAM: ICD-10-CM

## 2024-04-16 DIAGNOSIS — I10 PRIMARY HYPERTENSION: ICD-10-CM

## 2024-04-16 DIAGNOSIS — F60.3 BORDERLINE PERSONALITY DISORDER (H): ICD-10-CM

## 2024-04-16 DIAGNOSIS — M54.42 CHRONIC BILATERAL LOW BACK PAIN WITH BILATERAL SCIATICA: ICD-10-CM

## 2024-04-16 DIAGNOSIS — E11.9 TYPE 2 DIABETES MELLITUS WITHOUT COMPLICATION, WITHOUT LONG-TERM CURRENT USE OF INSULIN (H): Primary | ICD-10-CM

## 2024-04-16 DIAGNOSIS — N31.9 URINARY BLADDER NEUROGENIC DYSFUNCTION: ICD-10-CM

## 2024-04-16 DIAGNOSIS — Z00.00 HEALTH CARE MAINTENANCE: ICD-10-CM

## 2024-04-16 DIAGNOSIS — N39.0 ACUTE UTI (URINARY TRACT INFECTION): ICD-10-CM

## 2024-04-16 DIAGNOSIS — M54.41 CHRONIC BILATERAL LOW BACK PAIN WITH BILATERAL SCIATICA: ICD-10-CM

## 2024-04-16 DIAGNOSIS — E66.01 MORBID OBESITY (H): ICD-10-CM

## 2024-04-16 DIAGNOSIS — G35 MULTIPLE SCLEROSIS (H): ICD-10-CM

## 2024-04-16 DIAGNOSIS — G89.29 CHRONIC BILATERAL LOW BACK PAIN WITH BILATERAL SCIATICA: ICD-10-CM

## 2024-04-16 PROBLEM — R59.1 LYMPHADENOPATHY: Status: RESOLVED | Noted: 2022-04-07 | Resolved: 2024-04-16

## 2024-04-16 LAB — HBA1C MFR BLD: 8.2 % (ref 0–5.6)

## 2024-04-16 PROCEDURE — 80048 BASIC METABOLIC PNL TOTAL CA: CPT | Performed by: INTERNAL MEDICINE

## 2024-04-16 PROCEDURE — 83036 HEMOGLOBIN GLYCOSYLATED A1C: CPT | Performed by: INTERNAL MEDICINE

## 2024-04-16 PROCEDURE — 80061 LIPID PANEL: CPT | Performed by: INTERNAL MEDICINE

## 2024-04-16 PROCEDURE — 99214 OFFICE O/P EST MOD 30 MIN: CPT | Performed by: INTERNAL MEDICINE

## 2024-04-16 PROCEDURE — 36415 COLL VENOUS BLD VENIPUNCTURE: CPT | Performed by: INTERNAL MEDICINE

## 2024-04-16 RX ORDER — ATORVASTATIN CALCIUM 20 MG/1
20 TABLET, FILM COATED ORAL AT BEDTIME
Qty: 90 TABLET | Refills: 3 | Status: SHIPPED | OUTPATIENT
Start: 2024-04-16

## 2024-04-16 ASSESSMENT — ASTHMA QUESTIONNAIRES
QUESTION_2 LAST FOUR WEEKS HOW OFTEN HAVE YOU HAD SHORTNESS OF BREATH: ONCE OR TWICE A WEEK
QUESTION_4 LAST FOUR WEEKS HOW OFTEN HAVE YOU USED YOUR RESCUE INHALER OR NEBULIZER MEDICATION (SUCH AS ALBUTEROL): NOT AT ALL
QUESTION_5 LAST FOUR WEEKS HOW WOULD YOU RATE YOUR ASTHMA CONTROL: COMPLETELY CONTROLLED
QUESTION_3 LAST FOUR WEEKS HOW OFTEN DID YOUR ASTHMA SYMPTOMS (WHEEZING, COUGHING, SHORTNESS OF BREATH, CHEST TIGHTNESS OR PAIN) WAKE YOU UP AT NIGHT OR EARLIER THAN USUAL IN THE MORNING: NOT AT ALL
QUESTION_1 LAST FOUR WEEKS HOW MUCH OF THE TIME DID YOUR ASTHMA KEEP YOU FROM GETTING AS MUCH DONE AT WORK, SCHOOL OR AT HOME: NONE OF THE TIME
ACT_TOTALSCORE: 24
ACT_TOTALSCORE: 24

## 2024-04-16 ASSESSMENT — PATIENT HEALTH QUESTIONNAIRE - PHQ9
10. IF YOU CHECKED OFF ANY PROBLEMS, HOW DIFFICULT HAVE THESE PROBLEMS MADE IT FOR YOU TO DO YOUR WORK, TAKE CARE OF THINGS AT HOME, OR GET ALONG WITH OTHER PEOPLE: SOMEWHAT DIFFICULT
SUM OF ALL RESPONSES TO PHQ QUESTIONS 1-9: 3
SUM OF ALL RESPONSES TO PHQ QUESTIONS 1-9: 3

## 2024-04-16 NOTE — ASSESSMENT & PLAN NOTE
chronic low back pains, lumbar radiculopathy  - MRI of C-T-L spine - no active demyelinating disease  - MRI of L-spine (4/8/2021): L4-5 severe foraminal stenosis, moderate central stenosis   - epidural steroid injection (RFAH) on 4/2021 and 7/2021 with transient improvement in symptoms  May 25, 2022: Lumbar back decompression fusion - noticeable improvement in symptoms

## 2024-04-16 NOTE — ASSESSMENT & PLAN NOTE
Significant postoperative urinary retention after lumbar back fusion requiring intermittent bladder catheterization  -Generally improved symptoms since discharge

## 2024-04-16 NOTE — ASSESSMENT & PLAN NOTE
current antihypertensive regimen: amlodipine 5mg daily  regimen changes:  intolerance:  future titration/work-up plan:   - anticipate ACEi/ARB in the future

## 2024-04-16 NOTE — PROGRESS NOTES
Preoperative Evaluation  [address] [address]  Phone: [phone]  Fax: [phone]  Primary Provider: Fredy Cowart  Pre-op Performing Provider: FREDY COWART  Apr 16, 2024       Karli is a 48 year old, presenting for the following: Presents for preoperative evaluation before planned screening colonoscopy scheduled for later this month.  Both her mother and brother both diagnosed with colorectal cancer.  Sister has been screened with multiple polyps.  She has never had any screening testing to date.  Denies any symptoms.  From an MS standpoint relatively stable.  Remains on Ocrevus -in remission without any new demyelinating lesions.  Doing well from a back standpoint since her previous lumbar fusion.  Prior left total knee arthroplasty -generally doing well.  Still with significant mobility limitations primarily related to her MS.  Not using Victoza related to her diabetes -recurrent issues with nausea vomiting.  Would be open to an alternative GLP-1 agonist if better tolerated and for weight loss purposes.  No chief complaint on file.        4/16/2024     1:05 PM   Additional Questions   Roomed by Gayathri RUELAS     Surgical Information  Surgery/Procedure: colonoscopy  Surgery Location[facility]  Surgeon: Williams Huang MD  Surgery Date: 4/26/2024  Time of Surgery: 10:10 AM  Where patient plans to recover: At home with family  Fax number for surgical facility: Note does not need to be faxed, will be available electronically in Epic.    Assessment & Plan     The proposed surgical procedure is considered LOW risk.    Problem List Items Addressed This Visit          Nervous and Auditory    Chronic low back pain     chronic low back pains, lumbar radiculopathy  - MRI of C-T-L spine - no active demyelinating disease  - MRI of L-spine (4/8/2021): L4-5 severe foraminal stenosis, moderate central stenosis   -  epidural steroid injection (RFAH) on 4/2021 and 7/2021 with transient improvement in symptoms  May 25, 2022: Lumbar back decompression fusion - noticeable improvement in symptoms         Multiple sclerosis (H)     multiple sclerosis  - currently in remission;  follows with Dr. Webb, her neurologist  - previously on Lemtrada (alemtuzumab) in spring, 2018; good clinical response and tolerance - associated with sustained T-cell immunosuppression  - since begin on ocrelizumab infusions  - using amantadine - finding helpful with her fatigue  June, 2022: AFO brace fitting for right foot drop + botox to right calf contracture              Digestive    Morbid obesity (H)       Endocrine    Type 2 diabetes mellitus without complication, without long-term current use of insulin (H) - Primary     uncontrolled  hypoglycemic medications: Victoza 1.8mg daily (using intermittently - limited by nausea) , metformin ER 1000mg daily  insulin therapy: none  last HbA1c: 8.2%  microvascular complications: none  macrovascular complications: none known  ASA/VIRGILIO/statin: begin on atorvastatin 20mg at bedtime  - referral back to CDE to discuss alternative GLP1 - suspect would tolerate newer longer half-life options over liraglutide         Relevant Medications    atorvastatin (LIPITOR) 20 MG tablet    Other Relevant Orders    Lipid panel reflex to direct LDL Non-fasting    HEMOGLOBIN A1C (Completed)    Albumin Random Urine Quantitative with Creat Ratio    Adult Diabetes Education  Referral       Circulatory    Primary hypertension     current antihypertensive regimen: amlodipine 5mg daily  regimen changes:  intolerance:  future titration/work-up plan:   - anticipate ACEi/ARB in the future         Relevant Orders    BASIC METABOLIC PANEL       Urinary    Urinary bladder neurogenic dysfunction     Significant postoperative urinary retention after lumbar back fusion requiring intermittent bladder catheterization  -Generally improved  symptoms since discharge            Behavioral    Borderline personality disorder (H)       Other    Health care maintenance    Relevant Orders    REVIEW OF HEALTH MAINTENANCE PROTOCOL ORDERS (Completed)     Other Visit Diagnoses       Acute UTI (urinary tract infection)        Preop general physical exam                  Possible Sleep Apnea: multiple sleep studies - conflicting results; last study confirming no ROBERT      Implanted Device  Right sided infusaport      Risks and Recommendations  The patient has the following additional risks and recommendations for perioperative complications:   - Morbid obesity (BMI >40)   - History of urinary retention    Antiplatelet or Anticoagulation Medication Instructions   - Patient is on no antiplatelet or anticoagulation medications.    Additional Medication Instructions  Pt can hold AM meds on day of procedure - can be taken after completing procedure    Recommendation  APPROVAL GIVEN to proceed with proposed procedure, without further diagnostic evaluation.      Subjective       HPI related to upcoming procedure:         4/16/2024     1:02 PM   Preop Questions   1. Have you ever had a heart attack or stroke? No   2. Have you ever had surgery on your heart or blood vessels, such as a stent placement, a coronary artery bypass, or surgery on an artery in your head, neck, heart, or legs? No   3. Do you have chest pain with activity? No   4. Do you have a history of  heart failure? No   5. Do you currently have a cold, bronchitis or symptoms of other infection? No   6. Do you have a cough, shortness of breath, or wheezing? No   7. Do you or anyone in your family have previous history of blood clots? YES - brother with PE/VTE   8. Do you or does anyone in your family have a serious bleeding problem such as prolonged bleeding following surgeries or cuts? YES - self-described excessive bleeding   9. Have you ever had problems with anemia or been told to take iron pills? YES -     10. Have you had any abnormal blood loss such as black, tarry or bloody stools, or abnormal vaginal bleeding? No   11. Have you ever had a blood transfusion? YES -    11a. Have you ever had a transfusion reaction? YES -    12. Are you willing to have a blood transfusion if it is medically needed before, during, or after your surgery? Yes   13. Have you or any of your relatives ever had problems with anesthesia? YES -    14. Do you have sleep apnea, excessive snoring or daytime drowsiness? YES - borderline ROBERT dx   14a. Do you have a CPAP machine? No   15. Do you have any artifical heart valves or other implanted medical devices like a pacemaker, defibrillator, or continuous glucose monitor? YES - Infusaport, left TKA   15a. What type of device do you have? power port   15b. Name of the clinic that manages your device:  [facility]   16. Do you have artificial joints? YES - left TKA   17. Are you allergic to latex? No   18. Is there any chance that you may be pregnant? No       Health Care Directive  Patient does not have a Health Care Directive or Living Will:       Patient Active Problem List    Diagnosis Date Noted    S/P lumbar fusion 06/16/2022     Priority: Medium    Lymphadenopathy 04/07/2022     Priority: Medium    Chronic low back pain 03/28/2022     Priority: Medium    Migraine headache 03/28/2022     Priority: Medium    Morbid obesity (H) 03/28/2022     Priority: Medium    Seasonal allergic rhinitis 03/28/2022     Priority: Medium    Health care maintenance 03/28/2022     Priority: Medium    Primary hypertension 05/24/2021     Priority: Medium    Type 2 diabetes mellitus without complication, without long-term current use of insulin (H) 05/24/2021     Priority: Medium    Urinary bladder neurogenic dysfunction 06/15/2016     Priority: Medium    Multiple sclerosis (H) 04/13/2016     Priority: Medium    Vitamin D deficiency 04/13/2016     Priority: Medium    Moderate persistent asthma without  complication 2015     Priority: Medium    Depressive disorder 2013     Priority: Medium     Formatting of this note might be different from the original.  Depressive disorder, not elsewhere classified ([facility])      PTSD (post-traumatic stress disorder) 2011     Priority: Medium      Past Medical History:   Diagnosis Date    Anemia     Arthritis     Calcium nephrolithiasis 06/15/2016    Chronic low back pain 2022    Depressive disorder 2013    Formatting of this note might be different from the original. Depressive disorder, not elsewhere classified ([facility])    Heart murmur     Migraine headache 2022    Moderate persistent asthma without complication 2015    Morbid obesity (H) 2022    MRSA infection     hx no labs to rule out since    Multiple sclerosis (H) 2016    PONV (postoperative nausea and vomiting)     Primary hypertension 2021    PTSD (post-traumatic stress disorder) 2011    Seasonal allergic rhinitis 2022    Sleep apnea     no CPAP    Type 2 diabetes mellitus without complication, without long-term current use of insulin (H) 2021    Urinary bladder neurogenic dysfunction 06/15/2016    Urinary incontinence     Vitamin D deficiency 2016     Past Surgical History:   Procedure Laterality Date    APPENDECTOMY  2014    ARTHROPLASTY KNEE Left 10/24/2022    Procedure: LEFT TOTAL KNEE ARTHROPLASTY;  Surgeon: Lester Avila MD;  Location: [facility] Main OR    BONE MARROW BIOPSY       SECTION      no date    CHOLECYSTECTOMY      no date    CYSTOSCOPY  2016    HRW PORT A CATH  2016    IR LYMPH NODE BIOPSY  10/29/2020    LAP VENTRAL HERNIA REPAIR  2021    LAPAROSCOPY, SURGICAL; REPAIR INCISIONAL OR VENTRAL HERNIA      LUMBAR FUSION  2022    L4-L5    LYMPH NODE BIOPSY      2021 - reactive lymphadenopathy work-up    MAMMOPLASTY REDUCTION      SPINAL FUSION      TONSILLECTOMY & ADENOIDECTOMY   1989    TUBAL LIGATION      no date    URETEROSCOPY  2016     Current Outpatient Medications   Medication Sig Dispense Refill    acetaminophen (TYLENOL) 325 MG tablet Take 1 tablet (325 mg) by mouth every 6 hours as needed for other (mild pain) 20 tablet 0    acetaminophen (TYLENOL) 500 MG tablet Take 1,000 mg by mouth      amantadine (SYMMETREL) 100 MG capsule TAKE 1 CAPSULE TWICE DAILY AS NEEDED (Patient taking differently: Take 200 mg by mouth daily Take 2 in AM) 180 capsule 1    amLODIPine (NORVASC) 5 MG tablet TAKE 1 TABLET DAILY 90 tablet 3    ARIPiprazole (ABILIFY) 5 MG tablet Take 5 mg by mouth daily      aspirin 81 MG EC tablet Take 1 tablet (81 mg) by mouth 2 times daily 60 tablet 0    busPIRone (BUSPAR) 15 MG tablet Take 15 mg by mouth 2 times daily      Cholecalciferol 250 MCG (13674 UT) CAPS Take 10,000 Units by mouth      clonazePAM (KLONOPIN) 1 MG tablet Take 1 tablet (1 mg) by mouth At Bedtime 90 tablet 1    escitalopram (LEXAPRO) 20 MG tablet Take 20 mg by mouth daily      ferrous sulfate (FEROSUL) 325 (65 Fe) MG tablet Take 650 mg by mouth daily (with breakfast)      fexofenadine (ALLEGRA) 180 MG tablet Take 180 mg by mouth daily      fluticasone (FLONASE) 50 MCG/ACT nasal spray Spray 2 sprays in nostril daily as needed      gabapentin (NEURONTIN) 100 MG capsule TAKE 1 CAPSULE 3 TIMES A    capsule 3    hydrOXYzine (ATARAX) 10 MG tablet Take 10 mg by mouth nightly as needed      liraglutide (VICTOZA PEN) 18 MG/3ML solution Inject 1.8 mg Subcutaneous daily 9 mL 11    loperamide (IMODIUM) 2 MG capsule Take 2 capsules (4mg) orally with 1st loose stool, then 1 capsule (2mg) with other loose stools. Max 16 mg in 24 hrs.      meloxicam (MOBIC) 15 MG tablet Take 15 mg by mouth      metFORMIN (GLUCOPHAGE XR) 500 MG 24 hr tablet TAKE 2 TABLETS DAILY 180 tablet 1    methenamine hippurate (HIPREX) 1 g tablet Take 1 tablet (1 g) by mouth 2 times daily 180 tablet 3    nystatin (MYCOSTATIN) 864087 UNIT/GM  "external powder Apply topically 3 times daily as needed (rash) 60 g 0    Ocrelizumab (OCREVUS IV) Inject into the vein every 6 months      oxyCODONE (ROXICODONE) 5 MG tablet Take 1-2 tablets (5-10 mg) by mouth every 4 hours as needed for moderate to severe pain 30 tablet 0    polyethylene glycol (MIRALAX) 17 GM/Dose powder Take 17 g by mouth daily 510 g 0    prazosin (MINIPRESS) 1 MG capsule Take 1 mg by mouth At Bedtime      rizatriptan (MAXALT) 10 MG tablet Take 10 mg by mouth at onset of headache for migraine      senna-docusate (SENOKOT-S/PERICOLACE) 8.6-50 MG tablet Take 1-2 tablets by mouth daily as needed for constipation Take while on oral narcotics to prevent or treat constipation. 30 tablet 0    solifenacin (VESICARE) 5 MG tablet Take 5 mg by mouth daily      tiZANidine (ZANAFLEX) 2 MG tablet TAKE 1 TABLET TWICE A  tablet 1    tiZANidine (ZANAFLEX) 4 MG tablet TAKE 2 TABLETS AT BEDTIME. 180 tablet 1    Vitamin D3 (CHOLECALCIFEROL) 125 MCG (5000 UT) tablet Take 1 tablet by mouth At Bedtime         Allergies   Allergen Reactions    Sulfa Antibiotics Diarrhea, Rash and Nausea and Vomiting    Adhesive Tape Rash        Social History     Tobacco Use    Smoking status: Never    Smokeless tobacco: Never   Substance Use Topics    Alcohol use: Yes     Comment: 3 damien./week       History   Drug Use    Types: Opiates, Marijuana         Review of Systems    Review of Systems  Constitutional, HEENT, cardiovascular, pulmonary, gi and gu systems are negative, except as otherwise noted.    Objective    /81   Pulse 82   Temp 98.2  F (36.8  C)   Resp 22   Ht 1.651 m (5' 5\")   Wt 126.1 kg (278 lb)   LMP  (LMP Unknown)   SpO2 96%   BMI 46.26 kg/m     Estimated body mass index is 46.26 kg/m  as calculated from the following:    Height as of 10/24/22: 1.651 m (5' 5\").    Weight as of 10/24/22: 126.1 kg (278 lb).  Physical Exam  GENERAL: alert and no distress  NECK: no adenopathy, no asymmetry, masses, or " scars  RESP: lungs clear to auscultation - no rales, rhonchi or wheezes  CV: regular rate and rhythm, normal S1 S2, no S3 or S4, no murmur, click or rub, no peripheral edema  ABDOMEN: soft, nontender, no hepatosplenomegaly, no masses and bowel sounds normal  MS: no gross musculoskeletal defects noted, no edema    Recent Labs   Lab Test 10/26/22  1325 10/25/22  0604 10/04/22  1405   HGB 11.0* 10.4* 12.6   PLT  --   --  283   NA  --   --  141   POTASSIUM  --  4.4 4.3   CR  --  0.63 0.55   A1C  --   --  6.6*        Diagnostics  Labs pending at this time.  Results will be reviewed when available.   No EKG required for low risk surgery (cataract, skin procedure, breast biopsy, etc).    Revised Cardiac Risk Index (RCRI)  The patient has the following serious cardiovascular risks for perioperative complications:   - No serious cardiac risks = 0 points     RCRI Interpretation: 0 points: Class I (very low risk - 0.4% complication rate)         Signed Electronically by: Fredy Mendosa MD  Copy of this evaluation report is provided to requesting physician.         Answers submitted by the patient for this visit:  Patient Health Questionnaire (Submitted on 4/16/2024)  If you checked off any problems, how difficult have these problems made it for you to do your work, take care of things at home, or get along with other people?: Somewhat difficult  PHQ9 TOTAL SCORE: 3

## 2024-04-16 NOTE — Clinical Note
I'm asking patient to meet with you.  Prior Saxenda/victoza use - limited tolerance with nausea.  I suspect would get better results with alternative long half-life GLP1

## 2024-04-18 ENCOUNTER — APPOINTMENT (OUTPATIENT)
Dept: LAB | Facility: CLINIC | Age: 49
End: 2024-04-18
Payer: MEDICARE

## 2024-04-18 LAB
ANION GAP SERPL CALCULATED.3IONS-SCNC: 10 MMOL/L (ref 7–15)
BUN SERPL-MCNC: 10.3 MG/DL (ref 6–20)
CALCIUM SERPL-MCNC: 9.1 MG/DL (ref 8.6–10)
CHLORIDE SERPL-SCNC: 106 MMOL/L (ref 98–107)
CHOLEST SERPL-MCNC: 161 MG/DL
CREAT SERPL-MCNC: 0.53 MG/DL (ref 0.51–0.95)
DEPRECATED HCO3 PLAS-SCNC: 23 MMOL/L (ref 22–29)
EGFRCR SERPLBLD CKD-EPI 2021: >90 ML/MIN/1.73M2
FASTING STATUS PATIENT QL REPORTED: NO
GLUCOSE SERPL-MCNC: 194 MG/DL (ref 70–99)
HDLC SERPL-MCNC: 46 MG/DL
LDLC SERPL CALC-MCNC: 81 MG/DL
NONHDLC SERPL-MCNC: 115 MG/DL
POTASSIUM SERPL-SCNC: 4.5 MMOL/L (ref 3.4–5.3)
SODIUM SERPL-SCNC: 139 MMOL/L (ref 135–145)
TRIGL SERPL-MCNC: 169 MG/DL

## 2024-04-18 PROCEDURE — 82570 ASSAY OF URINE CREATININE: CPT | Performed by: INTERNAL MEDICINE

## 2024-04-18 PROCEDURE — 82043 UR ALBUMIN QUANTITATIVE: CPT | Performed by: INTERNAL MEDICINE

## 2024-04-19 LAB
CREAT UR-MCNC: 81 MG/DL
MICROALBUMIN UR-MCNC: 62.2 MG/L
MICROALBUMIN/CREAT UR: 76.79 MG/G CR (ref 0–25)

## 2024-05-07 ENCOUNTER — TELEPHONE (OUTPATIENT)
Dept: FAMILY MEDICINE | Facility: CLINIC | Age: 49
End: 2024-05-07
Payer: MEDICARE

## 2024-05-07 NOTE — TELEPHONE ENCOUNTER
Medication Question or Refill    Contacts         Type Contact Phone/Fax    05/07/2024 02:55 PM CDT Phone (Incoming) Karli Miner ANDREA (Self) [phone] (M)            What medication are you calling about (include dose and sig)?:     oxyCODONE (ROXICODONE) 5 MG tablet 30 tablet 0 11/8/2022 -- No  Sig - Route: Take 1-2 tablets (5-10 mg) by mouth every 4 hours as needed for moderate to severe pain - Oral  Sent to pharmacy as: oxyCODONE HCl 5 MG Oral Tablet (ROXICODONE)    HYDROcodone-acetaminophen (NORCO) 5-325 MG tablet (Discontinued) -- --  10/4/2022 --  Sig - Route: Take 1 tablet by mouth every 6 hours as needed for severe pain - Oral        Preferred Pharmacy:     [id number] [address]  [address] [address]  Phone: [phone] Fax: [phone]      Controlled Substance Agreement on file:   CSA -- Patient Level:    CSA: None found at the patient level.       Who prescribed the medication?: Dr. Mendosa    Do you need a refill? Yes  3 left of the oxyCODONE (ROXICODONE) 5 MG tablet    Out of the  HYDROcodone-acetaminophen (NORCO) 5-325 MG tablet      When did you use the medication last? Patient didn't report    Patient offered an appointment? Yes: Saw Dr. Mendosa recently.  Please advise patient if she needed to be seen    Do you have any questions or concerns?  No      Could we send this information to you in [facility] or would you prefer to receive a phone call?:   Patient would prefer a phone call with any questions  Okay to leave a detailed message?: Yes at Cell number on file:    Telephone Information:   Mobile [phone]

## 2024-05-18 DIAGNOSIS — G35 MULTIPLE SCLEROSIS (H): ICD-10-CM

## 2024-05-21 ENCOUNTER — ALLIED HEALTH/NURSE VISIT (OUTPATIENT)
Dept: EDUCATION SERVICES | Facility: CLINIC | Age: 49
End: 2024-05-21
Attending: INTERNAL MEDICINE
Payer: MEDICARE

## 2024-05-21 VITALS — HEIGHT: 65 IN | BODY MASS INDEX: 46.42 KG/M2 | WEIGHT: 278.6 LBS

## 2024-05-21 DIAGNOSIS — E11.9 TYPE 2 DIABETES MELLITUS WITHOUT COMPLICATION, WITHOUT LONG-TERM CURRENT USE OF INSULIN (H): Primary | ICD-10-CM

## 2024-05-21 DIAGNOSIS — E66.01 MORBID OBESITY (H): ICD-10-CM

## 2024-05-21 PROCEDURE — G0108 DIAB MANAGE TRN  PER INDIV: HCPCS | Performed by: DIETITIAN, REGISTERED

## 2024-05-21 RX ORDER — TIZANIDINE 2 MG/1
2 TABLET ORAL 2 TIMES DAILY
Qty: 180 TABLET | Refills: 1 | Status: SHIPPED | OUTPATIENT
Start: 2024-05-21

## 2024-05-21 NOTE — LETTER
5/21/2024         RE: Karli Miner  [address]  [facility] [address]        Dear Colleague,    Thank you for referring your patient, Karli Miner, to the [facility]. Please see a copy of my visit note below.    Diabetes Self-Management Education & Support    Presents for: Follow-up Type II Diabetes, Class 3 Obesity, Hypertension     Type of Service: In Person Visit      ASSESSMENT:  Patient was seen 3/2020.  Patient has been taking Victoza but unfortunately unable to continue to tolerate it and developed emesis with diarrhea requiring 3 ER visits due to dehydration.    Patient will be adding Mounjaro today and titrating slowly.  Patient does not have blood glucose monitor and has not been routinely checking BG.  Patient is provided with a sample of Dexcom G7 sensor.    Unfortunately patient has returned to some older dietary habits and is drinking regular soda daily.  Often consuming highly processed convenience foods due to feeling weak from an MS.      Patient's most recent   Lab Results   Component Value Date    A1C 8.2 04/16/2024     is not meeting goal of <7.0    Diabetes knowledge and skills assessment:   Patient is knowledgeable in diabetes management concepts related to: Education started again today    Continue education with the following diabetes management concepts: Healthy Eating, Being Active, Monitoring, and Taking Medication    Based on learning assessment above, most appropriate setting for further diabetes education would be: Individual setting.      PLAN  Due to patient's MS patient may qualify for additional services.  Patient to contact [facility] or if not helpful may benefit from care coordination/counseling services.    Blood Glucose testing   Test on 3 days per week (before and 2 hours after one meal)  Before eating goal 80 - 130mg/dL  2 hours after goal 80 - 150mg/dL     Go to [email or web address] for savings offers with Mail order     Start  "Mounjaro at   2.5 mg weekly x 4 weeks  5.0 mg weekly x 4 weeks increasing monthly as tolerated gave 2 months, let me know if you want refills   Topics to cover at upcoming visits: Healthy Eating, Being Active, Monitoring, Taking Medication, Problem Solving, Reducing Risks, and Healthy Coping    Follow-up: 3 months    See Care Plan for co-developed, patient-state behavior change goals.  AVS provided for patient today.    Education Materials Provided:  Goals for Your Diabetes Care, Carbohydrate Counting, Medication Information on Mounjaro, and My Plate Planner      SUBJECTIVE/OBJECTIVE:  Presents for: Follow-up  Accompanied by: Self  Diabetes education in the past 24mo: No  Diabetes type: Type 2  Disease course: Worsening  How confident are you filling out medical forms by yourself:: Extremely  Diabetes management related comments/concerns: The current medication. Shot Victoza makes me sick  Transportation concerns: No  Difficulty affording diabetes medication?: Sometimes  Difficulty affording diabetes testing supplies?: No ([place] through [employer])  Other concerns:: Glasses  Cultural Influences/Ethnic Background:  Not  or     Diabetes Symptoms & Complications:  Diabetes Related Symptoms: Fatigue, Neuropathy, Polydipsia (increased thirst), Polyphagia (increased hunger), Polyuria (increased urination), Visual change  Weight trend: Increasing  Symptom course: Worsening  Disease course: Worsening  Complications assessed today?: Yes  CVA: No  Heart disease: No  Nephropathy: No  Peripheral neuropathy: No  Peripheral Vascular Disease: No  Retinopathy: No  Sexual dysfunction: No    Patient Problem List and Family Medical History reviewed for relevant medical history, current medical status, and diabetes risk factors.    Vitals:  Ht 1.651 m (5' 5\")   Wt 126.4 kg (278 lb 9.6 oz)   LMP  (LMP Unknown)   BMI 46.36 kg/m    Estimated body mass index is 46.36 kg/m  as calculated from the following:    Height as of this " "encounter: 1.651 m (5' 5\").    Weight as of this encounter: 126.4 kg (278 lb 9.6 oz).   Last 3 BP:   BP Readings from Last 3 Encounters:   04/16/24 133/81   10/26/22 110/56   10/04/22 134/84       History   Smoking Status     Never   Smokeless Tobacco     Never       Labs:  Lab Results   Component Value Date    A1C 8.2 04/16/2024     Lab Results   Component Value Date     04/16/2024     10/26/2022     10/26/2022     Lab Results   Component Value Date    LDL 81 04/16/2024    LDL 83 11/24/2021     Direct Measure HDL   Date Value Ref Range Status   04/16/2024 46 (L) >=50 mg/dL Final   ]  GFR Estimate   Date Value Ref Range Status   04/16/2024 >90 >60 mL/min/1.73m2 Final   10/09/2020 >60 >60 Final     Comment:     _  Unit of Measure:   ml/min/1.73m2       No results found for: \"GFRESTBLACK\"  Lab Results   Component Value Date    CR 0.53 04/16/2024     No results found for: \"MICROALBUMIN\"    Healthy Eating:  Healthy Eating Assessed Today: Yes  Cultural/Rastafarian diet restrictions?: No  Do you have any food allergies or intolerances?: No  Meal planning/habits: None  Who cooks/prepares meals for you?: Self  Who purchases food in  your home?: Self  How many times a week on average do you eat food made away from home (restaurant/take-out)?: 1  Meals include: Breakfast, Lunch, Dinner, Evening Snack  Breakfast: cereal [employer] fairlife 2%  Lunch: lasagna, ice cream raffy chip cookie dough, soda Dr. Pepper  Dinner: pizza, water  Other: better on vegetables  Beverages: Water, Coffee, Milk, Soda, Coffee drinks  Has patient met with a dietitian in the past?: Yes    Being Active:  Being Active Assessed Today: Yes  Exercise:: Currently not exercising  Barrier to exercise: Safety, Physical limitation (using cane all of the time, right sided weakness from MS)    Monitoring:  Monitoring Assessed Today: Yes  Did patient bring glucose meter to appointment? : No  Blood Glucose Meter: Other  Times checking blood " sugar at home (number): Never  Blood glucose trend:  (BG today in office 143mg/dL)    Taking Medications:  Diabetes Medication(s)       Biguanides       metFORMIN (GLUCOPHAGE XR) 500 MG 24 hr tablet TAKE 2 TABLETS DAILY       Incretin Mimetic Agents       tirzepatide (MOUNJARO) 2.5 MG/0.5ML pen Inject 2.5 mg Subcutaneous every 7 days     tirzepatide (MOUNJARO) 5 MG/0.5ML pen Inject 5 mg Subcutaneous every 7 days     liraglutide (VICTOZA PEN) 18 MG/3ML solution Inject 1.8 mg Subcutaneous daily     Patient not taking: Reported on 4/16/2024            Current Treatments: Non-insulin Injectables, Oral Medication (taken by mouth)  Problems taking diabetes medications regularly?:  (Intolerant to Victoza - emesis and diarrhea)    Problem Solving:  Problem Solving Assessed Today: Yes  Is the patient at risk for hypoglycemia?: No  Is the patient at risk for DKA?: No  Does patient have severe weather/disaster plan for diabetes management?: Yes  Does patient have sick day plan for diabetes management?: Yes              Reducing Risks:  Reducing Risks Assessed Today: Yes  Diabetes Risks: Age over 45 years, Sedentary Lifestyle, Hyperlipidemia, Family History  CAD Risks: Family history, Diabetes Mellitus, Hypertension, Obesity, Sedentary lifestyle, Dyslipidemia  Has dilated eye exam at least once a year?: No  Sees dentist every 6 months?: Yes  Feet checked by healthcare provider in the last year?: No    Healthy Coping:  Healthy Coping Assessed Today: Yes  Emotional response to diabetes: Uncertain, Acceptance  Informal Support system:: [place] based, Family, Friends  Stage of change: PREPARATION (Decided to change - considering how)  Patient Activation Measure Survey Score:       No data to display                  Care Plan and Education Provided:  Care Plan: Diabetes   Updates made by Dianne Mane RD since 5/21/2024 12:00 AM        Problem: HbA1C Not In Goal         Goal: Establish Regular Follow-Ups with PCP         Task:  Discuss with PCP the recommended timing for patient's next follow up visit(s)    Responsible User: Dianne Mane RD        Task: Discuss schedule for PCP visits with patient Completed 5/21/2024   Responsible User: Dianne Mane RD        Goal: Get HbA1C Level in Goal         Task: Educate patient on diabetes education self-management topics    Responsible User: Dianne Mane RD        Task: Educate patient on benefits of regular glucose monitoring Completed 5/21/2024   Responsible User: Dianne Mane RD        Task: Refer patient to appropriate extended care team member, as needed (Medication Therapy Management, Behavioral Health, Physical Therapy, etc.)    Responsible User: Dianne Mane RD        Task: Discuss diabetes treatment plan with patient    Responsible User: Dianne Mane RD        Problem: Diabetes Self-Management Education Needed to Optimize Self-Care Behaviors         Goal: Understand diabetes pathophysiology and disease progression         Task: Provide education on diabetes pathophysiology and disease progression specfic to patient's diabetes type Completed 5/21/2024   Responsible User: Dianne Mane RD        Goal: Healthy Eating - follow a healthy eating pattern for diabetes         Task: Provide education on portion control and consistency in amount, composition and timing of food intake    Responsible User: Dianne Mane RD        Task: Provide education on managing carbohydrate intake (carbohydrate counting, plate planning method, etc.) Completed 5/21/2024   Responsible User: Dianne Mane RD        Task: Provide education on weight management    Responsible User: Dianne Mane RD        Task: Provide education on heart healthy eating    Responsible User: Dianne Mane RD        Task: Provide education on eating out    Responsible User: Dianne Mane RD        Task: Develop individualized healthy eating plan with patient Completed 5/21/2024   Responsible User: Alhaji  CARO Dean        Goal: Being Active - get regular physical activity, working up to at least 150 minutes per week         Task: Provide education on relationship of activity to glucose and precautions to take if at risk for low glucose    Responsible User: Dianne Mane RD        Task: Discuss barriers to physical activity with patient    Responsible User: Dianne Mane RD        Task: Develop physical activity plan with patient    Responsible User: Dianne Mane RD        Task: Explore community resources including walking groups, assistance programs, and home videos    Responsible User: Dianne Mane RD        Goal: Monitoring - monitor glucose and ketones as directed         Task: Provide education on blood glucose monitoring (purpose, proper technique, frequency, glucose targets, interpreting results, when to use glucose control solution, sharps disposal) Completed 5/21/2024   Responsible User: Dianne Mane RD        Task: Provide education on continuous glucose monitoring (sensor placement, use of gabby or /reader, understanding glucose trends, alerts and alarms, differences between sensor glucose and blood glucose)    Responsible User: Dianne Mane RD        Task: Provide education on ketone monitoring (when to monitor, frequency, etc.)    Responsible User: Dianne Mane RD        Goal: Taking Medication - patient is consistently taking medications as directed    This Visit's Progress: 0%   Note:    Pt to begin taking Mounjaro weekly        Task: Provide education on action of prescribed medication, including when to take and possible side effects Completed 5/21/2024   Responsible User: Dianne Mane RD        Task: Provide education on insulin and injectable diabetes medications, including administration, storage, site selection and rotation for injection sites Completed 5/21/2024   Responsible User: Dianne Mane RD        Task: Discuss barriers to medication adherence with patient  and provide management technique ideas as appropriate    Responsible User: Dianne Mane RD        Task: Provide education on frequency and refill details of medications    Responsible User: Dianne Mane RD        Goal: Problem Solving - know how to prevent and manage short-term diabetes complications         Task: Provide education on high blood glucose - causes, signs/symptoms, prevention and treatment Completed 5/21/2024   Responsible User: Dianne Mane RD        Task: Provide education on low blood glucose - causes, signs/symptoms, prevention, treatment, carrying a carbohydrate source at all times, and medical identification    Responsible User: Dianne Mane RD        Task: Provide education on safe travel with diabetes    Responsible User: Dianne Mane RD        Task: Provide education on how to care for diabetes on sick days    Responsible User: Dianne Mane RD        Task: Provide education on when to call a health care provider    Responsible User: Dianne Mane RD        Goal: Reducing Risks - know how to prevent and treat long-term diabetes complications         Task: Provide education on major complications of diabetes, prevention, early diagnostic measures and treatment of complications    Responsible User: Dianne Mane RD        Task: Provide education on recommended care for dental, eye and foot health    Responsible User: Dianne Mane RD        Task: Provide education on Hemoglobin A1c - goals and relationship to blood glucose levels Completed 5/21/2024   Responsible User: Dianne Mane RD        Task: Provide education on recommendations for heart health - lipid levels and goals, blood pressure and goals, and aspirin therapy, if indicated    Responsible User: Dianne Mane RD        Task: Provide education on tobacco cessation    Responsible User: Dianne Mane RD        Goal: Healthy Coping - use available resources to cope with the challenges of managing diabetes          Task: Discuss recognizing feelings about having diabetes    Responsible User: Dianne Mane RD        Task: Provide education on the benefits of making appropriate lifestyle changes Completed 5/21/2024   Responsible User: Dianne Mane RD        Task: Provide education on benefits of utilizing support systems    Responsible User: Dianne Mane RD        Task: Discuss methods for coping with stress    Responsible User: Dianne Mane RD        Task: Provide education on when to seek professional counseling    Responsible User: Dianne Mane RD          Time Spent: 60 minutes  Encounter Type: Individual    Any diabetes medication dose changes were made via the CDE Protocol per the patient's referring provider. A copy of this encounter was shared with the provider.

## 2024-05-21 NOTE — PATIENT INSTRUCTIONS
Blood Glucose testing   Test on 3 days per week (before and 2 hours after one meal)  Before eating goal 80 - 130mg/dL  2 hours after goal 80 - 150mg/dL     Go to [email or web address] for savings offers with Mail order     Start Mounjaro at   2.5 mg weekly x 4 weeks  5.0 mg weekly x 4 weeks increasing monthly as tolerated gave 2 months, let me know if you want refills     7.5 mg weekly x 4 weeks increasing monthly as tolerated  10 mg weekly x 4 weeks increasing monthly as tolerated  12.5 mg weekly x 4 weeks increasing monthly as tolerated  15 mg weekly goal dose    HB eggs  Tuna   Grilled chicken strips   Garbanzo/ black beans     Factor 75 meals - look into this     Egg bake   Cauliflower rice bowls - microwavable   Quiona crunch burger from [employer]  Cilantro lime wraps     Fruit try to get twice a day   Switching to diet soda only       8.2% A1c = average Result: 189 mg/dL     [facility]   General Office Hours  Monday - Friday  8:00am - 4:30pm  (Closed Holidays)    Phone: [phone]  Toll Free: [phone]  Fax: [phone]    Staff Contacts    [facility] Manager  Deondre Faith      Gabriela Wheatley    Nutrition Coordinator  Niyah Vela    Information & Assistance /Options Counselors  Steffi Hester    Elder   Liane Robles    Disability   Rafat Pestrinidad

## 2024-05-21 NOTE — PROGRESS NOTES
Diabetes Self-Management Education & Support    Presents for: Follow-up Type II Diabetes, Class 3 Obesity, Hypertension     Type of Service: In Person Visit      ASSESSMENT:  Patient was seen 3/2020.  Patient has been taking Victoza but unfortunately unable to continue to tolerate it and developed emesis with diarrhea requiring 3 ER visits due to dehydration.    Patient will be adding Mounjaro today and titrating slowly.  Patient does not have blood glucose monitor and has not been routinely checking BG.  Patient is provided with a sample of Dexcom G7 sensor.    Unfortunately patient has returned to some older dietary habits and is drinking regular soda daily.  Often consuming highly processed convenience foods due to feeling weak from an MS.      Patient's most recent   Lab Results   Component Value Date    A1C 8.2 04/16/2024     is not meeting goal of <7.0    Diabetes knowledge and skills assessment:   Patient is knowledgeable in diabetes management concepts related to: Education started again today    Continue education with the following diabetes management concepts: Healthy Eating, Being Active, Monitoring, and Taking Medication    Based on learning assessment above, most appropriate setting for further diabetes education would be: Individual setting.      PLAN  Due to patient's MS patient may qualify for additional services.  Patient to contact [facility] or if not helpful may benefit from care coordination/counseling services.    Blood Glucose testing   Test on 3 days per week (before and 2 hours after one meal)  Before eating goal 80 - 130mg/dL  2 hours after goal 80 - 150mg/dL     Go to [email or web address] for savings offers with Mail order     Start Mounjaro at   2.5 mg weekly x 4 weeks  5.0 mg weekly x 4 weeks increasing monthly as tolerated gave 2 months, let me know if you want refills   Topics to cover at upcoming visits: Healthy Eating, Being Active, Monitoring, Taking Medication, Problem Solving, Reducing Risks,  "and Healthy Coping    Follow-up: 3 months    See Care Plan for co-developed, patient-state behavior change goals.  AVS provided for patient today.    Education Materials Provided:  Goals for Your Diabetes Care, Carbohydrate Counting, Medication Information on Mounjaro, and My Plate Planner      SUBJECTIVE/OBJECTIVE:  Presents for: Follow-up  Accompanied by: Self  Diabetes education in the past 24mo: No  Diabetes type: Type 2  Disease course: Worsening  How confident are you filling out medical forms by yourself:: Extremely  Diabetes management related comments/concerns: The current medication. Shot Victoza makes me sick  Transportation concerns: No  Difficulty affording diabetes medication?: Sometimes  Difficulty affording diabetes testing supplies?: No ([place] through [employer])  Other concerns:: Glasses  Cultural Influences/Ethnic Background:  Not  or     Diabetes Symptoms & Complications:  Diabetes Related Symptoms: Fatigue, Neuropathy, Polydipsia (increased thirst), Polyphagia (increased hunger), Polyuria (increased urination), Visual change  Weight trend: Increasing  Symptom course: Worsening  Disease course: Worsening  Complications assessed today?: Yes  CVA: No  Heart disease: No  Nephropathy: No  Peripheral neuropathy: No  Peripheral Vascular Disease: No  Retinopathy: No  Sexual dysfunction: No    Patient Problem List and Family Medical History reviewed for relevant medical history, current medical status, and diabetes risk factors.    Vitals:  Ht 1.651 m (5' 5\")   Wt 126.4 kg (278 lb 9.6 oz)   LMP  (LMP Unknown)   BMI 46.36 kg/m    Estimated body mass index is 46.36 kg/m  as calculated from the following:    Height as of this encounter: 1.651 m (5' 5\").    Weight as of this encounter: 126.4 kg (278 lb 9.6 oz).   Last 3 BP:   BP Readings from Last 3 Encounters:   04/16/24 133/81   10/26/22 110/56   10/04/22 134/84       History   Smoking Status    Never   Smokeless Tobacco    Never " "      Labs:  Lab Results   Component Value Date    A1C 8.2 04/16/2024     Lab Results   Component Value Date     04/16/2024     10/26/2022     10/26/2022     Lab Results   Component Value Date    LDL 81 04/16/2024    LDL 83 11/24/2021     Direct Measure HDL   Date Value Ref Range Status   04/16/2024 46 (L) >=50 mg/dL Final   ]  GFR Estimate   Date Value Ref Range Status   04/16/2024 >90 >60 mL/min/1.73m2 Final   10/09/2020 >60 >60 Final     Comment:     _  Unit of Measure:   ml/min/1.73m2       No results found for: \"GFRESTBLACK\"  Lab Results   Component Value Date    CR 0.53 04/16/2024     No results found for: \"MICROALBUMIN\"    Healthy Eating:  Healthy Eating Assessed Today: Yes  Cultural/Episcopalian diet restrictions?: No  Do you have any food allergies or intolerances?: No  Meal planning/habits: None  Who cooks/prepares meals for you?: Self  Who purchases food in  your home?: Self  How many times a week on average do you eat food made away from home (restaurant/take-out)?: 1  Meals include: Breakfast, Lunch, Dinner, Evening Snack  Breakfast: cereal [employer] fairlife 2%  Lunch: lasagna, ice cream raffy chip cookie dough, soda Dr. Pepper  Dinner: pizza, water  Other: better on vegetables  Beverages: Water, Coffee, Milk, Soda, Coffee drinks  Has patient met with a dietitian in the past?: Yes    Being Active:  Being Active Assessed Today: Yes  Exercise:: Currently not exercising  Barrier to exercise: Safety, Physical limitation (using cane all of the time, right sided weakness from MS)    Monitoring:  Monitoring Assessed Today: Yes  Did patient bring glucose meter to appointment? : No  Blood Glucose Meter: Other  Times checking blood sugar at home (number): Never  Blood glucose trend:  (BG today in office 143mg/dL)    Taking Medications:  Diabetes Medication(s)       Biguanides       metFORMIN (GLUCOPHAGE XR) 500 MG 24 hr tablet TAKE 2 TABLETS DAILY       Incretin Mimetic Agents       " tirzepatide (MOUNJARO) 2.5 MG/0.5ML pen Inject 2.5 mg Subcutaneous every 7 days     tirzepatide (MOUNJARO) 5 MG/0.5ML pen Inject 5 mg Subcutaneous every 7 days     liraglutide (VICTOZA PEN) 18 MG/3ML solution Inject 1.8 mg Subcutaneous daily     Patient not taking: Reported on 4/16/2024            Current Treatments: Non-insulin Injectables, Oral Medication (taken by mouth)  Problems taking diabetes medications regularly?:  (Intolerant to Victoza - emesis and diarrhea)    Problem Solving:  Problem Solving Assessed Today: Yes  Is the patient at risk for hypoglycemia?: No  Is the patient at risk for DKA?: No  Does patient have severe weather/disaster plan for diabetes management?: Yes  Does patient have sick day plan for diabetes management?: Yes              Reducing Risks:  Reducing Risks Assessed Today: Yes  Diabetes Risks: Age over 45 years, Sedentary Lifestyle, Hyperlipidemia, Family History  CAD Risks: Family history, Diabetes Mellitus, Hypertension, Obesity, Sedentary lifestyle, Dyslipidemia  Has dilated eye exam at least once a year?: No  Sees dentist every 6 months?: Yes  Feet checked by healthcare provider in the last year?: No    Healthy Coping:  Healthy Coping Assessed Today: Yes  Emotional response to diabetes: Uncertain, Acceptance  Informal Support system:: [place] based, Family, Friends  Stage of change: PREPARATION (Decided to change - considering how)  Patient Activation Measure Survey Score:       No data to display                  Care Plan and Education Provided:  Care Plan: Diabetes   Updates made by Dianne Mane RD since 5/21/2024 12:00 AM        Problem: HbA1C Not In Goal         Goal: Establish Regular Follow-Ups with PCP         Task: Discuss with PCP the recommended timing for patient's next follow up visit(s)    Responsible User: Dianne Mane RD        Task: Discuss schedule for PCP visits with patient Completed 5/21/2024   Responsible User: Dianne Mane RD        Goal: Get HbA1C  Level in Goal         Task: Educate patient on diabetes education self-management topics    Responsible User: Dianne Mane RD        Task: Educate patient on benefits of regular glucose monitoring Completed 5/21/2024   Responsible User: Dianne Mane RD        Task: Refer patient to appropriate extended care team member, as needed (Medication Therapy Management, Behavioral Health, Physical Therapy, etc.)    Responsible User: Dianne Mane RD        Task: Discuss diabetes treatment plan with patient    Responsible User: Dianne Mane RD        Problem: Diabetes Self-Management Education Needed to Optimize Self-Care Behaviors         Goal: Understand diabetes pathophysiology and disease progression         Task: Provide education on diabetes pathophysiology and disease progression specfic to patient's diabetes type Completed 5/21/2024   Responsible User: Dianne Mane RD        Goal: Healthy Eating - follow a healthy eating pattern for diabetes         Task: Provide education on portion control and consistency in amount, composition and timing of food intake    Responsible User: Dianne Mane RD        Task: Provide education on managing carbohydrate intake (carbohydrate counting, plate planning method, etc.) Completed 5/21/2024   Responsible User: Dianne Mane RD        Task: Provide education on weight management    Responsible User: Dianne Mane RD        Task: Provide education on heart healthy eating    Responsible User: Dianne Mane RD        Task: Provide education on eating out    Responsible User: Dianne Mane RD        Task: Develop individualized healthy eating plan with patient Completed 5/21/2024   Responsible User: Dianne Mane RD        Goal: Being Active - get regular physical activity, working up to at least 150 minutes per week         Task: Provide education on relationship of activity to glucose and precautions to take if at risk for low glucose    Responsible User:  Dianne Mane RD        Task: Discuss barriers to physical activity with patient    Responsible User: Dianne Mane RD        Task: Develop physical activity plan with patient    Responsible User: Dianne Mane RD        Task: Explore community resources including walking groups, assistance programs, and home videos    Responsible User: Dianne Mane RD        Goal: Monitoring - monitor glucose and ketones as directed         Task: Provide education on blood glucose monitoring (purpose, proper technique, frequency, glucose targets, interpreting results, when to use glucose control solution, sharps disposal) Completed 5/21/2024   Responsible User: Dianne Mane RD        Task: Provide education on continuous glucose monitoring (sensor placement, use of gabby or /reader, understanding glucose trends, alerts and alarms, differences between sensor glucose and blood glucose)    Responsible User: Dianne Mane RD        Task: Provide education on ketone monitoring (when to monitor, frequency, etc.)    Responsible User: Dianne Mane RD        Goal: Taking Medication - patient is consistently taking medications as directed    This Visit's Progress: 0%   Note:    Pt to begin taking Mounjaro weekly        Task: Provide education on action of prescribed medication, including when to take and possible side effects Completed 5/21/2024   Responsible User: Dianne Mane RD        Task: Provide education on insulin and injectable diabetes medications, including administration, storage, site selection and rotation for injection sites Completed 5/21/2024   Responsible User: Dianne Mane RD        Task: Discuss barriers to medication adherence with patient and provide management technique ideas as appropriate    Responsible User: Dianne Mane RD        Task: Provide education on frequency and refill details of medications    Responsible User: Dianne Mane RD        Goal: Problem Solving - know how to  prevent and manage short-term diabetes complications         Task: Provide education on high blood glucose - causes, signs/symptoms, prevention and treatment Completed 5/21/2024   Responsible User: Dianne Mane RD        Task: Provide education on low blood glucose - causes, signs/symptoms, prevention, treatment, carrying a carbohydrate source at all times, and medical identification    Responsible User: Dianne Mane RD        Task: Provide education on safe travel with diabetes    Responsible User: Dianne Mane RD        Task: Provide education on how to care for diabetes on sick days    Responsible User: Dianne Mane RD        Task: Provide education on when to call a health care provider    Responsible User: Dianne Mane RD        Goal: Reducing Risks - know how to prevent and treat long-term diabetes complications         Task: Provide education on major complications of diabetes, prevention, early diagnostic measures and treatment of complications    Responsible User: Dianne Mane RD        Task: Provide education on recommended care for dental, eye and foot health    Responsible User: Dianne Mane RD        Task: Provide education on Hemoglobin A1c - goals and relationship to blood glucose levels Completed 5/21/2024   Responsible User: Dianne Mane RD        Task: Provide education on recommendations for heart health - lipid levels and goals, blood pressure and goals, and aspirin therapy, if indicated    Responsible User: Dianne Mane RD        Task: Provide education on tobacco cessation    Responsible User: Dianne Mane RD        Goal: Healthy Coping - use available resources to cope with the challenges of managing diabetes         Task: Discuss recognizing feelings about having diabetes    Responsible User: Dianne Mane RD        Task: Provide education on the benefits of making appropriate lifestyle changes Completed 5/21/2024   Responsible User: Dianne Mane RD         Task: Provide education on benefits of utilizing support systems    Responsible User: Dianne Mane RD        Task: Discuss methods for coping with stress    Responsible User: Dianne Mane RD        Task: Provide education on when to seek professional counseling    Responsible User: Dianne Mane RD          Time Spent: 60 minutes  Encounter Type: Individual    Any diabetes medication dose changes were made via the CDE Protocol per the patient's referring provider. A copy of this encounter was shared with the provider.

## 2024-05-24 ENCOUNTER — OFFICE VISIT (OUTPATIENT)
Dept: FAMILY MEDICINE | Facility: CLINIC | Age: 49
End: 2024-05-24
Payer: MEDICARE

## 2024-05-24 VITALS
RESPIRATION RATE: 20 BRPM | DIASTOLIC BLOOD PRESSURE: 85 MMHG | TEMPERATURE: 98.5 F | WEIGHT: 278 LBS | HEART RATE: 80 BPM | BODY MASS INDEX: 46.32 KG/M2 | SYSTOLIC BLOOD PRESSURE: 129 MMHG | OXYGEN SATURATION: 98 % | HEIGHT: 65 IN

## 2024-05-24 DIAGNOSIS — E11.9 TYPE 2 DIABETES MELLITUS WITHOUT COMPLICATION, WITHOUT LONG-TERM CURRENT USE OF INSULIN (H): ICD-10-CM

## 2024-05-24 DIAGNOSIS — G35 MULTIPLE SCLEROSIS (H): ICD-10-CM

## 2024-05-24 DIAGNOSIS — Z96.652 S/P TOTAL KNEE ARTHROPLASTY, LEFT: ICD-10-CM

## 2024-05-24 DIAGNOSIS — M54.42 CHRONIC BILATERAL LOW BACK PAIN WITH BILATERAL SCIATICA: Primary | ICD-10-CM

## 2024-05-24 DIAGNOSIS — M54.41 CHRONIC BILATERAL LOW BACK PAIN WITH BILATERAL SCIATICA: Primary | ICD-10-CM

## 2024-05-24 DIAGNOSIS — G89.29 CHRONIC BILATERAL LOW BACK PAIN WITH BILATERAL SCIATICA: Primary | ICD-10-CM

## 2024-05-24 DIAGNOSIS — E66.01 MORBID OBESITY (H): ICD-10-CM

## 2024-05-24 PROCEDURE — 99214 OFFICE O/P EST MOD 30 MIN: CPT | Performed by: INTERNAL MEDICINE

## 2024-05-24 RX ORDER — HYDROCODONE BITARTRATE AND ACETAMINOPHEN 5; 325 MG/1; MG/1
1 TABLET ORAL EVERY 6 HOURS PRN
Status: CANCELLED | OUTPATIENT
Start: 2024-05-24

## 2024-05-24 RX ORDER — OXYCODONE HYDROCHLORIDE 5 MG/1
5-10 TABLET ORAL EVERY 6 HOURS PRN
Qty: 40 TABLET | Refills: 0 | Status: SHIPPED | OUTPATIENT
Start: 2024-05-24

## 2024-05-24 RX ORDER — HYDROCODONE BITARTRATE AND ACETAMINOPHEN 5; 325 MG/1; MG/1
1 TABLET ORAL EVERY 6 HOURS PRN
COMMUNITY
End: 2024-05-24

## 2024-05-24 NOTE — ASSESSMENT & PLAN NOTE
chronic low back pains, lumbar radiculopathy  - MRI of C-T-L spine - no active demyelinating disease  - MRI of L-spine (4/8/2021): L4-5 severe foraminal stenosis, moderate central stenosis   - epidural steroid injection ([facility]) on 4/2021 and 7/2021 with transient improvement in symptoms  May 25, 2022: Lumbar back decompression fusion - noticeable improvement in symptoms  5/2024: Agreement to prescribe oxycodone on more limited basis which she typically uses only on the weekends related to her craft fairs -avoids use otherwise.  Historically reliant on using Tylenol and gabapentin for more other pain triggers.  -She has been made aware that she would need to present for clinical follow-up if and when needing further oxycodone refills (historically is filled on very fleeting basis, approximately once per year)

## 2024-05-24 NOTE — PROGRESS NOTES
Assessment & Plan   Problem List Items Addressed This Visit          Nervous and Auditory    Chronic low back pain - Primary     chronic low back pains, lumbar radiculopathy  - MRI of C-T-L spine - no active demyelinating disease  - MRI of L-spine (4/8/2021): L4-5 severe foraminal stenosis, moderate central stenosis   - epidural steroid injection ([facility]) on 4/2021 and 7/2021 with transient improvement in symptoms  May 25, 2022: Lumbar back decompression fusion - noticeable improvement in symptoms  5/2024: Agreement to prescribe oxycodone on more limited basis which she typically uses only on the weekends related to her craft fairs -avoids use otherwise.  Historically reliant on using Tylenol and gabapentin for more other pain triggers.  -She has been made aware that she would need to present for clinical follow-up if and when needing further oxycodone refills (historically is filled on very fleeting basis, approximately once per year)         Relevant Medications    oxyCODONE (ROXICODONE) 5 MG tablet    Multiple sclerosis (H)     multiple sclerosis  - currently in remission;  follows with Dr. Webb, her neurologist  - previously on Lemtrada (alemtuzumab) in spring, 2018; good clinical response and tolerance - associated with sustained T-cell immunosuppression  - since begin on ocrelizumab infusions  - using amantadine - finding helpful with her fatigue  June, 2022: AFO brace fitting for right foot drop + botox to right calf contracture              Digestive    Morbid obesity (H)       Endocrine    Type 2 diabetes mellitus without complication, without long-term current use of insulin (H)     uncontrolled  hypoglycemic medications: metformin ER 1000mg daily, Mounjaro 5mg/week (recently introduced)  insulin therapy: none  last HbA1c: 8.2%  microvascular complications: +WHIT  macrovascular complications: none known  ASA/VIRGILIO/statin: atorvastatin 20mg at bedtime  - 5/2024: Seen by CDE; more motivated to make  "lifestyle changes          Other Visit Diagnoses       S/P total knee arthroplasty, left                  BMI  Estimated body mass index is 46.26 kg/m  as calculated from the following:    Height as of this encounter: 1.651 m (5' 5\").    Weight as of this encounter: 126.1 kg (278 lb).   Weight management plan: Discussed healthy diet and exercise guidelines          Bud Calvillo is a 48 year old, presenting for the following health issues: Returns for follow-up primarily due to opiate analgesic request.  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  She has used quite sparingly, no refills of medications through our clinic over 2 years.  She states typically she takes extra gabapentin or Tylenol are more regular occasions, though identifies her fair activities as the more unusual sources of pain.  She describes the source of her pain is her lower back.  Does see massage therapist regularly; prior lumbar decompression surgery in 2022 did see Oanh Mane for her diabetes; was offered prescription for Mounjaro and short-term use of Dexcom.  She appears more motivated to make lifestyle and dietary changes.  Recheck Medication (Pt here for a medcheck on her vicodin and oxycodone.)      5/24/2024    11:36 AM   Additional Questions   Roomed by Dimas LERMA     History of Present Illness       Reason for visit:  Medication    She eats 0-1 servings of fruits and vegetables daily.She consumes 2 sweetened beverage(s) daily.She exercises with enough effort to increase her heart rate 9 or less minutes per day.  She exercises with enough effort to increase her heart rate 3 or less days per week.   She is taking medications regularly.           Review of Systems  Constitutional, HEENT, cardiovascular, pulmonary, gi and gu systems are negative, except as otherwise noted.      Objective    /84 " "(BP Location: Right arm, Patient Position: Sitting, Cuff Size: Adult Large)   Pulse 80   Temp 98.5  F (36.9  C) (Tympanic)   Resp 20   Ht 1.651 m (5' 5\")   Wt 126.1 kg (278 lb)   LMP  (LMP Unknown)   SpO2 98%   BMI 46.26 kg/m    Body mass index is 46.26 kg/m .  Physical Exam   GENERAL: alert and no distress  NECK: no adenopathy, no asymmetry, masses, or scars  RESP: lungs clear to auscultation - no rales, rhonchi or wheezes  CV: regular rate and rhythm, normal S1 S2, no S3 or S4, no murmur, click or rub, no peripheral edema  ABDOMEN: soft, nontender, no hepatosplenomegaly, no masses and bowel sounds normal  MS: no gross musculoskeletal defects noted, no edema    Office Visit on 04/16/2024   Component Date Value Ref Range Status    Cholesterol 04/16/2024 161  <200 mg/dL Final    Triglycerides 04/16/2024 169 (H)  <150 mg/dL Final    Direct Measure HDL 04/16/2024 46 (L)  >=50 mg/dL Final    LDL Cholesterol Calculated 04/16/2024 81  <=100 mg/dL Final    Non HDL Cholesterol 04/16/2024 115  <130 mg/dL Final    Patient Fasting > 8hrs? 04/16/2024 No   Final    Hemoglobin A1C 04/16/2024 8.2 (H)  0.0 - 5.6 % Final    Sodium 04/16/2024 139  135 - 145 mmol/L Final    Reference intervals for this test were updated on 09/26/2023 to more accurately reflect our healthy population. There may be differences in the flagging of prior results with similar values performed with this method. Interpretation of those prior results can be made in the context of the updated reference intervals.     Potassium 04/16/2024 4.5  3.4 - 5.3 mmol/L Final    Chloride 04/16/2024 106  98 - 107 mmol/L Final    Carbon Dioxide (CO2) 04/16/2024 23  22 - 29 mmol/L Final    Anion Gap 04/16/2024 10  7 - 15 mmol/L Final    Urea Nitrogen 04/16/2024 10.3  6.0 - 20.0 mg/dL Final    Creatinine 04/16/2024 0.53  0.51 - 0.95 mg/dL Final    GFR Estimate 04/16/2024 >90  >60 mL/min/1.73m2 Final    Calcium 04/16/2024 9.1  8.6 - 10.0 mg/dL Final    Glucose " 04/16/2024 194 (H)  70 - 99 mg/dL Final    Creatinine Urine mg/dL 04/18/2024 81.0  mg/dL Final    The reference ranges have not been established in urine creatinine. The results should be integrated into the clinical context for interpretation.    Albumin Urine mg/L 04/18/2024 62.2  mg/L Final    The reference ranges have not been established in urine albumin. The results should be integrated into the clinical context for interpretation.    Albumin Urine mg/g Cr 04/18/2024 76.79 (H)  0.00 - 25.00 mg/g Cr Final    Microalbuminuria is defined as an albumin:creatinine ratio of 17 to 299 for males and 25 to 299 for females. A ratio of albumin:creatinine of 300 or higher is indicative of overt proteinuria.  Due to biologic variability, positive results should be confirmed by a second, first-morning random or 24-hour timed urine specimen. If there is discrepancy, a third specimen is recommended. When 2 out of 3 results are in the microalbuminuria range, this is evidence for incipient nephropathy and warrants increased efforts at glucose control, blood pressure control, and institution of therapy with an angiotensin-converting-enzyme (ACE) inhibitor (if the patient can tolerate it).             Signed Electronically by: Fredy Mendosa MD

## 2024-05-24 NOTE — ASSESSMENT & PLAN NOTE
uncontrolled  hypoglycemic medications: metformin ER 1000mg daily, Mounjaro 5mg/week (recently introduced)  insulin therapy: none  last HbA1c: 8.2%  microvascular complications: +WHIT  macrovascular complications: none known  ASA/VIRGILIO/statin: atorvastatin 20mg at bedtime  - 5/2024: Seen by CDE; more motivated to make lifestyle changes

## 2024-07-26 ENCOUNTER — PATIENT OUTREACH (OUTPATIENT)
Dept: CARE COORDINATION | Facility: CLINIC | Age: 49
End: 2024-07-26
Payer: COMMERCIAL

## 2024-08-21 ENCOUNTER — VIRTUAL VISIT (OUTPATIENT)
Dept: EDUCATION SERVICES | Facility: CLINIC | Age: 49
End: 2024-08-21
Payer: MEDICARE

## 2024-08-21 DIAGNOSIS — E11.9 TYPE 2 DIABETES MELLITUS WITHOUT COMPLICATION, WITHOUT LONG-TERM CURRENT USE OF INSULIN (H): Primary | ICD-10-CM

## 2024-08-21 DIAGNOSIS — E66.01 MORBID OBESITY (H): ICD-10-CM

## 2024-08-21 PROCEDURE — 99207 PR DROP WITH A PROCEDURE: CPT | Mod: 95 | Performed by: DIETITIAN, REGISTERED

## 2024-08-21 PROCEDURE — G0108 DIAB MANAGE TRN  PER INDIV: HCPCS | Mod: 95 | Performed by: DIETITIAN, REGISTERED

## 2024-08-21 NOTE — PROGRESS NOTES
Diabetes Self-Management Education & Support    Presents for: Follow-up    Type of service:  Video Visit    If the video visit is dropped, the video visit invitation should be resent by: Text to cell phone: [phone]    Originating Location (pt. Location): Home  Distant Location (provider location): [facility]  Mode of Communication:  Video Conference via [employer]    Video Start Time:  7:53  Video End Time (time video stopped): 8:53     How would patient like to obtain AVS? MyChart      ASSESSMENT:  5/21/2024  Patient was seen 3/2020.  Patient has been taking Victoza but unfortunately unable to continue to tolerate it and developed emesis with diarrhea requiring 3 ER visits due to dehydration.     Patient will be adding Mounjaro today and titrating slowly.  Patient does not have blood glucose monitor and has not been routinely checking BG.  Patient is provided with a sample of Dexcom G7 sensor.    Unfortunately patient has returned to some older dietary habits and is drinking regular soda daily.  Often consuming highly processed convenience foods due to feeling weak from an MS.      8/21/2024 Patient on video visit today.  Unfortunately patient was not able to fill Mounjaro and did not follow through with questions.  Called [facility] mail order today and they do not fill Mounjaro through the mail patient is required to fill at local pharmacy.  Prescription was transferred to [facility] and also Mounjaro savings card activated and entered on prescription.    Patient has not made significant dietary or lifestyle changes.  Patient continues to drink regular soda daily and highly processed convenience foods.  Blood glucose meter not charged had patient charge it and check BG prior to end of visit patient's blood glucose 271 mg/dL.    Patient does have sample of Dexcom G7 sensor at home but did not start it and it was not accessible for patient during call.  Video demonstration of appropriate  insertion technique provided today.      Patient's most recent   Lab Results   Component Value Date    A1C 8.2 04/16/2024     is not meeting goal of <7.0    Diabetes knowledge and skills assessment:   Patient is knowledgeable in diabetes management concepts related to: Review and further instruction on previous education.    Continue education with the following diabetes management concepts: Healthy Eating, Monitoring, Taking Medication, Problem Solving, and Reducing Risks    Based on learning assessment above, most appropriate setting for further diabetes education would be: Individual setting.      PLAN    Blood Glucose testing   Test on 3 days per week (before and 2 hours after one meal)  Before eating goal 80 - 130mg/dL  2 hours after goal 80 - 150mg/dL      Go to [email or web address] for further information and videos as needed.  Savings card activated and provided through prescription for [facility]     Start Mounjaro at   2.5 mg weekly x 4 weeks  5.0 mg weekly x 4 weeks increasing monthly as tolerated gave 2 months, let me know if you want refills      7.5 mg weekly x 4 weeks increasing monthly as tolerated  10 mg weekly x 4 weeks increasing monthly as tolerated  12.5 mg weekly x 4 weeks increasing monthly as tolerated  15 mg weekly goal dose    Incorporate specific dietary interventions as discussed throughout consult, see wrap-up instructions     Topics to cover at upcoming visits: Healthy Eating, Monitoring, and Taking Medication    Follow-up: 3 months    See Care Plan for co-developed, patient-state behavior change goals.  AVS provided for patient today.    Education Materials Provided:  Carbohydrate Counting and My Plate Planner      SUBJECTIVE/OBJECTIVE:  Presents for: Follow-up  Accompanied by: Self  Diabetes education in the past 24mo: No  Diabetes type: Type 2  Disease course: Worsening  How confident are you filling out medical forms by yourself:: Extremely  Diabetes management related comments/concerns: The  "current medication. Shot Victoza makes me sick  Transportation concerns: No  Difficulty affording diabetes medication?: Sometimes  Difficulty affording diabetes testing supplies?: No ([employer] through [employer])  Other concerns:: Glasses  Cultural Influences/Ethnic Background:  Not  or     Diabetes Symptoms & Complications:  Diabetes Related Symptoms: Fatigue, Neuropathy, Polydipsia (increased thirst), Polyphagia (increased hunger), Polyuria (increased urination), Visual change  Weight trend: Increasing  Symptom course: Worsening  Disease course: Worsening  Complications assessed today?: Yes  CVA: No  Heart disease: No  Nephropathy: No  Peripheral neuropathy: No  Peripheral Vascular Disease: No  Retinopathy: No  Sexual dysfunction: No    Patient Problem List and Family Medical History reviewed for relevant medical history, current medical status, and diabetes risk factors.    Vitals:  LMP  (LMP Unknown)   Estimated body mass index is 46.26 kg/m  as calculated from the following:    Height as of 5/24/24: 1.651 m (5' 5\").    Weight as of 5/24/24: 126.1 kg (278 lb).   Last 3 BP:   BP Readings from Last 3 Encounters:   05/24/24 129/85   04/16/24 133/81   10/26/22 110/56       History   Smoking Status    Never   Smokeless Tobacco    Never       Labs:  Lab Results   Component Value Date    A1C 8.2 04/16/2024     Lab Results   Component Value Date     04/16/2024     10/26/2022     10/26/2022     Lab Results   Component Value Date    LDL 81 04/16/2024    LDL 83 11/24/2021     Direct Measure HDL   Date Value Ref Range Status   04/16/2024 46 (L) >=50 mg/dL Final   ]  GFR Estimate   Date Value Ref Range Status   04/16/2024 >90 >60 mL/min/1.73m2 Final   10/09/2020 >60 >60 Final     Comment:     _  Unit of Measure:   ml/min/1.73m2       No results found for: \"GFRESTBLACK\"  Lab Results   Component Value Date    CR 0.53 04/16/2024     No results found for: \"MICROALBUMIN\"    Healthy Eating:  Healthy " Eating Assessed Today: Yes  Cultural/Zoroastrian diet restrictions?: No  Do you have any food allergies or intolerances?: No  Meal planning/habits: None  Who cooks/prepares meals for you?: Self  Who purchases food in  your home?: Self  How many times a week on average do you eat food made away from home (restaurant/take-out)?: 1  Meals include: Breakfast, Lunch, Dinner, Evening Snack  Breakfast: cereal [employer] 2% (raisen bran cereal, Dt. Pepper)  Lunch: lasagna, ice cream raffy chip cookie dough, soda Dr. Pepper (2 tortilla's cheese, V8 energy drink with fruit)  Dinner: pizza, water (hot dog on a bun and water)  Other: better on vegetables  Beverages: Water, Coffee, Milk, Soda, Coffee drinks  Has patient met with a dietitian in the past?: Yes    Being Active:  Being Active Assessed Today: Yes  Exercise:: Currently not exercising  Barrier to exercise: Safety, Physical limitation (using cane all of the time, right sided weakness from MS)    Monitoring:  Monitoring Assessed Today: Yes  Did patient bring glucose meter to appointment? : No  Blood Glucose Meter: Other  Times checking blood sugar at home (number): Never  Blood glucose trend:     Blood glucose during office call 271 mg/dL    Taking Medications:  Diabetes Medication(s)       Biguanides       metFORMIN (GLUCOPHAGE XR) 500 MG 24 hr tablet TAKE 2 TABLETS DAILY       Incretin Mimetic Agents       tirzepatide (MOUNJARO) 2.5 MG/0.5ML pen Inject 2.5 mg subcutaneously every 7 days.     tirzepatide (MOUNJARO) 5 MG/0.5ML pen Inject 5 mg subcutaneously every 7 days.            Current Treatments: Non-insulin Injectables, Oral Medication (taken by mouth)  Problems taking diabetes medications regularly?:  (Intolerant to Victoza - emesis and diarrhea)    Problem Solving:  Problem Solving Assessed Today: Yes  Is the patient at risk for hypoglycemia?: No  Is the patient at risk for DKA?: No  Does patient have severe weather/disaster plan for diabetes  management?: Yes  Does patient have sick day plan for diabetes management?: Yes              Reducing Risks:  Reducing Risks Assessed Today: Yes  Diabetes Risks: Age over 45 years, Sedentary Lifestyle, Hyperlipidemia, Family History  CAD Risks: Family history, Diabetes Mellitus, Hypertension, Obesity, Sedentary lifestyle, Dyslipidemia  Has dilated eye exam at least once a year?: No  Sees dentist every 6 months?: Yes  Feet checked by healthcare provider in the last year?: No    Healthy Coping:  Healthy Coping Assessed Today: Yes  Emotional response to diabetes: Uncertain, Acceptance  Informal Support system:: Zuleyma based, Family, Friends  Stage of change: PREPARATION (Decided to change - considering how)  Patient Activation Measure Survey Score:       No data to display                  Care Plan and Education Provided:  Care Plan: Diabetes   Updates made by Dianne Mane RD since 8/21/2024 12:00 AM        Problem: HbA1C Not In Goal         Goal: Get HbA1C Level in Goal         Task: Refer patient to appropriate extended care team member, as needed (Medication Therapy Management, Behavioral Health, Physical Therapy, etc.) Completed 8/21/2024   Responsible User: Dianne Mane RD        Problem: Diabetes Self-Management Education Needed to Optimize Self-Care Behaviors         Goal: Healthy Eating - follow a healthy eating pattern for diabetes         Task: Provide education on heart healthy eating Completed 8/21/2024   Responsible User: Dianne Mane RD        Goal: Being Active - get regular physical activity, working up to at least 150 minutes per week         Task: Discuss barriers to physical activity with patient Completed 8/21/2024   Responsible User: Dianne Mane RD        Goal: Taking Medication - patient is consistently taking medications as directed    This Visit's Progress: 0%   Recent Progress: 0%   Note:    Pt to begin taking Mounjaro weekly        Task: Discuss barriers to medication adherence  with patient and provide management technique ideas as appropriate Completed 8/21/2024   Responsible User: Dianne Mane RD        Goal: Reducing Risks - know how to prevent and treat long-term diabetes complications         Task: Provide education on recommended care for dental, eye and foot health    Responsible User: Dianne Mane RD        Task: Provide education on recommendations for heart health - lipid levels and goals, blood pressure and goals, and aspirin therapy, if indicated Completed 8/21/2024   Responsible User: Dianne Mane RD        Goal: Healthy Coping - use available resources to cope with the challenges of managing diabetes         Task: Provide education on when to seek professional counseling Completed 8/21/2024   Responsible User: Dianne Mane RD          Time Spent: 60 minutes  Encounter Type: Individual    Any diabetes medication dose changes were made via the CDE Protocol per the patient's referring provider. A copy of this encounter was shared with the provider.

## 2024-08-21 NOTE — PATIENT INSTRUCTIONS
Blood Glucose testing   Test on 3 days per week (before and 2 hours after one meal)  Before eating goal 80 - 130mg/dL  2 hours after goal 80 - 150mg/dL      Go to [email or web address] for further information and videos as needed.  Savings card activated and provided through prescription for [facility]     Start Mounjaro at   2.5 mg weekly x 4 weeks  5.0 mg weekly x 4 weeks increasing monthly as tolerated gave 2 months, let me know if you want refills      7.5 mg weekly x 4 weeks increasing monthly as tolerated  10 mg weekly x 4 weeks increasing monthly as tolerated  12.5 mg weekly x 4 weeks increasing monthly as tolerated  15 mg weekly goal dose     HB eggs  Tuna   Grilled chicken strips   Garbanzo/ black beans      Factor 75 meals - look into this      Egg bake   Cauliflower rice bowls - microwavable   Quiona crunch burger from aldi  Cilantro lime wraps      Fruit try to get twice a day   Switching to diet soda only         8.2% A1c = average Result: 189 mg/dL      [facility]   General Office Hours  Monday - Friday  8:00am - 4:30pm  (Closed Holidays)     Phone: [phone]  Toll Free: [phone]  Fax: [phone]     Staff Contacts     [facility] Manager  Deondre Faith       Gabriela Wheatley     Nutrition Coordinator  Niyah Vela     Information & Assistance /Options Counselors  Steffi Hester     Elder   Liane Robles     Disability   Rafat Pestrindiad

## 2024-08-21 NOTE — LETTER
8/21/2024         RE: Karli Miner  [address]  [facility] [place] [facility] [address]        Dear Colleague,    Thank you for referring your patient, Karli Miner, to the [facility]. Please see a copy of my visit note below.    Diabetes Self-Management Education & Support    Presents for: Follow-up    Type of service:  Video Visit    If the video visit is dropped, the video visit invitation should be resent by: Text to cell phone: [phone]    Originating Location (pt. Location): Home  Distant Location (provider location): [facility]  Mode of Communication:  Video Conference via [employer]    Video Start Time:  7:53  Video End Time (time video stopped): 8:53     How would patient like to obtain AVS? MyChart      ASSESSMENT:  5/21/2024  Patient was seen 3/2020.  Patient has been taking Victoza but unfortunately unable to continue to tolerate it and developed emesis with diarrhea requiring 3 ER visits due to dehydration.     Patient will be adding Mounjaro today and titrating slowly.  Patient does not have blood glucose monitor and has not been routinely checking BG.  Patient is provided with a sample of Dexcom G7 sensor.    Unfortunately patient has returned to some older dietary habits and is drinking regular soda daily.  Often consuming highly processed convenience foods due to feeling weak from an MS.      8/21/2024 Patient on video visit today.  Unfortunately patient was not able to fill Mounjaro and did not follow through with questions.  Called [facility] mail order today and they do not fill Mounjaro through the mail patient is required to fill at local pharmacy.  Prescription was transferred to [facility] and also Mounjaro savings card activated and entered on prescription.    Patient has not made significant dietary or lifestyle changes.  Patient continues to drink regular soda daily and highly processed convenience foods.  Blood glucose  meter not charged had patient charge it and check BG prior to end of visit patient's blood glucose 271 mg/dL.    Patient does have sample of Dexcom G7 sensor at home but did not start it and it was not accessible for patient during call.  Video demonstration of appropriate insertion technique provided today.      Patient's most recent   Lab Results   Component Value Date    A1C 8.2 04/16/2024     is not meeting goal of <7.0    Diabetes knowledge and skills assessment:   Patient is knowledgeable in diabetes management concepts related to: Review and further instruction on previous education.    Continue education with the following diabetes management concepts: Healthy Eating, Monitoring, Taking Medication, Problem Solving, and Reducing Risks    Based on learning assessment above, most appropriate setting for further diabetes education would be: Individual setting.      PLAN    Blood Glucose testing   Test on 3 days per week (before and 2 hours after one meal)  Before eating goal 80 - 130mg/dL  2 hours after goal 80 - 150mg/dL      Go to [email or web address] for further information and videos as needed.  Savings card activated and provided through prescription for [facility]     Start Mounjaro at   2.5 mg weekly x 4 weeks  5.0 mg weekly x 4 weeks increasing monthly as tolerated gave 2 months, let me know if you want refills      7.5 mg weekly x 4 weeks increasing monthly as tolerated  10 mg weekly x 4 weeks increasing monthly as tolerated  12.5 mg weekly x 4 weeks increasing monthly as tolerated  15 mg weekly goal dose    Incorporate specific dietary interventions as discussed throughout consult, see wrap-up instructions     Topics to cover at upcoming visits: Healthy Eating, Monitoring, and Taking Medication    Follow-up: 3 months    See Care Plan for co-developed, patient-state behavior change goals.  AVS provided for patient today.    Education Materials Provided:  Carbohydrate Counting and My Plate  "Planner      SUBJECTIVE/OBJECTIVE:  Presents for: Follow-up  Accompanied by: Self  Diabetes education in the past 24mo: No  Diabetes type: Type 2  Disease course: Worsening  How confident are you filling out medical forms by yourself:: Extremely  Diabetes management related comments/concerns: The current medication. Shot Victoza makes me sick  Transportation concerns: No  Difficulty affording diabetes medication?: Sometimes  Difficulty affording diabetes testing supplies?: No ([employer] through [employer])  Other concerns:: Glasses  Cultural Influences/Ethnic Background:  Not  or     Diabetes Symptoms & Complications:  Diabetes Related Symptoms: Fatigue, Neuropathy, Polydipsia (increased thirst), Polyphagia (increased hunger), Polyuria (increased urination), Visual change  Weight trend: Increasing  Symptom course: Worsening  Disease course: Worsening  Complications assessed today?: Yes  CVA: No  Heart disease: No  Nephropathy: No  Peripheral neuropathy: No  Peripheral Vascular Disease: No  Retinopathy: No  Sexual dysfunction: No    Patient Problem List and Family Medical History reviewed for relevant medical history, current medical status, and diabetes risk factors.    Vitals:  LMP  (LMP Unknown)   Estimated body mass index is 46.26 kg/m  as calculated from the following:    Height as of 5/24/24: 1.651 m (5' 5\").    Weight as of 5/24/24: 126.1 kg (278 lb).   Last 3 BP:   BP Readings from Last 3 Encounters:   05/24/24 129/85   04/16/24 133/81   10/26/22 110/56       History   Smoking Status     Never   Smokeless Tobacco     Never       Labs:  Lab Results   Component Value Date    A1C 8.2 04/16/2024     Lab Results   Component Value Date     04/16/2024     10/26/2022     10/26/2022     Lab Results   Component Value Date    LDL 81 04/16/2024    LDL 83 11/24/2021     Direct Measure HDL   Date Value Ref Range Status   04/16/2024 46 (L) >=50 mg/dL Final   ]  GFR Estimate   Date Value Ref Range " "Status   04/16/2024 >90 >60 mL/min/1.73m2 Final   10/09/2020 >60 >60 Final     Comment:     _  Unit of Measure:   ml/min/1.73m2       No results found for: \"GFRESTBLACK\"  Lab Results   Component Value Date    CR 0.53 04/16/2024     No results found for: \"MICROALBUMIN\"    Healthy Eating:  Healthy Eating Assessed Today: Yes  Cultural/Mosque diet restrictions?: No  Do you have any food allergies or intolerances?: No  Meal planning/habits: None  Who cooks/prepares meals for you?: Self  Who purchases food in  your home?: Self  How many times a week on average do you eat food made away from home (restaurant/take-out)?: 1  Meals include: Breakfast, Lunch, Dinner, Evening Snack  Breakfast: cereal [email or web address] 2% (raisen bran cereal, Dt. Pepper)  Lunch: lasagna, ice cream raffy chip cookie dough, soda Dr. Pepper (2 tortilla's cheese, V8 energy drink with fruit)  Dinner: pizza, water (hot dog on a bun and water)  Other: better on vegetables  Beverages: Water, Coffee, Milk, Soda, Coffee drinks  Has patient met with a dietitian in the past?: Yes    Being Active:  Being Active Assessed Today: Yes  Exercise:: Currently not exercising  Barrier to exercise: Safety, Physical limitation (using cane all of the time, right sided weakness from MS)    Monitoring:  Monitoring Assessed Today: Yes  Did patient bring glucose meter to appointment? : No  Blood Glucose Meter: Other  Times checking blood sugar at home (number): Never  Blood glucose trend:     Blood glucose during office call 271 mg/dL    Taking Medications:  Diabetes Medication(s)       Biguanides       metFORMIN (GLUCOPHAGE XR) 500 MG 24 hr tablet TAKE 2 TABLETS DAILY       Incretin Mimetic Agents       tirzepatide (MOUNJARO) 2.5 MG/0.5ML pen Inject 2.5 mg subcutaneously every 7 days.     tirzepatide (MOUNJARO) 5 MG/0.5ML pen Inject 5 mg subcutaneously every 7 days.            Current Treatments: Non-insulin Injectables, Oral Medication (taken by mouth)  Problems " taking diabetes medications regularly?:  (Intolerant to Victoza - emesis and diarrhea)    Problem Solving:  Problem Solving Assessed Today: Yes  Is the patient at risk for hypoglycemia?: No  Is the patient at risk for DKA?: No  Does patient have severe weather/disaster plan for diabetes management?: Yes  Does patient have sick day plan for diabetes management?: Yes              Reducing Risks:  Reducing Risks Assessed Today: Yes  Diabetes Risks: Age over 45 years, Sedentary Lifestyle, Hyperlipidemia, Family History  CAD Risks: Family history, Diabetes Mellitus, Hypertension, Obesity, Sedentary lifestyle, Dyslipidemia  Has dilated eye exam at least once a year?: No  Sees dentist every 6 months?: Yes  Feet checked by healthcare provider in the last year?: No    Healthy Coping:  Healthy Coping Assessed Today: Yes  Emotional response to diabetes: Uncertain, Acceptance  Informal Support system:: Zuleyma based, Family, Friends  Stage of change: PREPARATION (Decided to change - considering how)  Patient Activation Measure Survey Score:       No data to display                  Care Plan and Education Provided:  Care Plan: Diabetes   Updates made by Dianne Mane RD since 8/21/2024 12:00 AM        Problem: HbA1C Not In Goal         Goal: Get HbA1C Level in Goal         Task: Refer patient to appropriate extended care team member, as needed (Medication Therapy Management, Behavioral Health, Physical Therapy, etc.) Completed 8/21/2024   Responsible User: Dianne Mane RD        Problem: Diabetes Self-Management Education Needed to Optimize Self-Care Behaviors         Goal: Healthy Eating - follow a healthy eating pattern for diabetes         Task: Provide education on heart healthy eating Completed 8/21/2024   Responsible User: Dianne Mane RD        Goal: Being Active - get regular physical activity, working up to at least 150 minutes per week         Task: Discuss barriers to physical activity with patient Completed  8/21/2024   Responsible User: Dianne Mane RD        Goal: Taking Medication - patient is consistently taking medications as directed    This Visit's Progress: 0%   Recent Progress: 0%   Note:    Pt to begin taking Mounjaro weekly        Task: Discuss barriers to medication adherence with patient and provide management technique ideas as appropriate Completed 8/21/2024   Responsible User: Dianne Mane RD        Goal: Reducing Risks - know how to prevent and treat long-term diabetes complications         Task: Provide education on recommended care for dental, eye and foot health    Responsible User: Dianne Mane RD        Task: Provide education on recommendations for heart health - lipid levels and goals, blood pressure and goals, and aspirin therapy, if indicated Completed 8/21/2024   Responsible User: Dianne Mane RD        Goal: Healthy Coping - use available resources to cope with the challenges of managing diabetes         Task: Provide education on when to seek professional counseling Completed 8/21/2024   Responsible User: Dianne Mane RD          Time Spent: 60 minutes  Encounter Type: Individual    Any diabetes medication dose changes were made via the CDE Protocol per the patient's referring provider. A copy of this encounter was shared with the provider.

## 2024-09-11 ENCOUNTER — NURSE TRIAGE (OUTPATIENT)
Dept: FAMILY MEDICINE | Facility: CLINIC | Age: 49
End: 2024-09-11

## 2024-09-11 ENCOUNTER — OFFICE VISIT (OUTPATIENT)
Dept: FAMILY MEDICINE | Facility: CLINIC | Age: 49
End: 2024-09-11
Payer: MEDICARE

## 2024-09-11 VITALS
TEMPERATURE: 97.7 F | OXYGEN SATURATION: 95 % | SYSTOLIC BLOOD PRESSURE: 134 MMHG | HEART RATE: 107 BPM | DIASTOLIC BLOOD PRESSURE: 84 MMHG | BODY MASS INDEX: 45.98 KG/M2 | HEIGHT: 65 IN | WEIGHT: 276 LBS | RESPIRATION RATE: 24 BRPM

## 2024-09-11 DIAGNOSIS — J01.10 ACUTE NON-RECURRENT FRONTAL SINUSITIS: Primary | ICD-10-CM

## 2024-09-11 DIAGNOSIS — R05.1 ACUTE COUGH: ICD-10-CM

## 2024-09-11 PROCEDURE — 99213 OFFICE O/P EST LOW 20 MIN: CPT | Performed by: PHYSICIAN ASSISTANT

## 2024-09-11 PROCEDURE — 87635 SARS-COV-2 COVID-19 AMP PRB: CPT | Performed by: PHYSICIAN ASSISTANT

## 2024-09-11 RX ORDER — BENZONATATE 200 MG/1
200 CAPSULE ORAL 3 TIMES DAILY PRN
Qty: 30 CAPSULE | Refills: 0 | Status: SHIPPED | OUTPATIENT
Start: 2024-09-11

## 2024-09-11 ASSESSMENT — ENCOUNTER SYMPTOMS: COUGH: 1

## 2024-09-11 NOTE — PATIENT INSTRUCTIONS
Flonase daily.  Continue allegra daily.   Nasal saline irrigation 2 x per day.   Return for increased short of breath, difficulty breathing, persistent fevers

## 2024-09-11 NOTE — PROGRESS NOTES
Assessment & Plan     Acute non-recurrent frontal sinusitis  Cover with augmentin given worsening sx 7 days into illness, mucopurulent dischage, comorbidities.  However I did give her the option to wait 3-5 more days and do conservative measures and if persisting or worsening at that time start abx. Nasal saline, flonase, humidification.    Will check Covid 19 test, will not change or tx plan given duration of sx she is not candidate for tx, but if she has lingering sx would have understanding of initial illness.    - amoxicillin-clavulanate (AUGMENTIN) 875-125 MG tablet  Dispense: 14 tablet; Refill: 0  - Symptomatic COVID-19 Virus (Coronavirus) by PCR Nose  - benzonatate (TESSALON) 200 MG capsule  Dispense: 30 capsule; Refill: 0    Acute cough  Primarily from PND, drainage.  Trial of tessalon to suppress cough.     Push fluids, rest and ibuprofen or tylenol for comfort.      - benzonatate (TESSALON) 200 MG capsule  Dispense: 30 capsule; Refill: 0             Return for 2 weeks if not improved, sooner if worsening..    [facility]    Bud Calvillo is a 49 year old female who presents to clinic today for the following health issues:  Chief Complaint   Patient presents with    Sinus Problem    Cough         9/11/2024    12:24 PM   Additional Questions   Roomed by Gayathri RUELAS     History of Present Illness       Reason for visit:  Allergies cough  Symptom onset:  3-7 days ago She is missing 7 dose(s) of medications per week.      Pt aydee a 49 year old female with hx of MS, type 2 diabetes, asthma, htn who presents with concerns re: possible sinus infection.    Had either allergies or asthma       Review of Systems   Respiratory:  Positive for cough.      Rhionrrhea, congestion, progressively worsening over the last 7 days which was preceeded by poorly controlled allergies.    Cough, PND. Mucoid discharge. Productive yellow sputum.    Difficulty breathing at  night due to nasal congestion.    Fevers at home subjective.    No HA.  Pressure in the sinuses.    Allegra 180 daily.    No nose sprays.    Daugther with similar.    No covid 19 test done at home.   Sx are causing a flare of her MS which is typical for her.         Patient Active Problem List   Diagnosis    Chronic low back pain    Depressive disorder    Primary hypertension    Migraine headache    Moderate persistent asthma without complication    Morbid obesity (H)    Multiple sclerosis (H)    PTSD (post-traumatic stress disorder)    Seasonal allergic rhinitis    Type 2 diabetes mellitus without complication, without long-term current use of insulin (H)    Urinary bladder neurogenic dysfunction    Vitamin D deficiency    Health care maintenance    S/P lumbar fusion    Borderline personality disorder (H)     Current Outpatient Medications   Medication Sig Dispense Refill    acetaminophen (TYLENOL) 325 MG tablet Take 1 tablet (325 mg) by mouth every 6 hours as needed for other (mild pain) 20 tablet 0    amantadine (SYMMETREL) 100 MG capsule TAKE 1 CAPSULE TWICE DAILY AS NEEDED (Patient taking differently: Take 200 mg by mouth daily. Take 2 in AM) 180 capsule 1    amLODIPine (NORVASC) 5 MG tablet TAKE 1 TABLET DAILY 90 tablet 3    amoxicillin-clavulanate (AUGMENTIN) 875-125 MG tablet Take 1 tablet by mouth 2 times daily for 7 days. 14 tablet 0    ARIPiprazole (ABILIFY) 5 MG tablet Take 5 mg by mouth daily      atorvastatin (LIPITOR) 20 MG tablet Take 1 tablet (20 mg) by mouth at bedtime 90 tablet 3    benzonatate (TESSALON) 200 MG capsule Take 1 capsule (200 mg) by mouth 3 times daily as needed for cough. 30 capsule 0    busPIRone (BUSPAR) 15 MG tablet Take 15 mg by mouth 2 times daily      Cholecalciferol 250 MCG (42483 UT) CAPS Take 10,000 Units by mouth      clonazePAM (KLONOPIN) 1 MG tablet Take 1 tablet (1 mg) by mouth At Bedtime 90 tablet 1    escitalopram (LEXAPRO) 20 MG tablet Take 20 mg by mouth daily       ferrous sulfate (FEROSUL) 325 (65 Fe) MG tablet Take 650 mg by mouth daily (with breakfast)      fexofenadine (ALLEGRA) 180 MG tablet Take 180 mg by mouth daily      fluticasone (FLONASE) 50 MCG/ACT nasal spray Spray 2 sprays in nostril daily as needed      gabapentin (NEURONTIN) 100 MG capsule TAKE 1 CAPSULE 3 TIMES A    capsule 3    hydrOXYzine (ATARAX) 10 MG tablet Take 10 mg by mouth nightly as needed      loperamide (IMODIUM) 2 MG capsule Take 2 capsules (4mg) orally with 1st loose stool, then 1 capsule (2mg) with other loose stools. Max 16 mg in 24 hrs.      metFORMIN (GLUCOPHAGE XR) 500 MG 24 hr tablet TAKE 2 TABLETS DAILY 180 tablet 1    methenamine hippurate (HIPREX) 1 g tablet Take 1 tablet (1 g) by mouth 2 times daily 180 tablet 3    nystatin (MYCOSTATIN) 177712 UNIT/GM external powder Apply topically 3 times daily as needed (rash) 60 g 0    Ocrelizumab (OCREVUS IV) Inject into the vein every 6 months      oxyCODONE (ROXICODONE) 5 MG tablet Take 1-2 tablets (5-10 mg) by mouth every 6 hours as needed for moderate to severe pain 40 tablet 0    polyethylene glycol (MIRALAX) 17 GM/Dose powder Take 17 g by mouth daily 510 g 0    prazosin (MINIPRESS) 1 MG capsule Take 1 mg by mouth At Bedtime      rizatriptan (MAXALT) 10 MG tablet Take 10 mg by mouth at onset of headache for migraine      senna-docusate (SENOKOT-S/PERICOLACE) 8.6-50 MG tablet Take 1-2 tablets by mouth daily as needed for constipation Take while on oral narcotics to prevent or treat constipation. 30 tablet 0    solifenacin (VESICARE) 5 MG tablet Take 5 mg by mouth daily      tirzepatide (MOUNJARO) 2.5 MG/0.5ML pen Inject 2.5 mg subcutaneously every 7 days. 2 mL 0    tiZANidine (ZANAFLEX) 2 MG tablet Take 1 tablet (2 mg) by mouth 2 times daily  tablet 1    tiZANidine (ZANAFLEX) 4 MG tablet TAKE 2 TABLETS AT BEDTIME. 180 tablet 1    Vitamin D3 (CHOLECALCIFEROL) 125 MCG (5000 UT) tablet Take 1 tablet by mouth At Bedtime       "tirzepatide (MOUNJARO) 5 MG/0.5ML pen Inject 5 mg subcutaneously every 7 days. (Patient not taking: Reported on 9/11/2024) 2 mL 1     No current facility-administered medications for this visit.       Objective    /84   Pulse 107   Temp 97.7  F (36.5  C)   Resp 24   Ht 1.651 m (5' 5\")   Wt 125.2 kg (276 lb)   LMP  (LMP Unknown)   SpO2 95%   BMI 45.93 kg/m    Physical Exam   Pt is in no acute distress and appears well  Ears patent B:  TM s intact, non-injected. All land marks easily visibile    Nasal mucosa is edematous, mucoid discharge.  TTP maxillary sinuses, nontender frontal sinuses.    Pharynx: non erythematous, tonsils non hypertrophied, No exudate   Neck supple: no adenopathy  Lungs: CTA  Heart: RRR, no murmur, no thrills or heaves   Ext: no edema  Skin: no rashes      No results found for any visits on 09/11/24.            "

## 2024-09-11 NOTE — TELEPHONE ENCOUNTER
S-(situation): Sinus pain, congestion since last Thursday.     B-(background): Hx of allergies, now she feels like it has turned into more of a sinus infection.     A-(assessment): Feels feverish, has not taken temp, cough, congestion, fullness/ pain in sinuses, feel it in chest. Denies sob, difficulty breathing.     R-(recommendations): Appointment advised. Scheduled with Hazel Escobedo today, 9/11/24.   Advised ER if symptoms worsen, sob, difficulty breathing or chest pain.      Reason for Disposition   Sinus pain (not just congestion) and fever    Additional Information   Negative: Sounds like a life-threatening emergency to the triager   Negative: Difficulty breathing, and not from stuffy nose (e.g., not relieved by cleaning out the nose)   Negative: SEVERE headache and has fever   Negative: Patient sounds very sick or weak to the triager   Negative: SEVERE sinus pain   Negative: SEVERE headache   Negative: Redness or swelling on the cheek, forehead, or around the eye   Negative: Fever > 103 F (39.4 C)   Negative: Fever > 101 F (38.3 C) and over 60 years of age   Negative: Fever > 100.0 F (37.8 C) and has diabetes mellitus or a weak immune system (e.g., HIV positive, cancer chemotherapy, organ transplant, splenectomy, chronic steroids)   Negative: Fever > 100.0 F (37.8 C) and bedridden (e.g., CVA, chronic illness, recovering from surgery)   Negative: Fever present > 3 days (72 hours)   Negative: Fever returns after gone for over 24 hours and symptoms worse or not improved    Protocols used: Sinus Pain or Congestion-A-[place]

## 2024-09-12 LAB — SARS-COV-2 RNA RESP QL NAA+PROBE: NEGATIVE

## 2024-09-19 ENCOUNTER — TELEPHONE (OUTPATIENT)
Dept: FAMILY MEDICINE | Facility: CLINIC | Age: 49
End: 2024-09-19

## 2024-09-19 DIAGNOSIS — J32.9 SINUSITIS, UNSPECIFIED CHRONICITY, UNSPECIFIED LOCATION: Primary | ICD-10-CM

## 2024-09-19 RX ORDER — AZITHROMYCIN 250 MG/1
TABLET, FILM COATED ORAL
Qty: 6 TABLET | Refills: 0 | Status: SHIPPED | OUTPATIENT
Start: 2024-09-19 | End: 2024-09-24

## 2024-09-19 NOTE — TELEPHONE ENCOUNTER
S-(situation):   Patient calling to request z-pack    B-(background):   Appointment 9/11/2024 - acute symptoms    A-(assessment):   Sinus are slightly improved  Cough is getting worse    Patient reports that illness is affecting her MS and this is causing trouble walking and weakness.    R-(recommendations):   Please Advise.    Patient instructed to call back if symptoms change or if new symptoms present. Patient verbalizes agreement with plan.    HEYDI Trejo, BSN  [place], [place]

## 2024-10-05 ENCOUNTER — HEALTH MAINTENANCE LETTER (OUTPATIENT)
Age: 49
End: 2024-10-05

## 2024-10-09 DIAGNOSIS — G35 MULTIPLE SCLEROSIS (H): ICD-10-CM

## 2024-10-09 DIAGNOSIS — I10 HTN (HYPERTENSION): ICD-10-CM

## 2024-10-09 DIAGNOSIS — E11.9 TYPE 2 DIABETES MELLITUS WITHOUT COMPLICATION, WITHOUT LONG-TERM CURRENT USE OF INSULIN (H): ICD-10-CM

## 2024-10-09 RX ORDER — GABAPENTIN 100 MG/1
CAPSULE ORAL
Qty: 270 CAPSULE | Refills: 3 | Status: SHIPPED | OUTPATIENT
Start: 2024-10-09

## 2024-10-09 RX ORDER — METFORMIN HYDROCHLORIDE 500 MG/1
TABLET, EXTENDED RELEASE ORAL
Qty: 180 TABLET | Refills: 1 | Status: SHIPPED | OUTPATIENT
Start: 2024-10-09

## 2024-10-09 NOTE — TELEPHONE ENCOUNTER
Outgoing call to confirm reason of gap.     Pt states she hasn't been able to afford medications for months and needing a new script from all the requested medication.     LOV 5/24/24.     Routing to PCP to review and advise.     Carlos LABOY RN

## 2024-10-10 RX ORDER — GABAPENTIN 100 MG/1
100 CAPSULE ORAL 2 TIMES DAILY PRN
Qty: 270 CAPSULE | Refills: 3 | Status: SHIPPED | OUTPATIENT
Start: 2024-10-10

## 2024-10-10 RX ORDER — METFORMIN HYDROCHLORIDE 500 MG/1
1000 TABLET, EXTENDED RELEASE ORAL DAILY
Qty: 180 TABLET | Refills: 1 | Status: SHIPPED | OUTPATIENT
Start: 2024-10-10

## 2024-10-10 RX ORDER — AMLODIPINE BESYLATE 5 MG/1
5 TABLET ORAL DAILY
Qty: 90 TABLET | Refills: 3 | Status: SHIPPED | OUTPATIENT
Start: 2024-10-10

## 2024-10-10 RX ORDER — AMANTADINE HYDROCHLORIDE 100 MG/1
100 CAPSULE, GELATIN COATED ORAL 2 TIMES DAILY PRN
Qty: 180 CAPSULE | Refills: 1 | Status: SHIPPED | OUTPATIENT
Start: 2024-10-10

## 2024-10-10 RX ORDER — METHENAMINE HIPPURATE 1000 MG/1
1 TABLET ORAL 2 TIMES DAILY
Qty: 180 TABLET | Refills: 3 | Status: SHIPPED | OUTPATIENT
Start: 2024-10-10

## 2024-11-20 ENCOUNTER — ALLIED HEALTH/NURSE VISIT (OUTPATIENT)
Dept: EDUCATION SERVICES | Facility: CLINIC | Age: 49
End: 2024-11-20
Payer: COMMERCIAL

## 2024-11-20 VITALS — HEIGHT: 65 IN | BODY MASS INDEX: 43.75 KG/M2 | WEIGHT: 262.6 LBS

## 2024-11-20 DIAGNOSIS — E66.01 MORBID OBESITY (H): ICD-10-CM

## 2024-11-20 DIAGNOSIS — E11.9 TYPE 2 DIABETES MELLITUS WITHOUT COMPLICATION, WITHOUT LONG-TERM CURRENT USE OF INSULIN (H): Primary | ICD-10-CM

## 2024-11-20 LAB
EST. AVERAGE GLUCOSE BLD GHB EST-MCNC: 151 MG/DL
HBA1C MFR BLD: 6.9 % (ref 0–5.6)

## 2024-11-20 NOTE — PROGRESS NOTES
Medical Nutrition Therapy for Diabetes  Visit Type:Reassessment and intervention  Karli Miner presents today for MNT and education related to type 2 diabetes and weight management Body mass index is 43.7 kg/m .  She is accompanied by self.     ASSESSMENT:   5/21/2024  Patient was seen 3/2020.  Patient has been taking Victoza but unfortunately unable to continue to tolerate it and developed emesis with diarrhea requiring 3 ER visits due to dehydration.     Patient will be adding Mounjaro today and titrating slowly.  Patient does not have blood glucose monitor and has not been routinely checking BG.  Patient is provided with a sample of Dexcom G7 sensor.    Unfortunately patient has returned to some older dietary habits and is drinking regular soda daily.  Often consuming highly processed convenience foods due to feeling weak from an MS.       8/21/2024 Patient on video visit today.  Unfortunately patient was not able to fill Mounjaro and did not follow through with questions.  Called [facility] mail order today and they do not fill Mounjaro through the mail patient is required to fill at local pharmacy.  Prescription was transferred to [facility] and also Mounjaro savings card activated and entered on prescription.     Patient has not made significant dietary or lifestyle changes.  Patient continues to drink regular soda daily and highly processed convenience foods.  Blood glucose meter not charged had patient charge it and check BG prior to end of visit patient's blood glucose 271 mg/dL.     Patient does have sample of Dexcom G7 sensor at home but did not start it and it was not accessible for patient during call.  Video demonstration of appropriate insertion technique provided today.    11/20/2024 Patient is taking Mounjaro and tolerating well.  Patient has titrated to 5 mg dosing and will continue with 5 mg x 3 months then evaluate glucose and weight control.  Conservative titration due to history of intolerance  with Victoza and MS.    Patient is motivated with weight loss.  Dietary changes continue to be a struggle for patient but with Mounjaro patient's portion sizes are smaller.  Patient is down to 1 can of regular Dr. Pepper per day.  Patient did agree to try to alternate daily between Dr. Pepper and Root Beer Zero.      Wt Readings from Last 3 Encounters:   11/20/24 119.1 kg (262 lb 9.6 oz)   09/11/24 125.2 kg (276 lb)   05/24/24 126.1 kg (278 lb)       BG values are: In goal  Patient's most recent   Lab Results   Component Value Date    A1C 6.9 11/20/2024    A1C 8.2 04/16/2024    A1C 6.6 10/04/2022    A1C 6.7 11/24/2021    A1C 7.0 10/09/2020    is meeting goal of <7.0    ESTIMATED KCAL REQUIREMENTS:  1300 kcal per day    NUTRITION DIAGNOSIS: Food- and nutrition-related knowledge deficit related to therapeutic dietary recommendations as evidenced by diabetes, class III obesity    NUTRITION INTERVENTION:  Nutrition Prescription: Energy Intake: 1300 kcal/day  Carbohydrate Intake: 30 grams/meal and 15 grams/snacks  Protein Intake: 90 grams/day  Sodium Intake: <2300 mg/day  Education given to support: weight reduction, carb counting, labeling, sick days, behavior modification, portion control, and heart healthy diet  Education Materials Provided: Portion Size Guide, Heart Healthy Food Choices, Weight Loss Tips, Sodium Content of Foods, Protein Content of Foods, and Carbohydrate Counting  Motivational Interviewing    PATIENT'S BEHAVIOR CHANGE GOALS:   See Patient Instructions for patient stated behavior change goals. AVS was printed and given to patient at today's appointment.    MONITOR / EVALUATE:  RD will monitor/evaluate:  Blood Glucose / A1c  Pertinent Labs  Weight change    SUBJECTIVE/OBJECTIVE:  Presents for: Follow-up  Accompanied by: Self  Diabetes education in the past 24mo: (Patient-Rptd) Yes  Diabetes type: (Patient-Rptd) Type 2  Disease course: (Patient-Rptd) Other (see Comments)  Please elaborate::  "(Patient-Rptd) Do not know.  How confident are you filling out medical forms by yourself:: (Patient-Rptd) Extremely  Diabetes management related comments/concerns: Mounjaro is working great  Transportation concerns: No  Difficulty affording diabetes medication?: Sometimes  Difficulty affording diabetes testing supplies?: No ([place] through [employer])  Other concerns:: Glasses  Cultural Influences/Ethnic Background:  Not  or     Diabetes Symptoms & Complications:  Diabetes Related Symptoms: (Patient-Rptd) Fatigue, Neuropathy, Polydipsia (increased thirst), Polyuria (increased urination)  Weight trend: (Patient-Rptd) Decreasing  Symptom course: (Patient-Rptd) Stable  Disease course: (Patient-Rptd) Other (see Comments)  Please elaborate:: (Patient-Rptd) Do not know.  Complications assessed today?: Yes  Autonomic neuropathy: No  CVA: No  Heart disease: No  Nephropathy: No  Peripheral neuropathy: No  Peripheral Vascular Disease: No  Retinopathy: No  Sexual dysfunction: No    Patient Problem List and Family Medical History reviewed for relevant medical history, current medical status, and diabetes risk factors.    Vitals:  Ht 1.651 m (5' 5\")   Wt 119.1 kg (262 lb 9.6 oz)   LMP  (LMP Unknown)   BMI 43.70 kg/m    Estimated body mass index is 43.7 kg/m  as calculated from the following:    Height as of this encounter: 1.651 m (5' 5\").    Weight as of this encounter: 119.1 kg (262 lb 9.6 oz).   Last 3 BP:   BP Readings from Last 3 Encounters:   09/11/24 134/84   05/24/24 129/85   04/16/24 133/81       History   Smoking Status    Never   Smokeless Tobacco    Never       Labs:  Lab Results   Component Value Date    A1C 6.9 11/20/2024     Lab Results   Component Value Date     04/16/2024     10/26/2022     10/26/2022     Lab Results   Component Value Date    LDL 81 04/16/2024    LDL 83 11/24/2021     Direct Measure HDL   Date Value Ref Range Status   04/16/2024 46 (L) >=50 mg/dL Final   ]  GFR " "Estimate   Date Value Ref Range Status   04/16/2024 >90 >60 mL/min/1.73m2 Final   10/09/2020 >60 >60 Final     Comment:     _  Unit of Measure:   ml/min/1.73m2       No results found for: \"GFRESTBLACK\"  Lab Results   Component Value Date    CR 0.53 04/16/2024     No results found for: \"MICROALBUMIN\"    Healthy Eating:  Healthy Eating Assessed Today: Yes  Cultural/Jain diet restrictions?: (Patient-Rptd) No  Do you have any food allergies or intolerances?: No  Meal planning/habits: Smaller portions  Who cooks/prepares meals for you?: Self  Who purchases food in  your home?: Self  How many times a week on average do you eat food made away from home (restaurant/take-out)?: (Patient-Rptd) 2  Meals include: (Patient-Rptd) Breakfast, Lunch, Dinner  Breakfast: more eggs, less cereal (raisen bran cereal, Dt. Pepper)  Lunch: protein shake  1 soda Dr. Pepper per day (2 tortilla's cheese, V8 energy drink with fruit)  Dinner: more vegetables, subway, water (hot dog on a bun and water)  Other: better on vegetables  Beverages: Water, Milk, Soda, Energy drinks  Has patient met with a dietitian in the past?: Yes    Being Active:  Being Active Assessed Today: Yes  Exercise:: Currently not exercising  Barrier to exercise: Safety, Physical limitation (MS, using a cane)    Monitoring:  Monitoring Assessed Today: Yes  Did patient bring glucose meter to appointment? : No  Blood Glucose Meter: (Patient-Rptd) Unknown  Times checking blood sugar at home (number): (Patient-Rptd) Never  Times checking blood sugar at home (per): (Patient-Rptd) Month    Taking Medications:  Diabetes Medication(s)       Biguanides       metFORMIN (GLUCOPHAGE XR) 500 MG 24 hr tablet Take 2 tablets (1,000 mg) by mouth daily.     metFORMIN (GLUCOPHAGE XR) 500 MG 24 hr tablet TAKE 2 TABLETS DAILY       Incretin Mimetic Agents       Tirzepatide 5 MG/0.5ML SOAJ Inject 0.5 mLs (5 mg) subcutaneously every 7 days.            Taking Medication Assessed Today: " Yes  Current Treatments: Non-insulin Injectables, Diet, Oral Medication (taken by mouth)  Problems taking diabetes medications regularly?: No (Intolerant to Victoza - emesis and diarrhea)  Diabetes medication side effects?: No    Problem Solving:  Problem Solving Assessed Today: Yes  Is the patient at risk for hypoglycemia?: No  Is the patient at risk for DKA?: No  Does patient have severe weather/disaster plan for diabetes management?: Yes  Does patient have sick day plan for diabetes management?: Yes    Reducing Risks:  Reducing Risks Assessed Today: Yes  Diabetes Risks: Age over 45 years, Sedentary Lifestyle, Hyperlipidemia, Family History  CAD Risks: Family history, Diabetes Mellitus, Hypertension, Obesity, Sedentary lifestyle, Dyslipidemia  Has dilated eye exam at least once a year?: (Patient-Rptd) No  Sees dentist every 6 months?: (Patient-Rptd) No  Feet checked by healthcare provider in the last year?: (Patient-Rptd) Yes    Healthy Coping:  Healthy Coping Assessed Today: Yes  Emotional response to diabetes: Uncertain, Acceptance  Informal Support system:: (Patient-Rptd) Children, [place] based, Family, Friends, Parent  Stage of change: ACTION (Actively working towards change)  Support resources: Weight Management, In-person Offerings, Websites  Patient Activation Measure Survey Score:       No data to display              Care Plan and Education Provided:  Care Plan: Diabetes   Updates made by Dianne Mane RD since 11/20/2024 12:00 AM        Problem: HbA1C Not In Goal         Goal: Get HbA1C Level in Goal         Task: Discuss diabetes treatment plan with patient Completed 11/20/2024   Responsible User: Dianne Mane RD        Problem: Diabetes Self-Management Education Needed to Optimize Self-Care Behaviors         Goal: Healthy Eating - follow a healthy eating pattern for diabetes         Task: Provide education on weight management Completed 11/20/2024   Responsible User: Dianne Mane RD        Task:  Provide education on eating out Completed 11/20/2024   Responsible User: Dianne Mane RD        Goal: Being Active - get regular physical activity, working up to at least 150 minutes per week         Task: Develop physical activity plan with patient Completed 11/20/2024   Responsible User: Dianne Mane RD        Task: Explore community resources including walking groups, assistance programs, and home videos Completed 11/20/2024   Responsible User: Dianne Mane RD        Goal: Monitoring - monitor glucose and ketones as directed         Task: Provide education on continuous glucose monitoring (sensor placement, use of gabby or /reader, understanding glucose trends, alerts and alarms, differences between sensor glucose and blood glucose) Completed 11/20/2024   Responsible User: Dianne Mane RD        Goal: Taking Medication - patient is consistently taking medications as directed    This Visit's Progress: 100%   Recent Progress: 0%   Note:    Pt to begin taking Mounjaro weekly        Goal: Problem Solving - know how to prevent and manage short-term diabetes complications         Task: Provide education on how to care for diabetes on sick days Completed 11/20/2024   Responsible User: Dianne Mane RD        Goal: Reducing Risks - know how to prevent and treat long-term diabetes complications         Task: Provide education on recommended care for dental, eye and foot health Completed 11/20/2024   Responsible User: Dianne Mane RD        Goal: Healthy Coping - use available resources to cope with the challenges of managing diabetes         Task: Provide education on benefits of utilizing support systems Completed 11/20/2024   Responsible User: Dianne Mane RD      CGM-specific education:   Dexcom sensor: insertion technique, sensor site location and rotation, insulin administration in relation to sensor placement, Use of trends and graphs for pattern management and problem solving, and Dexcom  Mobile gabby         Time Spent: 60 minutes  Encounter Type: Individual    Any diabetes medication dose changes were made via the CDE Protocol per the patient's referring provider. A copy of this encounter was shared with the provider.

## 2024-11-20 NOTE — LETTER
11/20/2024         RE: Karli Miner  [address]  [facility] [id number]        Dear Colleague,    Thank you for referring your patient, Karli Miner, to the [facility]. Please see a copy of my visit note below.    Medical Nutrition Therapy for Diabetes  Visit Type:Reassessment and intervention  Karli Miner presents today for MNT and education related to type 2 diabetes and weight management Body mass index is 43.7 kg/m .  She is accompanied by self.     ASSESSMENT:   5/21/2024  Patient was seen 3/2020.  Patient has been taking Victoza but unfortunately unable to continue to tolerate it and developed emesis with diarrhea requiring 3 ER visits due to dehydration.     Patient will be adding Mounjaro today and titrating slowly.  Patient does not have blood glucose monitor and has not been routinely checking BG.  Patient is provided with a sample of Dexcom G7 sensor.    Unfortunately patient has returned to some older dietary habits and is drinking regular soda daily.  Often consuming highly processed convenience foods due to feeling weak from an MS.       8/21/2024 Patient on video visit today.  Unfortunately patient was not able to fill Mounjaro and did not follow through with questions.  Called [facility] mail order today and they do not fill Mounjaro through the mail patient is required to fill at local pharmacy.  Prescription was transferred to [facility] and also Mounjaro savings card activated and entered on prescription.     Patient has not made significant dietary or lifestyle changes.  Patient continues to drink regular soda daily and highly processed convenience foods.  Blood glucose meter not charged had patient charge it and check BG prior to end of visit patient's blood glucose 271 mg/dL.     Patient does have sample of Dexcom G7 sensor at home but did not start it and it was not accessible for patient during call.  Video demonstration of appropriate  insertion technique provided today.    11/20/2024 Patient is taking Mounjaro and tolerating well.  Patient has titrated to 5 mg dosing and will continue with 5 mg x 3 months then evaluate glucose and weight control.  Conservative titration due to history of intolerance with Victoza and MS.    Patient is motivated with weight loss.  Dietary changes continue to be a struggle for patient but with Mounjaro patient's portion sizes are smaller.  Patient is down to 1 can of regular Dr. Pepper per day.  Patient did agree to try to alternate daily between Dr. Pepper and Root Beer Zero.      Wt Readings from Last 3 Encounters:   11/20/24 119.1 kg (262 lb 9.6 oz)   09/11/24 125.2 kg (276 lb)   05/24/24 126.1 kg (278 lb)       BG values are: In goal  Patient's most recent   Lab Results   Component Value Date    A1C 6.9 11/20/2024    A1C 8.2 04/16/2024    A1C 6.6 10/04/2022    A1C 6.7 11/24/2021    A1C 7.0 10/09/2020    is meeting goal of <7.0    ESTIMATED KCAL REQUIREMENTS:  1300 kcal per day    NUTRITION DIAGNOSIS: Food- and nutrition-related knowledge deficit related to therapeutic dietary recommendations as evidenced by diabetes, class III obesity    NUTRITION INTERVENTION:  Nutrition Prescription: Energy Intake: 1300 kcal/day  Carbohydrate Intake: 30 grams/meal and 15 grams/snacks  Protein Intake: 90 grams/day  Sodium Intake: <2300 mg/day  Education given to support: weight reduction, carb counting, labeling, sick days, behavior modification, portion control, and heart healthy diet  Education Materials Provided: Portion Size Guide, Heart Healthy Food Choices, Weight Loss Tips, Sodium Content of Foods, Protein Content of Foods, and Carbohydrate Counting  Motivational Interviewing    PATIENT'S BEHAVIOR CHANGE GOALS:   See Patient Instructions for patient stated behavior change goals. AVS was printed and given to patient at today's appointment.    MONITOR / EVALUATE:  RD will monitor/evaluate:  Blood Glucose / A1c  Pertinent  "Labs  Weight change    SUBJECTIVE/OBJECTIVE:  Presents for: Follow-up  Accompanied by: Self  Diabetes education in the past 24mo: (Patient-Rptd) Yes  Diabetes type: (Patient-Rptd) Type 2  Disease course: (Patient-Rptd) Other (see Comments)  Please elaborate:: (Patient-Rptd) Do not know.  How confident are you filling out medical forms by yourself:: (Patient-Rptd) Extremely  Diabetes management related comments/concerns: Mounjaro is working great  Transportation concerns: No  Difficulty affording diabetes medication?: Sometimes  Difficulty affording diabetes testing supplies?: No ([place] through [employer])  Other concerns:: Glasses  Cultural Influences/Ethnic Background:  Not  or     Diabetes Symptoms & Complications:  Diabetes Related Symptoms: (Patient-Rptd) Fatigue, Neuropathy, Polydipsia (increased thirst), Polyuria (increased urination)  Weight trend: (Patient-Rptd) Decreasing  Symptom course: (Patient-Rptd) Stable  Disease course: (Patient-Rptd) Other (see Comments)  Please elaborate:: (Patient-Rptd) Do not know.  Complications assessed today?: Yes  Autonomic neuropathy: No  CVA: No  Heart disease: No  Nephropathy: No  Peripheral neuropathy: No  Peripheral Vascular Disease: No  Retinopathy: No  Sexual dysfunction: No    Patient Problem List and Family Medical History reviewed for relevant medical history, current medical status, and diabetes risk factors.    Vitals:  Ht 1.651 m (5' 5\")   Wt 119.1 kg (262 lb 9.6 oz)   LMP  (LMP Unknown)   BMI 43.70 kg/m    Estimated body mass index is 43.7 kg/m  as calculated from the following:    Height as of this encounter: 1.651 m (5' 5\").    Weight as of this encounter: 119.1 kg (262 lb 9.6 oz).   Last 3 BP:   BP Readings from Last 3 Encounters:   09/11/24 134/84   05/24/24 129/85   04/16/24 133/81       History   Smoking Status     Never   Smokeless Tobacco     Never       Labs:  Lab Results   Component Value Date    A1C 6.9 11/20/2024     Lab Results " "  Component Value Date     04/16/2024     10/26/2022     10/26/2022     Lab Results   Component Value Date    LDL 81 04/16/2024    LDL 83 11/24/2021     Direct Measure HDL   Date Value Ref Range Status   04/16/2024 46 (L) >=50 mg/dL Final   ]  GFR Estimate   Date Value Ref Range Status   04/16/2024 >90 >60 mL/min/1.73m2 Final   10/09/2020 >60 >60 Final     Comment:     _  Unit of Measure:   ml/min/1.73m2       No results found for: \"GFRESTBLACK\"  Lab Results   Component Value Date    CR 0.53 04/16/2024     No results found for: \"MICROALBUMIN\"    Healthy Eating:  Healthy Eating Assessed Today: Yes  Cultural/Latter day diet restrictions?: (Patient-Rptd) No  Do you have any food allergies or intolerances?: No  Meal planning/habits: Smaller portions  Who cooks/prepares meals for you?: Self  Who purchases food in  your home?: Self  How many times a week on average do you eat food made away from home (restaurant/take-out)?: (Patient-Rptd) 2  Meals include: (Patient-Rptd) Breakfast, Lunch, Dinner  Breakfast: more eggs, less cereal (raisen bran cereal, Dt. Pepper)  Lunch: protein shake  1 soda DrLena Pepper per day (2 tortilla's cheese, V8 energy drink with fruit)  Dinner: more vegetables, subway, water (hot dog on a bun and water)  Other: better on vegetables  Beverages: Water, Milk, Soda, Energy drinks  Has patient met with a dietitian in the past?: Yes    Being Active:  Being Active Assessed Today: Yes  Exercise:: Currently not exercising  Barrier to exercise: Safety, Physical limitation (MS, using a cane)    Monitoring:  Monitoring Assessed Today: Yes  Did patient bring glucose meter to appointment? : No  Blood Glucose Meter: (Patient-Rptd) Unknown  Times checking blood sugar at home (number): (Patient-Rptd) Never  Times checking blood sugar at home (per): (Patient-Rptd) Month    Taking Medications:  Diabetes Medication(s)       Biguanides       metFORMIN (GLUCOPHAGE XR) 500 MG 24 hr tablet Take 2 " tablets (1,000 mg) by mouth daily.     metFORMIN (GLUCOPHAGE XR) 500 MG 24 hr tablet TAKE 2 TABLETS DAILY       Incretin Mimetic Agents       Tirzepatide 5 MG/0.5ML SOAJ Inject 0.5 mLs (5 mg) subcutaneously every 7 days.            Taking Medication Assessed Today: Yes  Current Treatments: Non-insulin Injectables, Diet, Oral Medication (taken by mouth)  Problems taking diabetes medications regularly?: No (Intolerant to Victoza - emesis and diarrhea)  Diabetes medication side effects?: No    Problem Solving:  Problem Solving Assessed Today: Yes  Is the patient at risk for hypoglycemia?: No  Is the patient at risk for DKA?: No  Does patient have severe weather/disaster plan for diabetes management?: Yes  Does patient have sick day plan for diabetes management?: Yes    Reducing Risks:  Reducing Risks Assessed Today: Yes  Diabetes Risks: Age over 45 years, Sedentary Lifestyle, Hyperlipidemia, Family History  CAD Risks: Family history, Diabetes Mellitus, Hypertension, Obesity, Sedentary lifestyle, Dyslipidemia  Has dilated eye exam at least once a year?: (Patient-Rptd) No  Sees dentist every 6 months?: (Patient-Rptd) No  Feet checked by healthcare provider in the last year?: (Patient-Rptd) Yes    Healthy Coping:  Healthy Coping Assessed Today: Yes  Emotional response to diabetes: Uncertain, Acceptance  Informal Support system:: (Patient-Rptd) Children, [place] based, Family, Friends, Parent  Stage of change: ACTION (Actively working towards change)  Support resources: Weight Management, In-person Offerings, Websites  Patient Activation Measure Survey Score:       No data to display              Care Plan and Education Provided:  Care Plan: Diabetes   Updates made by Dianne Mane RD since 11/20/2024 12:00 AM        Problem: HbA1C Not In Goal         Goal: Get HbA1C Level in Goal         Task: Discuss diabetes treatment plan with patient Completed 11/20/2024   Responsible User: Dianne Mane RD        Problem: Diabetes  Self-Management Education Needed to Optimize Self-Care Behaviors         Goal: Healthy Eating - follow a healthy eating pattern for diabetes         Task: Provide education on weight management Completed 11/20/2024   Responsible User: Dianne Mane RD        Task: Provide education on eating out Completed 11/20/2024   Responsible User: Dianne Mane RD        Goal: Being Active - get regular physical activity, working up to at least 150 minutes per week         Task: Develop physical activity plan with patient Completed 11/20/2024   Responsible User: Dianne Mane RD        Task: Explore community resources including walking groups, assistance programs, and home videos Completed 11/20/2024   Responsible User: Dianne Mane RD        Goal: Monitoring - monitor glucose and ketones as directed         Task: Provide education on continuous glucose monitoring (sensor placement, use of gabby or /reader, understanding glucose trends, alerts and alarms, differences between sensor glucose and blood glucose) Completed 11/20/2024   Responsible User: Dianne Mane RD        Goal: Taking Medication - patient is consistently taking medications as directed    This Visit's Progress: 100%   Recent Progress: 0%   Note:    Pt to begin taking Mounjaro weekly        Goal: Problem Solving - know how to prevent and manage short-term diabetes complications         Task: Provide education on how to care for diabetes on sick days Completed 11/20/2024   Responsible User: Dianne Mane RD        Goal: Reducing Risks - know how to prevent and treat long-term diabetes complications         Task: Provide education on recommended care for dental, eye and foot health Completed 11/20/2024   Responsible User: Dianne Mane RD        Goal: Healthy Coping - use available resources to cope with the challenges of managing diabetes         Task: Provide education on benefits of utilizing support systems Completed 11/20/2024    Responsible User: Dianne Mane RD      CGM-specific education:   Dexcom sensor: insertion technique, sensor site location and rotation, insulin administration in relation to sensor placement, Use of trends and graphs for pattern management and problem solving, and Dexcom Mobile gabby         Time Spent: 60 minutes  Encounter Type: Individual    Any diabetes medication dose changes were made via the CDE Protocol per the patient's referring provider. A copy of this encounter was shared with the provider.

## 2024-12-18 DIAGNOSIS — M54.42 CHRONIC BILATERAL LOW BACK PAIN WITH BILATERAL SCIATICA: ICD-10-CM

## 2024-12-18 DIAGNOSIS — G89.29 CHRONIC BILATERAL LOW BACK PAIN WITH BILATERAL SCIATICA: ICD-10-CM

## 2024-12-18 DIAGNOSIS — M54.41 CHRONIC BILATERAL LOW BACK PAIN WITH BILATERAL SCIATICA: ICD-10-CM

## 2024-12-18 RX ORDER — OXYCODONE HYDROCHLORIDE 5 MG/1
5-10 TABLET ORAL EVERY 6 HOURS PRN
Qty: 40 TABLET | Refills: 0 | OUTPATIENT
Start: 2024-12-18

## 2024-12-18 NOTE — TELEPHONE ENCOUNTER
Patient calling to request a refill.   Patient states she has MS and when she is doing more events, she has more muscle/back pain.    Last RX:   oxyCODONE (ROXICODONE) 5 MG tablet  05/24/2024   Dispense: 40 tablet

## 2025-01-22 ASSESSMENT — ASTHMA QUESTIONNAIRES
QUESTION_1 LAST FOUR WEEKS HOW MUCH OF THE TIME DID YOUR ASTHMA KEEP YOU FROM GETTING AS MUCH DONE AT WORK, SCHOOL OR AT HOME: NONE OF THE TIME
QUESTION_2 LAST FOUR WEEKS HOW OFTEN HAVE YOU HAD SHORTNESS OF BREATH: NOT AT ALL
QUESTION_5 LAST FOUR WEEKS HOW WOULD YOU RATE YOUR ASTHMA CONTROL: COMPLETELY CONTROLLED
QUESTION_4 LAST FOUR WEEKS HOW OFTEN HAVE YOU USED YOUR RESCUE INHALER OR NEBULIZER MEDICATION (SUCH AS ALBUTEROL): NOT AT ALL
ACT_TOTALSCORE: 25
QUESTION_3 LAST FOUR WEEKS HOW OFTEN DID YOUR ASTHMA SYMPTOMS (WHEEZING, COUGHING, SHORTNESS OF BREATH, CHEST TIGHTNESS OR PAIN) WAKE YOU UP AT NIGHT OR EARLIER THAN USUAL IN THE MORNING: NOT AT ALL

## 2025-02-18 ENCOUNTER — VIRTUAL VISIT (OUTPATIENT)
Dept: FAMILY MEDICINE | Facility: CLINIC | Age: 50
End: 2025-02-18
Payer: MEDICARE

## 2025-02-18 DIAGNOSIS — G89.29 CHRONIC BILATERAL LOW BACK PAIN WITH BILATERAL SCIATICA: ICD-10-CM

## 2025-02-18 DIAGNOSIS — J32.9 SINUSITIS, UNSPECIFIED CHRONICITY, UNSPECIFIED LOCATION: ICD-10-CM

## 2025-02-18 DIAGNOSIS — I10 PRIMARY HYPERTENSION: ICD-10-CM

## 2025-02-18 DIAGNOSIS — Z00.00 HEALTH CARE MAINTENANCE: Primary | ICD-10-CM

## 2025-02-18 DIAGNOSIS — G35 MULTIPLE SCLEROSIS (H): ICD-10-CM

## 2025-02-18 DIAGNOSIS — M54.42 CHRONIC BILATERAL LOW BACK PAIN WITH BILATERAL SCIATICA: ICD-10-CM

## 2025-02-18 DIAGNOSIS — N31.9 URINARY BLADDER NEUROGENIC DYSFUNCTION: ICD-10-CM

## 2025-02-18 DIAGNOSIS — M54.41 CHRONIC BILATERAL LOW BACK PAIN WITH BILATERAL SCIATICA: ICD-10-CM

## 2025-02-18 DIAGNOSIS — Z79.899 CONTROLLED SUBSTANCE AGREEMENT SIGNED: ICD-10-CM

## 2025-02-18 DIAGNOSIS — G43.809 OTHER MIGRAINE WITHOUT STATUS MIGRAINOSUS, NOT INTRACTABLE: ICD-10-CM

## 2025-02-18 DIAGNOSIS — E11.40 TYPE 2 DIABETES MELLITUS WITH DIABETIC NEUROPATHY, UNSPECIFIED WHETHER LONG TERM INSULIN USE (H): ICD-10-CM

## 2025-02-18 DIAGNOSIS — F41.9 ANXIETY: ICD-10-CM

## 2025-02-18 DIAGNOSIS — E11.9 TYPE 2 DIABETES MELLITUS WITHOUT COMPLICATION, WITHOUT LONG-TERM CURRENT USE OF INSULIN (H): ICD-10-CM

## 2025-02-18 RX ORDER — ATORVASTATIN CALCIUM 20 MG/1
20 TABLET, FILM COATED ORAL AT BEDTIME
Qty: 90 TABLET | Refills: 3 | Status: SHIPPED | OUTPATIENT
Start: 2025-02-18

## 2025-02-18 RX ORDER — CLONAZEPAM 1 MG/1
1 TABLET ORAL AT BEDTIME
Qty: 90 TABLET | Refills: 1 | Status: SHIPPED | OUTPATIENT
Start: 2025-02-18

## 2025-02-18 RX ORDER — AMANTADINE HYDROCHLORIDE 100 MG/1
100 CAPSULE, GELATIN COATED ORAL 2 TIMES DAILY PRN
Qty: 180 CAPSULE | Refills: 3 | Status: SHIPPED | OUTPATIENT
Start: 2025-02-18

## 2025-02-18 RX ORDER — RIZATRIPTAN BENZOATE 10 MG/1
10 TABLET ORAL
Qty: 12 TABLET | Refills: 3 | Status: SHIPPED | OUTPATIENT
Start: 2025-02-18

## 2025-02-18 RX ORDER — AZITHROMYCIN 250 MG/1
TABLET, FILM COATED ORAL
Qty: 6 TABLET | Refills: 0 | Status: SHIPPED | OUTPATIENT
Start: 2025-02-18 | End: 2025-02-23

## 2025-02-18 RX ORDER — METFORMIN HYDROCHLORIDE 500 MG/1
1000 TABLET, EXTENDED RELEASE ORAL DAILY
Qty: 180 TABLET | Refills: 3 | Status: SHIPPED | OUTPATIENT
Start: 2025-02-18

## 2025-02-18 RX ORDER — OXYCODONE HYDROCHLORIDE 5 MG/1
5-10 TABLET ORAL EVERY 6 HOURS PRN
Qty: 40 TABLET | Refills: 0 | Status: SHIPPED | OUTPATIENT
Start: 2025-02-18

## 2025-02-18 ASSESSMENT — ASTHMA QUESTIONNAIRES: ACT_TOTALSCORE: 25

## 2025-02-18 NOTE — PROGRESS NOTES
Karli is a 49 year old who is being evaluated via a billable telephone visit.  Attempts of video visit aborted due to technical difficulties  How would you like to obtain your AVS? MyChart  If the video visit is dropped, the invitation should be resent by: Text to cell phone: [phone]  Will anyone else be joining your video visit? No      Assessment & Plan   Problem List Items Addressed This Visit          Nervous and Auditory    Chronic low back pain    Relevant Medications    oxyCODONE (ROXICODONE) 5 MG tablet    Migraine headache    Relevant Medications    oxyCODONE (ROXICODONE) 5 MG tablet    rizatriptan (MAXALT) 10 MG tablet    clonazePAM (KLONOPIN) 1 MG tablet    Multiple sclerosis (H)    Relevant Medications    amantadine (SYMMETREL) 100 MG capsule    RESOLVED: Type 2 diabetes mellitus with diabetic neuropathy, unspecified whether long term insulin use (H)    Relevant Medications    amantadine (SYMMETREL) 100 MG capsule    clonazePAM (KLONOPIN) 1 MG tablet    metFORMIN (GLUCOPHAGE XR) 500 MG 24 hr tablet       Endocrine    Type 2 diabetes mellitus without complication, without long-term current use of insulin (H)     controlled  hypoglycemic medications: metformin ER 1000mg daily, Mounjaro 5mg/week   Anticipate further up titration of Mounjaro once patient proving more regular scheduled usage  insulin therapy: none  last HbA1c: 6.9%  microvascular complications: +WHIT  macrovascular complications: none known  ASA/VIRGILIO/statin: atorvastatin 20mg at bedtime  - 5/2024: Seen by CDE; more motivated to make lifestyle changes         Relevant Medications    atorvastatin (LIPITOR) 20 MG tablet    metFORMIN (GLUCOPHAGE XR) 500 MG 24 hr tablet       Circulatory    Primary hypertension     current antihypertensive regimen: amlodipine 5mg daily  regimen changes:  intolerance:  future titration/work-up plan:   - anticipate ACEi/ARB in the future            Urinary    Urinary bladder neurogenic dysfunction      "Significant postoperative urinary retention after lumbar back fusion requiring intermittent bladder catheterization  -Generally improved symptoms since discharge            Other    Health care maintenance - Primary    Relevant Orders    REVIEW OF HEALTH MAINTENANCE PROTOCOL ORDERS (Completed)     Other Visit Diagnoses       Body mass index (BMI) 45.0-49.9, adult (H)        Relevant Medications    metFORMIN (GLUCOPHAGE XR) 500 MG 24 hr tablet    Anxiety        Relevant Medications    clonazePAM (KLONOPIN) 1 MG tablet    Sinusitis, unspecified chronicity, unspecified location        Relevant Medications    azithromycin (ZITHROMAX) 250 MG tablet    Controlled substance agreement signed        Relevant Orders    Urine Drug Screen [facility]                  BMI  Estimated body mass index is 43.7 kg/m  as calculated from the following:    Height as of 11/20/24: 1.651 m (5' 5\").    Weight as of 11/20/24: 119.1 kg (262 lb 9.6 oz).   Weight management plan: Discussed healthy diet and exercise guidelines          Bud Calvillo is a 49 year old, presenting for the following health issues: Follow-up regarding pain management other chronic conditions.  Has been using oxycodone on a sparing basis related to both neuropathic pain and lumbar radiculopathy.  Using Mounjaro 5 mg/week -generally seen notable improvement with appetite suppression -has not titrated up further on dosing.  Walks with a cane on a regular basis with her MS; no fall or safety concerns.  Sinusitis (2 weeks) and Pain Management        2/18/2025    12:57 PM   Additional Questions   Roomed by Gayathri RUELAS     Telephone Start Time:  1300    History of Present Illness       Reason for visit:  Medication refill She is missing 1 dose(s) of medications per week.  She is not taking prescribed medications regularly due to cost of medication and remembering to take.           Objective           Vitals:  No vitals were obtained today due to virtual " visit.    Physical Exam   GENERAL: alert and no distress  RESP: No audible wheeze, cough, or visible cyanosis.    PSYCH: Appropriate affect, tone, and pace of words          Video-Visit Details    Type of service:  telephone Visit   Video End Time:1:19 PM  Originating Location (pt. Location): Home    Distant Location (provider location):  On-site  Signed Electronically by: Fredy Mendosa MD

## 2025-02-18 NOTE — Clinical Note
Should be seeing you this week.  UDS ordered.  I am fine with future up titration of Mounjaro, though she has shared using intermittently and would like for her to prove using for at least 1 month uninterrupted before further titration. Gianna -initially scheduled this video visit template -subsequently changed to telephone visit

## 2025-02-18 NOTE — ASSESSMENT & PLAN NOTE
controlled  hypoglycemic medications: metformin ER 1000mg daily, Mounjaro 5mg/week   Anticipate further up titration of Mounjaro once patient proving more regular scheduled usage  insulin therapy: none  last HbA1c: 6.9%  microvascular complications: +WHIT  macrovascular complications: none known  ASA/VIRGILIO/statin: atorvastatin 20mg at bedtime  - 5/2024: Seen by CDE; more motivated to make lifestyle changes

## 2025-02-19 ENCOUNTER — ALLIED HEALTH/NURSE VISIT (OUTPATIENT)
Dept: EDUCATION SERVICES | Facility: CLINIC | Age: 50
End: 2025-02-19
Payer: COMMERCIAL

## 2025-02-19 ENCOUNTER — LAB (OUTPATIENT)
Dept: LAB | Facility: CLINIC | Age: 50
End: 2025-02-19
Payer: COMMERCIAL

## 2025-02-19 VITALS — HEIGHT: 65 IN | BODY MASS INDEX: 43.77 KG/M2 | WEIGHT: 262.7 LBS

## 2025-02-19 DIAGNOSIS — E11.9 TYPE 2 DIABETES MELLITUS WITHOUT COMPLICATION, WITHOUT LONG-TERM CURRENT USE OF INSULIN (H): Primary | ICD-10-CM

## 2025-02-19 DIAGNOSIS — Z79.899 CONTROLLED SUBSTANCE AGREEMENT SIGNED: ICD-10-CM

## 2025-02-19 DIAGNOSIS — E66.01 MORBID OBESITY (H): ICD-10-CM

## 2025-02-19 LAB
AMPHETAMINES UR QL: NOT DETECTED
BARBITURATES UR QL SCN: NOT DETECTED
BENZODIAZ UR QL SCN: NOT DETECTED
BUPRENORPHINE UR QL: NOT DETECTED
CANNABINOIDS UR QL: NOT DETECTED
COCAINE UR QL SCN: NOT DETECTED
D-METHAMPHET UR QL: NOT DETECTED
EST. AVERAGE GLUCOSE BLD GHB EST-MCNC: 160 MG/DL
HBA1C MFR BLD: 7.2 % (ref 0–5.6)
METHADONE UR QL SCN: NOT DETECTED
OPIATES UR QL SCN: NOT DETECTED
OXYCODONE UR QL SCN: NOT DETECTED
PCP UR QL SCN: NOT DETECTED
TRICYCLICS UR QL SCN: NOT DETECTED

## 2025-02-19 PROCEDURE — G0108 DIAB MANAGE TRN  PER INDIV: HCPCS | Performed by: DIETITIAN, REGISTERED

## 2025-02-19 PROCEDURE — 80306 DRUG TEST PRSMV INSTRMNT: CPT

## 2025-02-19 PROCEDURE — 83036 HEMOGLOBIN GLYCOSYLATED A1C: CPT

## 2025-02-19 PROCEDURE — 36415 COLL VENOUS BLD VENIPUNCTURE: CPT

## 2025-02-19 RX ORDER — TIRZEPATIDE 7.5 MG/.5ML
7.5 INJECTION, SOLUTION SUBCUTANEOUS
Qty: 6 ML | Refills: 0 | Status: SHIPPED | OUTPATIENT
Start: 2025-02-19

## 2025-02-19 NOTE — LETTER
Mounjaro and tolerating well.  Patient has titrated to 5 mg dosing and will continue with 5 mg x 3 months then evaluate glucose and weight control.  Conservative titration due to history of intolerance with Victoza and MS.    Patient is motivated with weight loss.  Dietary changes continue to be a struggle for patient but with Mounjaro patient's portion sizes are smaller.  Patient is down to 1 can of regular Dr. Pepper per day.  Patient did agree to try to alternate daily between Dr. Pepper and Root Beer Zero.      2/19/2025 patient has missed about a month of Mounjaro and has been off of metformin for about 2 weeks will reintroduce medications slowly and titrate Mounjaro after 4 weeks of 5 mg will increase to 7.5 mg x 3 months.  A1c slightly higher due to missed medication.  Patient did initially decrease regular soda intake but is back to drinking 1 Dr. Pepper per day    Patient's most recent   Lab Results   Component Value Date    A1C 7.2 02/19/2025    A1C 6.9 11/20/2024    A1C 8.2 04/16/2024    A1C 6.6 10/04/2022    A1C 6.7 11/24/2021         is not meeting goal of <7.0    Diabetes knowledge and skills assessment:   Patient is knowledgeable in diabetes management concepts related to: Problem Solving, Reducing Risks, and Healthy Coping    Based on learning assessment above, most appropriate setting for further diabetes education would be: Individual setting.    Care Plan and Education Provided:  Healthy Eating: Carbohydrate Counting, Heart healthy diet, Label reading, and Weight Management,     Being Active: Finding a physical activity routine that works for you,     Monitoring: Frequency of monitoring and Individual glucose targets,     Taking Medication: Action of prescribed medication(s) and Side effects of prescribed medication(s), reintroducing metformin slowly, slowly titrating Mounjaro will take 4 weeks of 5 mg then increase to 7.5 mg x 3 months    Problem Solving: High glucose - causes, signs/symptoms,  "treatment and prevention, Low glucose - causes, signs/symptoms, treatment and prevention, and Sick day arrangements,     Reducing Risks: Complications of diabetes, Goal for A1c, how it relates to glucose and how often to check, and Preventing cardiovascular disease, including blood pressure goals, lipid goals, recommendations for cardioprotective medications, statins, and aspirin, and     Healthy Coping: Identifying helpful resources and Methods of coping with stress      Patient verbalized understanding of diabetes self-management education concepts discussed, opportunities for ongoing education and support, and recommendations provided today.    Plan    Swap out Dr. Pepper for V8 energy every other day or so    Restarting   Week 1 Mounjaro 5mg for 1 week   Week 2 Mounjaro plus 1 tab of metformin daily for a week.    Week 3 Mounjaro plus 2 tabs metformin   Week 4 \"    Then will increase Mounjaro to the 7.5 mg dose.      Eye exam due   Dental brush and floss daily   Topics to cover at upcoming visits: Healthy Eating and Taking Medication    Follow-up: 6/2025  Upcoming Diabetes Ed Appointments     No appointments to display        See Care Plan for co-developed, patient-state behavior change goals.    Education Materials Provided:      Subjective/Objective  Karli is an 49 year old year old, presenting for the following diabetes education related to: Presents for: Follow-up  Accompanied by: Self  Diabetes education in the past 24mo: Yes  Diabetes type: Type 2  How confident are you filling out medical forms by yourself:: Extremely  Diabetes management related comments/concerns: Mounjaro is working great  Transportation concerns: No  Difficulty affording diabetes medication?: Sometimes  Difficulty affording diabetes testing supplies?: No ([place] through [employer])  Other concerns:: Glasses  Cultural Influences/Ethnic Background:  Not  or     Diabetes Symptoms & Complications:  Diabetes Related Symptoms: " "Fatigue, Neuropathy, Polydipsia (increased thirst), Polyuria (increased urination)  Weight trend: Decreasing  Symptom course: Stable  Complications assessed today?: Yes  Autonomic neuropathy: No  CVA: No  Heart disease: No  Nephropathy: No  Peripheral neuropathy: No  Peripheral Vascular Disease: No  Retinopathy: No  Sexual dysfunction: No    Patient Problem List and Family Medical History reviewed for relevant medical history, current medical status, and diabetes risk factors.    Vitals:  Ht 1.651 m (5' 5\")   Wt 119.2 kg (262 lb 11.2 oz)   LMP  (LMP Unknown)   BMI 43.72 kg/m    Estimated body mass index is 43.72 kg/m  as calculated from the following:    Height as of this encounter: 1.651 m (5' 5\").    Weight as of this encounter: 119.2 kg (262 lb 11.2 oz).   Last 3 BP:   BP Readings from Last 3 Encounters:   09/11/24 134/84   05/24/24 129/85   04/16/24 133/81       History   Smoking Status     Never   Smokeless Tobacco     Never       Labs:  Lab Results   Component Value Date    A1C 7.2 02/19/2025     Lab Results   Component Value Date     04/16/2024     10/26/2022     10/26/2022     Lab Results   Component Value Date    LDL 81 04/16/2024    LDL 83 11/24/2021     Direct Measure HDL   Date Value Ref Range Status   04/16/2024 46 (L) >=50 mg/dL Final   ]  GFR Estimate   Date Value Ref Range Status   04/16/2024 >90 >60 mL/min/1.73m2 Final   10/09/2020 >60 >60 Final     Comment:     _  Unit of Measure:   ml/min/1.73m2       No results found for: \"GFRESTBLACK\"  Lab Results   Component Value Date    CR 0.53 04/16/2024     No results found for: \"MICROALBUMIN\"    Healthy Eating:  Healthy Eating Assessed Today: Yes  Cultural/Samaritan diet restrictions?: No  Do you have any food allergies or intolerances?: No  Meal planning/habits: Smaller portions  Who cooks/prepares meals for you?: Self  Who purchases food in  your home?: Self  How many times a week on average do you eat food made away from home " (restaurant/take-out)?: 2  Meals include: Breakfast, Lunch, Dinner  Breakfast: Yogurt or cereal or breakfast sandwich (raisen bran cereal, Dt. Marcia)  Lunch: protein shake  1 soda Dr. Pepper per day, chicken salad (2 tortilla's cheese, V8 energy drink with fruit (did initially cut back on Dr. Pepper after last consult but is back to having 1/day))  Dinner: more vegetables, subway, water (hot dog on a bun and water)  Other: better on vegetables  Beverages: Water, Milk, Soda, Energy drinks  Has patient met with a dietitian in the past?: Yes    Being Active:  Being Active Assessed Today: Yes  Exercise:: Currently not exercising  Barrier to exercise: Safety, Physical limitation (MS, using a cane)    Monitoring:  Monitoring Assessed Today: Yes  Did patient bring glucose meter to appointment? : No  Blood Glucose Meter: Other  Times checking blood sugar at home (number): Never  Times checking blood sugar at home (per): Month  Blood glucose trend: Increasing      Taking Medications:  Diabetes Medication(s)       Biguanides       metFORMIN (GLUCOPHAGE XR) 500 MG 24 hr tablet Take 2 tablets (1,000 mg) by mouth daily.       Incretin Mimetic Agents       Tirzepatide 5 MG/0.5ML SOAJ Inject 0.5 mLs (5 mg) subcutaneously every 7 days.     Tirzepatide (MOUNJARO) 7.5 MG/0.5ML SOAJ Inject 0.5 mLs (7.5 mg) subcutaneously every 7 days.          Taking Medication Assessed Today: Yes  Current Treatments: Non-insulin Injectables, Diet, Oral Medication (taken by mouth)  Problems taking diabetes medications regularly?: Yes (has missed some doses does have 4 weeks of 5 mg left and then will titrate to 7.5 mg)  Diabetes medication side effects?: No (Intolerant to Victoza - emesis and diarrhea, tolerating Mounjaro well)    Problem Solving:  Problem Solving Assessed Today: Yes  Is the patient at risk for hypoglycemia?: No  Is the patient at risk for DKA?: No  Does patient have severe weather/disaster plan for diabetes management?: Yes  Does  patient have sick day plan for diabetes management?: Yes        Reducing Risks:  Reducing Risks Assessed Today: Yes  Diabetes Risks: Age over 45 years, Sedentary Lifestyle, Hyperlipidemia, Family History  CAD Risks: Family history, Diabetes Mellitus, Hypertension, Obesity, Sedentary lifestyle, Dyslipidemia  Has dilated eye exam at least once a year?: No  Sees dentist every 6 months?: No  Feet checked by healthcare provider in the last year?: Yes    Healthy Coping:  Healthy Coping Assessed Today: Yes  Emotional response to diabetes: Uncertain, Acceptance  Informal Support system:: Children, Zuleyma based, Family, Friends, Parent  Stage of change: ACTION (Actively working towards change)  Support resources: Weight Management, In-person Offerings, Websites    Dianne Mane RD  Time Spent: 60 minutes  Encounter Type: Individual    Any diabetes medication dose changes were made via the CDCES Standing Orders under the patient's referring provider.

## 2025-02-19 NOTE — Clinical Note
2/19/2025         RE: Karli Miner  [address]  [facility] [address]        Dear Colleague,    Thank you for referring your patient, Karli Miner, to the [facility]. Please see a copy of my visit note below.    No notes on file

## 2025-02-19 NOTE — PATIENT INSTRUCTIONS
"Swap out Dr. Pepper for V8 energy every other day or so    Restarting   Week 1 Mounjaro 5mg for 1 week   Week 2 Mounjaro plus 1 tab of metformin daily for a week.    Week 3 Mounjaro plus 2 tabs metformin   Week 4 \"    Then will increase Mounjaro to the 7.5 mg dose.      Eye exam due   Dental brush and floss daily         Goals:  Practice healthy stress management and mindful eating - think are you physically hungry or are you bored, stressed, emotional etc, make of list of things to do besides eat.    Try to get good quality sleep with a goal of 7-8 hours per night.  Stay physically active daily.  Recommend working up to a total of 30 minutes on 5 days/ week.  Recommend a fitness tracker.     Eat in a healthy way- eliminate trans fats, limit saturated fats and added sugars; follow the plate method - picture above.  Keep a food record (MyFitnessPal, Loseit).    A meal is 3 or more food groups; make it colorful for better nutrition.    Total Carbohydrates (in grams) = Breakfast  30    Lunch  30    Supper  30    If desired snacks 15                      "

## 2025-02-24 NOTE — PROGRESS NOTES
Diabetes Self-Management Education & Support    Presents for: Follow-up type 2 diabetes, obesity class III Body mass index is 43.72 kg/m .    Type of Service: In Person Visit      Assessment  5/21/2024  Patient was seen 3/2020.  Patient has been taking Victoza but unfortunately unable to continue to tolerate it and developed emesis with diarrhea requiring 3 ER visits due to dehydration.     Patient will be adding Mounjaro today and titrating slowly.  Patient does not have blood glucose monitor and has not been routinely checking BG.  Patient is provided with a sample of Dexcom G7 sensor.    Unfortunately patient has returned to some older dietary habits and is drinking regular soda daily.  Often consuming highly processed convenience foods due to feeling weak from an MS.       8/21/2024 Patient on video visit today.  Unfortunately patient was not able to fill Mounjaro and did not follow through with questions.  Called [facility] mail order today and they do not fill Mounjaro through the mail patient is required to fill at local pharmacy.  Prescription was transferred to [facility] and also Mounjaro savings card activated and entered on prescription.     Patient has not made significant dietary or lifestyle changes.  Patient continues to drink regular soda daily and highly processed convenience foods.  Blood glucose meter not charged had patient charge it and check BG prior to end of visit patient's blood glucose 271 mg/dL.     Patient does have sample of Dexcom G7 sensor at home but did not start it and it was not accessible for patient during call.  Video demonstration of appropriate insertion technique provided today.     11/20/2024 Patient is taking Mounjaro and tolerating well.  Patient has titrated to 5 mg dosing and will continue with 5 mg x 3 months then evaluate glucose and weight control.  Conservative titration due to history of intolerance with Victoza and MS.    Patient is motivated with weight loss.  Dietary  changes continue to be a struggle for patient but with Mounjaro patient's portion sizes are smaller.  Patient is down to 1 can of regular Dr. Pepper per day.  Patient did agree to try to alternate daily between Dr. Pepper and Root Beer Zero.      2/19/2025 patient has missed about a month of Mounjaro and has been off of metformin for about 2 weeks will reintroduce medications slowly and titrate Mounjaro after 4 weeks of 5 mg will increase to 7.5 mg x 3 months.  A1c slightly higher due to missed medication.  Patient did initially decrease regular soda intake but is back to drinking 1 Dr. Pepper per day    Patient's most recent   Lab Results   Component Value Date    A1C 7.2 02/19/2025    A1C 6.9 11/20/2024    A1C 8.2 04/16/2024    A1C 6.6 10/04/2022    A1C 6.7 11/24/2021         is not meeting goal of <7.0    Diabetes knowledge and skills assessment:   Patient is knowledgeable in diabetes management concepts related to: Problem Solving, Reducing Risks, and Healthy Coping    Based on learning assessment above, most appropriate setting for further diabetes education would be: Individual setting.    Care Plan and Education Provided:  Healthy Eating: Carbohydrate Counting, Heart healthy diet, Label reading, and Weight Management,     Being Active: Finding a physical activity routine that works for you,     Monitoring: Frequency of monitoring and Individual glucose targets,     Taking Medication: Action of prescribed medication(s) and Side effects of prescribed medication(s), reintroducing metformin slowly, slowly titrating Mounjaro will take 4 weeks of 5 mg then increase to 7.5 mg x 3 months    Problem Solving: High glucose - causes, signs/symptoms, treatment and prevention, Low glucose - causes, signs/symptoms, treatment and prevention, and Sick day arrangements,     Reducing Risks: Complications of diabetes, Goal for A1c, how it relates to glucose and how often to check, and Preventing cardiovascular disease, including  "blood pressure goals, lipid goals, recommendations for cardioprotective medications, statins, and aspirin, and     Healthy Coping: Identifying helpful resources and Methods of coping with stress      Patient verbalized understanding of diabetes self-management education concepts discussed, opportunities for ongoing education and support, and recommendations provided today.    Plan    Swap out Dr. Pepper for V8 energy every other day or so    Restarting   Week 1 Mounjaro 5mg for 1 week   Week 2 Mounjaro plus 1 tab of metformin daily for a week.    Week 3 Mounjaro plus 2 tabs metformin   Week 4 \"    Then will increase Mounjaro to the 7.5 mg dose.      Eye exam due   Dental brush and floss daily   Topics to cover at upcoming visits: Healthy Eating and Taking Medication    Follow-up: 6/2025  Upcoming Diabetes Ed Appointments     No appointments to display        See Care Plan for co-developed, patient-state behavior change goals.    Education Materials Provided:      Subjective/Objective  Karli is an 49 year old year old, presenting for the following diabetes education related to: Presents for: Follow-up  Accompanied by: Self  Diabetes education in the past 24mo: Yes  Diabetes type: Type 2  How confident are you filling out medical forms by yourself:: Extremely  Diabetes management related comments/concerns: Mounjaro is working great  Transportation concerns: No  Difficulty affording diabetes medication?: Sometimes  Difficulty affording diabetes testing supplies?: No ([employer] through [employer])  Other concerns:: Glasses  Cultural Influences/Ethnic Background:  Not  or     Diabetes Symptoms & Complications:  Diabetes Related Symptoms: Fatigue, Neuropathy, Polydipsia (increased thirst), Polyuria (increased urination)  Weight trend: Decreasing  Symptom course: Stable  Complications assessed today?: Yes  Autonomic neuropathy: No  CVA: No  Heart disease: No  Nephropathy: No  Peripheral neuropathy: No  Peripheral " "Vascular Disease: No  Retinopathy: No  Sexual dysfunction: No    Patient Problem List and Family Medical History reviewed for relevant medical history, current medical status, and diabetes risk factors.    Vitals:  Ht 1.651 m (5' 5\")   Wt 119.2 kg (262 lb 11.2 oz)   LMP  (LMP Unknown)   BMI 43.72 kg/m    Estimated body mass index is 43.72 kg/m  as calculated from the following:    Height as of this encounter: 1.651 m (5' 5\").    Weight as of this encounter: 119.2 kg (262 lb 11.2 oz).   Last 3 BP:   BP Readings from Last 3 Encounters:   09/11/24 134/84   05/24/24 129/85   04/16/24 133/81       History   Smoking Status    Never   Smokeless Tobacco    Never       Labs:  Lab Results   Component Value Date    A1C 7.2 02/19/2025     Lab Results   Component Value Date     04/16/2024     10/26/2022     10/26/2022     Lab Results   Component Value Date    LDL 81 04/16/2024    LDL 83 11/24/2021     Direct Measure HDL   Date Value Ref Range Status   04/16/2024 46 (L) >=50 mg/dL Final   ]  GFR Estimate   Date Value Ref Range Status   04/16/2024 >90 >60 mL/min/1.73m2 Final   10/09/2020 >60 >60 Final     Comment:     _  Unit of Measure:   ml/min/1.73m2       No results found for: \"GFRESTBLACK\"  Lab Results   Component Value Date    CR 0.53 04/16/2024     No results found for: \"MICROALBUMIN\"    Healthy Eating:  Healthy Eating Assessed Today: Yes  Cultural/Jehovah's witness diet restrictions?: No  Do you have any food allergies or intolerances?: No  Meal planning/habits: Smaller portions  Who cooks/prepares meals for you?: Self  Who purchases food in  your home?: Self  How many times a week on average do you eat food made away from home (restaurant/take-out)?: 2  Meals include: Breakfast, Lunch, Dinner  Breakfast: Yogurt or cereal or breakfast sandwich (raisen bran cereal, Dt. Pepper)  Lunch: protein shake  1 soda DrLena Pepper per day, chicken salad (2 tortilla's cheese, V8 energy drink with fruit (did initially cut " back on Dr. Pepper after last consult but is back to having 1/day))  Dinner: more vegetables, subway, water (hot dog on a bun and water)  Other: better on vegetables  Beverages: Water, Milk, Soda, Energy drinks  Has patient met with a dietitian in the past?: Yes    Being Active:  Being Active Assessed Today: Yes  Exercise:: Currently not exercising  Barrier to exercise: Safety, Physical limitation (MS, using a cane)    Monitoring:  Monitoring Assessed Today: Yes  Did patient bring glucose meter to appointment? : No  Blood Glucose Meter: Other  Times checking blood sugar at home (number): Never  Times checking blood sugar at home (per): Month  Blood glucose trend: Increasing      Taking Medications:  Diabetes Medication(s)       Biguanides       metFORMIN (GLUCOPHAGE XR) 500 MG 24 hr tablet Take 2 tablets (1,000 mg) by mouth daily.       Incretin Mimetic Agents       Tirzepatide 5 MG/0.5ML SOAJ Inject 0.5 mLs (5 mg) subcutaneously every 7 days.     Tirzepatide (MOUNJARO) 7.5 MG/0.5ML SOAJ Inject 0.5 mLs (7.5 mg) subcutaneously every 7 days.          Taking Medication Assessed Today: Yes  Current Treatments: Non-insulin Injectables, Diet, Oral Medication (taken by mouth)  Problems taking diabetes medications regularly?: Yes (has missed some doses does have 4 weeks of 5 mg left and then will titrate to 7.5 mg)  Diabetes medication side effects?: No (Intolerant to Victoza - emesis and diarrhea, tolerating Mounjaro well)    Problem Solving:  Problem Solving Assessed Today: Yes  Is the patient at risk for hypoglycemia?: No  Is the patient at risk for DKA?: No  Does patient have severe weather/disaster plan for diabetes management?: Yes  Does patient have sick day plan for diabetes management?: Yes        Reducing Risks:  Reducing Risks Assessed Today: Yes  Diabetes Risks: Age over 45 years, Sedentary Lifestyle, Hyperlipidemia, Family History  CAD Risks: Family history, Diabetes Mellitus, Hypertension, Obesity, Sedentary  lifestyle, Dyslipidemia  Has dilated eye exam at least once a year?: No  Sees dentist every 6 months?: No  Feet checked by healthcare provider in the last year?: Yes    Healthy Coping:  Healthy Coping Assessed Today: Yes  Emotional response to diabetes: Uncertain, Acceptance  Informal Support system:: Children, Zuleyma based, Family, Friends, Parent  Stage of change: ACTION (Actively working towards change)  Support resources: Weight Management, In-person Offerings, Websites    Dianne Mane RD  Time Spent: 60 minutes  Encounter Type: Individual    Any diabetes medication dose changes were made via the CDCES Standing Orders under the patient's referring provider.

## 2025-05-28 ENCOUNTER — VIRTUAL VISIT (OUTPATIENT)
Dept: FAMILY MEDICINE | Facility: CLINIC | Age: 50
End: 2025-05-28
Payer: MEDICARE

## 2025-05-28 DIAGNOSIS — G35 MULTIPLE SCLEROSIS (H): ICD-10-CM

## 2025-05-28 DIAGNOSIS — J01.90 ACUTE SINUSITIS, RECURRENCE NOT SPECIFIED, UNSPECIFIED LOCATION: Primary | ICD-10-CM

## 2025-05-28 PROCEDURE — 98014 SYNCH AUDIO-ONLY EST MOD 30: CPT | Performed by: FAMILY MEDICINE

## 2025-05-28 RX ORDER — BENZONATATE 100 MG/1
100-200 CAPSULE ORAL 3 TIMES DAILY PRN
Qty: 30 CAPSULE | Refills: 0 | Status: SHIPPED | OUTPATIENT
Start: 2025-05-28

## 2025-05-28 RX ORDER — AZITHROMYCIN 250 MG/1
TABLET, FILM COATED ORAL
Qty: 6 TABLET | Refills: 0 | Status: SHIPPED | OUTPATIENT
Start: 2025-05-28 | End: 2025-06-02

## 2025-05-28 RX ORDER — PREDNISONE 20 MG/1
40 TABLET ORAL DAILY
Qty: 10 TABLET | Refills: 0 | Status: SHIPPED | OUTPATIENT
Start: 2025-05-28

## 2025-05-28 NOTE — PROGRESS NOTES
"Karli is a 49 year old who is being evaluated via a billable telephone visit.    What phone number would you like to be contacted at? [phone]   How would you like to obtain your AVS? Ashley  Originating Location (pt. Location): Home    Distant Location (provider location):  On-site  Telephone visit completed due to the patient did not have access to video, while the distant provider did.    Assessment & Plan   Problem List Items Addressed This Visit       Multiple sclerosis (H)     Other Visit Diagnoses         Acute sinusitis, recurrence not specified, unspecified location    -  Primary    Relevant Medications    azithromycin (ZITHROMAX) 250 MG tablet    predniSONE (DELTASONE) 20 MG tablet    benzonatate (TESSALON) 100 MG capsule           Assessment & Plan  Acute sinusitis, recurrence not specified, unspecified location patient with immune modulating medications for her multiple sclerosis which is noted and taken into account for medical decision making.  - Prescribe Z pack with instructions to take 2 pills on the first day and 1 pill each day for the next 4 days. Prescribe Tessalon Perles, 100 mg, 1 to 2 capsules 3 times daily as needed for cough. Prescribe prednisone 20 mg, 2 pills daily for 5 days.  - Risks and side effects: prednisone may cause feelings of being \"squirrely.\" Recommended to take with food earlier in the day.            Subjective   Karli is a 49 year old, presenting for the following health issues:  Sinus Problem (Pt c/o congestion, drainage in her throat, congestion has settled in her chest x 3 days. )        5/28/2025     2:26 PM   Additional Questions   Roomed by Rosa Chandlerbasim MENDEZ Isidra, 49 years old, female  - Tightness associated with exercise-induced asthma  - No current medications that lower immune system                Objective           Vitals:  No vitals were obtained today due to virtual visit.    Physical Exam   General: Alert and no distress //Respiratory: No " audible wheeze, cough, or shortness of breath // Psychiatric:  Appropriate affect, tone, and pace of words            Phone call duration: 5 minutes  Signed Electronically by: Emily Lan MD

## 2025-06-04 ENCOUNTER — ALLIED HEALTH/NURSE VISIT (OUTPATIENT)
Dept: EDUCATION SERVICES | Facility: CLINIC | Age: 50
End: 2025-06-04
Payer: COMMERCIAL

## 2025-06-04 VITALS — WEIGHT: 251.9 LBS | HEIGHT: 65 IN | BODY MASS INDEX: 41.97 KG/M2

## 2025-06-04 DIAGNOSIS — E66.01 MORBID OBESITY (H): ICD-10-CM

## 2025-06-04 DIAGNOSIS — E11.9 TYPE 2 DIABETES MELLITUS WITHOUT COMPLICATION, WITHOUT LONG-TERM CURRENT USE OF INSULIN (H): Primary | ICD-10-CM

## 2025-06-04 PROCEDURE — 99207 PR DROP WITH A PROCEDURE: CPT | Performed by: DIETITIAN, REGISTERED

## 2025-06-04 PROCEDURE — G0108 DIAB MANAGE TRN  PER INDIV: HCPCS | Performed by: DIETITIAN, REGISTERED

## 2025-06-04 NOTE — LETTER
6/4/2025         RE: Karli Miner  [address]  [facility] [address]        Dear Colleague,    Thank you for referring your patient, Karli Miner, to the [facility]. Please see a copy of my visit note below.    Diabetes Self-Management Education & Support    Presents for: Follow-up type 2 diabetes, class III obesity Body mass index is 41.92 kg/m .    Type of Service: In Person Visit      Assessment  5/21/2024  Patient was seen 3/2020.  Patient has been taking Victoza but unfortunately unable to continue to tolerate it and developed emesis with diarrhea requiring 3 ER visits due to dehydration.     Patient will be adding Mounjaro today and titrating slowly.  Patient does not have blood glucose monitor and has not been routinely checking BG.  Patient is provided with a sample of Dexcom G7 sensor.    Unfortunately patient has returned to some older dietary habits and is drinking regular soda daily.  Often consuming highly processed convenience foods due to feeling weak from an MS.       8/21/2024 Patient on video visit today.  Unfortunately patient was not able to fill Mounjaro and did not follow through with questions.  Called [facility] mail order today and they do not fill Mounjaro through the mail patient is required to fill at local pharmacy.  Prescription was transferred to [facility] and also Mounjaro savings card activated and entered on prescription.     Patient has not made significant dietary or lifestyle changes.  Patient continues to drink regular soda daily and highly processed convenience foods.  Blood glucose meter not charged had patient charge it and check BG prior to end of visit patient's blood glucose 271 mg/dL.     Patient does have sample of Dexcom G7 sensor at home but did not start it and it was not accessible for patient during call.  Video demonstration of appropriate insertion technique provided today.     11/20/2024 Patient is taking  "Mounjaro and tolerating well.  Patient has titrated to 5 mg dosing and will continue with 5 mg x 3 months then evaluate glucose and weight control.  Conservative titration due to history of intolerance with Victoza and MS.    Patient is motivated with weight loss.  Dietary changes continue to be a struggle for patient but with Mounjaro patient's portion sizes are smaller.  Patient is down to 1 can of regular Dr. Pepper per day.  Patient did agree to try to alternate daily between Dr. Pepper and Root Beer Zero.       2/19/2025 patient has missed about a month of Mounjaro and has been off of metformin for about 2 weeks will reintroduce medications slowly and titrate Mounjaro after 4 weeks of 5 mg will increase to 7.5 mg x 3 months.  A1c slightly higher due to missed medication.  Patient did initially decrease regular soda intake but is back to drinking 1 Dr. Pepper per day    6/4/2025  Pt is tolerating Mounjaro 7.5 mg very well.  Patient notes decreased appetite but with the exception of when she had been on steroids for infection.  Patient states she ate \" everything in sight\".  Patient has now decreased to 1 can of regular Dr. Pepper 3 times per week and does like the V8 energy drink as a replacement.  Mounjaro will be increased to 10 mg.    Patient's most recent   Lab Results   Component Value Date    A1C 7.2 02/19/2025     is not meeting goal of <7.0    Diabetes knowledge and skills assessment:   Patient is knowledgeable in diabetes management concepts related to: Healthy Eating, Being Active, Monitoring, Taking Medication, Problem Solving, Reducing Risks, and Healthy Coping    Based on learning assessment above, most appropriate setting for further diabetes education would be: Individual setting.    Care Plan and Education Provided:  Healthy Eating: Balanced meals, Portion control, Weight Management, and importance of adequate nutrition for health and wellness.  Patient to begin taking multivitamin, calcium, " patient already takes vitamin D,     Being Active: Relationship of activity to glucose, activity as able    Monitoring: Patient prefers not to test BG but encouraged testing if any signs and symptoms of hypoglycemia, A1c every 6 months    Taking Medication: Side effects of prescribed medication(s) and dosing titration,     Problem Solving: When to call a health care provider,     Reducing Risks: Goal for A1c, how it relates to glucose and how often to check and Preventing cardiovascular disease, including blood pressure goals, lipid goals, recommendations for cardioprotective medications, statins, and aspirin, and     Healthy Coping: Identifying helpful resources    Patient verbalized understanding of diabetes self-management education concepts discussed, opportunities for ongoing education and support, and recommendations provided today.    Plan    Lower carb greek yogurt     Asael tc other options     Multivitamin   Calcium 600mg     Mounjaro once the 7.5mg supply is out will be increasing to 10mg weekly    Topics to cover at upcoming visits: Any area as needed for ongoing diabetes self-management education and support/ motivational counseling.      See Care Plan for co-developed, patient-state behavior change goals.    Education Materials Provided:      Subjective/Objective  Karli is an 49 year old, presenting for the following diabetes education related to: Follow-up  Accompanied by: Self  Diabetes education in the past 24mo: Yes  Diabetes type: Type 2  Disease course: Improving  How confident are you filling out medical forms by yourself:: Extremely  Diabetes management related comments/concerns: No  Transportation concerns: No  Difficulty affording diabetes medication?: Sometimes  Difficulty affording diabetes testing supplies?: No ([place] through [employer])  Other concerns: Glasses  Cultural Influences/Ethnic Background:  Not  or     Diabetes Symptoms & Complications:  Diabetes Related Symptoms:  "Fatigue, Neuropathy, Polydipsia (increased thirst), Polyuria (increased urination)  Weight trend: Decreasing  Symptom course: Stable  Disease course: Improving  Complications assessed today?: Yes  Autonomic neuropathy: No  CVA: No  Heart disease: No  Nephropathy: No  Peripheral neuropathy: No  Peripheral Vascular Disease: No  Retinopathy: No  Sexual dysfunction: No    Patient Problem List and Family Medical History reviewed for relevant medical history, current medical status, and diabetes risk factors.    Vitals:  Ht 1.651 m (5' 5\")   Wt 114.3 kg (251 lb 14.4 oz)   LMP  (LMP Unknown)   BMI 41.92 kg/m    Estimated body mass index is 41.92 kg/m  as calculated from the following:    Height as of this encounter: 1.651 m (5' 5\").    Weight as of this encounter: 114.3 kg (251 lb 14.4 oz).   Last 3 BP:   BP Readings from Last 3 Encounters:   09/11/24 134/84   05/24/24 129/85   04/16/24 133/81       History   Smoking Status     Never   Smokeless Tobacco     Never       Labs:  Lab Results   Component Value Date    A1C 7.2 02/19/2025     Lab Results   Component Value Date     04/16/2024     10/26/2022     10/26/2022     Lab Results   Component Value Date    LDL 81 04/16/2024    LDL 83 11/24/2021     Direct Measure HDL   Date Value Ref Range Status   04/16/2024 46 (L) >=50 mg/dL Final     GFR Estimate   Date Value Ref Range Status   04/16/2024 >90 >60 mL/min/1.73m2 Final   10/09/2020 >60 >60 Final     Comment:     _  Unit of Measure:   ml/min/1.73m2       No results found for: \"GFRESTBLACK\"  Lab Results   Component Value Date    CR 0.53 04/16/2024     Lab Results   Component Value Date    MICROL 62.2 04/18/2024    UMALCR 76.79 (H) 04/18/2024    UCRR 81.0 04/18/2024 6/4/2025   Healthy Eating   Healthy Eating Assessed Today Yes   Cultural/Jain diet restrictions? No   Do you have any food allergies or intolerances? No   Meal planning/habits Smaller portions   Who cooks/prepares meals for " you? Self   Who purchases food in  your home? Self   How many times a week on average do you eat food made away from home (restaurant/take-out)? 1   Meals include Breakfast   Breakfast cereal and 2% milk or willson egg and cheese Croissant sandwich Asael Aidan   Lunch protein shake , chicken salad       2 tortilla's cheese, V8 energy drink with fruit (did initially cut back on Dr. Pepper after last consult but is back to having 1/day)   Dinner smaller portion   Snacks string cheese, protein shake qod   Other 3 can soda per week does like the V8 energy drink   Beverages Water;Coffee;Milk;Soda;Energy drinks;Coffee drinks   Has patient met with a dietitian in the past? Yes         6/4/2025   Being Active   Being Active Assessed Today Yes   Exercise: Currently not exercising   Barrier to exercise Safety         6/4/2025   Monitoring   Monitoring Assessed Today Yes   Did patient bring glucose meter to appointment?  No   Blood Glucose Meter Other   Times checking blood sugar at home (number) Never   Times checking blood sugar at home (per) Month   Blood glucose trend Decreasing     Diabetes Medication(s)       Biguanides       metFORMIN (GLUCOPHAGE XR) 500 MG 24 hr tablet Take 2 tablets (1,000 mg) by mouth daily.       Incretin Mimetic Agents       Tirzepatide (MOUNJARO) 7.5 MG/0.5ML SOAJ Inject 0.5 mLs (7.5 mg) subcutaneously every 7 days.     Tirzepatide 5 MG/0.5ML SOAJ Inject 0.5 mLs (5 mg) subcutaneously every 7 days.              6/4/2025   Taking Medications   Taking Medication Assessed Today Yes   Current Treatments Non-insulin Injectables;Oral Medication (taken by mouth)   Problems taking diabetes medications regularly? No   Diabetes medication side effects? No       Intolerant to Victoza - emesis and diarrhea, tolerating Mounjaro well         6/4/2025   Problem Solving   Problem Solving Assessed Today Yes   Is the patient at risk for hypoglycemia? No   Is the patient at risk for DKA? No   Does patient have severe  weather/disaster plan for diabetes management? Yes   Does patient have sick day plan for diabetes management? Yes           6/4/2025   Reducing Risks   Reducing Risks Assessed Today Yes   Diabetes Risks Age over 45 years;Sedentary Lifestyle;Hyperlipidemia;Family History   CAD Risks Family history;Diabetes Mellitus;Hypertension;Obesity;Sedentary lifestyle;Dyslipidemia   Has dilated eye exam at least once a year? No   Sees dentist every 6 months? No   Feet checked by healthcare provider in the last year? Yes         6/4/2025   Healthy Coping: Diabetes Distress Assessment   Healthy Coping Assessed Today Yes   I feel burned out by all of the attention and effort that diabetes demands of me. 2 - A Little Problem   It bothers me that diabetes seems to control my life. 1 - Not a Problem   I am frustrated that even when I do what I am supposed to for my diabetes, it doesn't seem to make a difference. 1 - Not a Problem   No matter how hard I try with my diabetes, it feels like it will never be good enough. 1 - Not a Problem   I am so tired of having to worry about diabetes all the time. 1 - Not a Problem   When it comes to my diabetes, I often feel like a failure. 1 - Not a Problem   It depresses me when I realize that my diabetes will likely never go away. 2 - A Little Problem   Living with diabetes is overwhelming for me. 2 - A Little Problem   T2 DDAS Total Score (0 - 1.9 Little or no DD, 2.0 - 2.9 Moderate DD,  3.0+ High DD) 1.4   Informal Support system: Children;Zuleyma based;Family;Friends       Dianne Mane RD, CD, [facility]     Time Spent: 60 minutes  Encounter Type: Individual    Any diabetes medication dose changes were made via the [facility] Standing Orders under the patient's referring provider.

## 2025-06-04 NOTE — PATIENT INSTRUCTIONS
Lower carb greek yogurt     Asael montez other options     Multivitamin   Calcium 600mg     Mounjaro once the 7.5mg supply is out will be increasing to 10mg      Wt Readings from Last 30 Encounters:   06/04/25 114.3 kg (251 lb 14.4 oz)   02/19/25 119.2 kg (262 lb 11.2 oz)   11/20/24 119.1 kg (262 lb 9.6 oz)   09/11/24 125.2 kg (276 lb)   05/24/24 126.1 kg (278 lb)   05/21/24 126.4 kg (278 lb 9.6 oz)   04/16/24 126.1 kg (278 lb)   10/24/22 126.1 kg (278 lb)   10/04/22 123.4 kg (272 lb)   06/16/22 126.6 kg (279 lb 1.6 oz)   05/06/22 123.8 kg (273 lb)   04/12/22 120.7 kg (266 lb)   04/07/22 119.7 kg (264 lb)   11/24/21 127.9 kg (282 lb)   07/13/21 131 kg (288 lb 12.8 oz)   05/18/21 132.5 kg (292 lb)   04/14/21 134.8 kg (297 lb 3.2 oz)   02/16/21 134.3 kg (296 lb)   10/09/20 132.5 kg (292 lb)   02/04/20 121 kg (266 lb 12.8 oz)   01/31/20 122 kg (269 lb)   01/27/20 117.5 kg (259 lb)   08/17/19 118 kg (260 lb 3.2 oz)   07/25/19 119.2 kg (262 lb 12.8 oz)   07/23/19 121.1 kg (267 lb)   12/12/18 128.8 kg (284 lb)   10/26/18 134.3 kg (296 lb)   05/01/18 135.4 kg (298 lb 9.6 oz)   04/17/18 132.5 kg (292 lb)   04/10/18 135.3 kg (298 lb 3.2 oz)     Work on getting in more vegetables

## 2025-06-04 NOTE — PROGRESS NOTES
Diabetes Self-Management Education & Support    Presents for: Follow-up type 2 diabetes, class III obesity Body mass index is 41.92 kg/m .    Type of Service: In Person Visit      Assessment  5/21/2024  Patient was seen 3/2020.  Patient has been taking Victoza but unfortunately unable to continue to tolerate it and developed emesis with diarrhea requiring 3 ER visits due to dehydration.     Patient will be adding Mounjaro today and titrating slowly.  Patient does not have blood glucose monitor and has not been routinely checking BG.  Patient is provided with a sample of Dexcom G7 sensor.    Unfortunately patient has returned to some older dietary habits and is drinking regular soda daily.  Often consuming highly processed convenience foods due to feeling weak from an MS.       8/21/2024 Patient on video visit today.  Unfortunately patient was not able to fill Mounjaro and did not follow through with questions.  Called [facility] mail order today and they do not fill Mounjaro through the mail patient is required to fill at local pharmacy.  Prescription was transferred to [facility] and also Mounjaro savings card activated and entered on prescription.     Patient has not made significant dietary or lifestyle changes.  Patient continues to drink regular soda daily and highly processed convenience foods.  Blood glucose meter not charged had patient charge it and check BG prior to end of visit patient's blood glucose 271 mg/dL.     Patient does have sample of Dexcom G7 sensor at home but did not start it and it was not accessible for patient during call.  Video demonstration of appropriate insertion technique provided today.     11/20/2024 Patient is taking Mounjaro and tolerating well.  Patient has titrated to 5 mg dosing and will continue with 5 mg x 3 months then evaluate glucose and weight control.  Conservative titration due to history of intolerance with Victoza and MS.    Patient is motivated with weight loss.  Dietary  "changes continue to be a struggle for patient but with Mounjaro patient's portion sizes are smaller.  Patient is down to 1 can of regular Dr. Pepper per day.  Patient did agree to try to alternate daily between Dr. Pepper and Root Beer Zero.       2/19/2025 patient has missed about a month of Mounjaro and has been off of metformin for about 2 weeks will reintroduce medications slowly and titrate Mounjaro after 4 weeks of 5 mg will increase to 7.5 mg x 3 months.  A1c slightly higher due to missed medication.  Patient did initially decrease regular soda intake but is back to drinking 1 Dr. Pepper per day    6/4/2025  Pt is tolerating Mounjaro 7.5 mg very well.  Patient notes decreased appetite but with the exception of when she had been on steroids for infection.  Patient states she ate \" everything in sight\".  Patient has now decreased to 1 can of regular Dr. Pepper 3 times per week and does like the V8 energy drink as a replacement.  Mounjaro will be increased to 10 mg.    Patient's most recent   Lab Results   Component Value Date    A1C 7.2 02/19/2025     is not meeting goal of <7.0    Diabetes knowledge and skills assessment:   Patient is knowledgeable in diabetes management concepts related to: Healthy Eating, Being Active, Monitoring, Taking Medication, Problem Solving, Reducing Risks, and Healthy Coping    Based on learning assessment above, most appropriate setting for further diabetes education would be: Individual setting.    Care Plan and Education Provided:  Healthy Eating: Balanced meals, Portion control, Weight Management, and importance of adequate nutrition for health and wellness.  Patient to begin taking multivitamin, calcium, patient already takes vitamin D,     Being Active: Relationship of activity to glucose, activity as able    Monitoring: Patient prefers not to test BG but encouraged testing if any signs and symptoms of hypoglycemia, A1c every 6 months    Taking Medication: Side effects of " prescribed medication(s) and dosing titration,     Problem Solving: When to call a health care provider,     Reducing Risks: Goal for A1c, how it relates to glucose and how often to check and Preventing cardiovascular disease, including blood pressure goals, lipid goals, recommendations for cardioprotective medications, statins, and aspirin, and     Healthy Coping: Identifying helpful resources    Patient verbalized understanding of diabetes self-management education concepts discussed, opportunities for ongoing education and support, and recommendations provided today.    Plan    Lower carb greek yogurt     Asael tc other options     Multivitamin   Calcium 600mg     Mounjaro once the 7.5mg supply is out will be increasing to 10mg weekly    Topics to cover at upcoming visits: Any area as needed for ongoing diabetes self-management education and support/ motivational counseling.      See Care Plan for co-developed, patient-state behavior change goals.    Education Materials Provided:      Subjective/Objective  Karli is an 49 year old, presenting for the following diabetes education related to: Follow-up  Accompanied by: Self  Diabetes education in the past 24mo: Yes  Diabetes type: Type 2  Disease course: Improving  How confident are you filling out medical forms by yourself:: Extremely  Diabetes management related comments/concerns: No  Transportation concerns: No  Difficulty affording diabetes medication?: Sometimes  Difficulty affording diabetes testing supplies?: No ([place] through [employer])  Other concerns: Glasses  Cultural Influences/Ethnic Background:  Not  or     Diabetes Symptoms & Complications:  Diabetes Related Symptoms: Fatigue, Neuropathy, Polydipsia (increased thirst), Polyuria (increased urination)  Weight trend: Decreasing  Symptom course: Stable  Disease course: Improving  Complications assessed today?: Yes  Autonomic neuropathy: No  CVA: No  Heart disease: No  Nephropathy:  "No  Peripheral neuropathy: No  Peripheral Vascular Disease: No  Retinopathy: No  Sexual dysfunction: No    Patient Problem List and Family Medical History reviewed for relevant medical history, current medical status, and diabetes risk factors.    Vitals:  Ht 1.651 m (5' 5\")   Wt 114.3 kg (251 lb 14.4 oz)   LMP  (LMP Unknown)   BMI 41.92 kg/m    Estimated body mass index is 41.92 kg/m  as calculated from the following:    Height as of this encounter: 1.651 m (5' 5\").    Weight as of this encounter: 114.3 kg (251 lb 14.4 oz).   Last 3 BP:   BP Readings from Last 3 Encounters:   09/11/24 134/84   05/24/24 129/85   04/16/24 133/81       History   Smoking Status    Never   Smokeless Tobacco    Never       Labs:  Lab Results   Component Value Date    A1C 7.2 02/19/2025     Lab Results   Component Value Date     04/16/2024     10/26/2022     10/26/2022     Lab Results   Component Value Date    LDL 81 04/16/2024    LDL 83 11/24/2021     Direct Measure HDL   Date Value Ref Range Status   04/16/2024 46 (L) >=50 mg/dL Final     GFR Estimate   Date Value Ref Range Status   04/16/2024 >90 >60 mL/min/1.73m2 Final   10/09/2020 >60 >60 Final     Comment:     _  Unit of Measure:   ml/min/1.73m2       No results found for: \"GFRESTBLACK\"  Lab Results   Component Value Date    CR 0.53 04/16/2024     Lab Results   Component Value Date    MICROL 62.2 04/18/2024    UMALCR 76.79 (H) 04/18/2024    UCRR 81.0 04/18/2024 6/4/2025   Healthy Eating   Healthy Eating Assessed Today Yes   Cultural/Synagogue diet restrictions? No   Do you have any food allergies or intolerances? No   Meal planning/habits Smaller portions   Who cooks/prepares meals for you? Self   Who purchases food in  your home? Self   How many times a week on average do you eat food made away from home (restaurant/take-out)? 1   Meals include Breakfast   Breakfast cereal and 2% milk or willson egg and cheese Croissant sandwich Asael Aidan   Lunch " protein shake , chicken salad       2 tortilla's cheese, V8 energy drink with fruit (did initially cut back on Dr. Pepper after last consult but is back to having 1/day)   Dinner smaller portion   Snacks string cheese, protein shake qod   Other 3 can soda per week does like the V8 energy drink   Beverages Water;Coffee;Milk;Soda;Energy drinks;Coffee drinks   Has patient met with a dietitian in the past? Yes         6/4/2025   Being Active   Being Active Assessed Today Yes   Exercise: Currently not exercising   Barrier to exercise Safety         6/4/2025   Monitoring   Monitoring Assessed Today Yes   Did patient bring glucose meter to appointment?  No   Blood Glucose Meter Other   Times checking blood sugar at home (number) Never   Times checking blood sugar at home (per) Month   Blood glucose trend Decreasing     Diabetes Medication(s)       Biguanides       metFORMIN (GLUCOPHAGE XR) 500 MG 24 hr tablet Take 2 tablets (1,000 mg) by mouth daily.       Incretin Mimetic Agents       Tirzepatide (MOUNJARO) 7.5 MG/0.5ML SOAJ Inject 0.5 mLs (7.5 mg) subcutaneously every 7 days.     Tirzepatide 5 MG/0.5ML SOAJ Inject 0.5 mLs (5 mg) subcutaneously every 7 days.              6/4/2025   Taking Medications   Taking Medication Assessed Today Yes   Current Treatments Non-insulin Injectables;Oral Medication (taken by mouth)   Problems taking diabetes medications regularly? No   Diabetes medication side effects? No       Intolerant to Victoza - emesis and diarrhea, tolerating Mounjaro well         6/4/2025   Problem Solving   Problem Solving Assessed Today Yes   Is the patient at risk for hypoglycemia? No   Is the patient at risk for DKA? No   Does patient have severe weather/disaster plan for diabetes management? Yes   Does patient have sick day plan for diabetes management? Yes           6/4/2025   Reducing Risks   Reducing Risks Assessed Today Yes   Diabetes Risks Age over 45 years;Sedentary Lifestyle;Hyperlipidemia;Family  History   CAD Risks Family history;Diabetes Mellitus;Hypertension;Obesity;Sedentary lifestyle;Dyslipidemia   Has dilated eye exam at least once a year? No   Sees dentist every 6 months? No   Feet checked by healthcare provider in the last year? Yes         6/4/2025   Healthy Coping: Diabetes Distress Assessment   Healthy Coping Assessed Today Yes   I feel burned out by all of the attention and effort that diabetes demands of me. 2 - A Little Problem   It bothers me that diabetes seems to control my life. 1 - Not a Problem   I am frustrated that even when I do what I am supposed to for my diabetes, it doesn't seem to make a difference. 1 - Not a Problem   No matter how hard I try with my diabetes, it feels like it will never be good enough. 1 - Not a Problem   I am so tired of having to worry about diabetes all the time. 1 - Not a Problem   When it comes to my diabetes, I often feel like a failure. 1 - Not a Problem   It depresses me when I realize that my diabetes will likely never go away. 2 - A Little Problem   Living with diabetes is overwhelming for me. 2 - A Little Problem   T2 DDAS Total Score (0 - 1.9 Little or no DD, 2.0 - 2.9 Moderate DD,  3.0+ High DD) 1.4   Informal Support system: Children;[place] based;Family;Friends       Dianne Mane RD, CD, Former[facility]     Time Spent: 60 minutes  Encounter Type: Individual    Any diabetes medication dose changes were [facility] under the patient's referring provider.

## 2025-06-08 ENCOUNTER — HEALTH MAINTENANCE LETTER (OUTPATIENT)
Age: 50
End: 2025-06-08

## 2025-06-09 ENCOUNTER — PATIENT OUTREACH (OUTPATIENT)
Dept: CARE COORDINATION | Facility: CLINIC | Age: 50
End: 2025-06-09
Payer: COMMERCIAL

## 2025-07-07 DIAGNOSIS — G35 MULTIPLE SCLEROSIS (H): ICD-10-CM

## 2025-08-31 ENCOUNTER — HEALTH MAINTENANCE LETTER (OUTPATIENT)
Age: 50
End: 2025-08-31

## 2025-09-04 DIAGNOSIS — M54.41 CHRONIC BILATERAL LOW BACK PAIN WITH BILATERAL SCIATICA: ICD-10-CM

## 2025-09-04 DIAGNOSIS — G89.29 CHRONIC BILATERAL LOW BACK PAIN WITH BILATERAL SCIATICA: ICD-10-CM

## 2025-09-04 DIAGNOSIS — F41.9 ANXIETY: ICD-10-CM

## 2025-09-04 DIAGNOSIS — M54.42 CHRONIC BILATERAL LOW BACK PAIN WITH BILATERAL SCIATICA: ICD-10-CM

## 2025-09-04 RX ORDER — CLONAZEPAM 1 MG/1
1 TABLET ORAL AT BEDTIME
Qty: 90 TABLET | Refills: 1 | Status: SHIPPED | OUTPATIENT
Start: 2025-09-04

## 2025-09-04 RX ORDER — OXYCODONE HYDROCHLORIDE 5 MG/1
5-10 TABLET ORAL EVERY 6 HOURS PRN
Qty: 40 TABLET | Refills: 0 | Status: SHIPPED | OUTPATIENT
Start: 2025-09-04

## (undated) DEVICE — SOL NACL 0.9% INJ 1000ML BAG 2B1324X

## (undated) DEVICE — DECANTER VIAL 2006S

## (undated) DEVICE — GLOVE UNDER INDICATOR PI SZ 6.5 LF 41665

## (undated) DEVICE — PLATE GROUNDING ADULT W/CORD 9165L

## (undated) DEVICE — GLOVE UNDER INDICATOR PI SZ 7.0 LF 41670

## (undated) DEVICE — GLOVE BIOGEL INDICATOR 7.5 LF 41675

## (undated) DEVICE — DRESSING MEPILEX AG SILVER 4X12 395990

## (undated) DEVICE — SOL WATER IRRIG 1000ML BOTTLE 2F7114

## (undated) DEVICE — CUSTOM PACK TOTAL KNEE ACCESSORY SOP5BTAHEA

## (undated) DEVICE — SU MONOCRYL 3-0 PS-2 18" UND MCP497G

## (undated) DEVICE — GOWN IMPERVIOUS BREATHABLE SMART LG 89015

## (undated) DEVICE — SUTURE VICRYL+ 1 27IN CT-1 UND VCP261H

## (undated) DEVICE — CUSTOM PACK TOTAL KNEE SOP5BTKHEC

## (undated) DEVICE — BANDAGE ELASTIC 6X550 LF DBL 593-96LF

## (undated) DEVICE — A3 SUPPLIES- SEE NURSING INFO PAGE

## (undated) DEVICE — HOLDER LIMB VELCRO OR 0814-1533

## (undated) DEVICE — BLADE SAW SAGITTAL STRK DUAL CUT 4118-135-090

## (undated) DEVICE — GLOVE UNDER INDICATOR PI SZ 8.5 LF 41685

## (undated) DEVICE — CUFF TOURN 34IN STRL DISP

## (undated) DEVICE — GLOVE BIOGEL PI SZ 8.5 40885

## (undated) DEVICE — GLOVE SURG PI ULTRA TOUCH M SZ 7-1/2 LF

## (undated) DEVICE — SUCTION MANIFOLD NEPTUNE 2 SYS 4 PORT 0702-020-000

## (undated) DEVICE — ADH SKIN CLOSURE PREMIERPRO EXOFIN 1.0ML 3470

## (undated) DEVICE — SU STRATAFIX PDS PLUS 1 CT-1 18" SXPP1A404

## (undated) DEVICE — SUTURE VICRYL+ 2-0 27IN CT-1 UND VCP259H

## (undated) DEVICE — SOL NACL 0.9% IRRIG 1000ML BOTTLE 2F7124

## (undated) DEVICE — GLOVE BIOGEL PI SZ 6.5 40865

## (undated) RX ORDER — FENTANYL CITRATE 50 UG/ML
INJECTION, SOLUTION INTRAMUSCULAR; INTRAVENOUS
Status: DISPENSED
Start: 2022-10-24

## (undated) RX ORDER — LIDOCAINE HYDROCHLORIDE 10 MG/ML
INJECTION, SOLUTION EPIDURAL; INFILTRATION; INTRACAUDAL; PERINEURAL
Status: DISPENSED
Start: 2022-10-24

## (undated) RX ORDER — ESMOLOL HYDROCHLORIDE 10 MG/ML
INJECTION INTRAVENOUS
Status: DISPENSED
Start: 2022-10-24

## (undated) RX ORDER — PROPOFOL 10 MG/ML
INJECTION, EMULSION INTRAVENOUS
Status: DISPENSED
Start: 2022-10-24

## (undated) RX ORDER — DEXAMETHASONE SODIUM PHOSPHATE 4 MG/ML
INJECTION, SOLUTION INTRA-ARTICULAR; INTRALESIONAL; INTRAMUSCULAR; INTRAVENOUS; SOFT TISSUE
Status: DISPENSED
Start: 2022-10-24

## (undated) RX ORDER — ONDANSETRON 2 MG/ML
INJECTION INTRAMUSCULAR; INTRAVENOUS
Status: DISPENSED
Start: 2022-10-24